# Patient Record
Sex: FEMALE | Race: WHITE | NOT HISPANIC OR LATINO | Employment: UNEMPLOYED | URBAN - METROPOLITAN AREA
[De-identification: names, ages, dates, MRNs, and addresses within clinical notes are randomized per-mention and may not be internally consistent; named-entity substitution may affect disease eponyms.]

---

## 2018-01-26 ENCOUNTER — OFFICE VISIT (OUTPATIENT)
Dept: URGENT CARE | Facility: CLINIC | Age: 33
End: 2018-01-26
Payer: COMMERCIAL

## 2018-01-26 VITALS
TEMPERATURE: 97 F | HEART RATE: 99 BPM | BODY MASS INDEX: 18.95 KG/M2 | DIASTOLIC BLOOD PRESSURE: 78 MMHG | WEIGHT: 103 LBS | OXYGEN SATURATION: 100 % | SYSTOLIC BLOOD PRESSURE: 106 MMHG | RESPIRATION RATE: 18 BRPM | HEIGHT: 62 IN

## 2018-01-26 DIAGNOSIS — J06.9 UPPER RESPIRATORY TRACT INFECTION, UNSPECIFIED TYPE: Primary | ICD-10-CM

## 2018-01-26 DIAGNOSIS — J02.9 SORE THROAT: ICD-10-CM

## 2018-01-26 DIAGNOSIS — B34.9 VIRAL SYNDROME: ICD-10-CM

## 2018-01-26 LAB
CTP QC/QA: YES
FLUAV AG NPH QL: NEGATIVE
FLUBV AG NPH QL: NEGATIVE

## 2018-01-26 PROCEDURE — 99214 OFFICE O/P EST MOD 30 MIN: CPT | Mod: S$GLB,,, | Performed by: PHYSICIAN ASSISTANT

## 2018-01-26 PROCEDURE — 87804 INFLUENZA ASSAY W/OPTIC: CPT | Mod: QW,S$GLB,, | Performed by: PHYSICIAN ASSISTANT

## 2018-01-27 NOTE — PATIENT INSTRUCTIONS
- Rest.    - Drink plenty of fluids.    - Tylenol or Ibuprofen as directed as needed for fever/pain.    - Take over-the-counter claritin, zyrtec, allegra, or xyzal as directed.  - Use over the counter Flonase as directed for sinus congestion and postnasal drip.  - use nasal saline prior to Flonase.  - Antibiotics are not needed at this time.  - Usual course of cold symptom is 10-14 days, but longer if patient is a smoker.   - Use salt water gargle for sore throat.   - Follow up with your PCP or specialty clinic as directed in the next 1-2 weeks if not improved or as needed.  You can call [phone] to schedule an appointment with the appropriate provider.    - Go to the ED if your symptoms worsen.    Viral Upper Respiratory Illness (Adult)  You have a viral upper respiratory illness (URI), which is another term for the common cold. This illness is contagious during the first few days. It is spread through the air by coughing and sneezing. It may also be spread by direct contact (touching the sick person and then touching your own eyes, nose, or mouth). Frequent handwashing will decrease risk of spread. Most viral illnesses go away within 7 to 10 days with rest and simple home remedies. Sometimes the illness may last for several weeks. Antibiotics will not kill a virus, and they are generally not prescribed for this condition.    Home care  · If symptoms are severe, rest at home for the first 2 to 3 days. When you resume activity, don't let yourself get too tired.  · Avoid being exposed to cigarette smoke (yours or others).  · You may use acetaminophen or ibuprofen to control pain and fever, unless another medicine was prescribed. (Note: If you have chronic liver or kidney disease, have ever had a stomach ulcer or gastrointestinal bleeding, or are taking blood-thinning medicines, talk with your healthcare provider before using these medicines.) Aspirin should never be given to anyone under 18 years of age who is  "ill with a viral infection or fever. It may cause severe liver or brain damage.  · Your appetite may be poor, so a light diet is fine. Avoid dehydration by drinking 6 to 8 glasses of fluids per day (water, soft drinks, juices, tea, or soup). Extra fluids will help loosen secretions in the nose and lungs.  · Over-the-counter cold medicines will not shorten the length of time youre sick, but they may be helpful for the following symptoms: cough, sore throat, and nasal and sinus congestion. (Note: Do not use decongestants if you have high blood pressure.)  Follow-up care  Follow up with your healthcare provider, or as advised.  When to seek medical advice  Call your healthcare provider right away if any of these occur:  · Cough with lots of colored sputum (mucus)  · Severe headache; face, neck, or ear pain  · Difficulty swallowing due to throat pain  · Fever of 100.4°F (38°C)  Call 911, or get immediate medical care  Call emergency services right away if any of these occur:  · Chest pain, shortness of breath, wheezing, or difficulty breathing  · Coughing up blood  · Inability to swallow due to throat pain  Date Last Reviewed: 9/13/2015  © 3074-4454 [employer]. [address] [place] [address], [place] [address]. All rights reserved. This information is not intended as a substitute for professional medical care. Always follow your healthcare professional's instructions.        Viral Syndrome (Adult)  A viral illness may cause a number of symptoms. The symptoms depend on the part of the body that the virus affects. If it settles in your nose, throat, and lungs, it may cause cough, sore throat, congestion, and sometimes headache. If it settles in your stomach and intestinal tract, it may cause vomiting and diarrhea. Sometimes it causes vague symptoms like "aching all over," feeling tired, loss of appetite, or fever.  A viral illness usually lasts 1 to 2 weeks, but sometimes it lasts longer. In some cases, a more " serious infection can look like a viral syndrome in the first few days of the illness. You may need another exam and additional tests to know the difference. Watch for the warning signs listed below.  Home care  Follow these guidelines for taking care of yourself at home:  · If symptoms are severe, rest at home for the first 2 to 3 days.  · Stay away from cigarette smoke - both your smoke and the smoke from others.  · You may use over-the-counter acetaminophen or ibuprofen for fever, muscle aching, and headache, unless another medicine was prescribed for this. If you have chronic liver or kidney disease or ever had a stomach ulcer or GI bleeding, talk with your doctor before using these medicines. No one who is younger than 18 and ill with a fever should take aspirin. It may cause severe disease or death.  · Your appetite may be poor, so a light diet is fine. Avoid dehydration by drinking 8 to 12 8-ounce glasses of fluids each day. This may include water; orange juice; lemonade; apple, grape, and cranberry juice; clear fruit drinks; electrolyte replacement and sports drinks; and decaffeinated teas and coffee. If you have been diagnosed with a kidney disease, ask your doctor how much and what types of fluids you should drink to prevent dehydration. If you have kidney disease, drinking too much fluid can cause it build up in the your body and be dangerous to your health.  · Over-the-counter remedies won't shorten the length of the illness but may be helpful for cough, sore throat; and nasal and sinus congestion. Don't use decongestants if you have high blood pressure.  Follow-up care  Follow up with your healthcare provider if you do not improve over the next week.  Call 911  Get emergency medical care if any of the following occur:  · Convulsion  · Feeling weak, dizzy, or like you are going to faint  · Chest pain, shortness of breath, wheezing, or difficulty breathing  When to seek medical advice  Call your  healthcare provider right away if any of these occur:  · Cough with lots of colored sputum (mucus) or blood in your sputum  · Chest pain, shortness of breath, wheezing, or difficulty breathing  · Severe headache; face, neck, or ear pain  · Severe, constant pain in the lower right side of your belly (abdominal)  · Continued vomiting (cant keep liquids down)  · Frequent diarrhea (more than 5 times a day); blood (red or black color) or mucus in diarrhea  · Feeling weak, dizzy, or like you are going to faint  · Extreme thirst  · Fever of 100.4°F (38°C) or higher, or as directed by your healthcare provider  Date Last Reviewed: 9/25/2015  © 2377-0059 [employer]. [address] [place] [address], [place] [address]. All rights reserved. This information is not intended as a substitute for professional medical care. Always follow your healthcare professional's instructions.

## 2018-01-27 NOTE — PROGRESS NOTES
"Subjective:       Patient ID: Aleena Elizalde is a 32 y.o. female.    Vitals:  height is 5' 2" (1.575 m) and weight is 46.7 kg (103 lb). Her oral temperature is 97.4 °F (36.3 °C). Her blood pressure is 106/78 and her pulse is 99. Her respiration is 18 and oxygen saturation is 100%.     Chief Complaint: Sore Throat (3 days )    This is a 32 y.o. female with Past Medical History:  No date: Anemia  No date: Hypertension  No date: Stroke   who presents today with a chief complaint of sore throat and cough.       Sore Throat    This is a new problem. The current episode started in the past 7 days (3 days ). The problem has been gradually worsening. Neither side of throat is experiencing more pain than the other. There has been no fever. The pain is at a severity of 3/10. The pain is mild. Associated symptoms include congestion, coughing, headaches and a hoarse voice. Pertinent negatives include no abdominal pain, ear pain or shortness of breath. She has tried NSAIDs (singular ) for the symptoms. The treatment provided moderate relief.     Review of Systems   Constitution: Positive for chills. Negative for fever and malaise/fatigue.   HENT: Positive for congestion, hoarse voice and sore throat. Negative for ear pain.    Eyes: Positive for discharge. Negative for redness.   Cardiovascular: Negative for chest pain, dyspnea on exertion and leg swelling.   Respiratory: Positive for cough. Negative for shortness of breath, sputum production and wheezing.    Musculoskeletal: Positive for myalgias.   Gastrointestinal: Negative for abdominal pain and nausea.   Neurological: Positive for headaches.       Objective:      Physical Exam   Constitutional: She is oriented to person, place, and time. She appears well-developed and well-nourished.   HENT:   Head: Normocephalic and atraumatic.   Right Ear: Hearing, tympanic membrane, external ear and ear canal normal.   Left Ear: Hearing, tympanic membrane, external ear and ear canal " normal.   Nose: No mucosal edema or rhinorrhea. Right sinus exhibits frontal sinus tenderness. Right sinus exhibits no maxillary sinus tenderness. Left sinus exhibits frontal sinus tenderness. Left sinus exhibits no maxillary sinus tenderness.   Mouth/Throat: Uvula is midline and oropharynx is clear and moist.   Eyes: Conjunctivae are normal.   Neck: Normal range of motion. Neck supple. No thyromegaly present.   Cardiovascular: Normal rate and regular rhythm.  Exam reveals no gallop and no friction rub.    No murmur heard.  Pulmonary/Chest: Effort normal and breath sounds normal. She has no wheezes. She has no rales.   Musculoskeletal: Normal range of motion.   Lymphadenopathy:     She has no cervical adenopathy.   Neurological: She is alert and oriented to person, place, and time.   Skin: Skin is warm and dry. No rash noted. No erythema.   Psychiatric: She has a normal mood and affect. Her behavior is normal. Judgment and thought content normal.   Nursing note and vitals reviewed.      Assessment:       1. Upper respiratory tract infection, unspecified type    2. Sore throat    3. Viral syndrome        Plan:         Upper respiratory tract infection, unspecified type    Sore throat  -     POCT Influenza A/B    Viral syndrome      Aleena was seen today for sore throat.    Diagnoses and all orders for this visit:    Upper respiratory tract infection, unspecified type    Sore throat  -     POCT Influenza A/B    Viral syndrome      Patient Instructions   - Rest.    - Drink plenty of fluids.    - Tylenol or Ibuprofen as directed as needed for fever/pain.    - Take over-the-counter claritin, zyrtec, allegra, or xyzal as directed.  - Use over the counter Flonase as directed for sinus congestion and postnasal drip.  - use nasal saline prior to Flonase.  - Antibiotics are not needed at this time.  - Usual course of cold symptom is 10-14 days, but longer if patient is a smoker.   - Use salt water gargle for sore throat.   -  Follow up with your PCP or specialty clinic as directed in the next 1-2 weeks if not improved or as needed.  You can call [phone] to schedule an appointment with the appropriate provider.    - Go to the ED if your symptoms worsen.    Viral Upper Respiratory Illness (Adult)  You have a viral upper respiratory illness (URI), which is another term for the common cold. This illness is contagious during the first few days. It is spread through the air by coughing and sneezing. It may also be spread by direct contact (touching the sick person and then touching your own eyes, nose, or mouth). Frequent handwashing will decrease risk of spread. Most viral illnesses go away within 7 to 10 days with rest and simple home remedies. Sometimes the illness may last for several weeks. Antibiotics will not kill a virus, and they are generally not prescribed for this condition.    Home care  · If symptoms are severe, rest at home for the first 2 to 3 days. When you resume activity, don't let yourself get too tired.  · Avoid being exposed to cigarette smoke (yours or others).  · You may use acetaminophen or ibuprofen to control pain and fever, unless another medicine was prescribed. (Note: If you have chronic liver or kidney disease, have ever had a stomach ulcer or gastrointestinal bleeding, or are taking blood-thinning medicines, talk with your healthcare provider before using these medicines.) Aspirin should never be given to anyone under 18 years of age who is ill with a viral infection or fever. It may cause severe liver or brain damage.  · Your appetite may be poor, so a light diet is fine. Avoid dehydration by drinking 6 to 8 glasses of fluids per day (water, soft drinks, juices, tea, or soup). Extra fluids will help loosen secretions in the nose and lungs.  · Over-the-counter cold medicines will not shorten the length of time youre sick, but they may be helpful for the following symptoms: cough, sore throat, and nasal  "and sinus congestion. (Note: Do not use decongestants if you have high blood pressure.)  Follow-up care  Follow up with your healthcare provider, or as advised.  When to seek medical advice  Call your healthcare provider right away if any of these occur:  · Cough with lots of colored sputum (mucus)  · Severe headache; face, neck, or ear pain  · Difficulty swallowing due to throat pain  · Fever of 100.4°F (38°C)  Call 911, or get immediate medical care  Call emergency services right away if any of these occur:  · Chest pain, shortness of breath, wheezing, or difficulty breathing  · Coughing up blood  · Inability to swallow due to throat pain  Date Last Reviewed: 9/13/2015  © 5257-5126 [employer]. [address] [place] [address], [place] [address]. All rights reserved. This information is not intended as a substitute for professional medical care. Always follow your healthcare professional's instructions.        Viral Syndrome (Adult)  A viral illness may cause a number of symptoms. The symptoms depend on the part of the body that the virus affects. If it settles in your nose, throat, and lungs, it may cause cough, sore throat, congestion, and sometimes headache. If it settles in your stomach and intestinal tract, it may cause vomiting and diarrhea. Sometimes it causes vague symptoms like "aching all over," feeling tired, loss of appetite, or fever.  A viral illness usually lasts 1 to 2 weeks, but sometimes it lasts longer. In some cases, a more serious infection can look like a viral syndrome in the first few days of the illness. You may need another exam and additional tests to know the difference. Watch for the warning signs listed below.  Home care  Follow these guidelines for taking care of yourself at home:  · If symptoms are severe, rest at home for the first 2 to 3 days.  · Stay away from cigarette smoke - both your smoke and the smoke from others.  · You may use over-the-counter acetaminophen or " ibuprofen for fever, muscle aching, and headache, unless another medicine was prescribed for this. If you have chronic liver or kidney disease or ever had a stomach ulcer or GI bleeding, talk with your doctor before using these medicines. No one who is younger than 18 and ill with a fever should take aspirin. It may cause severe disease or death.  · Your appetite may be poor, so a light diet is fine. Avoid dehydration by drinking 8 to 12 8-ounce glasses of fluids each day. This may include water; orange juice; lemonade; apple, grape, and cranberry juice; clear fruit drinks; electrolyte replacement and sports drinks; and decaffeinated teas and coffee. If you have been diagnosed with a kidney disease, ask your doctor how much and what types of fluids you should drink to prevent dehydration. If you have kidney disease, drinking too much fluid can cause it build up in the your body and be dangerous to your health.  · Over-the-counter remedies won't shorten the length of the illness but may be helpful for cough, sore throat; and nasal and sinus congestion. Don't use decongestants if you have high blood pressure.  Follow-up care  Follow up with your healthcare provider if you do not improve over the next week.  Call 911  Get emergency medical care if any of the following occur:  · Convulsion  · Feeling weak, dizzy, or like you are going to faint  · Chest pain, shortness of breath, wheezing, or difficulty breathing  When to seek medical advice  Call your healthcare provider right away if any of these occur:  · Cough with lots of colored sputum (mucus) or blood in your sputum  · Chest pain, shortness of breath, wheezing, or difficulty breathing  · Severe headache; face, neck, or ear pain  · Severe, constant pain in the lower right side of your belly (abdominal)  · Continued vomiting (cant keep liquids down)  · Frequent diarrhea (more than 5 times a day); blood (red or black color) or mucus in diarrhea  · Feeling weak,  dizzy, or like you are going to faint  · Extreme thirst  · Fever of 100.4°F (38°C) or higher, or as directed by your healthcare provider  Date Last Reviewed: 9/25/2015  © 7112-8997 [employer]. [address] [place] [address], [place] [address]. All rights reserved. This information is not intended as a substitute for professional medical care. Always follow your healthcare professional's instructions.

## 2019-06-08 ENCOUNTER — HOSPITAL ENCOUNTER (EMERGENCY)
Facility: HOSPITAL | Age: 34
Discharge: HOME | End: 2019-06-09
Attending: EMERGENCY MEDICINE
Payer: COMMERCIAL

## 2019-06-08 DIAGNOSIS — R20.2 PARESTHESIAS: Primary | ICD-10-CM

## 2019-06-08 DIAGNOSIS — R06.02 SOB (SHORTNESS OF BREATH): ICD-10-CM

## 2019-06-08 PROCEDURE — 80053 COMPREHEN METABOLIC PANEL: CPT

## 2019-06-08 PROCEDURE — 83880 ASSAY OF NATRIURETIC PEPTIDE: CPT

## 2019-06-08 PROCEDURE — 84484 ASSAY OF TROPONIN QUANT: CPT

## 2019-06-08 PROCEDURE — 85025 COMPLETE CBC W/AUTO DIFF WBC: CPT

## 2019-06-08 PROCEDURE — 93010 ELECTROCARDIOGRAM REPORT: CPT | Mod: ,,, | Performed by: INTERNAL MEDICINE

## 2019-06-08 PROCEDURE — 99285 EMERGENCY DEPT VISIT HI MDM: CPT | Mod: 25

## 2019-06-08 PROCEDURE — 25000003 PHARM REV CODE 250: Performed by: PHYSICIAN ASSISTANT

## 2019-06-08 PROCEDURE — 93010 EKG 12-LEAD: ICD-10-PCS | Mod: ,,, | Performed by: INTERNAL MEDICINE

## 2019-06-08 PROCEDURE — 93005 ELECTROCARDIOGRAM TRACING: CPT

## 2019-06-08 PROCEDURE — 81001 URINALYSIS AUTO W/SCOPE: CPT

## 2019-06-08 PROCEDURE — 99285 EMERGENCY DEPT VISIT HI MDM: CPT | Mod: ,,, | Performed by: PHYSICIAN ASSISTANT

## 2019-06-08 PROCEDURE — 99285 PR EMERGENCY DEPT VISIT,LEVEL V: ICD-10-PCS | Mod: ,,, | Performed by: PHYSICIAN ASSISTANT

## 2019-06-08 RX ORDER — LISDEXAMFETAMINE DIMESYLATE CAPSULES 20 MG/1
20 CAPSULE ORAL EVERY MORNING
COMMUNITY
End: 2019-07-04 | Stop reason: ALTCHOICE

## 2019-06-08 RX ORDER — ASPIRIN 325 MG
325 TABLET ORAL
Status: COMPLETED | OUTPATIENT
Start: 2019-06-08 | End: 2019-06-08

## 2019-06-08 RX ADMIN — ASPIRIN 325 MG ORAL TABLET 325 MG: 325 PILL ORAL at 11:06

## 2019-06-09 VITALS
HEART RATE: 92 BPM | OXYGEN SATURATION: 97 % | SYSTOLIC BLOOD PRESSURE: 115 MMHG | TEMPERATURE: 98 F | DIASTOLIC BLOOD PRESSURE: 66 MMHG | RESPIRATION RATE: 16 BRPM

## 2019-06-09 LAB
ALBUMIN SERPL BCP-MCNC: 4.5 G/DL (ref 3.5–5.2)
ALP SERPL-CCNC: 61 U/L (ref 55–135)
ALT SERPL W/O P-5'-P-CCNC: 27 U/L (ref 10–44)
ANION GAP SERPL CALC-SCNC: 10 MMOL/L (ref 8–16)
AST SERPL-CCNC: 28 U/L (ref 10–40)
B-HCG UR QL: NEGATIVE
BACTERIA #/AREA URNS AUTO: NORMAL /HPF
BASOPHILS # BLD AUTO: 0.02 K/UL (ref 0–0.2)
BASOPHILS NFR BLD: 0.3 % (ref 0–1.9)
BILIRUB SERPL-MCNC: 1.4 MG/DL (ref 0.1–1)
BILIRUB UR QL STRIP: NEGATIVE
BNP SERPL-MCNC: <10 PG/ML (ref 0–99)
BUN SERPL-MCNC: 29 MG/DL (ref 6–20)
CALCIUM SERPL-MCNC: 10.3 MG/DL (ref 8.7–10.5)
CHLORIDE SERPL-SCNC: 104 MMOL/L (ref 95–110)
CLARITY UR REFRACT.AUTO: CLEAR
CO2 SERPL-SCNC: 22 MMOL/L (ref 23–29)
COLOR UR AUTO: YELLOW
CREAT SERPL-MCNC: 1.3 MG/DL (ref 0.5–1.4)
CTP QC/QA: YES
D DIMER PPP IA.FEU-MCNC: 0.29 MG/L FEU
DIFFERENTIAL METHOD: ABNORMAL
EOSINOPHIL # BLD AUTO: 0.1 K/UL (ref 0–0.5)
EOSINOPHIL NFR BLD: 1.5 % (ref 0–8)
ERYTHROCYTE [DISTWIDTH] IN BLOOD BY AUTOMATED COUNT: 12.6 % (ref 11.5–14.5)
EST. GFR  (AFRICAN AMERICAN): >60 ML/MIN/1.73 M^2
EST. GFR  (NON AFRICAN AMERICAN): 53.3 ML/MIN/1.73 M^2
GLUCOSE SERPL-MCNC: 84 MG/DL (ref 70–110)
GLUCOSE UR QL STRIP: NEGATIVE
HCT VFR BLD AUTO: 41.5 % (ref 37–48.5)
HGB BLD-MCNC: 13.7 G/DL (ref 12–16)
HGB UR QL STRIP: NEGATIVE
IMM GRANULOCYTES # BLD AUTO: 0.02 K/UL (ref 0–0.04)
IMM GRANULOCYTES NFR BLD AUTO: 0.3 % (ref 0–0.5)
KETONES UR QL STRIP: ABNORMAL
LEUKOCYTE ESTERASE UR QL STRIP: NEGATIVE
LYMPHOCYTES # BLD AUTO: 1.5 K/UL (ref 1–4.8)
LYMPHOCYTES NFR BLD: 24.9 % (ref 18–48)
MCH RBC QN AUTO: 31.2 PG (ref 27–31)
MCHC RBC AUTO-ENTMCNC: 33 G/DL (ref 32–36)
MCV RBC AUTO: 95 FL (ref 82–98)
MICROSCOPIC COMMENT: NORMAL
MONOCYTES # BLD AUTO: 0.7 K/UL (ref 0.3–1)
MONOCYTES NFR BLD: 11.6 % (ref 4–15)
NEUTROPHILS # BLD AUTO: 3.7 K/UL (ref 1.8–7.7)
NEUTROPHILS NFR BLD: 61.4 % (ref 38–73)
NITRITE UR QL STRIP: NEGATIVE
NRBC BLD-RTO: 0 /100 WBC
PH UR STRIP: 5 [PH] (ref 5–8)
PLATELET # BLD AUTO: 167 K/UL (ref 150–350)
PMV BLD AUTO: 10 FL (ref 9.2–12.9)
POTASSIUM SERPL-SCNC: 4.4 MMOL/L (ref 3.5–5.1)
PROT SERPL-MCNC: 7.5 G/DL (ref 6–8.4)
PROT UR QL STRIP: NEGATIVE
RBC # BLD AUTO: 4.39 M/UL (ref 4–5.4)
RBC #/AREA URNS AUTO: 1 /HPF (ref 0–4)
SODIUM SERPL-SCNC: 136 MMOL/L (ref 136–145)
SP GR UR STRIP: 1.03 (ref 1–1.03)
SQUAMOUS #/AREA URNS AUTO: 1 /HPF
TROPONIN I SERPL DL<=0.01 NG/ML-MCNC: <0.006 NG/ML (ref 0–0.03)
URN SPEC COLLECT METH UR: ABNORMAL
WBC # BLD AUTO: 6.1 K/UL (ref 3.9–12.7)
WBC #/AREA URNS AUTO: 0 /HPF (ref 0–5)

## 2019-06-09 PROCEDURE — 85379 FIBRIN DEGRADATION QUANT: CPT

## 2019-06-09 PROCEDURE — 81025 URINE PREGNANCY TEST: CPT | Performed by: EMERGENCY MEDICINE

## 2019-06-09 NOTE — DISCHARGE INSTRUCTIONS
Your cardiac workup today was negative for evidence of blood clots, heart attack, and heart failure. Your electrolytes today were normal. However it is important for you to stay well hydrated especially while working long shift.  Please take over-the-counter ibuprofen and Tylenol for pain relief.  Return to the ER if he develops severe shortness of breath, chest pain, numbness, weakness, lightheadedness, or any other concerning symptoms. Please follow up with her primary care physician within a week to discuss her ER visit today

## 2019-06-09 NOTE — ED PROVIDER NOTES
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  Patient reports developing paresthesias throughout her bilateral lower extremities over the past week, which has been gradually worsening over time.  She describes her discomfort as a constant tingling throughout, worse in her feet.  She has also noticed that her, "veins are more constricted," in her feet.  Denies any known alleviating or exacerbating features.  She also reports feeling short of breath while climbing stairs yesterday, which resolved with rest.  However, patient this morning with SOB that has been constant throughout the day.  She reports associated mild chest heaviness, which she describes as her lungs feeling constricted.  Patient started smoking cigarettes daily 2 months ago after previously quitting.  Her father has a significant history of CAD in his 40s.  Patient reports being treated for a UTI last week with PO antibiotics.  Denies leg swelling, cough, fevers, chills, fatigue, weakness, numbness, urinary symptoms, abdominal pain, N/V/D, diaphoresis, or any other medical complaints.    The history is provided by the patient.     Review of patient's allergies indicates:  No Known Allergies  Past Medical History:   Diagnosis Date    Anemia     Hypertension     pre eclampsia and eclampsia    Stroke     baby     Past Surgical History:   Procedure Laterality Date    BREAST SURGERY      TUBAL LIGATION       Family History   Problem Relation Age of Onset    Hypertension Mother     Heart disease Father     Diabetes Maternal Grandmother     Cancer Maternal Grandmother      Social History     Tobacco Use    Smoking status: Current Some " Day Smoker    Smokeless tobacco: Never Used   Substance Use Topics    Alcohol use: Not Currently    Drug use: Not on file     Review of Systems   Constitutional: Negative for chills and fever.   HENT: Negative for congestion.    Eyes: Negative for visual disturbance.   Respiratory: Positive for shortness of breath. Negative for cough.    Cardiovascular: Negative for chest pain, palpitations and leg swelling.        +chest heaviness   Gastrointestinal: Negative for abdominal pain, constipation, diarrhea, nausea and vomiting.   Genitourinary: Negative for dysuria, frequency and hematuria.   Musculoskeletal: Negative for back pain and neck pain.        +bilateral leg paresthesias/pain   Skin: Negative for rash.   Neurological: Positive for tremors. Negative for dizziness, weakness, numbness and headaches.   Hematological: Does not bruise/bleed easily.   Psychiatric/Behavioral: Negative for confusion.       Physical Exam     Initial Vitals [06/08/19 2245]   BP Pulse Resp Temp SpO2   127/73 108 18 98.1 °F (36.7 °C) 98 %      MAP       --         Physical Exam    Nursing note and vitals reviewed.  Constitutional: She appears well-developed and well-nourished.   HENT:   Head: Normocephalic and atraumatic.   Eyes: Conjunctivae and EOM are normal.   Neck: Normal range of motion.   Cardiovascular: Regular rhythm and normal heart sounds. Tachycardia present.    Pulses:       Dorsalis pedis pulses are 2+ on the right side, and 2+ on the left side.   Pulmonary/Chest: Breath sounds normal. She has no wheezes. She has no rhonchi. She has no rales. She exhibits no tenderness.   Abdominal: Soft. There is no tenderness. There is no rebound and no guarding.   Musculoskeletal: Normal range of motion. She exhibits no edema or tenderness.   Neurological: She is alert and oriented to person, place, and time.   Decreased sensation in right lower extremity compared to left, which the patient states is her baseline.  Strength equal and  symmetric in bilateral lower extremities.   Skin: Skin is warm.   Psychiatric: She has a normal mood and affect.         ED Course   Procedures  Labs Reviewed   CBC W/ AUTO DIFFERENTIAL - Abnormal; Notable for the following components:       Result Value    Mean Corpuscular Hemoglobin 31.2 (*)     All other components within normal limits    Narrative:     add on d dimer, quantitative per HARVEY CREED, PA-C   COMPREHENSIVE METABOLIC PANEL - Abnormal; Notable for the following components:    CO2 22 (*)     BUN, Bld 29 (*)     Total Bilirubin 1.4 (*)     eGFR if non  53.3 (*)     All other components within normal limits    Narrative:     add on d dimer, quantitative per HARVEY CREED, PA-C   URINALYSIS, REFLEX TO URINE CULTURE - Abnormal; Notable for the following components:    Ketones, UA Trace (*)     All other components within normal limits    Narrative:     Preferred Collection Type->Urine, Clean Catch   TROPONIN I    Narrative:     add on d dimer, quantitative per HARVEY CREED, PA-C   B-TYPE NATRIURETIC PEPTIDE    Narrative:     add on d dimer, quantitative per HARVEY CREED, PA-C   D DIMER, QUANTITATIVE   D DIMER, QUANTITATIVE    Narrative:     add on d dimer, quantitative per HARVEY CREED, PA-C   URINALYSIS MICROSCOPIC    Narrative:     Preferred Collection Type->Urine, Clean Catch   POCT URINE PREGNANCY          Imaging Results          X-Ray Chest PA And Lateral (Final result)  Result time 06/09/19 00:36:55    Final result by Oleg Daly MD (06/09/19 00:36:55)                 Impression:      No acute cardiopulmonary process.      Electronically signed by: Oleg Daly MD  Date:    06/09/2019  Time:    00:36             Narrative:    EXAMINATION:  XR CHEST PA AND LATERAL    CLINICAL HISTORY:  Chest Pain;    TECHNIQUE:  PA and lateral views of the chest were performed.    COMPARISON:  None.    FINDINGS:  There is no consolidation, effusion, or pneumothorax.    Cardiomediastinal silhouette  is unremarkable.    Regional osseous structures are unremarkable.                                 Medical Decision Making:   History:   Old Medical Records: I decided to obtain old medical records.  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  Patient reports paresthesias to bilateral LEs x 1 week.  SOB x 2 days.  She has associated mild chest heaviness.  Significant family history of CAD.  Current everyday smoker.  Patient is tachycardic with HR of 108.  Afebrile.  Lungs CTA bilaterally. No chest wall tenderness. Abdomen soft and nontender. Decreased sensation in RLE, which patient states is her baseline after stroke.  DP 2+ bilaterally.  Differential Diagnosis:   DDX includes but is not limited to PE, ACS, CHF exacerbation, UTI, electrolyte abnormality, anemia.   Independently Interpreted Test(s):   I have ordered and independently interpreted X-rays - see summary below.  I have ordered and independently interpreted EKG Reading(s) - see prior notes  Clinical Tests:   Lab Tests: Reviewed and Ordered  Radiological Study: Ordered  Medical Tests: Ordered and Reviewed  ED Management:  Patient with significant cardiac risk factors. Will obtain cardiac workup, including troponin, BNP, d-dimer, EKG, and CXR.  Patient's have been persistent for > 6 hr, not necessary to trend troponin if negative.    D-dimer negative. No leukocytosis or anemia on CBC.  BUN 29, Cr 1.3.  Potassium 4.4, sodium 136. Troponin negative.  BNP negative. CXR without acute cardiopulmonary process.  UA with negative nitrites and leukocytes.    Patient is nontoxic appearing and is stable for discharge with instructions of follow up with PCP within a week.  Recommended patient stay well hydrated while working 13 hr shifts.  Strict ER return precautions given.  All questions answered.  Patient expressed understanding and is  agreeable with plan.    I have discussed the treatment and management of this patient with my supervising physician, and we agree on the plan of care.      Margarita Mccarthy PA-C      Additional MDM:   PERC Rule:   Age is greater than or equal to 50 = 0.0  Heart Rate is greater than or equal to 100 = 1.0  SaO2 on room air < 95% = 0.0  Unilateral leg swelling = 0.0  Hemoptysis = 0.0  Recent surgery or trauma = 0.0  Prior PE or DVT =  0.0  Hormone use = 0.00  PERC Score = 1                   Clinical Impression:       ICD-10-CM ICD-9-CM   1. Paresthesias R20.2 782.0   2. SOB (shortness of breath) R06.02 786.05         Disposition:   Disposition: Discharged  Condition: Stable                        Margarita Mccarthy PA-C  06/09/19 0108

## 2019-06-09 NOTE — PROVIDER PROGRESS NOTES - EMERGENCY DEPT.
Encounter Date: 6/8/2019    ED Physician Progress Notes       SCRIBE NOTE: I, Vianey Edwards, am scribing for, and in the presence of,  Dr. Bautista.  Physician Statement: I, Dr. Bautista, personally performed the services described in this documentation as scribed by Vianey Edwards in my presence, and it is both accurate and complete.      EKG - STEMI Decision  Initial Reading: No STEMI present.

## 2019-07-04 ENCOUNTER — OFFICE VISIT (OUTPATIENT)
Dept: URGENT CARE | Facility: CLINIC | Age: 34
End: 2019-07-04
Payer: COMMERCIAL

## 2019-07-04 VITALS
BODY MASS INDEX: 20.06 KG/M2 | RESPIRATION RATE: 18 BRPM | TEMPERATURE: 97 F | WEIGHT: 109 LBS | HEIGHT: 62 IN | SYSTOLIC BLOOD PRESSURE: 101 MMHG | OXYGEN SATURATION: 97 % | HEART RATE: 100 BPM | DIASTOLIC BLOOD PRESSURE: 71 MMHG

## 2019-07-04 DIAGNOSIS — J02.9 SORE THROAT: ICD-10-CM

## 2019-07-04 DIAGNOSIS — J02.9 PHARYNGITIS, UNSPECIFIED ETIOLOGY: Primary | ICD-10-CM

## 2019-07-04 LAB
CTP QC/QA: YES
S PYO RRNA THROAT QL PROBE: NEGATIVE

## 2019-07-04 PROCEDURE — 87880 POCT RAPID STREP A: ICD-10-PCS | Mod: QW,S$GLB,, | Performed by: FAMILY MEDICINE

## 2019-07-04 PROCEDURE — 87880 STREP A ASSAY W/OPTIC: CPT | Mod: QW,S$GLB,, | Performed by: FAMILY MEDICINE

## 2019-07-04 PROCEDURE — 87081 CULTURE SCREEN ONLY: CPT

## 2019-07-04 PROCEDURE — 99214 OFFICE O/P EST MOD 30 MIN: CPT | Mod: S$GLB,,, | Performed by: FAMILY MEDICINE

## 2019-07-04 PROCEDURE — 99000 PR SPECIMEN HANDLING,DR OFF->LAB: ICD-10-PCS | Mod: S$GLB,,, | Performed by: FAMILY MEDICINE

## 2019-07-04 PROCEDURE — 99214 PR OFFICE/OUTPT VISIT, EST, LEVL IV, 30-39 MIN: ICD-10-PCS | Mod: S$GLB,,, | Performed by: FAMILY MEDICINE

## 2019-07-04 PROCEDURE — 3008F BODY MASS INDEX DOCD: CPT | Mod: CPTII,S$GLB,, | Performed by: FAMILY MEDICINE

## 2019-07-04 PROCEDURE — 99000 SPECIMEN HANDLING OFFICE-LAB: CPT | Mod: S$GLB,,, | Performed by: FAMILY MEDICINE

## 2019-07-04 PROCEDURE — 3008F PR BODY MASS INDEX (BMI) DOCUMENTED: ICD-10-PCS | Mod: CPTII,S$GLB,, | Performed by: FAMILY MEDICINE

## 2019-07-04 RX ORDER — CEFDINIR 300 MG/1
300 CAPSULE ORAL 2 TIMES DAILY
Qty: 10 CAPSULE | Refills: 0 | Status: SHIPPED | OUTPATIENT
Start: 2019-07-04 | End: 2019-07-09

## 2019-07-04 NOTE — PROGRESS NOTES
"Subjective:       Patient ID: Aleena Thurman is a 33 y.o. female.    Vitals:  height is 5' 2" (1.575 m) and weight is 49.4 kg (109 lb). Her oral temperature is 97.1 °F (36.2 °C). Her blood pressure is 101/71 and her pulse is 100. Her respiration is 18 and oxygen saturation is 97%.     Chief Complaint: Sinus Problem    Patient presents with c/o sore throat that presented this morning. Notes frontal sinus pressure. Patient with sinus sxs in the last few days. Patient was dx with a uti on her last visit; she would like her urine retested to make sure the infection is completely gone. 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       Sinus Problem   This is a new problem. Episode onset: few days. The problem is unchanged. There has been no fever. Associated symptoms include congestion, coughing, sinus pressure and a sore throat. Pertinent negatives include no chills, diaphoresis, ear pain or shortness of breath. Treatments tried: zyrtec d. The treatment provided mild relief.       Constitution: Negative for chills, sweating, fatigue and fever.   HENT: Positive for congestion, sinus pressure and sore throat. Negative for ear pain and voice change.    Neck: Negative for painful lymph nodes.   Eyes: Negative for eye redness.   Respiratory: Positive for cough. Negative for chest tightness, sputum production, bloody sputum, COPD, shortness of breath, stridor, wheezing and asthma.    Gastrointestinal: Negative for nausea and vomiting.   Musculoskeletal: Negative for muscle ache.   Skin: Negative for rash.   Allergic/Immunologic: Negative for seasonal allergies and asthma.   Hematologic/Lymphatic: Negative for swollen lymph nodes.       Objective:      Physical Exam   Constitutional: She appears well-developed and well-nourished.   HENT:   Head: Normocephalic and atraumatic.   Right Ear: External ear normal.   Left Ear: External ear normal.   Mild erythema of pharynx, no "  Turbinate edema and congestion   Eyes: Pupils are equal, round, and reactive to light. Conjunctivae and EOM are normal.   Neck: Normal range of motion. Neck supple. No thyromegaly present.   Cardiovascular: Normal rate, regular rhythm, normal heart sounds and intact distal pulses.   Pulmonary/Chest: Effort normal and breath sounds normal.   Lymphadenopathy:     She has no cervical adenopathy.   Psychiatric: She has a normal mood and affect. Her behavior is normal. Judgment and thought content normal.   Vitals reviewed.      Assessment:       1. Pharyngitis, unspecified etiology    2. Sore throat        Plan:         Pharyngitis, unspecified etiology  -     cefdinir (OMNICEF) 300 MG capsule; Take 1 capsule (300 mg total) by mouth 2 (two) times daily. for 5 days  Dispense: 10 capsule; Refill: 0    Sore throat  -     POCT rapid strep A      Patient Instructions       Pharyngitis (Sore Throat), Report Pending    Pharyngitis (sore throat) is often due to a virus. It can also be caused by the streptococcus, or strep, bacterium, often called strep throat. Both viral and strep infections can cause throat pain that is worse when swallowing, aching all over with headache, and fever. Both types of infections are contagious. They may be spread by coughing, kissing, or touching others after touching your mouth or nose.  A test has been done to find out whether you (or your child, if your child is the patient) have strep throat. Call this facility or your healthcare provider if you were not given your test results. If the test is positive for strep infection, you will need to take antibiotic medicines. A prescription can be called into your pharmacy at that time. If the test is negative, you probably have a viral pharyngitis. This does not need to be treated with antibiotics. Until you receive the results of the strep test, you should stay home from work. If your child is being tested, he or she should stay home from  school.  Home care  · Rest at home. Drink plenty of fluids so you won't get dehydrated.  · If the test is positive for strep, don't go to work or school for the first 2 days of taking the antibiotics. After this time, you will not be contagious. You can then return to work or school if you are feeling better.   · Take the antibiotic medicine for the full 10 days, even if you feel better. This is very important to make sure the infection is treated. It is also important to prevent drug-resistant germs from developing. If you were given an antibiotic shot, you won't need more antibiotics.  · For children: Use acetaminophen for fever, fussiness, or discomfort. In infants older than 6 months of age, you may use ibuprofen instead of acetaminophen. Talk with your child's healthcare provider before giving these medicines if your child has chronic liver or kidney disease or ever had a stomach ulcer or GI bleeding. Never give aspirin to a child under 18 years of age who is ill with a fever. It may cause severe liver damage.  · For adults: Use acetaminophen or ibuprofen to control pain or fever, unless another medicine was prescribed for this. Talk with your healthcare provider before taking these medicines if you have chronic liver or kidney disease or ever had a stomach ulcer or GI bleeding.  · Use throat lozenges or numbing throat sprays to help reduce pain. Gargling with warm salt water will also help reduce throat pain. For this, dissolve 1/2 teaspoon of salt in 1 glass of warm water. To help soothe a sore throat, children can sip on juice or a popsicle. Children 5 years and older can also suck on a lollipop or hard candy.  · Don't eat salty or spicy foods. These can irritate the throat.  Follow-up care  Follow up with your healthcare provider or our staff if you don't get better over the next week.  When to seek medical advice  Call your healthcare provider right away if any of these occur:  · Fever as directed by your  healthcare provider. For children, seek care if:  ¨ Your child is of any age and has repeated fevers above 104°F (40°C).  ¨ Your child is younger than 2 years of age and has a fever of 100.4°F (38°C) that continues for more than 1 day.  ¨ Your child is 2 years old or older and has a fever of 100.4°F (38°C) that continues for more than 3 days.  · New or worsening ear pain, sinus pain, or headache  · Painful lumps in the back of neck  · Stiff neck  · Lymph nodes are getting larger  · Inability to swallow liquids, excessive drooling, or inability to open mouth wide due to throat pain  · Signs of dehydration (very dark urine or no urine, sunken eyes, dizziness)  · Trouble breathing or noisy breathing  · Muffled voice  · New rash  · Child appears to be getting sicker  Date Last Reviewed: 4/13/2015  © 0980-0524 [employer]. [address] [place] [address]. All rights reserved. This information is not intended as a substitute for professional medical care. Always follow your healthcare professional's instructions.        Self-Care for Sore Throats    Sore throats happen for many reasons, such as colds, allergies, and infections caused by viruses or bacteria. In any case, your throat becomes red and sore. Your goal for self-care is to reduce your discomfort while giving your throat a chance to heal.  Moisten and soothe your throat  Tips include the following:  · Try a sip of water first thing after waking up.  · Keep your throat moist by drinking 6 or more glasses of clear liquids every day.  · Run a cool-air humidifier in your room overnight.  · Avoid cigarette smoke.   · Suck on throat lozenges, cough drops, hard candy, ice chips, or frozen fruit-juice bars. Use the sugar-free versions if your diet or medical condition requires them.  Gargle to ease irritation  Gargling every hour or 2 can ease irritation. Try gargling with 1 of these solutions:  · 1/4 teaspoon of salt in 1/2 cup of warm  water  · An over-the-counter anesthetic gargle  Use medicine for more relief  Over-the-counter medicine can reduce sore throat symptoms. Ask your pharmacist if you have questions about which medicine to use:  · Ease pain with anesthetic sprays. Aspirin or an aspirin substitute also helps. Remember, never give aspirin to anyone 18 or younger, or if you are already taking blood thinners.   · For sore throats caused by allergies, try antihistamines to block the allergic reaction.  · Remember: unless a sore throat is caused by a bacterial infection, antibiotics wont help you.  Prevent future sore throats  Prevention tips include the following:  · Stop smoking or reduce contact with secondhand smoke. Smoke irritates the tender throat lining.  · Limit contact with pets and with allergy-causing substances, such as pollen and mold.  · When youre around someone with a sore throat or cold, wash your hands often to keep viruses or bacteria from spreading.  · Dont strain your vocal cords.  Call your healthcare provider  Contact your healthcare provider if you have:  · A temperature over 101°F (38.3°C)  · White spots on the throat  · Great difficulty swallowing  · Trouble breathing  · A skin rash  · Recent exposure to someone else with strep bacteria  · Severe hoarseness and swollen glands in the neck or jaw   Date Last Reviewed: 8/1/2016  © 6813-7322 [employer]. [address] [place] [address], [place] [address]. All rights reserved. This information is not intended as a substitute for professional medical care. Always follow your healthcare professional's instructions.

## 2019-07-04 NOTE — PATIENT INSTRUCTIONS
Pharyngitis (Sore Throat), Report Pending    Pharyngitis (sore throat) is often due to a virus. It can also be caused by the streptococcus, or strep, bacterium, often called strep throat. Both viral and strep infections can cause throat pain that is worse when swallowing, aching all over with headache, and fever. Both types of infections are contagious. They may be spread by coughing, kissing, or touching others after touching your mouth or nose.  A test has been done to find out whether you (or your child, if your child is the patient) have strep throat. Call this facility or your healthcare provider if you were not given your test results. If the test is positive for strep infection, you will need to take antibiotic medicines. A prescription can be called into your pharmacy at that time. If the test is negative, you probably have a viral pharyngitis. This does not need to be treated with antibiotics. Until you receive the results of the strep test, you should stay home from work. If your child is being tested, he or she should stay home from school.  Home care  · Rest at home. Drink plenty of fluids so you won't get dehydrated.  · If the test is positive for strep, don't go to work or school for the first 2 days of taking the antibiotics. After this time, you will not be contagious. You can then return to work or school if you are feeling better.   · Take the antibiotic medicine for the full 10 days, even if you feel better. This is very important to make sure the infection is treated. It is also important to prevent drug-resistant germs from developing. If you were given an antibiotic shot, you won't need more antibiotics.  · For children: Use acetaminophen for fever, fussiness, or discomfort. In infants older than 6 months of age, you may use ibuprofen instead of acetaminophen. Talk with your child's healthcare provider before giving these medicines if your child has chronic liver or kidney disease or ever had  a stomach ulcer or GI bleeding. Never give aspirin to a child under 18 years of age who is ill with a fever. It may cause severe liver damage.  · For adults: Use acetaminophen or ibuprofen to control pain or fever, unless another medicine was prescribed for this. Talk with your healthcare provider before taking these medicines if you have chronic liver or kidney disease or ever had a stomach ulcer or GI bleeding.  · Use throat lozenges or numbing throat sprays to help reduce pain. Gargling with warm salt water will also help reduce throat pain. For this, dissolve 1/2 teaspoon of salt in 1 glass of warm water. To help soothe a sore throat, children can sip on juice or a popsicle. Children 5 years and older can also suck on a lollipop or hard candy.  · Don't eat salty or spicy foods. These can irritate the throat.  Follow-up care  Follow up with your healthcare provider or our staff if you don't get better over the next week.  When to seek medical advice  Call your healthcare provider right away if any of these occur:  · Fever as directed by your healthcare provider. For children, seek care if:  ¨ Your child is of any age and has repeated fevers above 104°F (40°C).  ¨ Your child is younger than 2 years of age and has a fever of 100.4°F (38°C) that continues for more than 1 day.  ¨ Your child is 2 years old or older and has a fever of 100.4°F (38°C) that continues for more than 3 days.  · New or worsening ear pain, sinus pain, or headache  · Painful lumps in the back of neck  · Stiff neck  · Lymph nodes are getting larger  · Inability to swallow liquids, excessive drooling, or inability to open mouth wide due to throat pain  · Signs of dehydration (very dark urine or no urine, sunken eyes, dizziness)  · Trouble breathing or noisy breathing  · Muffled voice  · New rash  · Child appears to be getting sicker  Date Last Reviewed: 4/13/2015  © 0712-2314 [employer]. [address] [place] [address], [place] [address].  All rights reserved. This information is not intended as a substitute for professional medical care. Always follow your healthcare professional's instructions.        Self-Care for Sore Throats    Sore throats happen for many reasons, such as colds, allergies, and infections caused by viruses or bacteria. In any case, your throat becomes red and sore. Your goal for self-care is to reduce your discomfort while giving your throat a chance to heal.  Moisten and soothe your throat  Tips include the following:  · Try a sip of water first thing after waking up.  · Keep your throat moist by drinking 6 or more glasses of clear liquids every day.  · Run a cool-air humidifier in your room overnight.  · Avoid cigarette smoke.   · Suck on throat lozenges, cough drops, hard candy, ice chips, or frozen fruit-juice bars. Use the sugar-free versions if your diet or medical condition requires them.  Gargle to ease irritation  Gargling every hour or 2 can ease irritation. Try gargling with 1 of these solutions:  · 1/4 teaspoon of salt in 1/2 cup of warm water  · An over-the-counter anesthetic gargle  Use medicine for more relief  Over-the-counter medicine can reduce sore throat symptoms. Ask your pharmacist if you have questions about which medicine to use:  · Ease pain with anesthetic sprays. Aspirin or an aspirin substitute also helps. Remember, never give aspirin to anyone 18 or younger, or if you are already taking blood thinners.   · For sore throats caused by allergies, try antihistamines to block the allergic reaction.  · Remember: unless a sore throat is caused by a bacterial infection, antibiotics wont help you.  Prevent future sore throats  Prevention tips include the following:  · Stop smoking or reduce contact with secondhand smoke. Smoke irritates the tender throat lining.  · Limit contact with pets and with allergy-causing substances, such as pollen and mold.  · When youre around someone with a sore throat or cold,  wash your hands often to keep viruses or bacteria from spreading.  · Dont strain your vocal cords.  Call your healthcare provider  Contact your healthcare provider if you have:  · A temperature over 101°F (38.3°C)  · White spots on the throat  · Great difficulty swallowing  · Trouble breathing  · A skin rash  · Recent exposure to someone else with strep bacteria  · Severe hoarseness and swollen glands in the neck or jaw   Date Last Reviewed: 8/1/2016  © 5464-7513 [employer]. [address] [place] [address], [place] [address]. All rights reserved. This information is not intended as a substitute for professional medical care. Always follow your healthcare professional's instructions.

## 2019-07-06 ENCOUNTER — TELEPHONE (OUTPATIENT)
Dept: URGENT CARE | Facility: CLINIC | Age: 34
End: 2019-07-06

## 2019-07-06 LAB — BACTERIA THROAT CULT: NORMAL

## 2021-04-22 ENCOUNTER — LAB VISIT (OUTPATIENT)
Dept: LAB | Facility: OTHER | Age: 36
End: 2021-04-22
Attending: OBSTETRICS & GYNECOLOGY
Payer: COMMERCIAL

## 2021-04-22 ENCOUNTER — OFFICE VISIT (OUTPATIENT)
Dept: OBSTETRICS AND GYNECOLOGY | Facility: CLINIC | Age: 36
End: 2021-04-22
Payer: COMMERCIAL

## 2021-04-22 VITALS
WEIGHT: 110.25 LBS | HEIGHT: 62 IN | BODY MASS INDEX: 20.29 KG/M2 | DIASTOLIC BLOOD PRESSURE: 67 MMHG | SYSTOLIC BLOOD PRESSURE: 109 MMHG

## 2021-04-22 DIAGNOSIS — Z12.4 CERVICAL CANCER SCREENING: Primary | ICD-10-CM

## 2021-04-22 DIAGNOSIS — N92.0 MENORRHAGIA WITH REGULAR CYCLE: ICD-10-CM

## 2021-04-22 DIAGNOSIS — Z11.3 ROUTINE SCREENING FOR STI (SEXUALLY TRANSMITTED INFECTION): ICD-10-CM

## 2021-04-22 LAB — TSH SERPL DL<=0.005 MIU/L-ACNC: 0.91 UIU/ML (ref 0.4–4)

## 2021-04-22 PROCEDURE — 1126F AMNT PAIN NOTED NONE PRSNT: CPT | Mod: S$GLB,,, | Performed by: OBSTETRICS & GYNECOLOGY

## 2021-04-22 PROCEDURE — 3008F BODY MASS INDEX DOCD: CPT | Mod: CPTII,S$GLB,, | Performed by: OBSTETRICS & GYNECOLOGY

## 2021-04-22 PROCEDURE — 87340 HEPATITIS B SURFACE AG IA: CPT | Performed by: OBSTETRICS & GYNECOLOGY

## 2021-04-22 PROCEDURE — 84443 ASSAY THYROID STIM HORMONE: CPT | Performed by: OBSTETRICS & GYNECOLOGY

## 2021-04-22 PROCEDURE — 88175 CYTOPATH C/V AUTO FLUID REDO: CPT | Performed by: OBSTETRICS & GYNECOLOGY

## 2021-04-22 PROCEDURE — 1126F PR PAIN SEVERITY QUANTIFIED, NO PAIN PRESENT: ICD-10-PCS | Mod: S$GLB,,, | Performed by: OBSTETRICS & GYNECOLOGY

## 2021-04-22 PROCEDURE — 86803 HEPATITIS C AB TEST: CPT | Performed by: OBSTETRICS & GYNECOLOGY

## 2021-04-22 PROCEDURE — 87481 CANDIDA DNA AMP PROBE: CPT | Mod: 59 | Performed by: OBSTETRICS & GYNECOLOGY

## 2021-04-22 PROCEDURE — 36415 COLL VENOUS BLD VENIPUNCTURE: CPT | Performed by: OBSTETRICS & GYNECOLOGY

## 2021-04-22 PROCEDURE — 87624 HPV HI-RISK TYP POOLED RSLT: CPT | Performed by: OBSTETRICS & GYNECOLOGY

## 2021-04-22 PROCEDURE — 99385 PREV VISIT NEW AGE 18-39: CPT | Mod: 25,S$GLB,, | Performed by: OBSTETRICS & GYNECOLOGY

## 2021-04-22 PROCEDURE — 99999 PR PBB SHADOW E&M-EST. PATIENT-LVL III: ICD-10-PCS | Mod: PBBFAC,,, | Performed by: OBSTETRICS & GYNECOLOGY

## 2021-04-22 PROCEDURE — 99385 PR PREVENTIVE VISIT,NEW,18-39: ICD-10-PCS | Mod: 25,S$GLB,, | Performed by: OBSTETRICS & GYNECOLOGY

## 2021-04-22 PROCEDURE — 3008F PR BODY MASS INDEX (BMI) DOCUMENTED: ICD-10-PCS | Mod: CPTII,S$GLB,, | Performed by: OBSTETRICS & GYNECOLOGY

## 2021-04-22 PROCEDURE — 86703 HIV-1/HIV-2 1 RESULT ANTBDY: CPT | Performed by: OBSTETRICS & GYNECOLOGY

## 2021-04-22 PROCEDURE — 99999 PR PBB SHADOW E&M-EST. PATIENT-LVL III: CPT | Mod: PBBFAC,,, | Performed by: OBSTETRICS & GYNECOLOGY

## 2021-04-22 PROCEDURE — 86592 SYPHILIS TEST NON-TREP QUAL: CPT | Performed by: OBSTETRICS & GYNECOLOGY

## 2021-04-22 RX ORDER — DEXTROAMPHETAMINE SACCHARATE, AMPHETAMINE ASPARTATE, DEXTROAMPHETAMINE SULFATE AND AMPHETAMINE SULFATE 3.125; 3.125; 3.125; 3.125 MG/1; MG/1; MG/1; MG/1
12.5 TABLET ORAL
COMMUNITY

## 2021-04-22 RX ORDER — AZELASTINE HYDROCHLORIDE 0.5 MG/ML
SOLUTION/ DROPS OPHTHALMIC
COMMUNITY
Start: 2020-12-14 | End: 2023-04-21 | Stop reason: ALTCHOICE

## 2021-04-22 RX ORDER — VALACYCLOVIR HYDROCHLORIDE 500 MG/1
500 TABLET, FILM COATED ORAL
COMMUNITY
Start: 2018-04-22 | End: 2023-04-21 | Stop reason: ALTCHOICE

## 2021-04-22 RX ORDER — CETIRIZINE HYDROCHLORIDE 10 MG/1
10 TABLET, CHEWABLE ORAL
COMMUNITY

## 2021-04-22 RX ORDER — IBUPROFEN 600 MG/1
TABLET ORAL
COMMUNITY
Start: 2020-12-14

## 2021-04-22 RX ORDER — ACETAMINOPHEN AND CODEINE PHOSPHATE 120; 12 MG/5ML; MG/5ML
1 SOLUTION ORAL DAILY
Qty: 90 TABLET | Refills: 3 | Status: SHIPPED | OUTPATIENT
Start: 2021-04-22 | End: 2023-04-21 | Stop reason: ALTCHOICE

## 2021-04-22 RX ORDER — FLUCONAZOLE 150 MG/1
TABLET ORAL
COMMUNITY
Start: 2021-03-08 | End: 2023-04-21

## 2021-04-22 RX ORDER — CARBOXYMETHYLCELLULOSE SODIUM 10 MG/ML
GEL OPHTHALMIC
COMMUNITY
Start: 2020-12-14 | End: 2023-04-21 | Stop reason: ALTCHOICE

## 2021-04-23 LAB
BACTERIAL VAGINOSIS DNA: NEGATIVE
CANDIDA GLABRATA DNA: NEGATIVE
CANDIDA KRUSEI DNA: NEGATIVE
CANDIDA RRNA VAG QL PROBE: NEGATIVE
HIV 1+2 AB+HIV1 P24 AG SERPL QL IA: NEGATIVE
RPR SER QL: NORMAL
T VAGINALIS RRNA GENITAL QL PROBE: NEGATIVE

## 2021-04-26 LAB
C TRACH RRNA SPEC QL NAA+PROBE: NEGATIVE
HBV SURFACE AG SERPL QL IA: NEGATIVE
HCV AB SERPL QL IA: NEGATIVE
N GONORRHOEA RRNA SPEC QL NAA+PROBE: NEGATIVE

## 2021-04-28 ENCOUNTER — PATIENT MESSAGE (OUTPATIENT)
Dept: RESEARCH | Facility: HOSPITAL | Age: 36
End: 2021-04-28

## 2021-04-28 LAB
FINAL PATHOLOGIC DIAGNOSIS: NORMAL
Lab: NORMAL

## 2021-04-30 ENCOUNTER — TELEPHONE (OUTPATIENT)
Dept: OBSTETRICS AND GYNECOLOGY | Facility: CLINIC | Age: 36
End: 2021-04-30

## 2021-04-30 LAB
HPV HR 12 DNA SPEC QL NAA+PROBE: POSITIVE
HPV16 AG SPEC QL: NEGATIVE
HPV18 DNA SPEC QL NAA+PROBE: NEGATIVE

## 2021-05-07 RX ORDER — FLUCONAZOLE 100 MG/1
150 TABLET ORAL
Qty: 3 TABLET | Refills: 0 | Status: SHIPPED | OUTPATIENT
Start: 2021-05-07 | End: 2021-05-09

## 2021-07-29 NOTE — ED TRIAGE NOTES
Patient reports to the ED with complaints of leg cramping and shortness of breath that has been ongoing, she denies over exerting herself with exercise but endorses spending time in the sun and drinking coconut water and pedialyte.  She also endorses muscle weakness, she states tires easily.  Denies CP or dizziness.   AAO x4. AMbulates independently.        Patient Identifiers for Aleena Thurman checked and correct  · LOC: The patient is awake, alert and aware of environment with an appropriate affect.  Alert to person, place, time and situation.    · APPEARANCE: Patient resting comfortably with no acute distress noted, patient is clean and well groomed, patient's clothing is properly fastened.  · SKIN: The skin is warm and dry, patient has normal skin turgor and moist mucus membranes,no rashes or lesions.  Skin is Intact , No Breakdown Noted  · Musculoskeletal:  Normal range of motion noted. Moves all extremeties well, complaints of tingling, pain, and fatigue of BLE.  · RESPIRATORY: Airway is open and patent, respirations are spontaneous, patient has a normal effort, rate. Patient reports SOB, O2 sats 99% on RA.  · CARDIAC: Patient tachycardic, no periphreal edema noted, capillary refill < 3 seconds.   · ABDOMEN: Soft and non tender.  · PULSES: 2+  And symmetrical in all extremeties  · NEUROLOGIC: Pupils PERRL & Reactive to light.         
no

## 2022-06-17 ENCOUNTER — TELEPHONE (OUTPATIENT)
Dept: OBSTETRICS AND GYNECOLOGY | Facility: CLINIC | Age: 37
End: 2022-06-17
Payer: COMMERCIAL

## 2022-06-17 NOTE — TELEPHONE ENCOUNTER
----- Message from Yady Davies sent at 6/17/2022  4:45 PM CDT -----  Pt is calling to schedule an appt  Pt can be contacted at [phone]

## 2023-04-10 ENCOUNTER — TELEPHONE (OUTPATIENT)
Dept: HEMATOLOGY/ONCOLOGY | Facility: CLINIC | Age: 38
End: 2023-04-10
Payer: COMMERCIAL

## 2023-04-10 NOTE — NURSING
Received call from pt.  She is a newly dx TNBC pt who had a partial mastectomy at [facility] by Dr. Douglas Last on 2/28/23.  She lives in [place] and would like a second opinion with probable transfer of care here.   She is requesting Dr. Ayala.  Scheduled her to see Dr. Ayala on 4/17/23.  Requested records (fax [phone], tel [phone]). Location and contact information reviewed.  Asked her to call with any questions or concerns.  She thanked me and verbalized understanding.

## 2023-04-10 NOTE — TELEPHONE ENCOUNTER
Called pt back and she stated she has breast cancer, had sx with Dr Last at [facility] in [place] and requesting to have chemo here on the [facility].  She was recommended to Dr Ayala.  Gave and transferred call to Breast navigator, Angelica LYNN

## 2023-04-10 NOTE — TELEPHONE ENCOUNTER
----- Message from Heaven Mcdonald sent at 4/10/2023  9:24 AM CDT -----  Type: Needs Medical Advice  Who Called:  Patient   Symptoms (please be specific):    How long has patient had these symptoms:    Pharmacy name and phone #:    Best Call Back Number: [phone]  Additional Information: Patient is requesting a call back to schedule a NP appt. with Dr. Ayala.

## 2023-04-11 DIAGNOSIS — C50.919 BREAST CANCER IN FEMALE: Primary | ICD-10-CM

## 2023-04-13 ENCOUNTER — TELEPHONE (OUTPATIENT)
Dept: HEMATOLOGY/ONCOLOGY | Facility: CLINIC | Age: 38
End: 2023-04-13
Payer: COMMERCIAL

## 2023-04-13 NOTE — TELEPHONE ENCOUNTER
----- Message from Syed Howard sent at 4/13/2023  9:25 AM CDT -----  Type:  Patient Returning Call    Who Called:Pt  Who Left Message for Patient:unk  Does the patient know what this is regarding?:unk  Would the patient rather a call back or a response via [facility]? Call back  Best Call Back Number:[phone]     Additional Information: Sts she had a missed call but no vm.  Please advise -- Thank you

## 2023-04-17 ENCOUNTER — LAB VISIT (OUTPATIENT)
Dept: LAB | Facility: HOSPITAL | Age: 38
End: 2023-04-17
Attending: INTERNAL MEDICINE
Payer: COMMERCIAL

## 2023-04-17 ENCOUNTER — OFFICE VISIT (OUTPATIENT)
Dept: HEMATOLOGY/ONCOLOGY | Facility: CLINIC | Age: 38
End: 2023-04-17
Payer: COMMERCIAL

## 2023-04-17 ENCOUNTER — TELEPHONE (OUTPATIENT)
Dept: HEMATOLOGY/ONCOLOGY | Facility: CLINIC | Age: 38
End: 2023-04-17

## 2023-04-17 VITALS
SYSTOLIC BLOOD PRESSURE: 100 MMHG | OXYGEN SATURATION: 99 % | BODY MASS INDEX: 19.76 KG/M2 | DIASTOLIC BLOOD PRESSURE: 75 MMHG | RESPIRATION RATE: 16 BRPM | HEIGHT: 62 IN | WEIGHT: 107.38 LBS | TEMPERATURE: 98 F | HEART RATE: 90 BPM

## 2023-04-17 DIAGNOSIS — C50.919 TRIPLE NEGATIVE BREAST CANCER: ICD-10-CM

## 2023-04-17 DIAGNOSIS — C50.919 TRIPLE NEGATIVE BREAST CANCER: Primary | ICD-10-CM

## 2023-04-17 DIAGNOSIS — D50.0 IRON DEFICIENCY ANEMIA DUE TO CHRONIC BLOOD LOSS: ICD-10-CM

## 2023-04-17 DIAGNOSIS — E80.6 HYPERBILIRUBINEMIA: ICD-10-CM

## 2023-04-17 DIAGNOSIS — C50.919 BREAST CANCER IN FEMALE: ICD-10-CM

## 2023-04-17 DIAGNOSIS — D69.6 THROMBOCYTOPENIA: ICD-10-CM

## 2023-04-17 PROBLEM — Z17.421 TRIPLE NEGATIVE BREAST CANCER: Status: ACTIVE | Noted: 2023-04-17

## 2023-04-17 LAB
ALBUMIN SERPL BCP-MCNC: 4.7 G/DL (ref 3.5–5.2)
ALP SERPL-CCNC: 48 U/L (ref 55–135)
ALT SERPL W/O P-5'-P-CCNC: 30 U/L (ref 10–44)
ANION GAP SERPL CALC-SCNC: 9 MMOL/L (ref 8–16)
AST SERPL-CCNC: 23 U/L (ref 10–40)
BASOPHILS # BLD AUTO: 0.01 K/UL (ref 0–0.2)
BASOPHILS NFR BLD: 0.2 % (ref 0–1.9)
BILIRUB SERPL-MCNC: 1.9 MG/DL (ref 0.1–1)
BUN SERPL-MCNC: 18 MG/DL (ref 6–20)
CALCIUM SERPL-MCNC: 9.4 MG/DL (ref 8.7–10.5)
CHLORIDE SERPL-SCNC: 108 MMOL/L (ref 95–110)
CO2 SERPL-SCNC: 22 MMOL/L (ref 23–29)
CREAT SERPL-MCNC: 0.8 MG/DL (ref 0.5–1.4)
DIFFERENTIAL METHOD: ABNORMAL
EOSINOPHIL # BLD AUTO: 0.1 K/UL (ref 0–0.5)
EOSINOPHIL NFR BLD: 1.9 % (ref 0–8)
ERYTHROCYTE [DISTWIDTH] IN BLOOD BY AUTOMATED COUNT: 12.7 % (ref 11.5–14.5)
EST. GFR  (NO RACE VARIABLE): >60 ML/MIN/1.73 M^2
FERRITIN SERPL-MCNC: 94 NG/ML (ref 20–300)
GLUCOSE SERPL-MCNC: 91 MG/DL (ref 70–110)
HCT VFR BLD AUTO: 36.1 % (ref 37–48.5)
HGB BLD-MCNC: 12.7 G/DL (ref 12–16)
IMM GRANULOCYTES # BLD AUTO: 0.01 K/UL (ref 0–0.04)
IMM GRANULOCYTES NFR BLD AUTO: 0.2 % (ref 0–0.5)
IRON SERPL-MCNC: 137 UG/DL (ref 30–160)
LYMPHOCYTES # BLD AUTO: 1.5 K/UL (ref 1–4.8)
LYMPHOCYTES NFR BLD: 34.7 % (ref 18–48)
MCH RBC QN AUTO: 30.4 PG (ref 27–31)
MCHC RBC AUTO-ENTMCNC: 35.2 G/DL (ref 32–36)
MCV RBC AUTO: 86 FL (ref 82–98)
MONOCYTES # BLD AUTO: 0.5 K/UL (ref 0.3–1)
MONOCYTES NFR BLD: 11.4 % (ref 4–15)
NEUTROPHILS # BLD AUTO: 2.2 K/UL (ref 1.8–7.7)
NEUTROPHILS NFR BLD: 51.6 % (ref 38–73)
NRBC BLD-RTO: 0 /100 WBC
PLATELET # BLD AUTO: 138 K/UL (ref 150–450)
PMV BLD AUTO: 9.5 FL (ref 9.2–12.9)
POTASSIUM SERPL-SCNC: 4.2 MMOL/L (ref 3.5–5.1)
PROT SERPL-MCNC: 7.1 G/DL (ref 6–8.4)
RBC # BLD AUTO: 4.18 M/UL (ref 4–5.4)
SATURATED IRON: 38 % (ref 20–50)
SODIUM SERPL-SCNC: 139 MMOL/L (ref 136–145)
TOTAL IRON BINDING CAPACITY: 358 UG/DL (ref 250–450)
TRANSFERRIN SERPL-MCNC: 242 MG/DL (ref 200–375)
WBC # BLD AUTO: 4.21 K/UL (ref 3.9–12.7)

## 2023-04-17 PROCEDURE — 3008F BODY MASS INDEX DOCD: CPT | Mod: CPTII,S$GLB,, | Performed by: INTERNAL MEDICINE

## 2023-04-17 PROCEDURE — 99205 PR OFFICE/OUTPT VISIT, NEW, LEVL V, 60-74 MIN: ICD-10-PCS | Mod: S$GLB,,, | Performed by: INTERNAL MEDICINE

## 2023-04-17 PROCEDURE — 99999 PR PBB SHADOW E&M-EST. PATIENT-LVL V: CPT | Mod: PBBFAC,,, | Performed by: INTERNAL MEDICINE

## 2023-04-17 PROCEDURE — 3074F SYST BP LT 130 MM HG: CPT | Mod: CPTII,S$GLB,, | Performed by: INTERNAL MEDICINE

## 2023-04-17 PROCEDURE — 82728 ASSAY OF FERRITIN: CPT | Performed by: INTERNAL MEDICINE

## 2023-04-17 PROCEDURE — 3078F PR MOST RECENT DIASTOLIC BLOOD PRESSURE < 80 MM HG: ICD-10-PCS | Mod: CPTII,S$GLB,, | Performed by: INTERNAL MEDICINE

## 2023-04-17 PROCEDURE — 84466 ASSAY OF TRANSFERRIN: CPT | Performed by: INTERNAL MEDICINE

## 2023-04-17 PROCEDURE — 1159F MED LIST DOCD IN RCRD: CPT | Mod: CPTII,S$GLB,, | Performed by: INTERNAL MEDICINE

## 2023-04-17 PROCEDURE — 3074F PR MOST RECENT SYSTOLIC BLOOD PRESSURE < 130 MM HG: ICD-10-PCS | Mod: CPTII,S$GLB,, | Performed by: INTERNAL MEDICINE

## 2023-04-17 PROCEDURE — 36415 COLL VENOUS BLD VENIPUNCTURE: CPT | Mod: PN | Performed by: INTERNAL MEDICINE

## 2023-04-17 PROCEDURE — 80053 COMPREHEN METABOLIC PANEL: CPT | Mod: PN | Performed by: INTERNAL MEDICINE

## 2023-04-17 PROCEDURE — 3078F DIAST BP <80 MM HG: CPT | Mod: CPTII,S$GLB,, | Performed by: INTERNAL MEDICINE

## 2023-04-17 PROCEDURE — 1160F PR REVIEW ALL MEDS BY PRESCRIBER/CLIN PHARMACIST DOCUMENTED: ICD-10-PCS | Mod: CPTII,S$GLB,, | Performed by: INTERNAL MEDICINE

## 2023-04-17 PROCEDURE — 85025 COMPLETE CBC W/AUTO DIFF WBC: CPT | Mod: PN | Performed by: INTERNAL MEDICINE

## 2023-04-17 PROCEDURE — 99999 PR PBB SHADOW E&M-EST. PATIENT-LVL V: ICD-10-PCS | Mod: PBBFAC,,, | Performed by: INTERNAL MEDICINE

## 2023-04-17 PROCEDURE — 3008F PR BODY MASS INDEX (BMI) DOCUMENTED: ICD-10-PCS | Mod: CPTII,S$GLB,, | Performed by: INTERNAL MEDICINE

## 2023-04-17 PROCEDURE — 99205 OFFICE O/P NEW HI 60 MIN: CPT | Mod: S$GLB,,, | Performed by: INTERNAL MEDICINE

## 2023-04-17 PROCEDURE — 1160F RVW MEDS BY RX/DR IN RCRD: CPT | Mod: CPTII,S$GLB,, | Performed by: INTERNAL MEDICINE

## 2023-04-17 PROCEDURE — 1159F PR MEDICATION LIST DOCUMENTED IN MEDICAL RECORD: ICD-10-PCS | Mod: CPTII,S$GLB,, | Performed by: INTERNAL MEDICINE

## 2023-04-17 RX ORDER — CHLOPHEDIANOL HCL AND PYRILAMINE MALEATE 12.5; 12.5 MG/5ML; MG/5ML
10 SOLUTION ORAL
COMMUNITY
Start: 2023-03-23

## 2023-04-17 RX ORDER — ZOLPIDEM TARTRATE 10 MG/1
10 TABLET ORAL NIGHTLY PRN
COMMUNITY
Start: 2023-04-04

## 2023-04-17 RX ORDER — BALOXAVIR MARBOXIL 40 MG/1
40 TABLET, FILM COATED ORAL ONCE
COMMUNITY
Start: 2023-03-23 | End: 2023-04-21 | Stop reason: ALTCHOICE

## 2023-04-17 RX ORDER — DEXTROAMPHETAMINE SACCHARATE, AMPHETAMINE ASPARTATE, DEXTROAMPHETAMINE SULFATE AND AMPHETAMINE SULFATE 5; 5; 5; 5 MG/1; MG/1; MG/1; MG/1
1 TABLET ORAL
COMMUNITY
Start: 2023-02-27

## 2023-04-17 RX ORDER — OXYCODONE AND ACETAMINOPHEN 5; 325 MG/1; MG/1
1 TABLET ORAL EVERY 6 HOURS PRN
COMMUNITY
Start: 2023-03-03 | End: 2023-04-21 | Stop reason: DRUGHIGH

## 2023-04-17 RX ORDER — CEFDINIR 300 MG/1
300 CAPSULE ORAL EVERY 12 HOURS
COMMUNITY
Start: 2023-02-22 | End: 2023-04-21 | Stop reason: ALTCHOICE

## 2023-04-17 RX ORDER — DEXAMETHASONE 4 MG/1
8 TABLET ORAL EVERY 12 HOURS
Qty: 60 TABLET | Refills: 0 | Status: SHIPPED | OUTPATIENT
Start: 2023-04-17 | End: 2023-04-21

## 2023-04-17 RX ORDER — NORETHINDRONE ACETATE AND ETHINYL ESTRADIOL AND FERROUS FUMARATE 1MG-20(24)
1 KIT ORAL
COMMUNITY
Start: 2023-02-13 | End: 2023-07-10 | Stop reason: ALTCHOICE

## 2023-04-17 RX ORDER — OXYCODONE AND ACETAMINOPHEN 10; 325 MG/1; MG/1
1 TABLET ORAL EVERY 6 HOURS
COMMUNITY
Start: 2023-04-04

## 2023-04-17 NOTE — PLAN OF CARE
START ON PATHWAY REGIMEN - Breast    [id number]        Docetaxel (Taxotere)       Cyclophosphamide           Additional Orders: Premedicate with dexamethasone 8 mg PO bid for three   days beginning 1 day prior to therapy    **Always confirm dose/schedule in your pharmacy ordering system**    Patient Characteristics:  Postoperative without Neoadjuvant Therapy (Pathologic Staging), Invasive   Disease, Adjuvant Therapy, HER2 Negative/Unknown/Equivocal, ER Negative/Unknown,   Node Negative, pT1a-b, N0, Chemotherapy Indicated  Therapeutic Status: Postoperative without Neoadjuvant Therapy (Pathologic   Staging)  AJCC Grade: G3  AJCC N Category: pN0  AJCC M Category: cM0  ER Status: Negative (-)  AJCC 8 Stage Grouping: IB  HER2 Status: Negative (-)  Oncotype Dx Recurrence Score: Not Appropriate  AJCC T Category: pT1b  FL Status: Negative (-)  Adjuvant Therapy Status: No Adjuvant Therapy Received Yet or Changing Initial   Adjuvant Regimen due to Tolerance  Intervention Indicated: Chemotherapy  Intent of Therapy:  Curative Intent, Discussed with Patient

## 2023-04-17 NOTE — NURSING
Spoke with pt.  Scheduled chemo class for Friday.  Tentative date for infusion is Monday 4/24.  She understands if her treatment is not authorized by Monday, she will be rescheduled.   Still waiting on records from Dr. Nicholson's office.  Will reach out to them again tomorrow.  Pt will try to call them as well.

## 2023-04-17 NOTE — PROGRESS NOTES
CONSULT NOTE    Subjective:       Patient ID: Aleena Duckworth is a 37 y.o. female.  MRN: [id number]  : 1985    Chief Complaint: Breast Cancer      History of Present Illness:   Aleena Duckworth is a 37 y.o. female who is referred for newly diagnosed TNBC.  So far, she has been treated at [facility] and is establishing care with me due to proximity.     She does not report any prior breast issues, has had breast implants for several years.   She self palpated a left-sided breast mass.  She felt it was growing fast and reported to her primary care physician who referred her for breast imaging.  On 2023, she underwent a bilateral breast mammogram that showed a lobular hypoechoic solid mass within the left breast at 2 o'clock position 7 cm was recommended.  On 2023, she underwent an ultrasound guided biopsy that showed invasive ductal carcinoma, grade 3, ER/NE/HER2 Randall negative, Ki-67 80%.     On 23, she underwent left partial mastectomy that revealed invasive ductal carcinoma 9 x 8 x 6 mm, single focus, approximately 1 mm focus of high-grade DCIS, closest margin 3 mm, anterior.  Two sentinel lymph nodes were negative.  pT1b pN0(sn).     Has ADHD, has been on Adderall for several years.  Was born prematurely, was diagnosed with a stroke and heart murmur at birth, was treated at [facility].     Heavy menorrhagia for the past few years, worsening recently and has developed CAROLIN.  Has been on iron supplementation.  Will see her gynecologist, Dr. Almita Clark with [facility].      She did have 1 visit with the medical oncology team at [facility].  Adjuvant chemotherapy was recommended.  Workup was ongoing for mild hyperbilirubinemia.  An abdominal ultrasound was done, negative for any abnormalities.     Gyn:  Menarche 13  . Age at first pregnancy 19  OCP use a year or so   Premenopausal, regular, menorrhagia.   LMP 21       Oncology  History:  Oncology History   Triple negative breast cancer   4/17/2023 Initial Diagnosis    Triple negative breast cancer       4/24/2023 -  Chemotherapy    Treatment Summary   Plan Name: OP BREAST TC - DOCETAXEL CYCLOPHOSPHAMIDE Q3W  Treatment Goal: Curative  Status: Active  Start Date: 4/24/2023 (Planned)  End Date: 6/27/2023 (Planned)  Provider: Ambar Ayala MD  Chemotherapy: cycloPHOSphamide 600 mg/m2 = 880 mg in sodium chloride 0.9% 254.4 mL chemo infusion, 600 mg/m2, Intravenous, Clinic/HOD 1 time, 0 of 4 cycles  DOCEtaxel (TAXOTERE) 75 mg/m2 = 110 mg in sodium chloride 0.9% 255.5 mL chemo infusion, 75 mg/m2, Intravenous, Clinic/HOD 1 time, 0 of 4 cycles           History:  Past Medical History:   Diagnosis Date    ADHD (attention deficit hyperactivity disorder)     Anemia     Hypertension     pre eclampsia and eclampsia    Stroke     baby      Past Surgical History:   Procedure Laterality Date    BREAST SURGERY      TUBAL LIGATION      VAGINAL DELIVERY       Family History   Problem Relation Age of Onset    Hypertension Mother     Breast cancer Mother 51        negative screening    Heart disease Father     Diabetes Maternal Grandmother     Breast cancer Maternal Grandmother       Social History     Tobacco Use    Smoking status: Some Days    Smokeless tobacco: Never   Substance and Sexual Activity    Alcohol use: Yes    Drug use: Never    Sexual activity: Yes     Partners: Male     Birth control/protection: See Surgical Hx     Comment: tubal        ROS:   Review of Systems   Constitutional:  Negative for fever, malaise/fatigue and weight loss.   HENT:  Negative for congestion, hearing loss, nosebleeds and sore throat.    Eyes:  Negative for double vision and photophobia.   Respiratory:  Negative for cough, hemoptysis, sputum production, shortness of breath and wheezing.    Cardiovascular:  Negative for chest pain, palpitations, orthopnea and leg swelling.   Gastrointestinal:  Negative for abdominal pain,  "blood in stool, constipation, diarrhea, heartburn, nausea and vomiting.   Genitourinary:  Negative for dysuria, hematuria and urgency.   Musculoskeletal:  Negative for back pain, joint pain and myalgias.   Skin:  Negative for itching and rash.   Neurological:  Negative for dizziness, tingling, seizures, weakness and headaches.   Endo/Heme/Allergies:  Negative for polydipsia. Does not bruise/bleed easily.   Psychiatric/Behavioral:  Negative for depression and memory loss. The patient has insomnia. The patient is not nervous/anxious.       Objective:     Vitals:    04/17/23 1017   BP: 100/75   Pulse: 90   Resp: 16   Temp: 97.5 °F (36.4 °C)   TempSrc: Temporal   SpO2: 99%   Weight: 48.7 kg (107 lb 5.8 oz)   Height: 5' 2" (1.575 m)   PainSc:   1   PainLoc: Rib Cage       Physical Examination:   Physical Exam  Vitals and nursing note reviewed.   Constitutional:       General: She is not in acute distress.     Appearance: She is not diaphoretic.   HENT:      Head: Normocephalic.      Mouth/Throat:      Pharynx: No oropharyngeal exudate.   Eyes:      General: No scleral icterus.     Conjunctiva/sclera: Conjunctivae normal.   Neck:      Thyroid: No thyromegaly.   Cardiovascular:      Rate and Rhythm: Normal rate and regular rhythm.      Heart sounds: Normal heart sounds. No murmur heard.  Pulmonary:      Effort: Pulmonary effort is normal. No respiratory distress.      Breath sounds: No stridor. No wheezing or rales.   Chest:      Chest wall: No tenderness.      Comments: Bilateral breast implants.  Status post left lumpectomy, healed surgical incisions  Abdominal:      General: Bowel sounds are normal. There is no distension.      Palpations: Abdomen is soft. There is no mass.      Tenderness: There is no abdominal tenderness. There is no rebound.   Musculoskeletal:         General: No tenderness or deformity. Normal range of motion.      Cervical back: Neck supple.   Lymphadenopathy:      Cervical: No cervical adenopathy. "   Skin:     General: Skin is warm and dry.      Findings: No erythema or rash.   Neurological:      Mental Status: She is alert and oriented to person, place, and time.      Cranial Nerves: No cranial nerve deficit.      Coordination: Coordination normal.      Gait: Gait is intact.   Psychiatric:         Mood and Affect: Affect normal.         Cognition and Memory: Memory normal.         Judgment: Judgment normal.        Diagnostic Tests:  Significant Imaging: I have reviewed and interpreted all pertinent imaging results/findings.  CT Chest Without Contrast No results found for this or any previous visit.    CT Chest With ContrastNo results found for this or any previous visit.    CT Abdomen/Pelvis Without Contrast No results found for this or any previous visit.     CT Abdomen/Pelvis With Contrast No results found for this or any previous visit.    CT Abdomen/Pelvis With Without Contrast No results found for this or any previous visit.     PET Scan No results found for this or any previous visit.      Laboratory Data:  All pertinent labs have been reviewed.    Labs:   Lab Results   Component Value Date    WBC 5.01 04/18/2023    HGB 12.1 04/18/2023    HCT 33.5 (L) 04/18/2023    MCV 86 04/18/2023     (L) 04/18/2023       Assessment/Plan:   Triple negative breast cancer  pT1b pN0 (sn) ER/PA/HER2 Randall negative, Ki-67 80%, grade 3    37-year-old premenopausal woman, presenting with early stage triple negative breast cancer.  She is a candidate for adjuvant chemotherapy.  NCCN guidelines in different chemotherapy regimens were reviewed with the patient.  She is appropriate for and would prefer docetaxel and Cytoxan rather than Adriamycin, Cytoxan and Taxol to avoid cardiotoxicity and the rare risk of potential secondary cancers.  Her history of a childhood murmur in stroke was also discussed.    We did discuss that liver dysfunction is a contraindication to using docetaxel.  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  I will check hemolysis panel as well as indirect bilirubin.  Liver ultrasound results are reviewed, normal.  For cycle 1, will use a dose reduced docetaxel and if tolerated, will give full dose for the subsequent cycles.    We discussed thickening cap and she has declined it.  I will refer her to chemotherapy school.  Chemotherapy can be given via peripheral line.    -     CBC Auto Differential; Standing  -     CMP; Future; Expected date: 04/17/2023  -     Ambulatory referral/consult to Chemo School; Future; Expected date: 04/24/2023  -     dexAMETHasone (DECADRON) 4 MG Tab; Take 2 tablets (8 mg total) by mouth every 12 (twelve) hours. Take 2 tab bid the day before chemo, 2 tab the night of chemo and 2 tab bid the day after chemo  Dispense: 60 tablet; Refill: 0    Breast cancer in female  -     Ambulatory referral/consult to Hematology / Oncology    Hyperbilirubinemia  As above.  Continue to monitor bilirubin and liver function closely.  -     Pathologist Interpretation Differential; Future; Expected date: 04/17/2023  -     BILIRUBIN, DIRECT; Future; Expected date: 04/17/2023  -     Lactate Dehydrogenase; Standing  -     HAPTOGLOBIN; Future; Expected date: 04/17/2023    Thrombocytopenia  Mild and isolated thrombocytopenia noted.  Check peripheral smear.  Check LDH, haptoglobin and monitor closely.    Iron deficiency anemia due to chronic blood loss  History of menorrhagia noted.  Obtain baseline iron panel and offer IV iron as clinically indicated.  -     IRON AND TIBC; Future; Expected date: 04/17/2023  -     FERRITIN; Future; Expected date: 04/17/2023       ECOG SCORE    0 - Fully active-able to carry on all pre-disease performance without restriction           Discussion:   No follow-ups on file.    Plan was discussed with the patient at length, and she verbalized understanding. Aleena was given an opportunity to ask questions that were answered to her  satisfaction, and she was advised to call in the interval if any problems or questions arise.    Electronically signed by Ambar Ayala MD    Route Chart for Scheduling    Med Onc Chart Routing      Follow up with physician . Chemo school this week, 4/24   Follow up with NUNO    Infusion scheduling note 4/24 new chemo start.   Injection scheduling note    Labs CBC, CMP and iron and TIBC   Scheduling:  Preferred lab:  Lab interval:  labs today   Imaging    Pharmacy appointment    Other referrals           Treatment Plan Information   OP BREAST TC - DOCETAXEL CYCLOPHOSPHAMIDE Q3W   Ambar Ayala MD   Upcoming Treatment Dates - OP BREAST TC - DOCETAXEL CYCLOPHOSPHAMIDE Q3W    4/24/2023       Pre-Medications       palonosetron 0.25mg/dexAMETHasone 10mg in NS IVPB 0.25 mg 50 mL       aprepitant (CINVANTI) injection 130 mg       Chemotherapy       DOCEtaxel (TAXOTERE) 75 mg/m2 = 110 mg in sodium chloride 0.9% 255.5 mL chemo infusion       cycloPHOSphamide 600 mg/m2 = 880 mg in sodium chloride 0.9% 254.4 mL chemo infusion  4/25/2023       Growth Factor       pegfilgrastim-cbqv (UDENYCA) injection 6 mg  5/15/2023       Pre-Medications       palonosetron 0.25mg/dexAMETHasone 10mg in NS IVPB 0.25 mg 50 mL       aprepitant (CINVANTI) injection 130 mg       Chemotherapy       DOCEtaxel (TAXOTERE) 75 mg/m2 = 110 mg in sodium chloride 0.9% 255.5 mL chemo infusion       cycloPHOSphamide 600 mg/m2 = 880 mg in sodium chloride 0.9% 254.4 mL chemo infusion  5/16/2023       Growth Factor       pegfilgrastim-cbqv (UDENYCA) injection 6 mg

## 2023-04-17 NOTE — NURSING
Dr. Ayala ordered additional lab work.  Spoke with pt.  Scheduled for tomorrow.  Pt spoke with Dr. Ayala and understands her treatment may change depending on those results.  Reviewed contact information.  Asked her to call me with any questions or concerns.  Pt verbalized understanding.

## 2023-04-18 ENCOUNTER — LAB VISIT (OUTPATIENT)
Dept: LAB | Facility: HOSPITAL | Age: 38
End: 2023-04-18
Attending: NURSE PRACTITIONER
Payer: COMMERCIAL

## 2023-04-18 ENCOUNTER — PATIENT MESSAGE (OUTPATIENT)
Dept: HEMATOLOGY/ONCOLOGY | Facility: CLINIC | Age: 38
End: 2023-04-18
Payer: COMMERCIAL

## 2023-04-18 DIAGNOSIS — C50.919 TRIPLE NEGATIVE BREAST CANCER: ICD-10-CM

## 2023-04-18 DIAGNOSIS — E80.6 HYPERBILIRUBINEMIA: ICD-10-CM

## 2023-04-18 LAB
ALBUMIN SERPL BCP-MCNC: 4.7 G/DL (ref 3.5–5.2)
ALP SERPL-CCNC: 46 U/L (ref 55–135)
ALT SERPL W/O P-5'-P-CCNC: 28 U/L (ref 10–44)
ANION GAP SERPL CALC-SCNC: 9 MMOL/L (ref 8–16)
AST SERPL-CCNC: 21 U/L (ref 10–40)
B-HCG UR QL: NEGATIVE
BASOPHILS # BLD AUTO: 0.01 K/UL (ref 0–0.2)
BASOPHILS NFR BLD: 0.2 % (ref 0–1.9)
BILIRUB DIRECT SERPL-MCNC: 0.5 MG/DL (ref 0.1–0.3)
BILIRUB SERPL-MCNC: 1.6 MG/DL (ref 0.1–1)
BUN SERPL-MCNC: 19 MG/DL (ref 6–20)
CALCIUM SERPL-MCNC: 9.6 MG/DL (ref 8.7–10.5)
CHLORIDE SERPL-SCNC: 108 MMOL/L (ref 95–110)
CO2 SERPL-SCNC: 21 MMOL/L (ref 23–29)
CREAT SERPL-MCNC: 0.8 MG/DL (ref 0.5–1.4)
DIFFERENTIAL METHOD: ABNORMAL
EOSINOPHIL # BLD AUTO: 0.1 K/UL (ref 0–0.5)
EOSINOPHIL NFR BLD: 1.6 % (ref 0–8)
ERYTHROCYTE [DISTWIDTH] IN BLOOD BY AUTOMATED COUNT: 12.7 % (ref 11.5–14.5)
EST. GFR  (NO RACE VARIABLE): >60 ML/MIN/1.73 M^2
GLUCOSE SERPL-MCNC: 91 MG/DL (ref 70–110)
HAPTOGLOB SERPL-MCNC: 38 MG/DL (ref 30–250)
HCT VFR BLD AUTO: 33.5 % (ref 37–48.5)
HGB BLD-MCNC: 12.1 G/DL (ref 12–16)
IMM GRANULOCYTES # BLD AUTO: 0.01 K/UL (ref 0–0.04)
IMM GRANULOCYTES NFR BLD AUTO: 0.2 % (ref 0–0.5)
LDH SERPL L TO P-CCNC: 136 U/L (ref 110–260)
LYMPHOCYTES # BLD AUTO: 2 K/UL (ref 1–4.8)
LYMPHOCYTES NFR BLD: 38.9 % (ref 18–48)
MCH RBC QN AUTO: 31.2 PG (ref 27–31)
MCHC RBC AUTO-ENTMCNC: 36.1 G/DL (ref 32–36)
MCV RBC AUTO: 86 FL (ref 82–98)
MONOCYTES # BLD AUTO: 0.5 K/UL (ref 0.3–1)
MONOCYTES NFR BLD: 9 % (ref 4–15)
NEUTROPHILS # BLD AUTO: 2.5 K/UL (ref 1.8–7.7)
NEUTROPHILS NFR BLD: 50.1 % (ref 38–73)
NRBC BLD-RTO: 0 /100 WBC
PLATELET # BLD AUTO: 140 K/UL (ref 150–450)
PMV BLD AUTO: 9.2 FL (ref 9.2–12.9)
POTASSIUM SERPL-SCNC: 4.5 MMOL/L (ref 3.5–5.1)
PROT SERPL-MCNC: 6.9 G/DL (ref 6–8.4)
RBC # BLD AUTO: 3.88 M/UL (ref 4–5.4)
SODIUM SERPL-SCNC: 138 MMOL/L (ref 136–145)
WBC # BLD AUTO: 5.01 K/UL (ref 3.9–12.7)

## 2023-04-18 PROCEDURE — 82248 BILIRUBIN DIRECT: CPT | Mod: PN | Performed by: INTERNAL MEDICINE

## 2023-04-18 PROCEDURE — 80053 COMPREHEN METABOLIC PANEL: CPT | Mod: PN | Performed by: INTERNAL MEDICINE

## 2023-04-18 PROCEDURE — 85025 COMPLETE CBC W/AUTO DIFF WBC: CPT | Mod: PN | Performed by: INTERNAL MEDICINE

## 2023-04-18 PROCEDURE — 36415 COLL VENOUS BLD VENIPUNCTURE: CPT | Mod: PN | Performed by: INTERNAL MEDICINE

## 2023-04-18 PROCEDURE — 83010 ASSAY OF HAPTOGLOBIN QUANT: CPT | Performed by: INTERNAL MEDICINE

## 2023-04-18 PROCEDURE — 81025 URINE PREGNANCY TEST: CPT | Mod: PN | Performed by: INTERNAL MEDICINE

## 2023-04-18 PROCEDURE — 83615 LACTATE (LD) (LDH) ENZYME: CPT | Mod: PN | Performed by: INTERNAL MEDICINE

## 2023-04-20 ENCOUNTER — TELEPHONE (OUTPATIENT)
Dept: HEMATOLOGY/ONCOLOGY | Facility: CLINIC | Age: 38
End: 2023-04-20
Payer: COMMERCIAL

## 2023-04-20 NOTE — NURSING
Spoke with pt.  Confirmed all upcoming appts.   Chemo class and IO appt tomorrow, Dr. Ayala next Thursday and if authorized treatment on Monday 5/1/23. Pt is aware once auth for treatment is obtained she will need lab work and a pregnancy test.  She has seen the hepatologist and is having a liver scan today.  She has asked the office to send those records to me when complete.  Encouraged her to call with any questions or concerns.  Pt thanked me and verbalized understanding.

## 2023-04-21 ENCOUNTER — DOCUMENTATION ONLY (OUTPATIENT)
Dept: INFUSION THERAPY | Facility: HOSPITAL | Age: 38
End: 2023-04-21
Payer: COMMERCIAL

## 2023-04-21 ENCOUNTER — CLINICAL SUPPORT (OUTPATIENT)
Dept: HEMATOLOGY/ONCOLOGY | Facility: CLINIC | Age: 38
End: 2023-04-21
Payer: COMMERCIAL

## 2023-04-21 ENCOUNTER — OFFICE VISIT (OUTPATIENT)
Dept: HEMATOLOGY/ONCOLOGY | Facility: CLINIC | Age: 38
End: 2023-04-21
Payer: COMMERCIAL

## 2023-04-21 VITALS
OXYGEN SATURATION: 99 % | TEMPERATURE: 98 F | HEART RATE: 84 BPM | SYSTOLIC BLOOD PRESSURE: 91 MMHG | RESPIRATION RATE: 16 BRPM | WEIGHT: 108.13 LBS | BODY MASS INDEX: 19.9 KG/M2 | HEIGHT: 62 IN | DIASTOLIC BLOOD PRESSURE: 69 MMHG

## 2023-04-21 DIAGNOSIS — R45.89 ANXIETY ABOUT HEALTH: Primary | ICD-10-CM

## 2023-04-21 DIAGNOSIS — C50.919 TRIPLE NEGATIVE BREAST CANCER: Primary | ICD-10-CM

## 2023-04-21 DIAGNOSIS — G47.00 INSOMNIA, UNSPECIFIED TYPE: ICD-10-CM

## 2023-04-21 DIAGNOSIS — C50.919 TRIPLE NEGATIVE BREAST CANCER: ICD-10-CM

## 2023-04-21 PROCEDURE — 99215 OFFICE O/P EST HI 40 MIN: CPT | Mod: S$GLB,,, | Performed by: NURSE PRACTITIONER

## 2023-04-21 PROCEDURE — 99215 PR OFFICE/OUTPT VISIT, EST, LEVL V, 40-54 MIN: ICD-10-PCS | Mod: S$GLB,,, | Performed by: NURSE PRACTITIONER

## 2023-04-21 PROCEDURE — 3008F PR BODY MASS INDEX (BMI) DOCUMENTED: ICD-10-PCS | Mod: CPTII,S$GLB,, | Performed by: NURSE PRACTITIONER

## 2023-04-21 PROCEDURE — 1159F MED LIST DOCD IN RCRD: CPT | Mod: CPTII,S$GLB,, | Performed by: NURSE PRACTITIONER

## 2023-04-21 PROCEDURE — 1159F PR MEDICATION LIST DOCUMENTED IN MEDICAL RECORD: ICD-10-PCS | Mod: CPTII,S$GLB,, | Performed by: NURSE PRACTITIONER

## 2023-04-21 PROCEDURE — 3078F DIAST BP <80 MM HG: CPT | Mod: CPTII,S$GLB,, | Performed by: NURSE PRACTITIONER

## 2023-04-21 PROCEDURE — 3074F SYST BP LT 130 MM HG: CPT | Mod: CPTII,S$GLB,, | Performed by: NURSE PRACTITIONER

## 2023-04-21 PROCEDURE — 1160F PR REVIEW ALL MEDS BY PRESCRIBER/CLIN PHARMACIST DOCUMENTED: ICD-10-PCS | Mod: CPTII,S$GLB,, | Performed by: NURSE PRACTITIONER

## 2023-04-21 PROCEDURE — 1160F RVW MEDS BY RX/DR IN RCRD: CPT | Mod: CPTII,S$GLB,, | Performed by: NURSE PRACTITIONER

## 2023-04-21 PROCEDURE — 3008F BODY MASS INDEX DOCD: CPT | Mod: CPTII,S$GLB,, | Performed by: NURSE PRACTITIONER

## 2023-04-21 PROCEDURE — 99999 PR PBB SHADOW E&M-EST. PATIENT-LVL V: ICD-10-PCS | Mod: PBBFAC,,, | Performed by: NURSE PRACTITIONER

## 2023-04-21 PROCEDURE — 3078F PR MOST RECENT DIASTOLIC BLOOD PRESSURE < 80 MM HG: ICD-10-PCS | Mod: CPTII,S$GLB,, | Performed by: NURSE PRACTITIONER

## 2023-04-21 PROCEDURE — 99999 PR PBB SHADOW E&M-EST. PATIENT-LVL V: CPT | Mod: PBBFAC,,, | Performed by: NURSE PRACTITIONER

## 2023-04-21 PROCEDURE — 99999 PR PBB SHADOW E&M-EST. PATIENT-LVL IV: CPT | Mod: PBBFAC,,, | Performed by: NURSE PRACTITIONER

## 2023-04-21 PROCEDURE — 99999 PR PBB SHADOW E&M-EST. PATIENT-LVL IV: ICD-10-PCS | Mod: PBBFAC,,, | Performed by: NURSE PRACTITIONER

## 2023-04-21 PROCEDURE — 3074F PR MOST RECENT SYSTOLIC BLOOD PRESSURE < 130 MM HG: ICD-10-PCS | Mod: CPTII,S$GLB,, | Performed by: NURSE PRACTITIONER

## 2023-04-21 RX ORDER — DEXAMETHASONE 4 MG/1
8 TABLET ORAL EVERY 12 HOURS
Qty: 60 TABLET | Refills: 0 | Status: SHIPPED | OUTPATIENT
Start: 2023-04-21 | End: 2024-04-20

## 2023-04-21 RX ORDER — ONDANSETRON 8 MG/1
8 TABLET, ORALLY DISINTEGRATING ORAL EVERY 8 HOURS PRN
Qty: 40 TABLET | Refills: 3 | Status: SHIPPED | OUTPATIENT
Start: 2023-04-21 | End: 2023-06-26 | Stop reason: SDUPTHER

## 2023-04-21 RX ORDER — AZITHROMYCIN 250 MG/1
250 TABLET, FILM COATED ORAL
COMMUNITY
Start: 2023-02-15 | End: 2023-04-21 | Stop reason: ALTCHOICE

## 2023-04-21 RX ORDER — PROMETHAZINE HYDROCHLORIDE 12.5 MG/1
TABLET ORAL
Qty: 40 TABLET | Refills: 3 | Status: SHIPPED | OUTPATIENT
Start: 2023-04-21 | End: 2023-06-26 | Stop reason: SDUPTHER

## 2023-04-21 NOTE — PROGRESS NOTES
Subjective:      Name: Aleena Duckworht  : 1985  MRN: [id number]    CC:  Education    HPI:   Aleena Duckworth is a 37 y.o. female who presents to the clinic today with her  for education on treatment plan for a diagnosis of Breast cancer, left, Triple negative.  PMHx:  ADHD, Anemia, HTN, Stroke    History:  Implants for several years.  She self palpated a left-sided breast mass.  She felt it was growing fast and reported to her primary care physician who referred her for breast imaging.  On 2023, she underwent a bilateral breast mammogram that showed a lobular hypoechoic solid mass within the left breast at 2 o'clock position 7 cm was recommended.  On 2023, she underwent an ultrasound guided biopsy that showed invasive ductal carcinoma, grade 3, ER/NE/HER2 Randall negative, Ki-67 80%.     On 23, she underwent left partial mastectomy that revealed invasive ductal carcinoma 9 x 8 x 6 mm, single focus, approximately 1 mm focus of high-grade DCIS, closest margin 3 mm, anterior.  Two sentinel lymph nodes were negative.  pT1b pN0(sn).     Has ADHD, has been on Adderall for several years.  Was born prematurely, was diagnosed with a stroke and heart murmur at birth, was treated at [facility].     Heavy menorrhagia for the past few years, worsening recently and has developed CAROLIN.  Has been on iron supplementation.  Will see her gynecologist, Dr. Almita Clark with [facility].      She did have 1 visit with the medical oncology team at [facility].  Adjuvant chemotherapy was recommended.  Workup was ongoing for mild hyperbilirubinemia.  An abdominal ultrasound was done, negative for any abnormalities.    Gyn:  Menarche 13  . Age at first pregnancy 19  OCP use a year or so   Premenopausal, regular, menorrhagia.   LMP 21    Oncology History   Triple negative breast cancer   2023 Initial Diagnosis    Triple negative breast cancer       2023 Cancer Staged    Staging form: Breast, AJCC 8th  "Edition  - Pathologic stage from 4/18/2023: Stage IB (pT1b, pN0, cM0, G3, ER-, CA-, HER2-)       4/24/2023 -  Chemotherapy    Treatment Summary   Plan Name: OP BREAST TC - DOCETAXEL CYCLOPHOSPHAMIDE Q3W  Treatment Goal: Curative  Status: Active  Start Date: 4/24/2023 (Planned)  End Date: 6/27/2023 (Planned)  Provider: Ambar Ayala MD  Chemotherapy: cycloPHOSphamide 600 mg/m2 = 880 mg in sodium chloride 0.9% 254.4 mL chemo infusion, 600 mg/m2, Intravenous, Clinic/HOD 1 time, 0 of 4 cycles  DOCEtaxel (TAXOTERE) 75 mg/m2 = 110 mg in sodium chloride 0.9% 255.5 mL chemo infusion, 75 mg/m2, Intravenous, Clinic/HOD 1 time, 0 of 4 cycles              Past Medical History:   Diagnosis Date    ADHD (attention deficit hyperactivity disorder)     Anemia     Hypertension     pre eclampsia and eclampsia    Stroke     baby       Past Surgical History:   Procedure Laterality Date    BREAST SURGERY      TUBAL LIGATION      VAGINAL DELIVERY         Family History   Problem Relation Age of Onset    Hypertension Mother     Breast cancer Mother 51        negative screening    Heart disease Father     Diabetes Maternal Grandmother     Breast cancer Maternal Grandmother        Social History     Socioeconomic History    Marital status:    Tobacco Use    Smoking status: Some Days    Smokeless tobacco: Never   Substance and Sexual Activity    Alcohol use: Yes    Drug use: Never    Sexual activity: Yes     Partners: Male     Birth control/protection: See Surgical Hx     Comment: tubal       Review of patient's allergies indicates:  No Known Allergies    Review of Systems   All other systems reviewed and are negative.         Objective:     Vitals:    04/21/23 1309   BP: 91/69   Pulse: 84   Resp: 16   Temp: 97.7 °F (36.5 °C)   TempSrc: Temporal   SpO2: 99%   Weight: 49 kg (108 lb 2.2 oz)   Height: 5' 2" (1.575 m)        Physical Exam  Vitals reviewed.   Constitutional:       General: She is not in acute distress.  HENT:      Head: " Normocephalic.   Pulmonary:      Effort: Pulmonary effort is normal.   Neurological:      Mental Status: She is alert.   Psychiatric:         Behavior: Behavior normal.         Thought Content: Thought content normal.           Current Outpatient Medications on File Prior to Visit   Medication Sig    azelastine (OPTIVAR) 0.05 % ophthalmic solution azelastine 1 drop ophthalmic (eye)   2 times a day Until directed to stop    carboxymethylcellulose (REFRESH LIQUIGEL) 1 % ophthalmic solution Refresh Liquigel    cefdinir (OMNICEF) 300 MG capsule Take 300 mg by mouth every 12 (twelve) hours.    cetirizine 10 mg chewable tablet Take 10 mg by mouth.    cetirizine HCl/pseudoephedrine (ZYRTEC-D ORAL) Take by mouth.    dexAMETHasone (DECADRON) 4 MG Tab Take 2 tablets (8 mg total) by mouth every 12 (twelve) hours. Take 2 tab bid the day before chemo, 2 tab the night of chemo and 2 tab bid the day after chemo    dextroamphetamine-amphetamine (ADDERALL XR) 20 MG 24 hr capsule Take 1 capsule (20 mg total) by mouth every morning.    dextroamphetamine-amphetamine (ADDERALL) 12.5 MG tablet Take 12.5 mg by mouth.    dextroamphetamine-amphetamine (ADDERALL) 20 mg tablet Take 1 tablet by mouth.    dextroamphetamine/amphetamine (ADDERALL ORAL) Adderall    fluconazole (DIFLUCAN) 150 MG Tab Take 1 now and 1 in 1 week    fluticasone (FLONASE) 50 mcg/actuation nasal spray 1 spray by Each Nare route once daily.    ibuprofen (ADVIL,MOTRIN) 600 MG tablet ibuprofen    JUNEL FE 24 1 mg-20 mcg (24)/75 mg (4) per tablet Take 1 tablet by mouth.    montelukast (SINGULAIR) 10 mg tablet Take 1 tablet (10 mg total) by mouth once daily.    NINJACOF 12.5-12.5 mg/5 mL Liqd Take 10 mLs by mouth.    norethindrone (ORTHO MICRONOR) 0.35 mg tablet Take 1 tablet (0.35 mg total) by mouth once daily.    oxyCODONE-acetaminophen (PERCOCET)  mg per tablet Take 1 tablet by mouth every 6 (six) hours.    oxyCODONE-acetaminophen (PERCOCET) 5-325 mg per tablet Take  1 tablet by mouth every 6 (six) hours as needed.    valACYclovir (VALTREX) 500 MG tablet Take 500 mg by mouth.    XOFLUZA 40 mg tablet Take 40 mg by mouth once.    zolpidem (AMBIEN) 10 mg Tab Take 10 mg by mouth nightly as needed.     No current facility-administered medications on file prior to visit.       CBC:  Lab Results   Component Value Date    WBC 5.01 04/18/2023    HGB 12.1 04/18/2023    HCT 33.5 (L) 04/18/2023    MCV 86 04/18/2023     (L) 04/18/2023         CMP:  Sodium   Date Value Ref Range Status   04/18/2023 138 136 - 145 mmol/L Final     Potassium   Date Value Ref Range Status   04/18/2023 4.5 3.5 - 5.1 mmol/L Final     Chloride   Date Value Ref Range Status   04/18/2023 108 95 - 110 mmol/L Final     CO2   Date Value Ref Range Status   04/18/2023 21 (L) 23 - 29 mmol/L Final     Glucose   Date Value Ref Range Status   04/18/2023 91 70 - 110 mg/dL Final     BUN   Date Value Ref Range Status   04/18/2023 19 6 - 20 mg/dL Final     Creatinine   Date Value Ref Range Status   04/18/2023 0.8 0.5 - 1.4 mg/dL Final     Calcium   Date Value Ref Range Status   04/18/2023 9.6 8.7 - 10.5 mg/dL Final     Total Protein   Date Value Ref Range Status   04/18/2023 6.9 6.0 - 8.4 g/dL Final     Albumin   Date Value Ref Range Status   04/18/2023 4.7 3.5 - 5.2 g/dL Final     Total Bilirubin   Date Value Ref Range Status   04/18/2023 1.6 (H) 0.1 - 1.0 mg/dL Final     Comment:     For infants and newborns, interpretation of results should be based  on gestational age, weight and in agreement with clinical  observations.    Premature Infant recommended reference ranges:  Up to 24 hours.............<8.0 mg/dL  Up to 48 hours............<12.0 mg/dL  3-5 days..................<15.0 mg/dL  6-29 days.................<15.0 mg/dL       Alkaline Phosphatase   Date Value Ref Range Status   04/18/2023 46 (L) 55 - 135 U/L Final     AST   Date Value Ref Range Status   04/18/2023 21 10 - 40 U/L Final     ALT   Date Value Ref Range  Status   04/18/2023 28 10 - 44 U/L Final     Anion Gap   Date Value Ref Range Status   04/18/2023 9 8 - 16 mmol/L Final     eGFR if    Date Value Ref Range Status   06/08/2019 >60.0 >60 mL/min/1.73 m^2 Final     eGFR if non    Date Value Ref Range Status   06/08/2019 53.3 (A) >60 mL/min/1.73 m^2 Final     Comment:     Calculation used to obtain the estimated glomerular filtration  rate (eGFR) is the CKD-EPI equation.          X-Ray Chest PA And Lateral  Narrative: EXAMINATION:  XR CHEST PA AND LATERAL    CLINICAL HISTORY:  Chest Pain;    TECHNIQUE:  PA and lateral views of the chest were performed.    COMPARISON:  None.    FINDINGS:  There is no consolidation, effusion, or pneumothorax.    Cardiomediastinal silhouette is unremarkable.    Regional osseous structures are unremarkable.  Impression: No acute cardiopulmonary process.    Electronically signed by: Oleg Daly MD  Date:    06/09/2019  Time:    00:36       All pertinent labs and imaging reviewed.    Assessment:       1. Triple negative breast cancer         Plan:     Triple negative breast cancer         1.  Treatment plan of TC every 3 weeks x 4 cycles:  Udenyca on Day 2 discussed with patient.  2.  Handouts provided on chemotherapy agents & Dexamethasone administration.  Dexamethasone reviewed of 8 mg bid the day prior to chemo; 8 mg night of chemo & 8 mg bid the day after chemo.  3.  Discussed the mechanism of action, potential side effects of this treatment as well as ways to manage them at home.   4.  Dietary modifications discussed.  Detail instructions to be provided by dietician.   5.  Chemotherapy Portfolio supplied with contact information.   6.  Discussed follow-up with the physician for toxicity monitoring throughout treatment.    7.  Scripts were escribed (Zofran, ODT, Phenergan, Duke's, Dexamethasone) and explained for home use.    8.  Consented the patient to the treatment plan and the patient was educated  on the planned duration of the treatment and schedule of the treatment administration.     40 minutes were spent in coordination of patient's care, record review and counseling.

## 2023-04-21 NOTE — PROGRESS NOTES
Oncology Nutrition   New Patient Education  Aleena Duckworth   1985    Nutrition Education   This is a 37 y.o.female with a medical diagnosis of Triple negative breast cancer. She will begin IV Docetaxel Cyclophosphamide.     Met w/ pt & spouse Gen to discuss current nutritional status and nutrition as it relates to cancer and cancer treatment. Pt currently at low nutritional risk.     Wt Readings from Last 10 Encounters:   04/21/23 49 kg (108 lb 2.2 oz)   04/17/23 48.7 kg (107 lb 5.8 oz)   04/22/21 50 kg (110 lb 3.7 oz)   07/04/19 49.4 kg (109 lb)   01/26/18 46.7 kg (103 lb)   10/03/17 46.9 kg (103 lb 6.4 oz)   06/26/17 49 kg (108 lb)   03/20/17 48.5 kg (107 lb)   11/16/16 48.6 kg (107 lb 3.2 oz)   08/08/16 50.4 kg (111 lb 3.2 oz)      [x] PMHx reviewed  [x] Labs reviewed    Educated on food safety and common nutrition impact symptoms associated with chemotherapy treatment. Reinforced the importance of good hydration. Handouts provided.    Pt had no nutrition related questions.     Patient provided with dietitian contact number and advised to call with questions or make future appointment if further intervention is needed. RD to follow throughout tx prn.    Ronit Sim, JADON, LDN, [facility]  04/21/2023  2:29 PM

## 2023-04-21 NOTE — PROGRESS NOTES
Aleena Duckworth  37 y.o. is here to seek an integrative approach to discuss side effects related to breast cancer treatment. Aleena Duckworth  was referred by Dr. Ayala     HPI  Mrs. Duckworth is here today with her . She reports she noticed a lump in February and then was diagnosed with triple negative breast cancer. She had a partial mastectomy and will have a bilateral mastectomy with reconstruction at the end of the hear. She reports she sleeps well with Ambien, but has difficulty sleeping without it. She reports her stress level varies from moderate to high with daily life in addition to the diagnosis.   She has a good appetite and eats a healthy diet.     They recently  and Aleena has 2 children, a 7 year old and an 18 year old. 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       7 pillars Assessment    Sleep  How many hours of sleep per night? 8 hours  Do you have trouble falling asleep, staying asleep or waking up earlier than you need to? yes  Do you have daytime fatigue? yes  Do you need medication for sleep? yes Ambien as needed  Do you use any supplements or other interventions for sleep? no    Resilience  Rate your current level of stress- moderate to high  How do you manage stress?  Watches TV and reads    Purpose  Do you feel you have a vision or a life purpose? Yes    Environment  Any exposures:no known exposures    Spirituality-  Religion    Nutrition   Food allergies or sensitivities: yes rice and lactose  Do you adhere to a particular type of diet? no  Do you have any concerns with your eating habits? no    Exercise  How would you describe your physical activity level? low    Past Medical History  Past Medical History:   Diagnosis Date    ADHD (attention deficit hyperactivity disorder)     Anemia     Hypertension     pre eclampsia and eclampsia    Stroke     baby        Past Surgical History   Past Surgical History:   Procedure Laterality Date    BREAST SURGERY       TUBAL LIGATION      VAGINAL DELIVERY        Family History   Family History   Problem Relation Age of Onset    Hypertension Mother     Breast cancer Mother 51        negative screening    Heart disease Father     Diabetes Maternal Grandmother     Breast cancer Maternal Grandmother       Social History  Social History     Socioeconomic History    Marital status:    Tobacco Use    Smoking status: Some Days    Smokeless tobacco: Never    Tobacco comments:     vape   Substance and Sexual Activity    Alcohol use: Not Currently    Drug use: Never    Sexual activity: Yes     Partners: Male     Birth control/protection: See Surgical Hx     Comment: tubal      Allergies  Review of patient's allergies indicates:  No Known Allergies   Current Medications:    Current Outpatient Medications:     azelastine (OPTIVAR) 0.05 % ophthalmic solution, azelastine 1 drop ophthalmic (eye)   2 times a day Until directed to stop, Disp: , Rfl:     azithromycin (Z-SOBIA) 250 MG tablet, Take 250 mg by mouth., Disp: , Rfl:     carboxymethylcellulose (REFRESH LIQUIGEL) 1 % ophthalmic solution, Refresh Liquigel, Disp: , Rfl:     cefdinir (OMNICEF) 300 MG capsule, Take 300 mg by mouth every 12 (twelve) hours., Disp: , Rfl:     cetirizine 10 mg chewable tablet, Take 10 mg by mouth., Disp: , Rfl:     cetirizine HCl/pseudoephedrine (ZYRTEC-D ORAL), Take by mouth., Disp: , Rfl:     dexAMETHasone (DECADRON) 4 MG Tab, Take 2 tablets (8 mg total) by mouth every 12 (twelve) hours. Take 2 tab bid the day before chemo, 2 tab the night of chemo and 2 tab bid the day after chemo, Disp: 60 tablet, Rfl: 0    dextroamphetamine-amphetamine (ADDERALL XR) 20 MG 24 hr capsule, Take 1 capsule (20 mg total) by mouth every morning., Disp: 30 capsule, Rfl: 0    dextroamphetamine-amphetamine (ADDERALL) 12.5 MG tablet, Take 12.5 mg by mouth., Disp: , Rfl:     dextroamphetamine-amphetamine (ADDERALL) 20 mg tablet, Take 1 tablet by mouth., Disp: , Rfl:      dextroamphetamine/amphetamine (ADDERALL ORAL), Adderall, Disp: , Rfl:     duke's soln (benadryl 30 mL, mylanta 30 mL, LIDOcaine 30 mL, nystatin 30 mL) 120mL, Take 10 mLs by mouth 4 (four) times daily., Disp: 120 mL, Rfl: 0    fluconazole (DIFLUCAN) 150 MG Tab, Take 1 now and 1 in 1 week, Disp: , Rfl:     fluticasone (FLONASE) 50 mcg/actuation nasal spray, 1 spray by Each Nare route once daily., Disp: 16 g, Rfl: 1    ibuprofen (ADVIL,MOTRIN) 600 MG tablet, ibuprofen, Disp: , Rfl:     JUNEL FE 24 1 mg-20 mcg (24)/75 mg (4) per tablet, Take 1 tablet by mouth., Disp: , Rfl:     montelukast (SINGULAIR) 10 mg tablet, Take 1 tablet (10 mg total) by mouth once daily., Disp: 30 tablet, Rfl: 2    NINJACOF 12.5-12.5 mg/5 mL Liqd, Take 10 mLs by mouth., Disp: , Rfl:     norethindrone (ORTHO MICRONOR) 0.35 mg tablet, Take 1 tablet (0.35 mg total) by mouth once daily., Disp: 90 tablet, Rfl: 3    ondansetron (ZOFRAN-ODT) 8 MG TbDL, Take 1 tablet (8 mg total) by mouth every 8 (eight) hours as needed (nausea)., Disp: 40 tablet, Rfl: 3    oxyCODONE-acetaminophen (PERCOCET)  mg per tablet, Take 1 tablet by mouth every 6 (six) hours., Disp: , Rfl:     oxyCODONE-acetaminophen (PERCOCET) 5-325 mg per tablet, Take 1 tablet by mouth every 6 (six) hours as needed., Disp: , Rfl:     promethazine (PHENERGAN) 12.5 MG Tab, (Take 1-2 tabs every 6 hours as needed for nausea persistent despite zofran), Disp: 40 tablet, Rfl: 3    valACYclovir (VALTREX) 500 MG tablet, Take 500 mg by mouth., Disp: , Rfl:     XOFLUZA 40 mg tablet, Take 40 mg by mouth once., Disp: , Rfl:     zolpidem (AMBIEN) 10 mg Tab, Take 10 mg by mouth nightly as needed., Disp: , Rfl:      Review of Systems  Review of Systems   Constitutional: Negative.    HENT: Negative.     Eyes: Negative.    Respiratory: Negative.     Cardiovascular: Negative.    Gastrointestinal: Negative.    Genitourinary: Negative.    Musculoskeletal: Negative.    Skin: Negative.    Neurological:  "Negative.    Endo/Heme/Allergies: Negative.    Psychiatric/Behavioral:  The patient is nervous/anxious and has insomnia.       Physical Exam      Vitals:    04/21/23 1426   BP: 91/69   Pulse: 84   Resp: 16   Temp: 97.7 °F (36.5 °C)   SpO2: 99%   Weight: 49.4 kg (109 lb)   Height: 5' 2" (1.575 m)    Body mass index is 19.94 kg/m².  Physical Exam  Vitals reviewed.   Constitutional:       Appearance: Normal appearance.   Neurological:      Mental Status: She is alert.   Psychiatric:         Mood and Affect: Mood normal.         Behavior: Behavior normal.        ASSESSMENT :  1. Anxiety about health    2. Triple negative breast cancer    3. Insomnia, unspecified type        PLAN:  Reviewed all information discussed at today's visit and all questions were answered.    Counseled on healthy lifestyle and behavior modifications: Encouraged an increase in physical activity walking 10-15 minutes 3-5 days a week with the goals of increasing.   Referral to Acupuncture I discussed Acupuncture with Aleena Duckworth .    See Nutrition as needed during treatment  I discussed and recommended the following support services:  Tim Chi and Yoga   Music and Relaxation therapy   Meditation  [facility] appt scheduled    Follow up with Integrative Services       I spent a total of 43 minutes on the day of the visit.This includes face to face time and non-face to face time preparing to see the patient (eg, review of tests), obtaining and/or reviewing separately obtained history, documenting clinical information in the electronic or other health record, independently interpreting results and communicating results to the patient/family/caregiver, or care coordinator.    "

## 2023-04-21 NOTE — PROGRESS NOTES
Oncology   Chemotherapy School Education  Aleena Duckworth   1985    Social Service Education   This is a 37 y.o.female with a medical diagnosis of triple negative breast cancer. Pt said she is feeling nervous and overwhelmed. She is anxious to get started with treatment. SW provided emotional support and also educated pt and  on the availability of psychology here if needed.     Current Support System: Pt is here with her  Gen. Pt did not report any concerns with getting to and from her treatments.     Met with pt for new pt education. Reviewed role of  during cancer treatment. Educated on role of SW on care team and resources available at the cancer center.     Answered all psychosocial/socioeconomic related questions. 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 She said they have not work since the end of 2022. She is currently receiving unemployment. She does have insurance thorough her employer and expects it to last though the end of the year. Her husbands insurance is her secondary coverage. She is concerned the secondary coverage may end before her  treatments are finished. AYSE provided them information about financial counselor here and encouraged them to meet with Rachaeltt to address concerns and gather answers to questions they have. SW provided them with a card her her and showed pt the location of her office.     Patient provided with social contact number and advised to call with questions or make future appointment if further intervention is needed. AYSE to follow throughout tx.    Rylee Wright, LATOYA  04/21/2023  3:06 PM

## 2023-04-24 VITALS
RESPIRATION RATE: 16 BRPM | BODY MASS INDEX: 20.06 KG/M2 | HEART RATE: 84 BPM | OXYGEN SATURATION: 99 % | SYSTOLIC BLOOD PRESSURE: 91 MMHG | HEIGHT: 62 IN | TEMPERATURE: 98 F | WEIGHT: 109 LBS | DIASTOLIC BLOOD PRESSURE: 69 MMHG

## 2023-04-24 PROBLEM — G47.00 INSOMNIA: Status: ACTIVE | Noted: 2023-04-24

## 2023-04-24 PROBLEM — R45.89 ANXIETY ABOUT HEALTH: Status: ACTIVE | Noted: 2023-04-24

## 2023-04-27 ENCOUNTER — LAB VISIT (OUTPATIENT)
Dept: LAB | Facility: HOSPITAL | Age: 38
End: 2023-04-27
Attending: INTERNAL MEDICINE
Payer: COMMERCIAL

## 2023-04-27 ENCOUNTER — OFFICE VISIT (OUTPATIENT)
Dept: HEMATOLOGY/ONCOLOGY | Facility: CLINIC | Age: 38
End: 2023-04-27
Payer: COMMERCIAL

## 2023-04-27 ENCOUNTER — CLINICAL SUPPORT (OUTPATIENT)
Dept: HEMATOLOGY/ONCOLOGY | Facility: CLINIC | Age: 38
End: 2023-04-27
Payer: COMMERCIAL

## 2023-04-27 VITALS
OXYGEN SATURATION: 100 % | DIASTOLIC BLOOD PRESSURE: 60 MMHG | HEART RATE: 84 BPM | RESPIRATION RATE: 16 BRPM | SYSTOLIC BLOOD PRESSURE: 104 MMHG | WEIGHT: 108 LBS | TEMPERATURE: 98 F | HEIGHT: 62 IN | BODY MASS INDEX: 19.88 KG/M2

## 2023-04-27 DIAGNOSIS — C50.919 TRIPLE NEGATIVE BREAST CANCER: ICD-10-CM

## 2023-04-27 DIAGNOSIS — C50.919 TRIPLE NEGATIVE BREAST CANCER: Primary | ICD-10-CM

## 2023-04-27 DIAGNOSIS — E80.6 HYPERBILIRUBINEMIA: ICD-10-CM

## 2023-04-27 DIAGNOSIS — R45.89 ANXIETY ABOUT HEALTH: ICD-10-CM

## 2023-04-27 LAB
ALBUMIN SERPL BCP-MCNC: 4.5 G/DL (ref 3.5–5.2)
ALP SERPL-CCNC: 41 U/L (ref 55–135)
ALT SERPL W/O P-5'-P-CCNC: 25 U/L (ref 10–44)
ANION GAP SERPL CALC-SCNC: 9 MMOL/L (ref 8–16)
AST SERPL-CCNC: 20 U/L (ref 10–40)
B-HCG UR QL: NEGATIVE
BASOPHILS # BLD AUTO: 0.01 K/UL (ref 0–0.2)
BASOPHILS NFR BLD: 0.2 % (ref 0–1.9)
BILIRUB SERPL-MCNC: 1.4 MG/DL (ref 0.1–1)
BUN SERPL-MCNC: 15 MG/DL (ref 6–20)
CALCIUM SERPL-MCNC: 9.3 MG/DL (ref 8.7–10.5)
CHLORIDE SERPL-SCNC: 109 MMOL/L (ref 95–110)
CO2 SERPL-SCNC: 21 MMOL/L (ref 23–29)
CREAT SERPL-MCNC: 0.8 MG/DL (ref 0.5–1.4)
DIFFERENTIAL METHOD: ABNORMAL
EOSINOPHIL # BLD AUTO: 0.1 K/UL (ref 0–0.5)
EOSINOPHIL NFR BLD: 1.5 % (ref 0–8)
ERYTHROCYTE [DISTWIDTH] IN BLOOD BY AUTOMATED COUNT: 12.6 % (ref 11.5–14.5)
EST. GFR  (NO RACE VARIABLE): >60 ML/MIN/1.73 M^2
GLUCOSE SERPL-MCNC: 81 MG/DL (ref 70–110)
HCT VFR BLD AUTO: 32.4 % (ref 37–48.5)
HGB BLD-MCNC: 11.3 G/DL (ref 12–16)
IMM GRANULOCYTES # BLD AUTO: 0.01 K/UL (ref 0–0.04)
IMM GRANULOCYTES NFR BLD AUTO: 0.2 % (ref 0–0.5)
LDH SERPL L TO P-CCNC: 134 U/L (ref 110–260)
LYMPHOCYTES # BLD AUTO: 1.7 K/UL (ref 1–4.8)
LYMPHOCYTES NFR BLD: 36.5 % (ref 18–48)
MCH RBC QN AUTO: 30.3 PG (ref 27–31)
MCHC RBC AUTO-ENTMCNC: 34.9 G/DL (ref 32–36)
MCV RBC AUTO: 87 FL (ref 82–98)
MONOCYTES # BLD AUTO: 0.4 K/UL (ref 0.3–1)
MONOCYTES NFR BLD: 9.3 % (ref 4–15)
NEUTROPHILS # BLD AUTO: 2.4 K/UL (ref 1.8–7.7)
NEUTROPHILS NFR BLD: 52.3 % (ref 38–73)
NRBC BLD-RTO: 0 /100 WBC
PLATELET # BLD AUTO: 130 K/UL (ref 150–450)
PMV BLD AUTO: 9.6 FL (ref 9.2–12.9)
POTASSIUM SERPL-SCNC: 4.2 MMOL/L (ref 3.5–5.1)
PROT SERPL-MCNC: 6.6 G/DL (ref 6–8.4)
RBC # BLD AUTO: 3.73 M/UL (ref 4–5.4)
SODIUM SERPL-SCNC: 139 MMOL/L (ref 136–145)
WBC # BLD AUTO: 4.6 K/UL (ref 3.9–12.7)

## 2023-04-27 PROCEDURE — 3008F PR BODY MASS INDEX (BMI) DOCUMENTED: ICD-10-PCS | Mod: CPTII,S$GLB,, | Performed by: INTERNAL MEDICINE

## 2023-04-27 PROCEDURE — 1159F PR MEDICATION LIST DOCUMENTED IN MEDICAL RECORD: ICD-10-PCS | Mod: CPTII,S$GLB,, | Performed by: INTERNAL MEDICINE

## 2023-04-27 PROCEDURE — 3008F BODY MASS INDEX DOCD: CPT | Mod: CPTII,S$GLB,, | Performed by: INTERNAL MEDICINE

## 2023-04-27 PROCEDURE — 99999 PR PBB SHADOW E&M-EST. PATIENT-LVL IV: CPT | Mod: PBBFAC,,, | Performed by: INTERNAL MEDICINE

## 2023-04-27 PROCEDURE — 3078F DIAST BP <80 MM HG: CPT | Mod: CPTII,S$GLB,, | Performed by: INTERNAL MEDICINE

## 2023-04-27 PROCEDURE — 83615 LACTATE (LD) (LDH) ENZYME: CPT | Mod: PN | Performed by: INTERNAL MEDICINE

## 2023-04-27 PROCEDURE — 99999 PR PBB SHADOW E&M-EST. PATIENT-LVL IV: ICD-10-PCS | Mod: PBBFAC,,, | Performed by: INTERNAL MEDICINE

## 2023-04-27 PROCEDURE — 3078F PR MOST RECENT DIASTOLIC BLOOD PRESSURE < 80 MM HG: ICD-10-PCS | Mod: CPTII,S$GLB,, | Performed by: INTERNAL MEDICINE

## 2023-04-27 PROCEDURE — 3074F PR MOST RECENT SYSTOLIC BLOOD PRESSURE < 130 MM HG: ICD-10-PCS | Mod: CPTII,S$GLB,, | Performed by: INTERNAL MEDICINE

## 2023-04-27 PROCEDURE — 36415 COLL VENOUS BLD VENIPUNCTURE: CPT | Mod: PN | Performed by: INTERNAL MEDICINE

## 2023-04-27 PROCEDURE — 80053 COMPREHEN METABOLIC PANEL: CPT | Mod: PN | Performed by: INTERNAL MEDICINE

## 2023-04-27 PROCEDURE — 3074F SYST BP LT 130 MM HG: CPT | Mod: CPTII,S$GLB,, | Performed by: INTERNAL MEDICINE

## 2023-04-27 PROCEDURE — 1159F MED LIST DOCD IN RCRD: CPT | Mod: CPTII,S$GLB,, | Performed by: INTERNAL MEDICINE

## 2023-04-27 PROCEDURE — 99215 OFFICE O/P EST HI 40 MIN: CPT | Mod: S$GLB,,, | Performed by: INTERNAL MEDICINE

## 2023-04-27 PROCEDURE — 85025 COMPLETE CBC W/AUTO DIFF WBC: CPT | Mod: PN | Performed by: INTERNAL MEDICINE

## 2023-04-27 PROCEDURE — 81025 URINE PREGNANCY TEST: CPT | Mod: PN | Performed by: INTERNAL MEDICINE

## 2023-04-27 PROCEDURE — 99215 PR OFFICE/OUTPT VISIT, EST, LEVL V, 40-54 MIN: ICD-10-PCS | Mod: S$GLB,,, | Performed by: INTERNAL MEDICINE

## 2023-04-27 NOTE — PROGRESS NOTES
2:38 PM    [facility] and her , Gen for a wig appointment.     The pt tried on several wigs and chose one she and her  like on her. She also was provided with a chemo beanie and a donated beanie.    The pt was in good spirits and reported no other needs at this time.

## 2023-04-27 NOTE — PROGRESS NOTES
PROGRESS NOTE    Subjective:       Patient ID: Aleena Duckworth is a 37 y.o. female.  MRN: [id number]  : 1985    Chief Complaint: No chief complaint on file.      History of Present Illness:   Aleena Duckworth is a 37 y.o. female who is referred for newly diagnosed TNBC.  So far, she has been treated at [facility] and is establishing care with me due to proximity.     As previously documented, she self palpated a left-sided breast mass.  She felt it was growing fast and reported to her primary care physician who referred her for breast imaging.  On 2023, she underwent a bilateral breast mammogram that showed a lobular hypoechoic solid mass within the left breast at 2 o'clock position 7 cm was recommended.  On 2023, she underwent an ultrasound guided biopsy that showed invasive ductal carcinoma, grade 3, ER/FL/HER2 Randall negative, Ki-67 80%.     On 23, she underwent left partial mastectomy that revealed invasive ductal carcinoma 9 x 8 x 6 mm, single focus, approximately 1 mm focus of high-grade DCIS, closest margin 3 mm, anterior.  Two sentinel lymph nodes were negative.  pT1b pN0(sn).     Has ADHD, has been on Adderall for several years.  Was born prematurely, was diagnosed with a stroke and heart murmur at birth, was treated at [facility].     Heavy menorrhagia for the past few years, worsening recently and has developed CAROLIN.  Has been on iron supplementation.  Will see her gynecologist, Dr. Almita Clark with [facility].      She did have 1 visit with the medical oncology team at [facility].  Adjuvant chemotherapy was recommended.  Workup was ongoing for mild hyperbilirubinemia.  An abdominal ultrasound was done, negative for any abnormalities.     Gyn:  Menarche 13  . Age at first pregnancy 19  OCP use a year or so   Premenopausal, regular, menorrhagia.   LMP 21    Interim history:  Feels like she is about to start her period. Is concerned  about chemo side effects as she generally feels unwell during her period but is willing to start on Monday.   Saw Hepatology last week who suspected she has Gilbert's disease and found no contraindication to proceeding with TC. Awaiting records.        Oncology History:  Oncology History   Triple negative breast cancer   4/17/2023 Initial Diagnosis    Triple negative breast cancer       4/18/2023 Cancer Staged    Staging form: Breast, AJCC 8th Edition  - Pathologic stage from 4/18/2023: Stage IB (pT1b, pN0, cM0, G3, ER-, VA-, HER2-)       4/24/2023 -  Chemotherapy    Treatment Summary   Plan Name: OP BREAST TC - DOCETAXEL CYCLOPHOSPHAMIDE Q3W  Treatment Goal: Curative  Status: Active  Start Date: 4/24/2023 (Planned)  End Date: 6/27/2023 (Planned)  Provider: Ambar Ayala MD  Chemotherapy: cycloPHOSphamide 600 mg/m2 = 880 mg in sodium chloride 0.9% 254.4 mL chemo infusion, 600 mg/m2, Intravenous, Clinic/HOD 1 time, 0 of 4 cycles  DOCEtaxel (TAXOTERE) 75 mg/m2 = 110 mg in sodium chloride 0.9% 255.5 mL chemo infusion, 75 mg/m2, Intravenous, Clinic/HOD 1 time, 0 of 4 cycles           History:  Past Medical History:   Diagnosis Date    ADHD (attention deficit hyperactivity disorder)     Anemia     Hypertension     pre eclampsia and eclampsia    Stroke     baby      Past Surgical History:   Procedure Laterality Date    BREAST SURGERY      TUBAL LIGATION      VAGINAL DELIVERY       Family History   Problem Relation Age of Onset    Hypertension Mother     Breast cancer Mother 51        negative screening    Heart disease Father     Diabetes Maternal Grandmother     Breast cancer Maternal Grandmother       Social History     Tobacco Use    Smoking status: Some Days    Smokeless tobacco: Never    Tobacco comments:     vape   Substance and Sexual Activity    Alcohol use: Not Currently    Drug use: Never    Sexual activity: Yes     Partners: Male     Birth control/protection: See Surgical Hx     Comment: tubal        ROS:  "  Review of Systems   Constitutional:  Negative for fever, malaise/fatigue and weight loss.   HENT:  Negative for congestion, hearing loss, nosebleeds and sore throat.    Eyes:  Negative for double vision and photophobia.   Respiratory:  Negative for cough, hemoptysis, sputum production, shortness of breath and wheezing.    Cardiovascular:  Negative for chest pain, palpitations, orthopnea and leg swelling.   Gastrointestinal:  Negative for abdominal pain, blood in stool, constipation, diarrhea, heartburn, nausea and vomiting.   Genitourinary:  Negative for dysuria, hematuria and urgency.   Musculoskeletal:  Negative for back pain, joint pain and myalgias.   Skin:  Negative for itching and rash.   Neurological:  Negative for dizziness, tingling, seizures, weakness and headaches.   Endo/Heme/Allergies:  Negative for polydipsia. Does not bruise/bleed easily.   Psychiatric/Behavioral:  Negative for depression and memory loss. The patient has insomnia. The patient is not nervous/anxious.       Objective:     Vitals:    04/27/23 1306   BP: 104/60   Pulse: 84   Resp: 16   Temp: 97.7 °F (36.5 °C)   TempSrc: Temporal   SpO2: 100%   Weight: 49 kg (108 lb 0.4 oz)   Height: 5' 2" (1.575 m)   PainSc: 0-No pain         Physical Examination:   Physical Exam  Vitals and nursing note reviewed.   Constitutional:       General: She is not in acute distress.     Appearance: She is not diaphoretic.   HENT:      Head: Normocephalic.      Mouth/Throat:      Pharynx: No oropharyngeal exudate.   Eyes:      General: No scleral icterus.     Conjunctiva/sclera: Conjunctivae normal.   Neck:      Thyroid: No thyromegaly.   Cardiovascular:      Rate and Rhythm: Normal rate and regular rhythm.      Heart sounds: Normal heart sounds. No murmur heard.  Pulmonary:      Effort: Pulmonary effort is normal. No respiratory distress.      Breath sounds: No stridor. No wheezing or rales.   Chest:      Chest wall: No tenderness.      Comments: Bilateral " breast implants.  Status post left lumpectomy, healed surgical incisions  Abdominal:      General: Bowel sounds are normal. There is no distension.      Palpations: Abdomen is soft. There is no mass.      Tenderness: There is no abdominal tenderness. There is no rebound.   Musculoskeletal:         General: No tenderness or deformity. Normal range of motion.      Cervical back: Neck supple.   Lymphadenopathy:      Cervical: No cervical adenopathy.   Skin:     General: Skin is warm and dry.      Findings: No erythema or rash.   Neurological:      Mental Status: She is alert and oriented to person, place, and time.      Cranial Nerves: No cranial nerve deficit.      Coordination: Coordination normal.      Gait: Gait is intact.   Psychiatric:         Mood and Affect: Affect normal.         Cognition and Memory: Memory normal.         Judgment: Judgment normal.        Diagnostic Tests:  Significant Imaging: I have reviewed and interpreted all pertinent imaging results/findings.  CT Chest Without Contrast No results found for this or any previous visit.    CT Chest With ContrastNo results found for this or any previous visit.    CT Abdomen/Pelvis Without Contrast No results found for this or any previous visit.     CT Abdomen/Pelvis With Contrast No results found for this or any previous visit.    CT Abdomen/Pelvis With Without Contrast No results found for this or any previous visit.     PET Scan No results found for this or any previous visit.      Laboratory Data:  All pertinent labs have been reviewed.    Labs:   Lab Results   Component Value Date    WBC 4.60 04/27/2023    HGB 11.3 (L) 04/27/2023    HCT 32.4 (L) 04/27/2023    MCV 87 04/27/2023     (L) 04/27/2023       Assessment/Plan:   Triple negative breast cancer  pT1b pN0 (sn) ER/IN/HER2 Randall negative, Ki-67 80%, grade 3    37-year-old premenopausal woman, presenting with early stage triple negative breast cancer.  She is a candidate for adjuvant  chemotherapy.  NCCN guidelines in different chemotherapy regimens were reviewed with the patient.  She is appropriate for and would prefer docetaxel and Cytoxan rather than Adriamycin, Cytoxan and Taxol to avoid cardiotoxicity and the rare risk of potential secondary cancers.  Her history of a childhood murmur in stroke was also discussed.    We did discuss that liver dysfunction is a contraindication to using docetaxel.  She has chronic mild hyperbilirubinemia, most likely clinically consistent with Gilbert syndrome.   Liver ultrasound results are reviewed, normal.    Will obtain Hepatology records. Bilirubin is 1.4 today.  Will proceed and monitor closely.   She has completed chemo school. Went over supportive care again.    Chemotherapy can be given via peripheral line.      Hyperbilirubinemia  As above.  Continue to monitor bilirubin and liver function closely.    Thrombocytopenia  Mild and isolated thrombocytopenia noted.  Check peripheral smear. No hemolysis noted on work up. Monitor while on chemotherapy.     Iron deficiency anemia due to chronic blood loss  History of menorrhagia noted.  Obtain baseline iron panel and offer IV iron as clinically indicated.  Iron panel is normal at this time.      ECOG SCORE    0 - Fully active-able to carry on all pre-disease performance without restriction           Discussion:   No follow-ups on file.    Plan was discussed with the patient at length, and she verbalized understanding. Aleena was given an opportunity to ask questions that were answered to her satisfaction, and she was advised to call in the interval if any problems or questions arise.    Electronically signed by Ambar Ayala MD    Route Chart for Scheduling    Med Onc Chart Routing      Follow up with physician . 5/11,5/18   Follow up with NUNO    Infusion scheduling note 5/22   Injection scheduling note 5/23   Labs CBC and CMP   Scheduling:  Preferred lab:  Lab interval:  b hcg, labs today, 5/11 and 5/18    Imaging    Pharmacy appointment    Other referrals           Treatment Plan Information   OP BREAST TC - DOCETAXEL CYCLOPHOSPHAMIDE Q3W   Ambar Ayala MD   Upcoming Treatment Dates - OP BREAST TC - DOCETAXEL CYCLOPHOSPHAMIDE Q3W    4/24/2023       Pre-Medications       palonosetron 0.25mg/dexAMETHasone 10mg in NS IVPB 0.25 mg 50 mL       aprepitant (CINVANTI) injection 130 mg       Chemotherapy       DOCEtaxel (TAXOTERE) 75 mg/m2 = 110 mg in sodium chloride 0.9% 255.5 mL chemo infusion       cycloPHOSphamide 600 mg/m2 = 880 mg in sodium chloride 0.9% 254.4 mL chemo infusion  4/25/2023       Growth Factor       pegfilgrastim-cbqv (UDENYCA) injection 6 mg  5/15/2023       Pre-Medications       palonosetron 0.25mg/dexAMETHasone 10mg in NS IVPB 0.25 mg 50 mL       aprepitant (CINVANTI) injection 130 mg       Chemotherapy       DOCEtaxel (TAXOTERE) 75 mg/m2 = 110 mg in sodium chloride 0.9% 255.5 mL chemo infusion       cycloPHOSphamide 600 mg/m2 = 880 mg in sodium chloride 0.9% 254.4 mL chemo infusion  5/16/2023       Growth Factor       pegfilgrastim-cbqv (UDENYCA) injection 6 mg

## 2023-04-28 ENCOUNTER — PATIENT MESSAGE (OUTPATIENT)
Dept: ADMINISTRATIVE | Facility: OTHER | Age: 38
End: 2023-04-28
Payer: COMMERCIAL

## 2023-05-01 ENCOUNTER — INFUSION (OUTPATIENT)
Dept: INFUSION THERAPY | Facility: HOSPITAL | Age: 38
End: 2023-05-01
Attending: INTERNAL MEDICINE
Payer: COMMERCIAL

## 2023-05-01 ENCOUNTER — PATIENT MESSAGE (OUTPATIENT)
Dept: ADMINISTRATIVE | Facility: OTHER | Age: 38
End: 2023-05-01
Payer: COMMERCIAL

## 2023-05-01 ENCOUNTER — DOCUMENTATION ONLY (OUTPATIENT)
Dept: INFUSION THERAPY | Facility: HOSPITAL | Age: 38
End: 2023-05-01
Payer: COMMERCIAL

## 2023-05-01 VITALS
DIASTOLIC BLOOD PRESSURE: 63 MMHG | HEART RATE: 86 BPM | BODY MASS INDEX: 20.08 KG/M2 | SYSTOLIC BLOOD PRESSURE: 93 MMHG | HEIGHT: 62 IN | WEIGHT: 109.13 LBS

## 2023-05-01 DIAGNOSIS — C50.919 TRIPLE NEGATIVE BREAST CANCER: Primary | ICD-10-CM

## 2023-05-01 PROCEDURE — 96367 TX/PROPH/DG ADDL SEQ IV INF: CPT | Mod: PN

## 2023-05-01 PROCEDURE — 25000003 PHARM REV CODE 250: Mod: PN | Performed by: INTERNAL MEDICINE

## 2023-05-01 PROCEDURE — 63600175 PHARM REV CODE 636 W HCPCS: Mod: PN | Performed by: INTERNAL MEDICINE

## 2023-05-01 PROCEDURE — 96413 CHEMO IV INFUSION 1 HR: CPT | Mod: PN

## 2023-05-01 PROCEDURE — 96415 CHEMO IV INFUSION ADDL HR: CPT | Mod: PN

## 2023-05-01 RX ORDER — HEPARIN 100 UNIT/ML
500 SYRINGE INTRAVENOUS
Status: CANCELLED | OUTPATIENT
Start: 2023-05-01

## 2023-05-01 RX ORDER — SODIUM CHLORIDE 0.9 % (FLUSH) 0.9 %
10 SYRINGE (ML) INJECTION
Status: CANCELLED | OUTPATIENT
Start: 2023-05-01

## 2023-05-01 RX ORDER — HEPARIN 100 UNIT/ML
500 SYRINGE INTRAVENOUS
Status: DISCONTINUED | OUTPATIENT
Start: 2023-05-01 | End: 2023-05-01 | Stop reason: HOSPADM

## 2023-05-01 RX ORDER — SODIUM CHLORIDE 0.9 % (FLUSH) 0.9 %
10 SYRINGE (ML) INJECTION
Status: DISCONTINUED | OUTPATIENT
Start: 2023-05-01 | End: 2023-05-01 | Stop reason: HOSPADM

## 2023-05-01 RX ADMIN — DOCETAXEL ANHYDROUS 110 MG: 20 INJECTION, SOLUTION INTRAVENOUS at 02:05

## 2023-05-01 RX ADMIN — PALONOSETRON 0.25 MG: 0.05 INJECTION, SOLUTION INTRAVENOUS at 01:05

## 2023-05-01 RX ADMIN — SODIUM CHLORIDE: 9 INJECTION, SOLUTION INTRAVENOUS at 01:05

## 2023-05-01 RX ADMIN — CYCLOPHOSPHAMIDE 880 MG: 200 INJECTION, SOLUTION INTRAVENOUS at 03:05

## 2023-05-01 RX ADMIN — APREPITANT 130 MG: 130 INJECTION, EMULSION INTRAVENOUS at 01:05

## 2023-05-01 NOTE — PROGRESS NOTES
Oncology Nutrition   Chemotherapy Infusion Visit    Nutrition Follow Up   RD spoke with pt at chairside for f/u after chemo-school. Pt has read through all handouts and is aware of food safety pre-cautions. She asked if she has to avoid prepackaged deli meats. RD explained the newer guidelines by [employer] are to not consume even prepackaged meats as a safety precaution. Pt can eat at own risk and should really avoid if WBC is low. She understands importance of hydration and is consuming fluids during visit. She states her mother in law has lots of recipes and offering to help with meal planning. No other questions.     Wt Readings from Last 10 Encounters:   05/01/23 49.5 kg (109 lb 2 oz)   04/27/23 49 kg (108 lb 0.4 oz)   04/21/23 49.4 kg (109 lb)   04/21/23 49 kg (108 lb 2.2 oz)   04/17/23 48.7 kg (107 lb 5.8 oz)   04/22/21 50 kg (110 lb 3.7 oz)   07/04/19 49.4 kg (109 lb)   01/26/18 46.7 kg (103 lb)   10/03/17 46.9 kg (103 lb 6.4 oz)   06/26/17 49 kg (108 lb)     Will continue to monitor prn throughout treatment.     Ronit Sim RDN, LDN, [facility]  05/01/2023  3:35 PM

## 2023-05-01 NOTE — PROGRESS NOTES
4:16 PM    This LCSW met with this pt and her  at chairside to check in after the initiation of treatment. The pt reported being sleepy due to the anit-nausea medication, however was still interested in looking at some donated scarves and beanies.  The pt and her  were in good spirits and this  left the beanies with them to look through and choose some while she is receiving her treatment.  The pt reported no needs at this time.

## 2023-05-01 NOTE — PLAN OF CARE
Problem: Adult Inpatient Plan of Care  Goal: Plan of Care Review  Outcome: Ongoing, Progressing  Flowsheets (Taken 5/1/2023 1400)  Plan of Care Reviewed With:   patient   spouse  Goal: Patient-Specific Goal (Individualized)  Outcome: Ongoing, Progressing  Flowsheets (Taken 5/1/2023 1400)  Anxieties, Fears or Concerns: None  Individualized Care Needs: Recliner, warm blanket,  at chairside, education     Problem: Fatigue  Goal: Improved Activity Tolerance  5/1/2023 1510 by Tati Pa RN  Outcome: Ongoing, Progressing  5/1/2023 1507 by Tati Pa RN  Outcome: Ongoing, Progressing  Intervention: Promote Improved Energy  Flowsheets (Taken 5/1/2023 1400)  Fatigue Management:   frequent rest breaks encouraged   paced activity encouraged  Sleep/Rest Enhancement: regular sleep/rest pattern promoted  Activity Management: Ambulated -L4    Patient to Infusion for Taxotere and Cytoxan, accompanied by her . Treatment plan reviewed with patient. VSS. Tolerated infusion. Provided with copy of upcoming appointment schedule. Escorted to the front lobby for discharge to home in no distress.

## 2023-05-02 ENCOUNTER — NURSE TRIAGE (OUTPATIENT)
Dept: ADMINISTRATIVE | Facility: CLINIC | Age: 38
End: 2023-05-02
Payer: COMMERCIAL

## 2023-05-02 ENCOUNTER — INFUSION (OUTPATIENT)
Dept: INFUSION THERAPY | Facility: HOSPITAL | Age: 38
End: 2023-05-02
Attending: INTERNAL MEDICINE
Payer: COMMERCIAL

## 2023-05-02 ENCOUNTER — PATIENT MESSAGE (OUTPATIENT)
Dept: ADMINISTRATIVE | Facility: OTHER | Age: 38
End: 2023-05-02
Payer: COMMERCIAL

## 2023-05-02 ENCOUNTER — TELEPHONE (OUTPATIENT)
Dept: HEMATOLOGY/ONCOLOGY | Facility: CLINIC | Age: 38
End: 2023-05-02
Payer: COMMERCIAL

## 2023-05-02 ENCOUNTER — PATIENT MESSAGE (OUTPATIENT)
Dept: HEMATOLOGY/ONCOLOGY | Facility: CLINIC | Age: 38
End: 2023-05-02
Payer: COMMERCIAL

## 2023-05-02 VITALS
DIASTOLIC BLOOD PRESSURE: 69 MMHG | WEIGHT: 108 LBS | HEART RATE: 72 BPM | RESPIRATION RATE: 16 BRPM | BODY MASS INDEX: 19.76 KG/M2 | SYSTOLIC BLOOD PRESSURE: 102 MMHG | TEMPERATURE: 98 F

## 2023-05-02 DIAGNOSIS — C50.919 TRIPLE NEGATIVE BREAST CANCER: Primary | ICD-10-CM

## 2023-05-02 PROCEDURE — 96372 THER/PROPH/DIAG INJ SC/IM: CPT | Mod: PN

## 2023-05-02 PROCEDURE — 63600175 PHARM REV CODE 636 W HCPCS: Mod: JZ,JG,PN | Performed by: INTERNAL MEDICINE

## 2023-05-02 RX ORDER — LORAZEPAM 1 MG/1
1 TABLET ORAL 2 TIMES DAILY PRN
Qty: 30 TABLET | Refills: 0 | Status: SHIPPED | OUTPATIENT
Start: 2023-05-02 | End: 2023-06-06 | Stop reason: SDUPTHER

## 2023-05-02 RX ADMIN — PEGFILGRASTIM-CBQV 6 MG: 6 INJECTION, SOLUTION SUBCUTANEOUS at 03:05

## 2023-05-02 NOTE — TELEPHONE ENCOUNTER
Ok with cutting steroids. Can take zofran/promethazine together if needed. I can add ativan for nausea and anxiety if she wants to try it. Keep monitoring the heart rate and keep hydrating. Can come in for a visit if symptoms do not improve, I can see her Thursday. Thank you

## 2023-05-02 NOTE — TELEPHONE ENCOUNTER
----- Message from Syed Howard sent at 5/2/2023 10:08 AM CDT -----  Regarding: change of RX / reactions  Contact: spouse  Type:  Needs Medical Advice    Who Called: Pt's Manuela Blevins   Symptoms (please be specific): Rx change   How long has patient had these symptoms:       Pharmacy name and phone #:    [id number] [place] [address] & [address] [address]  Phone: [phone] Fax: [phone]      Would the patient rather a call back or a response via [facility]? Call back  Best Call Back Number: [phone]    Additional Information: Sts that she is having an adverse reaction to some of her Rx and wants to see about either cutting back or changing and would like to talk to someone about them.  Please advise =-- Thank you      Spoke with Pt, Steroids x1 Q12hr, ativan to Ro on [facility]. In Ward, Watch HR, rotate zofran and promethazine. Increase fluids.

## 2023-05-03 ENCOUNTER — PATIENT MESSAGE (OUTPATIENT)
Dept: ADMINISTRATIVE | Facility: OTHER | Age: 38
End: 2023-05-03
Payer: COMMERCIAL

## 2023-05-03 NOTE — TELEPHONE ENCOUNTER
Spoke to patient this morning. Flushing is better this morning. Will take the decadron today at half dose as advised. Will call if anything changes.

## 2023-05-03 NOTE — TELEPHONE ENCOUNTER
Patient is calling with concerns of her face being red on this evening.She received neulasta today post chemo on yesterday and she is very concerned as to wether or not she is experiencing an allergic reaction. I called to Dr. Ayala who advised patient to watch farther and if any sob,chest tightness to call back and also that the facial redness could also be from the chemo received on yesterday. Patient has voiced understanding and has no farther questions.      Reason for Disposition   [1] Caller has URGENT question AND [2] triager unable to answer question    Additional Information   Negative: Life-threatening allergic reaction (anaphylaxis) suspected   Negative: Tongue swelling is main symptom   Negative: Lip swelling is main symptom   Negative: Face swelling   Negative: Asthma attack triggered by pollen or other allergen   Negative: [1] Bee sting AND [2] widespread hives or swelling   Negative: [1] Hives AND [2] not from bee sting or prescription drug   Negative: [1] Drug allergy suspected AND [2] taking prescription medicine AND [3] widespread rash   Negative: Coughing from pollen or other allergen (allergic cough suspected)   Negative: Eyelid swelling   Negative: Allergic rhinitis suspected (itchy nose with clear discharge)   Negative: [1] Allergic conjunctivitis suspected (itchy red eyes) AND [2] no other symptoms   Negative: COVID-19 vaccine reaction suspected   Negative: Immunization (vaccine) reaction suspected   Negative: [1] Widespread itching BUT [2] no rash    Protocols used: Allergic Reactions - Guideline [id number], Cancer - Skin Symptoms and [id number]

## 2023-05-04 ENCOUNTER — TELEPHONE (OUTPATIENT)
Dept: HEMATOLOGY/ONCOLOGY | Facility: CLINIC | Age: 38
End: 2023-05-04
Payer: COMMERCIAL

## 2023-05-04 ENCOUNTER — PATIENT MESSAGE (OUTPATIENT)
Dept: ADMINISTRATIVE | Facility: OTHER | Age: 38
End: 2023-05-04
Payer: COMMERCIAL

## 2023-05-04 ENCOUNTER — PATIENT MESSAGE (OUTPATIENT)
Dept: HEMATOLOGY/ONCOLOGY | Facility: CLINIC | Age: 38
End: 2023-05-04
Payer: COMMERCIAL

## 2023-05-04 NOTE — TELEPHONE ENCOUNTER
Spoke to pt regarding temperature overnight, pt states injection yesterday was followed by bone pain and neck pain, mild sore throat.  noticed temp around midnight, care companion recorded 100.4, pt took tylenol, temperature returned to normal. Present temp is 98.1 @ 8:20 am. Pt also states she has constipation for which she took Miralax 1x a few days ago, suggested taking Miralax daily and senna. Pt states no other symptoms.

## 2023-05-04 NOTE — NURSING
Spoke with pt.  She is doing well.  She had some questions about nausea and constipation.  Reviewed anti nausea and constipation regimens.  Asked her to let us know if she did not have a BM by tomorrow morning.   Pt thanked me and verbalized understanding.

## 2023-05-05 ENCOUNTER — PATIENT MESSAGE (OUTPATIENT)
Dept: ADMINISTRATIVE | Facility: OTHER | Age: 38
End: 2023-05-05
Payer: COMMERCIAL

## 2023-05-05 ENCOUNTER — PATIENT MESSAGE (OUTPATIENT)
Dept: HEMATOLOGY/ONCOLOGY | Facility: CLINIC | Age: 38
End: 2023-05-05
Payer: COMMERCIAL

## 2023-05-05 DIAGNOSIS — K59.03 DRUG-INDUCED CONSTIPATION: Primary | ICD-10-CM

## 2023-05-05 RX ORDER — LACTULOSE 10 G/15ML
SOLUTION ORAL
Qty: 210 ML | Refills: 1 | Status: SHIPPED | OUTPATIENT
Start: 2023-05-05 | End: 2023-05-18 | Stop reason: SDUPTHER

## 2023-05-05 NOTE — PROGRESS NOTES
Juan Manuel Yoder reaching out to ask if its possible to take ibuprofen with my pain medication? It seems as though i have inflammation in my stomach, ribs, back, and legs. Hopping ibuprofen might help things settle. Marcella had on going constipation for 8 days now. Marcella taking all recommended laxatives (Myrilax, senna, stool softeners, etc) with little relief. Its as though my body isnt really moving things like it should. Ill continue to take everything, but I become very weak an exhausted when tying to go to the bathroom.    Call to patient:  Taking Miralax daily, hydrating with water along with senna.  Bowel movements are small & hard.  Using narcotic pain medicine every 5 to 6 hours.    Rx Lactulose with directions given.

## 2023-05-05 NOTE — TELEPHONE ENCOUNTER
Spoke with pt regarding care companion bp result. Pt states she is not having any symptoms and that it is a normal reading for her, which review of the chart shows. Pt states that she is still constipated, taking miralax and senna, she did however have a small bm today and feels that it's starting to work, Also stated she felt like she had a lot of bone pain and inflammation that she was going take ibuprofen which she has in her medications list.

## 2023-05-06 ENCOUNTER — PATIENT MESSAGE (OUTPATIENT)
Dept: HEMATOLOGY/ONCOLOGY | Facility: CLINIC | Age: 38
End: 2023-05-06
Payer: COMMERCIAL

## 2023-05-06 ENCOUNTER — PATIENT MESSAGE (OUTPATIENT)
Dept: ADMINISTRATIVE | Facility: OTHER | Age: 38
End: 2023-05-06
Payer: COMMERCIAL

## 2023-05-07 ENCOUNTER — NURSE TRIAGE (OUTPATIENT)
Dept: ADMINISTRATIVE | Facility: CLINIC | Age: 38
End: 2023-05-07
Payer: COMMERCIAL

## 2023-05-07 ENCOUNTER — PATIENT MESSAGE (OUTPATIENT)
Dept: ADMINISTRATIVE | Facility: OTHER | Age: 38
End: 2023-05-07
Payer: COMMERCIAL

## 2023-05-07 NOTE — TELEPHONE ENCOUNTER
"Pt calling states that she had chemo on Monday and today began with fever this AM but states it went away quickly and hasnt come back but got an "inflammation" feeling to her ribs to her pelvis and lower back. Rates the pain at "5" out of 10 after her prescribed pain medication.  States that it has been constant all day and has gotten worse. Per protocol advised to be seen within 4 hours. verbalized understanding. Denies any further questions or concerns at this time, advised to call back if they have any that come up. Advised pt to call back with any other concerns or worsening symptoms. Verbalized understanding and will route message to provider.     Reason for Disposition   [1] MILD-MODERATE pain AND [2] constant AND [3] present > 2 hours    Additional Information   Negative: Shock suspected (e.g., cold/pale/clammy skin, too weak to stand, low BP, rapid pulse)   Negative: Difficult to awaken or acting confused (e.g., disoriented, slurred speech)   Negative: Passed out (i.e., lost consciousness, collapsed and was not responding)   Negative: Sounds like a life-threatening emergency to the triager   Negative: [1] SEVERE pain (e.g., excruciating) AND [2] present > 1 hour   Negative: [1] SEVERE pain AND [2] age > 60 years   Negative: [1] Vomiting AND [2] contains red blood or black ("coffee ground") material  (Exception: Few red streaks in vomit that only happened once.)   Negative: Blood in bowel movements  (Exception: Blood on surface of BM with constipation.)   Negative: Black or tarry bowel movements  (Exception: Chronic-unchanged black-grey BMs AND is taking iron pills or Pepto-Bismol.)   Negative: [1] Vomiting AND [2] contains bile (green color)   Negative: Patient sounds very sick or weak to the triager    Protocols used: Abdominal Pain - Female-A-AH    "

## 2023-05-08 ENCOUNTER — PATIENT MESSAGE (OUTPATIENT)
Dept: HEMATOLOGY/ONCOLOGY | Facility: CLINIC | Age: 38
End: 2023-05-08
Payer: COMMERCIAL

## 2023-05-08 ENCOUNTER — TELEPHONE (OUTPATIENT)
Dept: HEMATOLOGY/ONCOLOGY | Facility: CLINIC | Age: 38
End: 2023-05-08
Payer: COMMERCIAL

## 2023-05-08 NOTE — TELEPHONE ENCOUNTER
Called patient to get her in to be seen today. She advised she was in the ER now being evaluated. Advised to keep us updated with what happens.

## 2023-05-08 NOTE — TELEPHONE ENCOUNTER
Called patient to see how she was feeling and to get her in to be seen today. She advised she is in ER now. Will keep us updated.

## 2023-05-08 NOTE — TELEPHONE ENCOUNTER
Patient wanted us to be advised that she in [facility] and they will be admitting her for further testing. Possible infection but unsure where. Will keep us updated.  ----- Message from Heaven Mcdonald sent at 5/8/2023 10:28 AM CDT -----  Type: Needs Medical Advice  Who Called:  Patient   Symptoms (please be specific):    How long has patient had these symptoms:    Pharmacy name and phone #:    Best Call Back Number: [phone]  Additional Information: Patient is requesting a call back from the nurse.

## 2023-05-09 ENCOUNTER — TELEPHONE (OUTPATIENT)
Dept: SURGERY | Facility: CLINIC | Age: 38
End: 2023-05-09
Payer: COMMERCIAL

## 2023-05-09 NOTE — TELEPHONE ENCOUNTER
Returned pt mother-in-law call and informed her Dr Caruso does not do breast  reconstruction and mastectomy.

## 2023-05-10 ENCOUNTER — TELEPHONE (OUTPATIENT)
Dept: HEMATOLOGY/ONCOLOGY | Facility: CLINIC | Age: 38
End: 2023-05-10
Payer: COMMERCIAL

## 2023-05-10 NOTE — TELEPHONE ENCOUNTER
----- Message from Pete Gonzalez sent at 5/10/2023  2:41 PM CDT -----  Type: Need Medical Advice   Who Called: Patient   Best callback number: [phone]  Additional Information: Patient is in  hospital and need to reschedule tomorrow's appointments. She will call back to reschedule once she is discharged   Thanks

## 2023-05-10 NOTE — TELEPHONE ENCOUNTER
Spoke to patient, in the hospital since Monday and will be in till at least Friday. Will call back when discharged to make appts.

## 2023-05-11 ENCOUNTER — PATIENT MESSAGE (OUTPATIENT)
Dept: ADMINISTRATIVE | Facility: OTHER | Age: 38
End: 2023-05-11
Payer: COMMERCIAL

## 2023-05-12 DIAGNOSIS — C50.919 TRIPLE NEGATIVE BREAST CANCER: Primary | ICD-10-CM

## 2023-05-15 ENCOUNTER — OFFICE VISIT (OUTPATIENT)
Dept: HEMATOLOGY/ONCOLOGY | Facility: CLINIC | Age: 38
End: 2023-05-15
Payer: COMMERCIAL

## 2023-05-15 ENCOUNTER — LAB VISIT (OUTPATIENT)
Dept: LAB | Facility: HOSPITAL | Age: 38
End: 2023-05-15
Attending: INTERNAL MEDICINE
Payer: COMMERCIAL

## 2023-05-15 VITALS
HEIGHT: 62 IN | WEIGHT: 112.44 LBS | OXYGEN SATURATION: 99 % | BODY MASS INDEX: 20.69 KG/M2 | TEMPERATURE: 98 F | DIASTOLIC BLOOD PRESSURE: 53 MMHG | SYSTOLIC BLOOD PRESSURE: 98 MMHG | HEART RATE: 98 BPM | RESPIRATION RATE: 16 BRPM

## 2023-05-15 DIAGNOSIS — C50.919 TRIPLE NEGATIVE BREAST CANCER: Primary | ICD-10-CM

## 2023-05-15 DIAGNOSIS — R50.81 NEUTROPENIC FEVER: ICD-10-CM

## 2023-05-15 DIAGNOSIS — R74.01 TRANSAMINITIS: ICD-10-CM

## 2023-05-15 DIAGNOSIS — C50.919 TRIPLE NEGATIVE BREAST CANCER: ICD-10-CM

## 2023-05-15 DIAGNOSIS — R45.89 ANXIETY ABOUT HEALTH: ICD-10-CM

## 2023-05-15 DIAGNOSIS — D70.9 NEUTROPENIC FEVER: ICD-10-CM

## 2023-05-15 DIAGNOSIS — G89.18 POST-OP PAIN: ICD-10-CM

## 2023-05-15 DIAGNOSIS — D69.6 THROMBOCYTOPENIA: ICD-10-CM

## 2023-05-15 LAB
ALBUMIN SERPL BCP-MCNC: 3.9 G/DL (ref 3.5–5.2)
ALP SERPL-CCNC: 132 U/L (ref 55–135)
ALT SERPL W/O P-5'-P-CCNC: 269 U/L (ref 10–44)
ANION GAP SERPL CALC-SCNC: 9 MMOL/L (ref 8–16)
AST SERPL-CCNC: 113 U/L (ref 10–40)
BASOPHILS # BLD AUTO: 0.05 K/UL (ref 0–0.2)
BASOPHILS NFR BLD: 1 % (ref 0–1.9)
BILIRUB SERPL-MCNC: 0.8 MG/DL (ref 0.1–1)
BUN SERPL-MCNC: 15 MG/DL (ref 6–20)
CALCIUM SERPL-MCNC: 9.3 MG/DL (ref 8.7–10.5)
CHLORIDE SERPL-SCNC: 103 MMOL/L (ref 95–110)
CO2 SERPL-SCNC: 24 MMOL/L (ref 23–29)
CREAT SERPL-MCNC: 0.8 MG/DL (ref 0.5–1.4)
DIFFERENTIAL METHOD: ABNORMAL
EOSINOPHIL # BLD AUTO: 0 K/UL (ref 0–0.5)
EOSINOPHIL NFR BLD: 0.2 % (ref 0–8)
ERYTHROCYTE [DISTWIDTH] IN BLOOD BY AUTOMATED COUNT: 13.7 % (ref 11.5–14.5)
EST. GFR  (NO RACE VARIABLE): >60 ML/MIN/1.73 M^2
GLUCOSE SERPL-MCNC: 92 MG/DL (ref 70–110)
HCT VFR BLD AUTO: 31.3 % (ref 37–48.5)
HGB BLD-MCNC: 10.8 G/DL (ref 12–16)
IMM GRANULOCYTES # BLD AUTO: 0.13 K/UL (ref 0–0.04)
IMM GRANULOCYTES NFR BLD AUTO: 2.5 % (ref 0–0.5)
LYMPHOCYTES # BLD AUTO: 1.1 K/UL (ref 1–4.8)
LYMPHOCYTES NFR BLD: 20.6 % (ref 18–48)
MCH RBC QN AUTO: 30.6 PG (ref 27–31)
MCHC RBC AUTO-ENTMCNC: 34.5 G/DL (ref 32–36)
MCV RBC AUTO: 89 FL (ref 82–98)
MONOCYTES # BLD AUTO: 0.7 K/UL (ref 0.3–1)
MONOCYTES NFR BLD: 12.5 % (ref 4–15)
NEUTROPHILS # BLD AUTO: 3.3 K/UL (ref 1.8–7.7)
NEUTROPHILS NFR BLD: 63.2 % (ref 38–73)
NRBC BLD-RTO: 0 /100 WBC
PLATELET # BLD AUTO: 103 K/UL (ref 150–450)
PMV BLD AUTO: 10 FL (ref 9.2–12.9)
POTASSIUM SERPL-SCNC: 4.2 MMOL/L (ref 3.5–5.1)
PROT SERPL-MCNC: 6.6 G/DL (ref 6–8.4)
RBC # BLD AUTO: 3.53 M/UL (ref 4–5.4)
SODIUM SERPL-SCNC: 136 MMOL/L (ref 136–145)
WBC # BLD AUTO: 5.2 K/UL (ref 3.9–12.7)

## 2023-05-15 PROCEDURE — 80053 COMPREHEN METABOLIC PANEL: CPT | Mod: PN | Performed by: INTERNAL MEDICINE

## 2023-05-15 PROCEDURE — 36415 COLL VENOUS BLD VENIPUNCTURE: CPT | Mod: PN | Performed by: INTERNAL MEDICINE

## 2023-05-15 PROCEDURE — 99215 PR OFFICE/OUTPT VISIT, EST, LEVL V, 40-54 MIN: ICD-10-PCS | Mod: S$GLB,,, | Performed by: INTERNAL MEDICINE

## 2023-05-15 PROCEDURE — 3074F PR MOST RECENT SYSTOLIC BLOOD PRESSURE < 130 MM HG: ICD-10-PCS | Mod: CPTII,S$GLB,, | Performed by: INTERNAL MEDICINE

## 2023-05-15 PROCEDURE — 3078F DIAST BP <80 MM HG: CPT | Mod: CPTII,S$GLB,, | Performed by: INTERNAL MEDICINE

## 2023-05-15 PROCEDURE — 3008F BODY MASS INDEX DOCD: CPT | Mod: CPTII,S$GLB,, | Performed by: INTERNAL MEDICINE

## 2023-05-15 PROCEDURE — 85025 COMPLETE CBC W/AUTO DIFF WBC: CPT | Mod: PN | Performed by: INTERNAL MEDICINE

## 2023-05-15 PROCEDURE — 3008F PR BODY MASS INDEX (BMI) DOCUMENTED: ICD-10-PCS | Mod: CPTII,S$GLB,, | Performed by: INTERNAL MEDICINE

## 2023-05-15 PROCEDURE — 99999 PR PBB SHADOW E&M-EST. PATIENT-LVL III: ICD-10-PCS | Mod: PBBFAC,,, | Performed by: INTERNAL MEDICINE

## 2023-05-15 PROCEDURE — 3074F SYST BP LT 130 MM HG: CPT | Mod: CPTII,S$GLB,, | Performed by: INTERNAL MEDICINE

## 2023-05-15 PROCEDURE — 1160F PR REVIEW ALL MEDS BY PRESCRIBER/CLIN PHARMACIST DOCUMENTED: ICD-10-PCS | Mod: CPTII,S$GLB,, | Performed by: INTERNAL MEDICINE

## 2023-05-15 PROCEDURE — 3078F PR MOST RECENT DIASTOLIC BLOOD PRESSURE < 80 MM HG: ICD-10-PCS | Mod: CPTII,S$GLB,, | Performed by: INTERNAL MEDICINE

## 2023-05-15 PROCEDURE — 1159F PR MEDICATION LIST DOCUMENTED IN MEDICAL RECORD: ICD-10-PCS | Mod: CPTII,S$GLB,, | Performed by: INTERNAL MEDICINE

## 2023-05-15 PROCEDURE — 99215 OFFICE O/P EST HI 40 MIN: CPT | Mod: S$GLB,,, | Performed by: INTERNAL MEDICINE

## 2023-05-15 PROCEDURE — 99999 PR PBB SHADOW E&M-EST. PATIENT-LVL III: CPT | Mod: PBBFAC,,, | Performed by: INTERNAL MEDICINE

## 2023-05-15 PROCEDURE — 1159F MED LIST DOCD IN RCRD: CPT | Mod: CPTII,S$GLB,, | Performed by: INTERNAL MEDICINE

## 2023-05-15 PROCEDURE — 1160F RVW MEDS BY RX/DR IN RCRD: CPT | Mod: CPTII,S$GLB,, | Performed by: INTERNAL MEDICINE

## 2023-05-15 RX ORDER — OXYCODONE HYDROCHLORIDE 10 MG/1
10 TABLET ORAL EVERY 12 HOURS PRN
Qty: 30 TABLET | Refills: 0 | Status: SHIPPED | OUTPATIENT
Start: 2023-05-15 | End: 2023-05-29 | Stop reason: SDUPTHER

## 2023-05-15 NOTE — PROGRESS NOTES
PROGRESS NOTE    Subjective:       Patient ID: Aleena Duckworth is a 37 y.o. female.  MRN: [id number]  : 1985    Chief Complaint: Breast Cancer      History of Present Illness:   Aleena Duckworth is a 37 y.o. female who is referred for newly diagnosed TNBC.  So far, she has been treated at [facility] and is establishing care with me due to proximity.     As previously documented, she self palpated a left-sided breast mass.  She felt it was growing fast and reported to her primary care physician who referred her for breast imaging.  On 2023, she underwent a bilateral breast mammogram that showed a lobular hypoechoic solid mass within the left breast at 2 o'clock position 7 cm was recommended.  On 2023, she underwent an ultrasound guided biopsy that showed invasive ductal carcinoma, grade 3, ER/WA/HER2 Randall negative, Ki-67 80%.     On 23, she underwent left partial mastectomy that revealed invasive ductal carcinoma 9 x 8 x 6 mm, single focus, approximately 1 mm focus of high-grade DCIS, closest margin 3 mm, anterior.  Two sentinel lymph nodes were negative.  pT1b pN0(sn).     Has ADHD, has been on Adderall for several years.  Was born prematurely, was diagnosed with a stroke and heart murmur at birth, was treated at [facility].     Heavy menorrhagia for the past few years, worsening recently and has developed CAROLIN.  Has been on iron supplementation.  Will see her gynecologist, Dr. Almita Clark with [facility].      She did have 1 visit with the medical oncology team at [facility].  Adjuvant chemotherapy was recommended.  Workup was ongoing for mild hyperbilirubinemia.  An abdominal ultrasound was done, negative for any abnormalities.Saw Hepatology prior to chemo who suspected she has Gilbert's disease and found no contraindication to proceeding with TC. Records not available.        Gyn:  Menarche 13  . Age at first pregnancy 19  OCP use a year or  so   Premenopausal, regular, menorrhagia.   LMP 4/1/23    Interim history:    Underwent cycle 1 of Docetaxel and Cytoxan on 5/1/23.   The steroids made her irritable and she cut down dose to 1 tab for day 3.   Developed nausea and generalized pain soon after receiving chemo.   Took promethazine, zofran and ativan as needed.   Had constipation.  Is using Percocet 1-2 times a day for post op pain and now for generalized body pain.      Developed a low grade fever at home, 100.4 one episode with quick resolution. At the same time, she had hypotension and tachycardia and was referred to the ER. She was admitted for sepsis, was started on antibiotics. Has no other episodes of fever, no vasopressor needed, no ICU visit.  Cultures reportedly negative, no clear source,  was discharged on Bactrim.        Oncology History:  Oncology History   Triple negative breast cancer   4/17/2023 Initial Diagnosis    Triple negative breast cancer       4/18/2023 Cancer Staged    Staging form: Breast, AJCC 8th Edition  - Pathologic stage from 4/18/2023: Stage IB (pT1b, pN0, cM0, G3, ER-, KS-, HER2-)       5/1/2023 -  Chemotherapy    Treatment Summary   Plan Name: OP BREAST TC - DOCETAXEL CYCLOPHOSPHAMIDE Q3W  Treatment Goal: Curative  Status: Active  Start Date: 5/1/2023  End Date: 7/4/2023 (Planned)  Provider: Ambar Ayala MD  Chemotherapy: cycloPHOSphamide 600 mg/m2 = 880 mg in sodium chloride 0.9% 289.4 mL chemo infusion, 600 mg/m2 = 880 mg, Intravenous, Clinic/HOD 1 time, 1 of 4 cycles  Administration: 880 mg (5/1/2023)  DOCEtaxel 75 mg/m2 = 110 mg in sodium chloride 0.9% 290.5 mL chemo infusion, 75 mg/m2 = 110 mg, Intravenous, Clinic/HOD 1 time, 1 of 4 cycles  Administration: 110 mg (5/1/2023)           History:  Past Medical History:   Diagnosis Date    ADHD (attention deficit hyperactivity disorder)     Anemia     Hypertension     pre eclampsia and eclampsia    Stroke     baby      Past Surgical History:   Procedure Laterality  "Date    BREAST SURGERY      TUBAL LIGATION      VAGINAL DELIVERY       Family History   Problem Relation Age of Onset    Hypertension Mother     Breast cancer Mother 51        negative screening    Heart disease Father     Diabetes Maternal Grandmother     Breast cancer Maternal Grandmother       Social History     Tobacco Use    Smoking status: Some Days    Smokeless tobacco: Never    Tobacco comments:     vape   Substance and Sexual Activity    Alcohol use: Not Currently    Drug use: Never    Sexual activity: Yes     Partners: Male     Birth control/protection: See Surgical Hx     Comment: tubal        ROS:   Review of Systems   Constitutional:  Positive for malaise/fatigue. Negative for fever and weight loss.   HENT:  Negative for congestion, hearing loss, nosebleeds and sore throat.    Eyes:  Negative for double vision and photophobia.   Respiratory:  Negative for cough, hemoptysis, sputum production, shortness of breath and wheezing.    Cardiovascular:  Positive for leg swelling. Negative for chest pain, palpitations and orthopnea.   Gastrointestinal:  Positive for nausea. Negative for abdominal pain, blood in stool, constipation, diarrhea, heartburn and vomiting.   Genitourinary:  Negative for dysuria, hematuria and urgency.   Musculoskeletal:  Negative for back pain, joint pain and myalgias.   Skin:  Negative for itching and rash.   Neurological:  Negative for dizziness, tingling, seizures, weakness and headaches.   Endo/Heme/Allergies:  Negative for polydipsia. Does not bruise/bleed easily.   Psychiatric/Behavioral:  Negative for depression and memory loss. The patient has insomnia. The patient is not nervous/anxious.       Objective:     Vitals:    05/15/23 1531   BP: (!) 98/53   Pulse: 98   Resp: 16   Temp: 98 °F (36.7 °C)   SpO2: 99%   Weight: 51 kg (112 lb 7 oz)   Height: 5' 2" (1.575 m)   PainSc:   2           Physical Examination:   Physical Exam  Vitals and nursing note reviewed.   Constitutional:     "   General: She is not in acute distress.     Appearance: She is not diaphoretic.   HENT:      Head: Normocephalic.      Mouth/Throat:      Pharynx: No oropharyngeal exudate.   Eyes:      General: No scleral icterus.     Conjunctiva/sclera: Conjunctivae normal.   Neck:      Thyroid: No thyromegaly.   Cardiovascular:      Rate and Rhythm: Normal rate and regular rhythm.      Heart sounds: Normal heart sounds. No murmur heard.  Pulmonary:      Effort: Pulmonary effort is normal. No respiratory distress.      Breath sounds: No stridor. No wheezing or rales.   Chest:      Chest wall: No tenderness.   Abdominal:      General: Bowel sounds are normal. There is no distension.      Palpations: Abdomen is soft. There is no mass.      Tenderness: There is no abdominal tenderness. There is no rebound.   Musculoskeletal:         General: No tenderness or deformity. Normal range of motion.      Cervical back: Neck supple.   Lymphadenopathy:      Cervical: No cervical adenopathy.   Skin:     General: Skin is warm and dry.      Findings: No erythema or rash.   Neurological:      Mental Status: She is alert and oriented to person, place, and time.      Cranial Nerves: No cranial nerve deficit.      Coordination: Coordination normal.      Gait: Gait is intact.   Psychiatric:         Mood and Affect: Affect normal.         Cognition and Memory: Memory normal.         Judgment: Judgment normal.        Diagnostic Tests:  Significant Imaging: I have reviewed and interpreted all pertinent imaging results/findings.      Laboratory Data:  All pertinent labs have been reviewed.    Labs:   Lab Results   Component Value Date    WBC 5.20 05/15/2023    HGB 10.8 (L) 05/15/2023    HCT 31.3 (L) 05/15/2023    MCV 89 05/15/2023     (L) 05/15/2023       Assessment/Plan:   Triple negative breast cancer  pT1b pN0 (sn) ER/SC/HER2 Randall negative, Ki-67 80%, grade 3    37-year-old premenopausal woman, presenting with early stage triple negative breast  cancer.  She is a candidate for adjuvant chemotherapy.  See previous discussions.   S/p cycle 1 of  docetaxel and Cytoxan  with poor tolerability and hospitalization.   Chemo is due next week but will obtain outside records, monitor recovery and may delay by another week. Will also reduce the dose for cycle 2. Continue to optimize supportive care while on chemotherapy.     Transaminitis   AST/ALT , new onset elevation. Likely multifactorial post chemo and antibiotics. Stop bactrim at this time. Repeat CMP in one week. As above.       Hyperbilirubinemia  Possible Gilbert's syndrome  Bilirubin is now normal   As above.  Continue to monitor bilirubin and liver function closely.    Thrombocytopenia  Mild and isolated thrombocytopenia noted.    No hemolysis noted on work up. Monitor while on chemotherapy. 103 k today, no intervention needed.     Iron deficiency anemia due to chronic blood loss  History of menorrhagia noted.  Obtain baseline iron panel and offer IV iron as clinically indicated.  Iron panel is normal at this time.      ECOG SCORE    1 - Restricted in strenuous activity-ambulatory and able to carry out work of a light nature           Discussion:   No follow-ups on file.    Plan was discussed with the patient at length, and she verbalized understanding. Aleena was given an opportunity to ask questions that were answered to her satisfaction, and she was advised to call in the interval if any problems or questions arise.    Electronically signed by Ambar Ayala MD    Route Chart for Scheduling    Med Onc Chart Routing      Follow up with physician . 5/22 with labs, cancel 5/18   Follow up with NUNO    Infusion scheduling note    Injection scheduling note    Labs CBC and CMP   Scheduling:  Preferred lab:  Lab interval:     Imaging    Pharmacy appointment    Other referrals           Treatment Plan Information   OP BREAST TC - DOCETAXEL CYCLOPHOSPHAMIDE Q3W   Ambar Ayala MD   Upcoming Treatment Dates -  OP BREAST TC - DOCETAXEL CYCLOPHOSPHAMIDE Q3W    5/22/2023       Pre-Medications       palonosetron 0.25mg/dexAMETHasone 10mg in NS IVPB 0.25 mg 50 mL       aprepitant (CINVANTI) injection 130 mg       Chemotherapy       DOCEtaxel (TAXOTERE) 75 mg/m2 = 110 mg in sodium chloride 0.9% 255.5 mL chemo infusion       cycloPHOSphamide 600 mg/m2 = 880 mg in sodium chloride 0.9% 254.4 mL chemo infusion  5/23/2023       Growth Factor       pegfilgrastim-cbqv (UDENYCA) injection 6 mg  6/12/2023       Pre-Medications       palonosetron 0.25mg/dexAMETHasone 10mg in NS IVPB 0.25 mg 50 mL       aprepitant (CINVANTI) injection 130 mg       Chemotherapy       DOCEtaxel (TAXOTERE) 75 mg/m2 = 110 mg in sodium chloride 0.9% 255.5 mL chemo infusion       cycloPHOSphamide 600 mg/m2 = 880 mg in sodium chloride 0.9% 254.4 mL chemo infusion  6/13/2023       Growth Factor       pegfilgrastim-cbqv (UDENYCA) injection 6 mg

## 2023-05-16 DIAGNOSIS — G89.18 POST-OP PAIN: ICD-10-CM

## 2023-05-16 DIAGNOSIS — C50.919 TRIPLE NEGATIVE BREAST CANCER: Primary | ICD-10-CM

## 2023-05-16 RX ORDER — OXYCODONE HYDROCHLORIDE 10 MG/1
10 TABLET ORAL EVERY 12 HOURS PRN
Qty: 30 TABLET | Refills: 0 | Status: CANCELLED | OUTPATIENT
Start: 2023-05-16

## 2023-05-17 ENCOUNTER — TELEPHONE (OUTPATIENT)
Dept: HEMATOLOGY/ONCOLOGY | Facility: CLINIC | Age: 38
End: 2023-05-17
Payer: COMMERCIAL

## 2023-05-17 NOTE — TELEPHONE ENCOUNTER
----- Message from Jessica Fernandez RN sent at 5/16/2023  3:22 PM CDT -----  Regarding: FW: advice  Contact: patient    ----- Message -----  From: Ruth Lopez  Sent: 5/16/2023   3:01 PM CDT  To: Jamie Villalta ([facility]) Staff  Subject: advice                                           Type: Needs Medical Advice  Who Called:  patient  Symptoms (please be specific):    How long has patient had these symptoms:    Pharmacy name and phone #:    Best Call Back Number: [phone] (home)   Additional Information: Patient states the pharmacy told her they can only fill her oxyCODONE (ROXICODONE) 10 mg Tab immediate release tablet for 2 weeks. Patient would like to speak to Angelica. Please call patient to advise.Thanks!

## 2023-05-18 DIAGNOSIS — K59.03 DRUG-INDUCED CONSTIPATION: ICD-10-CM

## 2023-05-18 RX ORDER — LACTULOSE 10 G/15ML
SOLUTION ORAL
Qty: 210 ML | Refills: 1 | Status: SHIPPED | OUTPATIENT
Start: 2023-05-18

## 2023-05-19 DIAGNOSIS — C50.919 TRIPLE NEGATIVE BREAST CANCER: Primary | ICD-10-CM

## 2023-05-22 ENCOUNTER — OFFICE VISIT (OUTPATIENT)
Dept: HEMATOLOGY/ONCOLOGY | Facility: CLINIC | Age: 38
End: 2023-05-22
Payer: COMMERCIAL

## 2023-05-22 ENCOUNTER — LAB VISIT (OUTPATIENT)
Dept: LAB | Facility: HOSPITAL | Age: 38
End: 2023-05-22
Attending: INTERNAL MEDICINE
Payer: COMMERCIAL

## 2023-05-22 VITALS
DIASTOLIC BLOOD PRESSURE: 58 MMHG | RESPIRATION RATE: 16 BRPM | HEIGHT: 62 IN | HEART RATE: 69 BPM | TEMPERATURE: 98 F | SYSTOLIC BLOOD PRESSURE: 112 MMHG | OXYGEN SATURATION: 99 % | WEIGHT: 113.56 LBS | BODY MASS INDEX: 20.9 KG/M2

## 2023-05-22 DIAGNOSIS — R11.0 NAUSEA: ICD-10-CM

## 2023-05-22 DIAGNOSIS — R74.01 TRANSAMINITIS: ICD-10-CM

## 2023-05-22 DIAGNOSIS — C50.919 TRIPLE NEGATIVE BREAST CANCER: Primary | ICD-10-CM

## 2023-05-22 DIAGNOSIS — G47.00 INSOMNIA, UNSPECIFIED TYPE: ICD-10-CM

## 2023-05-22 DIAGNOSIS — C50.919 TRIPLE NEGATIVE BREAST CANCER: ICD-10-CM

## 2023-05-22 DIAGNOSIS — R45.89 ANXIETY ABOUT HEALTH: ICD-10-CM

## 2023-05-22 LAB
ALBUMIN SERPL BCP-MCNC: 3.8 G/DL (ref 3.5–5.2)
ALP SERPL-CCNC: 78 U/L (ref 55–135)
ALT SERPL W/O P-5'-P-CCNC: 79 U/L (ref 10–44)
ANION GAP SERPL CALC-SCNC: 8 MMOL/L (ref 8–16)
AST SERPL-CCNC: 30 U/L (ref 10–40)
BASOPHILS # BLD AUTO: 0.03 K/UL (ref 0–0.2)
BASOPHILS NFR BLD: 0.7 % (ref 0–1.9)
BILIRUB SERPL-MCNC: 1.2 MG/DL (ref 0.1–1)
BUN SERPL-MCNC: 9 MG/DL (ref 6–20)
CALCIUM SERPL-MCNC: 8.8 MG/DL (ref 8.7–10.5)
CHLORIDE SERPL-SCNC: 107 MMOL/L (ref 95–110)
CO2 SERPL-SCNC: 24 MMOL/L (ref 23–29)
CREAT SERPL-MCNC: 0.7 MG/DL (ref 0.5–1.4)
DIFFERENTIAL METHOD: ABNORMAL
EOSINOPHIL # BLD AUTO: 0 K/UL (ref 0–0.5)
EOSINOPHIL NFR BLD: 0.5 % (ref 0–8)
ERYTHROCYTE [DISTWIDTH] IN BLOOD BY AUTOMATED COUNT: 14.3 % (ref 11.5–14.5)
EST. GFR  (NO RACE VARIABLE): >60 ML/MIN/1.73 M^2
GLUCOSE SERPL-MCNC: 112 MG/DL (ref 70–110)
HCG INTACT+B SERPL-ACNC: <2.4 MIU/ML
HCT VFR BLD AUTO: 27.5 % (ref 37–48.5)
HGB BLD-MCNC: 9.3 G/DL (ref 12–16)
IMM GRANULOCYTES # BLD AUTO: 0.02 K/UL (ref 0–0.04)
IMM GRANULOCYTES NFR BLD AUTO: 0.5 % (ref 0–0.5)
LYMPHOCYTES # BLD AUTO: 0.6 K/UL (ref 1–4.8)
LYMPHOCYTES NFR BLD: 14 % (ref 18–48)
MCH RBC QN AUTO: 30.5 PG (ref 27–31)
MCHC RBC AUTO-ENTMCNC: 33.8 G/DL (ref 32–36)
MCV RBC AUTO: 90 FL (ref 82–98)
MONOCYTES # BLD AUTO: 0.7 K/UL (ref 0.3–1)
MONOCYTES NFR BLD: 15.4 % (ref 4–15)
NEUTROPHILS # BLD AUTO: 2.9 K/UL (ref 1.8–7.7)
NEUTROPHILS NFR BLD: 68.9 % (ref 38–73)
NRBC BLD-RTO: 0 /100 WBC
PLATELET # BLD AUTO: 205 K/UL (ref 150–450)
PLATELET BLD QL SMEAR: ABNORMAL
PMV BLD AUTO: 8.6 FL (ref 9.2–12.9)
POTASSIUM SERPL-SCNC: 3.6 MMOL/L (ref 3.5–5.1)
PROT SERPL-MCNC: 5.7 G/DL (ref 6–8.4)
RBC # BLD AUTO: 3.05 M/UL (ref 4–5.4)
SODIUM SERPL-SCNC: 139 MMOL/L (ref 136–145)
WBC # BLD AUTO: 4.22 K/UL (ref 3.9–12.7)

## 2023-05-22 PROCEDURE — 99215 OFFICE O/P EST HI 40 MIN: CPT | Mod: S$GLB,,, | Performed by: INTERNAL MEDICINE

## 2023-05-22 PROCEDURE — 99999 PR PBB SHADOW E&M-EST. PATIENT-LVL V: CPT | Mod: PBBFAC,,, | Performed by: INTERNAL MEDICINE

## 2023-05-22 PROCEDURE — 99215 PR OFFICE/OUTPT VISIT, EST, LEVL V, 40-54 MIN: ICD-10-PCS | Mod: S$GLB,,, | Performed by: INTERNAL MEDICINE

## 2023-05-22 PROCEDURE — 3008F PR BODY MASS INDEX (BMI) DOCUMENTED: ICD-10-PCS | Mod: CPTII,S$GLB,, | Performed by: INTERNAL MEDICINE

## 2023-05-22 PROCEDURE — 3008F BODY MASS INDEX DOCD: CPT | Mod: CPTII,S$GLB,, | Performed by: INTERNAL MEDICINE

## 2023-05-22 PROCEDURE — 3078F DIAST BP <80 MM HG: CPT | Mod: CPTII,S$GLB,, | Performed by: INTERNAL MEDICINE

## 2023-05-22 PROCEDURE — 36415 COLL VENOUS BLD VENIPUNCTURE: CPT | Mod: PN | Performed by: INTERNAL MEDICINE

## 2023-05-22 PROCEDURE — 3074F SYST BP LT 130 MM HG: CPT | Mod: CPTII,S$GLB,, | Performed by: INTERNAL MEDICINE

## 2023-05-22 PROCEDURE — 99999 PR PBB SHADOW E&M-EST. PATIENT-LVL V: ICD-10-PCS | Mod: PBBFAC,,, | Performed by: INTERNAL MEDICINE

## 2023-05-22 PROCEDURE — 80053 COMPREHEN METABOLIC PANEL: CPT | Mod: PN | Performed by: INTERNAL MEDICINE

## 2023-05-22 PROCEDURE — 85025 COMPLETE CBC W/AUTO DIFF WBC: CPT | Mod: PN | Performed by: INTERNAL MEDICINE

## 2023-05-22 PROCEDURE — 84702 CHORIONIC GONADOTROPIN TEST: CPT | Performed by: INTERNAL MEDICINE

## 2023-05-22 PROCEDURE — 3078F PR MOST RECENT DIASTOLIC BLOOD PRESSURE < 80 MM HG: ICD-10-PCS | Mod: CPTII,S$GLB,, | Performed by: INTERNAL MEDICINE

## 2023-05-22 PROCEDURE — 3074F PR MOST RECENT SYSTOLIC BLOOD PRESSURE < 130 MM HG: ICD-10-PCS | Mod: CPTII,S$GLB,, | Performed by: INTERNAL MEDICINE

## 2023-05-22 RX ORDER — SODIUM CHLORIDE 0.9 % (FLUSH) 0.9 %
10 SYRINGE (ML) INJECTION
Status: CANCELLED | OUTPATIENT
Start: 2023-05-24

## 2023-05-22 RX ORDER — HEPARIN 100 UNIT/ML
500 SYRINGE INTRAVENOUS
Status: CANCELLED | OUTPATIENT
Start: 2023-05-24

## 2023-05-22 RX ORDER — POLYETHYLENE GLYCOL 3350 17 G/17G
17 POWDER, FOR SOLUTION ORAL
COMMUNITY
Start: 2023-05-15

## 2023-05-22 RX ORDER — AMOXICILLIN 875 MG/1
TABLET, FILM COATED ORAL
COMMUNITY
End: 2023-05-29 | Stop reason: ALTCHOICE

## 2023-05-22 NOTE — PROGRESS NOTES
PROGRESS NOTE    Subjective:       Patient ID: Aleena Duckworth is a 37 y.o. female.  MRN: [id number]  : 1985    Chief Complaint: Triple negative breast cancer      History of Present Illness:   Aleena Duckworth is a 37 y.o. female who is referred for newly diagnosed TNBC. Had her initial surgery at [facility].      As previously documented, she self palpated a left-sided breast mass.  She felt it was growing fast and reported to her primary care physician who referred her for breast imaging.  On 2023, she underwent a bilateral breast mammogram that showed a lobular hypoechoic solid mass within the left breast at 2 o'clock position 7 cm was recommended.  On 2023, she underwent an ultrasound guided biopsy that showed invasive ductal carcinoma, grade 3, ER/TN/HER2 Randall negative, Ki-67 80%.     On 23, she underwent left partial mastectomy that revealed invasive ductal carcinoma 9 x 8 x 6 mm, single focus, approximately 1 mm focus of high-grade DCIS, closest margin 3 mm, anterior.  Two sentinel lymph nodes were negative.  pT1b pN0(sn).     Has ADHD, has been on Adderall for several years.  Was born prematurely, was diagnosed with a stroke and heart murmur at birth, was treated at [facility].     Heavy menorrhagia for the past few years, worsening recently and has developed CAROLIN.  Has been on iron supplementation.  Sees gynecologist, Dr. Almita Clark with [facility].      She did have 1 visit with the medical oncology team at [facility].  Adjuvant chemotherapy was recommended.  Workup was ongoing for mild hyperbilirubinemia.  An abdominal ultrasound was done, negative for any abnormalities.Saw Hepatology prior to chemo who suspected she has Gilbert's disease and found no contraindication to proceeding with TC.       Gyn:  Menarche 13  . Age at first pregnancy 19  OCP use a year or so   Premenopausal, regular, menorrhagia.   LMP 23    Underwent cycle 1 of  Docetaxel and Cytoxan on 5/1/23.   The steroids made her irritable and she cut down dose to 1 tab for day 3.   Developed nausea and generalized pain soon after receiving chemo.   Took promethazine, zofran and ativan as needed.   Had constipation.  Is using Percocet 1-2 times a day for post op pain and now for generalized body pain.      Developed a low grade fever at home, 100.4 one episode with quick resolution. At the same time, she had hypotension and tachycardia and was referred to the ER. She was admitted for sepsis, was started on antibiotics. Has no other episodes of fever, no vasopressor needed, no ICU visit.  Cultures negative, no clear source,  was discharged on Bactrim.     Interim history:  Feels better over time. No fever. Has some fatigue and intermittent nausea.   I have reviewed outside hospitalization records.   C2 will be given with 20% dose reduction.        Oncology History:  Oncology History   Triple negative breast cancer   4/17/2023 Initial Diagnosis    Triple negative breast cancer       4/18/2023 Cancer Staged    Staging form: Breast, AJCC 8th Edition  - Pathologic stage from 4/18/2023: Stage IB (pT1b, pN0, cM0, G3, ER-, [place]-, HER2-)       5/1/2023 -  Chemotherapy    Treatment Summary   Plan Name: OP BREAST TC - DOCETAXEL CYCLOPHOSPHAMIDE Q3W  Treatment Goal: Curative  Status: Active  Start Date: 5/1/2023  End Date: 7/4/2023 (Planned)  Provider: Ambar Ayala MD  Chemotherapy: cycloPHOSphamide 600 mg/m2 = 880 mg in sodium chloride 0.9% 289.4 mL chemo infusion, 600 mg/m2 = 880 mg, Intravenous, Clinic/HOD 1 time, 1 of 4 cycles  Administration: 880 mg (5/1/2023)  DOCEtaxel 75 mg/m2 = 110 mg in sodium chloride 0.9% 290.5 mL chemo infusion, 75 mg/m2 = 110 mg, Intravenous, Clinic/HOD 1 time, 1 of 4 cycles  Dose modification: 60 mg/m2 (original dose 75 mg/m2, Cycle 2, Reason: Dose not tolerated)  Administration: 110 mg (5/1/2023)           History:  Past Medical History:   Diagnosis Date    ADHD  (attention deficit hyperactivity disorder)     Anemia     Hypertension     pre eclampsia and eclampsia    Stroke     baby      Past Surgical History:   Procedure Laterality Date    BREAST SURGERY      TUBAL LIGATION      VAGINAL DELIVERY       Family History   Problem Relation Age of Onset    Hypertension Mother     Breast cancer Mother 51        negative screening    Heart disease Father     Diabetes Maternal Grandmother     Breast cancer Maternal Grandmother       Social History     Tobacco Use    Smoking status: Some Days    Smokeless tobacco: Never    Tobacco comments:     vape   Substance and Sexual Activity    Alcohol use: Not Currently    Drug use: Never    Sexual activity: Yes     Partners: Male     Birth control/protection: See Surgical Hx     Comment: tubal        ROS:   Review of Systems   Constitutional:  Positive for malaise/fatigue. Negative for fever and weight loss.   HENT:  Negative for congestion, hearing loss, nosebleeds and sore throat.    Eyes:  Negative for double vision and photophobia.   Respiratory:  Negative for cough, hemoptysis, sputum production, shortness of breath and wheezing.    Cardiovascular:  Positive for leg swelling. Negative for chest pain, palpitations and orthopnea.   Gastrointestinal:  Positive for nausea. Negative for abdominal pain, blood in stool, constipation, diarrhea, heartburn and vomiting.   Genitourinary:  Negative for dysuria, hematuria and urgency.   Musculoskeletal:  Negative for back pain, joint pain and myalgias.   Skin:  Negative for itching and rash.   Neurological:  Negative for dizziness, tingling, seizures, weakness and headaches.   Endo/Heme/Allergies:  Negative for polydipsia. Does not bruise/bleed easily.   Psychiatric/Behavioral:  Negative for depression and memory loss. The patient has insomnia. The patient is not nervous/anxious.       Objective:     Vitals:    05/22/23 1134   BP: (!) 112/58   Pulse: 69   Resp: 16   Temp: 98 °F (36.7 °C)   TempSrc:  "Temporal   SpO2: 99%   Weight: 51.5 kg (113 lb 8.6 oz)   Height: 5' 2" (1.575 m)   PainSc:   4   PainLoc: Back       Physical Examination:   Physical Exam  Vitals and nursing note reviewed.   Constitutional:       General: She is not in acute distress.     Appearance: She is not diaphoretic.   HENT:      Head: Normocephalic.      Mouth/Throat:      Pharynx: No oropharyngeal exudate.   Eyes:      General: No scleral icterus.     Conjunctiva/sclera: Conjunctivae normal.   Neck:      Thyroid: No thyromegaly.   Cardiovascular:      Rate and Rhythm: Normal rate and regular rhythm.      Heart sounds: Normal heart sounds. No murmur heard.  Pulmonary:      Effort: Pulmonary effort is normal. No respiratory distress.      Breath sounds: No stridor. No wheezing or rales.   Chest:      Chest wall: No tenderness.   Abdominal:      General: Bowel sounds are normal. There is no distension.      Palpations: Abdomen is soft. There is no mass.      Tenderness: There is no abdominal tenderness. There is no rebound.   Musculoskeletal:         General: No tenderness or deformity. Normal range of motion.      Cervical back: Neck supple.   Lymphadenopathy:      Cervical: No cervical adenopathy.   Skin:     General: Skin is warm and dry.      Findings: No erythema or rash.   Neurological:      Mental Status: She is alert and oriented to person, place, and time.      Cranial Nerves: No cranial nerve deficit.      Coordination: Coordination normal.      Gait: Gait is intact.   Psychiatric:         Mood and Affect: Affect normal.         Cognition and Memory: Memory normal.         Judgment: Judgment normal.        Diagnostic Tests:  Significant Imaging: I have reviewed and interpreted all pertinent imaging results/findings.      Laboratory Data:  All pertinent labs have been reviewed.    Labs:   Lab Results   Component Value Date    WBC 4.22 05/22/2023    HGB 9.3 (L) 05/22/2023    HCT 27.5 (L) 05/22/2023    MCV 90 05/22/2023     " 05/22/2023       Assessment/Plan:   Triple negative breast cancer  pT1b pN0 (sn) ER/WA/HER2 Randall negative, Ki-67 80%, grade 3    37-year-old premenopausal woman, presenting with early stage triple negative breast cancer.  She is a candidate for adjuvant chemotherapy.  See previous discussions.   S/p cycle 1 of  docetaxel and Cytoxan  with poor tolerability and hospitalization.   Clinically back to baseline. Will  reduce the dose for cycle 2, docetaxel to 60 mg/m2. Continue to optimize supportive care while on chemotherapy.     Transaminitis   AST/ALT , new onset elevation. Likely multifactorial post chemo and antibiotics. LFTs have improved significantly, ALT is slightly elevated, AP is normal.   Decetaxel is being dose reduced.     Hyperbilirubinemia  Possible Gilbert's syndrome  Bilirubin is 1.2.  As above.  Continue to monitor bilirubin and liver function closely.    Thrombocytopenia  Mild and isolated thrombocytopenia noted.    No hemolysis noted on work up. Monitor while on chemotherapy.  Normal today, no intervention needed.     Iron deficiency anemia due to chronic blood loss  History of menorrhagia noted.  Obtain baseline iron panel and offer IV iron as clinically indicated.  Iron panel is normal at this time.   Hb is noted to be in the 9 g/dl range, continue to monitor.      ECOG SCORE    1 - Restricted in strenuous activity-ambulatory and able to carry out work of a light nature           Discussion:   No follow-ups on file.    Plan was discussed with the patient at length, and she verbalized understanding. Aleena was given an opportunity to ask questions that were answered to her satisfaction, and she was advised to call in the interval if any problems or questions arise.    Electronically signed by Ambar Ayala MD    Route Chart for Scheduling    Med Onc Chart Routing      Follow up with physician . 6/12   Follow up with NUNO . 6/5 tox check   Infusion scheduling note    Injection scheduling note     Labs CBC and CMP   Scheduling:  Preferred lab:  Lab interval:  b hcg 6/12. only, cbc cmp on 6/5 and 6/12   Imaging    Pharmacy appointment    Other referrals           Treatment Plan Information   OP BREAST TC - DOCETAXEL CYCLOPHOSPHAMIDE Q3W   Ambar Ayala MD   Upcoming Treatment Dates - OP BREAST TC - DOCETAXEL CYCLOPHOSPHAMIDE Q3W    5/22/2023       Pre-Medications       palonosetron 0.25mg/dexAMETHasone 10mg in NS IVPB 0.25 mg 50 mL       aprepitant (CINVANTI) injection 130 mg       Chemotherapy       DOCEtaxel (TAXOTERE) 60 mg/m2 = 88 mg in sodium chloride 0.9% 254.4 mL chemo infusion       cycloPHOSphamide 600 mg/m2 = 880 mg in sodium chloride 0.9% 254.4 mL chemo infusion  5/23/2023       Growth Factor       pegfilgrastim-cbqv (UDENYCA) injection 6 mg  6/12/2023       Pre-Medications       palonosetron 0.25mg/dexAMETHasone 10mg in NS IVPB 0.25 mg 50 mL       aprepitant (CINVANTI) injection 130 mg       Chemotherapy       DOCEtaxel (TAXOTERE) 60 mg/m2 = 88 mg in sodium chloride 0.9% 254.4 mL chemo infusion       cycloPHOSphamide 600 mg/m2 = 880 mg in sodium chloride 0.9% 254.4 mL chemo infusion  6/13/2023       Growth Factor       pegfilgrastim-cbqv (UDENYCA) injection 6 mg

## 2023-05-23 ENCOUNTER — PATIENT MESSAGE (OUTPATIENT)
Dept: HEMATOLOGY/ONCOLOGY | Facility: CLINIC | Age: 38
End: 2023-05-23
Payer: COMMERCIAL

## 2023-05-23 RX ORDER — LORAZEPAM 2 MG/ML
1 INJECTION INTRAMUSCULAR ONCE
Status: CANCELLED
Start: 2023-05-24

## 2023-05-24 ENCOUNTER — INFUSION (OUTPATIENT)
Dept: INFUSION THERAPY | Facility: HOSPITAL | Age: 38
End: 2023-05-24
Attending: INTERNAL MEDICINE
Payer: COMMERCIAL

## 2023-05-24 VITALS
RESPIRATION RATE: 16 BRPM | WEIGHT: 113.56 LBS | HEART RATE: 84 BPM | OXYGEN SATURATION: 100 % | DIASTOLIC BLOOD PRESSURE: 72 MMHG | HEIGHT: 62 IN | BODY MASS INDEX: 20.9 KG/M2 | TEMPERATURE: 98 F | SYSTOLIC BLOOD PRESSURE: 97 MMHG

## 2023-05-24 DIAGNOSIS — C50.919 TRIPLE NEGATIVE BREAST CANCER: Primary | ICD-10-CM

## 2023-05-24 PROCEDURE — 25000003 PHARM REV CODE 250: Mod: PN | Performed by: INTERNAL MEDICINE

## 2023-05-24 PROCEDURE — A4216 STERILE WATER/SALINE, 10 ML: HCPCS | Mod: PN | Performed by: INTERNAL MEDICINE

## 2023-05-24 PROCEDURE — 63600175 PHARM REV CODE 636 W HCPCS: Mod: PN | Performed by: INTERNAL MEDICINE

## 2023-05-24 PROCEDURE — 96367 TX/PROPH/DG ADDL SEQ IV INF: CPT | Mod: PN

## 2023-05-24 PROCEDURE — 96375 TX/PRO/DX INJ NEW DRUG ADDON: CPT | Mod: PN

## 2023-05-24 PROCEDURE — 96413 CHEMO IV INFUSION 1 HR: CPT | Mod: PN

## 2023-05-24 PROCEDURE — 96417 CHEMO IV INFUS EACH ADDL SEQ: CPT | Mod: PN

## 2023-05-24 RX ORDER — PANTOPRAZOLE SODIUM 40 MG/1
40 TABLET, DELAYED RELEASE ORAL DAILY
Qty: 30 TABLET | Refills: 1 | Status: SHIPPED | OUTPATIENT
Start: 2023-05-24 | End: 2023-07-25

## 2023-05-24 RX ORDER — SODIUM CHLORIDE 0.9 % (FLUSH) 0.9 %
10 SYRINGE (ML) INJECTION
Status: DISCONTINUED | OUTPATIENT
Start: 2023-05-24 | End: 2023-05-24 | Stop reason: HOSPADM

## 2023-05-24 RX ORDER — LORAZEPAM 2 MG/ML
1 INJECTION INTRAMUSCULAR ONCE
Status: COMPLETED | OUTPATIENT
Start: 2023-05-24 | End: 2023-05-24

## 2023-05-24 RX ADMIN — APREPITANT 130 MG: 130 INJECTION, EMULSION INTRAVENOUS at 02:05

## 2023-05-24 RX ADMIN — CYCLOPHOSPHAMIDE 880 MG: 200 INJECTION, SOLUTION INTRAVENOUS at 04:05

## 2023-05-24 RX ADMIN — DOCETAXEL 90 MG: 20 INJECTION, SOLUTION, CONCENTRATE INTRAVENOUS at 02:05

## 2023-05-24 RX ADMIN — SODIUM CHLORIDE: 9 INJECTION, SOLUTION INTRAVENOUS at 02:05

## 2023-05-24 RX ADMIN — LORAZEPAM 1 MG: 2 INJECTION INTRAMUSCULAR; INTRAVENOUS at 02:05

## 2023-05-24 RX ADMIN — PALONOSETRON 0.25 MG: 0.05 INJECTION, SOLUTION INTRAVENOUS at 02:05

## 2023-05-24 RX ADMIN — Medication 10 ML: at 05:05

## 2023-05-24 NOTE — PLAN OF CARE
Problem: Adult Inpatient Plan of Care  Goal: Plan of Care Review  Outcome: Ongoing, Progressing  Flowsheets (Taken 5/24/2023 1715)  Plan of Care Reviewed With:   spouse   patient  Goal: Patient-Specific Goal (Individualized)  Outcome: Ongoing, Progressing  Flowsheets (Taken 5/24/2023 1715)  Anxieties, Fears or Concerns: generalized anxiety  Individualized Care Needs: recliner, blanket, pillow, dim lights,  at chairside, education, conversation, tv, Ativan for anxiety, ice to chew/homemade popsicles     Problem: Fatigue  Goal: Improved Activity Tolerance  Outcome: Ongoing, Progressing  Intervention: Promote Improved Energy  Flowsheets (Taken 5/24/2023 1715)  Fatigue Management:   frequent rest breaks encouraged   paced activity encouraged   fatigue-related activity identified  Sleep/Rest Enhancement:   therapeutic touch utilized   room darkened   relaxation techniques promoted   noise level reduced   family presence promoted   consistent schedule promoted   natural light exposure provided  Activity Management:   Ambulated -L4   Up in chair - L3     Problem: Fall Injury Risk  Goal: Absence of Fall and Fall-Related Injury  Outcome: Ongoing, Progressing  Intervention: Promote Injury-Free Environment  Flowsheets (Taken 5/24/2023 1715)  Safety Promotion/Fall Prevention:   instructed to call staff for mobility   lighting adjusted   in recliner, wheels locked   family to remain at bedside   high risk medications identified   medications reviewed   Fall Risk reviewed with patient/family   room near unit station   supervised activity  Pt arrived to clinic today for C2D1 of Taxotere and Cyclophosphamide infusions and tolerated well with no changes throughout therapy. Pt aware of home meds to take with treatment plan and prn. Pt aware of side effects and number to call for any needs and discharged to home in NAD with .

## 2023-05-25 ENCOUNTER — PATIENT MESSAGE (OUTPATIENT)
Dept: ADMINISTRATIVE | Facility: OTHER | Age: 38
End: 2023-05-25
Payer: COMMERCIAL

## 2023-05-25 ENCOUNTER — INFUSION (OUTPATIENT)
Dept: INFUSION THERAPY | Facility: HOSPITAL | Age: 38
End: 2023-05-25
Attending: INTERNAL MEDICINE
Payer: COMMERCIAL

## 2023-05-25 VITALS — DIASTOLIC BLOOD PRESSURE: 61 MMHG | RESPIRATION RATE: 16 BRPM | SYSTOLIC BLOOD PRESSURE: 94 MMHG | HEART RATE: 72 BPM

## 2023-05-25 DIAGNOSIS — C50.919 TRIPLE NEGATIVE BREAST CANCER: Primary | ICD-10-CM

## 2023-05-25 PROCEDURE — 63600175 PHARM REV CODE 636 W HCPCS: Mod: JZ,JG,PN | Performed by: INTERNAL MEDICINE

## 2023-05-25 PROCEDURE — 96372 THER/PROPH/DIAG INJ SC/IM: CPT | Mod: PN

## 2023-05-25 RX ADMIN — PEGFILGRASTIM-CBQV 6 MG: 6 INJECTION, SOLUTION SUBCUTANEOUS at 04:05

## 2023-05-25 NOTE — PLAN OF CARE
Problem: Adult Inpatient Plan of Care  Goal: Plan of Care Review  Outcome: Ongoing, Progressing  Goal: Patient-Specific Goal (Individualized)  Outcome: Ongoing, Progressing     Problem: Fatigue  Goal: Improved Activity Tolerance  Outcome: Ongoing, Progressing   Pt tolerated udenyca injection well.   No adverse reaction noted.  Pt very nauseated but not throwing up, staying hydrated.  All questions answered.  Pt left clinic in no acute distress.

## 2023-05-26 ENCOUNTER — PATIENT MESSAGE (OUTPATIENT)
Dept: HEMATOLOGY/ONCOLOGY | Facility: CLINIC | Age: 38
End: 2023-05-26
Payer: COMMERCIAL

## 2023-05-29 ENCOUNTER — INFUSION (OUTPATIENT)
Dept: INFUSION THERAPY | Facility: HOSPITAL | Age: 38
End: 2023-05-29
Attending: INTERNAL MEDICINE
Payer: COMMERCIAL

## 2023-05-29 ENCOUNTER — LAB VISIT (OUTPATIENT)
Dept: LAB | Facility: HOSPITAL | Age: 38
End: 2023-05-29
Attending: INTERNAL MEDICINE
Payer: COMMERCIAL

## 2023-05-29 ENCOUNTER — TELEPHONE (OUTPATIENT)
Dept: HEMATOLOGY/ONCOLOGY | Facility: CLINIC | Age: 38
End: 2023-05-29
Payer: COMMERCIAL

## 2023-05-29 ENCOUNTER — OFFICE VISIT (OUTPATIENT)
Dept: HEMATOLOGY/ONCOLOGY | Facility: CLINIC | Age: 38
End: 2023-05-29
Payer: COMMERCIAL

## 2023-05-29 VITALS
WEIGHT: 112.88 LBS | OXYGEN SATURATION: 99 % | SYSTOLIC BLOOD PRESSURE: 90 MMHG | HEIGHT: 62 IN | BODY MASS INDEX: 20.77 KG/M2 | DIASTOLIC BLOOD PRESSURE: 50 MMHG | HEART RATE: 99 BPM | TEMPERATURE: 96 F

## 2023-05-29 VITALS
WEIGHT: 112.88 LBS | RESPIRATION RATE: 17 BRPM | SYSTOLIC BLOOD PRESSURE: 101 MMHG | TEMPERATURE: 98 F | DIASTOLIC BLOOD PRESSURE: 57 MMHG | HEART RATE: 99 BPM | BODY MASS INDEX: 20.77 KG/M2 | HEIGHT: 62 IN

## 2023-05-29 DIAGNOSIS — K59.1 FUNCTIONAL DIARRHEA: ICD-10-CM

## 2023-05-29 DIAGNOSIS — D72.819 LEUKOPENIA, UNSPECIFIED TYPE: ICD-10-CM

## 2023-05-29 DIAGNOSIS — D63.0 ANEMIA IN NEOPLASTIC DISEASE: ICD-10-CM

## 2023-05-29 DIAGNOSIS — R11.0 NAUSEA: ICD-10-CM

## 2023-05-29 DIAGNOSIS — D69.6 THROMBOCYTOPENIA: ICD-10-CM

## 2023-05-29 DIAGNOSIS — G89.18 POST-OP PAIN: ICD-10-CM

## 2023-05-29 DIAGNOSIS — C50.919 TRIPLE NEGATIVE BREAST CANCER: ICD-10-CM

## 2023-05-29 DIAGNOSIS — R74.01 TRANSAMINITIS: ICD-10-CM

## 2023-05-29 DIAGNOSIS — R45.89 ANXIETY ABOUT HEALTH: ICD-10-CM

## 2023-05-29 DIAGNOSIS — C50.919 TRIPLE NEGATIVE BREAST CANCER: Primary | ICD-10-CM

## 2023-05-29 LAB
ALBUMIN SERPL BCP-MCNC: 3.7 G/DL (ref 3.5–5.2)
ALP SERPL-CCNC: 65 U/L (ref 55–135)
ALT SERPL W/O P-5'-P-CCNC: 27 U/L (ref 10–44)
ANION GAP SERPL CALC-SCNC: 10 MMOL/L (ref 8–16)
AST SERPL-CCNC: 13 U/L (ref 10–40)
BASOPHILS # BLD AUTO: 0.04 K/UL (ref 0–0.2)
BASOPHILS NFR BLD: 1.6 % (ref 0–1.9)
BILIRUB SERPL-MCNC: 1.2 MG/DL (ref 0.1–1)
BUN SERPL-MCNC: 11 MG/DL (ref 6–20)
CALCIUM SERPL-MCNC: 9 MG/DL (ref 8.7–10.5)
CHLORIDE SERPL-SCNC: 101 MMOL/L (ref 95–110)
CO2 SERPL-SCNC: 25 MMOL/L (ref 23–29)
CREAT SERPL-MCNC: 0.6 MG/DL (ref 0.5–1.4)
DIFFERENTIAL METHOD: ABNORMAL
EOSINOPHIL # BLD AUTO: 0.1 K/UL (ref 0–0.5)
EOSINOPHIL NFR BLD: 4.8 % (ref 0–8)
ERYTHROCYTE [DISTWIDTH] IN BLOOD BY AUTOMATED COUNT: 13.9 % (ref 11.5–14.5)
EST. GFR  (NO RACE VARIABLE): >60 ML/MIN/1.73 M^2
GLUCOSE SERPL-MCNC: 87 MG/DL (ref 70–110)
HCT VFR BLD AUTO: 26.3 % (ref 37–48.5)
HGB BLD-MCNC: 8.8 G/DL (ref 12–16)
IMM GRANULOCYTES # BLD AUTO: 0 K/UL (ref 0–0.04)
IMM GRANULOCYTES NFR BLD AUTO: 0 % (ref 0–0.5)
LYMPHOCYTES # BLD AUTO: 0.6 K/UL (ref 1–4.8)
LYMPHOCYTES NFR BLD: 22.2 % (ref 18–48)
MAGNESIUM SERPL-MCNC: 1.9 MG/DL (ref 1.6–2.6)
MCH RBC QN AUTO: 30.3 PG (ref 27–31)
MCHC RBC AUTO-ENTMCNC: 33.5 G/DL (ref 32–36)
MCV RBC AUTO: 91 FL (ref 82–98)
MONOCYTES # BLD AUTO: 0.2 K/UL (ref 0.3–1)
MONOCYTES NFR BLD: 6.9 % (ref 4–15)
NEUTROPHILS # BLD AUTO: 1.6 K/UL (ref 1.8–7.7)
NEUTROPHILS NFR BLD: 64.5 % (ref 38–73)
NRBC BLD-RTO: 0 /100 WBC
PLATELET # BLD AUTO: 73 K/UL (ref 150–450)
PLATELET BLD QL SMEAR: ABNORMAL
PMV BLD AUTO: 9.5 FL (ref 9.2–12.9)
POTASSIUM SERPL-SCNC: 3.6 MMOL/L (ref 3.5–5.1)
PROT SERPL-MCNC: 5.6 G/DL (ref 6–8.4)
RBC # BLD AUTO: 2.9 M/UL (ref 4–5.4)
SODIUM SERPL-SCNC: 136 MMOL/L (ref 136–145)
WBC # BLD AUTO: 2.48 K/UL (ref 3.9–12.7)

## 2023-05-29 PROCEDURE — 3074F PR MOST RECENT SYSTOLIC BLOOD PRESSURE < 130 MM HG: ICD-10-PCS | Mod: CPTII,S$GLB,, | Performed by: INTERNAL MEDICINE

## 2023-05-29 PROCEDURE — 63600175 PHARM REV CODE 636 W HCPCS: Mod: PN | Performed by: INTERNAL MEDICINE

## 2023-05-29 PROCEDURE — 3078F DIAST BP <80 MM HG: CPT | Mod: CPTII,S$GLB,, | Performed by: INTERNAL MEDICINE

## 2023-05-29 PROCEDURE — 83735 ASSAY OF MAGNESIUM: CPT | Mod: PN | Performed by: INTERNAL MEDICINE

## 2023-05-29 PROCEDURE — 85025 COMPLETE CBC W/AUTO DIFF WBC: CPT | Mod: PN | Performed by: INTERNAL MEDICINE

## 2023-05-29 PROCEDURE — 96367 TX/PROPH/DG ADDL SEQ IV INF: CPT | Mod: PN

## 2023-05-29 PROCEDURE — 80053 COMPREHEN METABOLIC PANEL: CPT | Mod: PN | Performed by: INTERNAL MEDICINE

## 2023-05-29 PROCEDURE — 3008F BODY MASS INDEX DOCD: CPT | Mod: CPTII,S$GLB,, | Performed by: INTERNAL MEDICINE

## 2023-05-29 PROCEDURE — 96375 TX/PRO/DX INJ NEW DRUG ADDON: CPT | Mod: PN

## 2023-05-29 PROCEDURE — 25000003 PHARM REV CODE 250: Mod: PN | Performed by: INTERNAL MEDICINE

## 2023-05-29 PROCEDURE — 36415 COLL VENOUS BLD VENIPUNCTURE: CPT | Mod: PN | Performed by: INTERNAL MEDICINE

## 2023-05-29 PROCEDURE — 99215 OFFICE O/P EST HI 40 MIN: CPT | Mod: S$GLB,,, | Performed by: INTERNAL MEDICINE

## 2023-05-29 PROCEDURE — 99215 PR OFFICE/OUTPT VISIT, EST, LEVL V, 40-54 MIN: ICD-10-PCS | Mod: S$GLB,,, | Performed by: INTERNAL MEDICINE

## 2023-05-29 PROCEDURE — 3074F SYST BP LT 130 MM HG: CPT | Mod: CPTII,S$GLB,, | Performed by: INTERNAL MEDICINE

## 2023-05-29 PROCEDURE — 3008F PR BODY MASS INDEX (BMI) DOCUMENTED: ICD-10-PCS | Mod: CPTII,S$GLB,, | Performed by: INTERNAL MEDICINE

## 2023-05-29 PROCEDURE — 99999 PR PBB SHADOW E&M-EST. PATIENT-LVL IV: ICD-10-PCS | Mod: PBBFAC,,, | Performed by: INTERNAL MEDICINE

## 2023-05-29 PROCEDURE — 99999 PR PBB SHADOW E&M-EST. PATIENT-LVL IV: CPT | Mod: PBBFAC,,, | Performed by: INTERNAL MEDICINE

## 2023-05-29 PROCEDURE — 3078F PR MOST RECENT DIASTOLIC BLOOD PRESSURE < 80 MM HG: ICD-10-PCS | Mod: CPTII,S$GLB,, | Performed by: INTERNAL MEDICINE

## 2023-05-29 PROCEDURE — 96361 HYDRATE IV INFUSION ADD-ON: CPT | Mod: PN

## 2023-05-29 PROCEDURE — 96374 THER/PROPH/DIAG INJ IV PUSH: CPT | Mod: PN

## 2023-05-29 RX ORDER — DEXAMETHASONE SODIUM PHOSPHATE 4 MG/ML
8 INJECTION, SOLUTION INTRA-ARTICULAR; INTRALESIONAL; INTRAMUSCULAR; INTRAVENOUS; SOFT TISSUE ONCE
Status: COMPLETED | OUTPATIENT
Start: 2023-05-29 | End: 2023-05-29

## 2023-05-29 RX ORDER — SODIUM CHLORIDE 0.9 % (FLUSH) 0.9 %
10 SYRINGE (ML) INJECTION
OUTPATIENT
Start: 2023-05-29

## 2023-05-29 RX ORDER — ONDANSETRON 2 MG/ML
8 INJECTION INTRAMUSCULAR; INTRAVENOUS ONCE AS NEEDED
Status: COMPLETED | OUTPATIENT
Start: 2023-05-29 | End: 2023-05-29

## 2023-05-29 RX ORDER — OLANZAPINE 5 MG/1
5 TABLET ORAL NIGHTLY
Qty: 20 TABLET | Refills: 1 | Status: SHIPPED | OUTPATIENT
Start: 2023-05-29 | End: 2024-05-28

## 2023-05-29 RX ORDER — OXYCODONE HYDROCHLORIDE 10 MG/1
10 TABLET ORAL EVERY 8 HOURS PRN
Qty: 30 TABLET | Refills: 0 | Status: SHIPPED | OUTPATIENT
Start: 2023-05-29 | End: 2023-06-13 | Stop reason: SDUPTHER

## 2023-05-29 RX ORDER — GRANISETRON 3.1 MG/24H
1 PATCH TRANSDERMAL ONCE
Qty: 4 PATCH | Refills: 0 | Status: SHIPPED | OUTPATIENT
Start: 2023-05-29 | End: 2023-05-30

## 2023-05-29 RX ORDER — HEPARIN 100 UNIT/ML
500 SYRINGE INTRAVENOUS
Status: DISCONTINUED | OUTPATIENT
Start: 2023-05-29 | End: 2023-05-29 | Stop reason: HOSPADM

## 2023-05-29 RX ORDER — ONDANSETRON 2 MG/ML
8 INJECTION INTRAMUSCULAR; INTRAVENOUS ONCE AS NEEDED
Start: 2023-05-29

## 2023-05-29 RX ORDER — HEPARIN 100 UNIT/ML
500 SYRINGE INTRAVENOUS
OUTPATIENT
Start: 2023-05-29

## 2023-05-29 RX ORDER — DEXAMETHASONE SODIUM PHOSPHATE 4 MG/ML
8 INJECTION, SOLUTION INTRA-ARTICULAR; INTRALESIONAL; INTRAMUSCULAR; INTRAVENOUS; SOFT TISSUE ONCE
Start: 2023-05-29

## 2023-05-29 RX ORDER — SODIUM CHLORIDE 0.9 % (FLUSH) 0.9 %
10 SYRINGE (ML) INJECTION
Status: DISCONTINUED | OUTPATIENT
Start: 2023-05-29 | End: 2023-05-29 | Stop reason: HOSPADM

## 2023-05-29 RX ORDER — SODIUM CHLORIDE 9 MG/ML
1000 INJECTION, SOLUTION INTRAVENOUS
Status: COMPLETED | OUTPATIENT
Start: 2023-05-29 | End: 2023-05-29

## 2023-05-29 RX ADMIN — SODIUM CHLORIDE 1000 ML: 9 INJECTION, SOLUTION INTRAVENOUS at 01:05

## 2023-05-29 RX ADMIN — DEXAMETHASONE SODIUM PHOSPHATE 8 MG: 4 INJECTION INTRA-ARTICULAR; INTRALESIONAL; INTRAMUSCULAR; INTRAVENOUS; SOFT TISSUE at 01:05

## 2023-05-29 RX ADMIN — PROMETHAZINE HYDROCHLORIDE 12.5 MG: 25 INJECTION INTRAMUSCULAR; INTRAVENOUS at 02:05

## 2023-05-29 RX ADMIN — ONDANSETRON 8 MG: 2 INJECTION INTRAMUSCULAR; INTRAVENOUS at 01:05

## 2023-05-29 NOTE — TELEPHONE ENCOUNTER
Spoke with pt. BP 94/61. Pain, nausea, diarrhea, hot flashes x2 days. She would like to know if she could possible come in for fluids today and could pain meds be called in for her. Forwarded message to Dr. Ayala.

## 2023-05-29 NOTE — PROGRESS NOTES
PROGRESS NOTE    Subjective:       Patient ID: Aleena Duckworth is a 37 y.o. female.  MRN: [id number]  : 1985    Chief Complaint: Diarrhea (Sick visit; pt has had nausea, vomiting, and fatigue)      History of Present Illness:   Aleena Duckworth is a 37 y.o. female who is referred for newly diagnosed TNBC. Had her initial surgery at [facility].      As previously documented, she self palpated a left-sided breast mass.  She felt it was growing fast and reported to her primary care physician who referred her for breast imaging.  On 2023, she underwent a bilateral breast mammogram that showed a lobular hypoechoic solid mass within the left breast at 2 o'clock position 7 cm was recommended.  On 2023, she underwent an ultrasound guided biopsy that showed invasive ductal carcinoma, grade 3, ER/LA/HER2 Randall negative, Ki-67 80%.     On 23, she underwent left partial mastectomy that revealed invasive ductal carcinoma 9 x 8 x 6 mm, single focus, approximately 1 mm focus of high-grade DCIS, closest margin 3 mm, anterior.  Two sentinel lymph nodes were negative.  pT1b pN0(sn).     Has ADHD, has been on Adderall for several years.  Was born prematurely, was diagnosed with a stroke and heart murmur at birth, was treated at [facility].     Heavy menorrhagia for the past few years, worsening recently and has developed CAROLIN.  Has been on iron supplementation.  Sees gynecologist, Dr. Almita Clark with [facility].      She did have 1 visit with the medical oncology team at [facility].  Adjuvant chemotherapy was recommended.  Workup was ongoing for mild hyperbilirubinemia.  An abdominal ultrasound was done, negative for any abnormalities.Saw Hepatology prior to chemo who suspected she has Gilbert's disease and found no contraindication to proceeding with TC.       Gyn:  Menarche 13  . Age at first pregnancy 19  OCP use a year or so   Premenopausal, regular, menorrhagia.    LMP 4/1/23    Underwent cycle 1 of Docetaxel and Cytoxan on 5/1/23.   The steroids made her irritable and she cut down dose to 1 tab for day 3.   Developed nausea and generalized pain soon after receiving chemo.   Took promethazine, zofran and ativan as needed.   Had constipation.  Is using Percocet 1-2 times a day for post op pain and now for generalized body pain.      Developed a low grade fever at home, 100.4 one episode with quick resolution. At the same time, she had hypotension and tachycardia and was referred to the ER. She was admitted for sepsis, was started on antibiotics. Has no other episodes of fever, no vasopressor needed, no ICU visit.  Cultures negative, no clear source,  was discharged on Bactrim.     Interim history:  Received cycle 2 of TC with 20% dose reduction of docetaxel. Presents for an urgent visit today.   Generalized body pain and nausea since this chemo on 5/24/23. Is on oxycodone every 6 hours.  Is taking zofran and promethazine alternating every 4 hours.     Diarrhea after even minimal food intake. Did not want to take imodium and took activated charcoal yesterday with improvement.   Did have a mild productive cough. Has a sore throat. Improving.          Oncology History:  Oncology History   Triple negative breast cancer   4/17/2023 Initial Diagnosis    Triple negative breast cancer     4/18/2023 Cancer Staged    Staging form: Breast, AJCC 8th Edition  - Pathologic stage from 4/18/2023: Stage IB (pT1b, pN0, cM0, G3, ER-, RI-, HER2-)     5/1/2023 -  Chemotherapy    Treatment Summary   Plan Name: OP BREAST TC - DOCETAXEL CYCLOPHOSPHAMIDE Q3W  Treatment Goal: Curative  Status: Active  Start Date: 5/1/2023  End Date: 7/6/2023 (Planned)  Provider: Ambar Ayala MD  Chemotherapy: cycloPHOSphamide 600 mg/m2 = 880 mg in sodium chloride 0.9% 289.4 mL chemo infusion, 600 mg/m2 = 880 mg, Intravenous, Clinic/HOD 1 time, 2 of 4 cycles  Dose modification: 600 mg/m2 (Cycle 3)  Administration:  880 mg (5/1/2023)  DOCEtaxel 75 mg/m2 = 110 mg in sodium chloride 0.9% 290.5 mL chemo infusion, 75 mg/m2 = 110 mg, Intravenous, Clinic/HOD 1 time, 2 of 4 cycles  Dose modification: 60 mg/m2 (original dose 75 mg/m2, Cycle 2, Reason: Dose not tolerated)  Administration: 110 mg (5/1/2023), 90 mg (5/24/2023)         History:  Past Medical History:   Diagnosis Date    ADHD (attention deficit hyperactivity disorder)     Anemia     Hypertension     pre eclampsia and eclampsia    Stroke     baby      Past Surgical History:   Procedure Laterality Date    BREAST SURGERY      TUBAL LIGATION      VAGINAL DELIVERY       Family History   Problem Relation Age of Onset    Hypertension Mother     Breast cancer Mother 51        negative screening    Heart disease Father     Diabetes Maternal Grandmother     Breast cancer Maternal Grandmother       Social History     Tobacco Use    Smoking status: Some Days    Smokeless tobacco: Never    Tobacco comments:     vape   Substance and Sexual Activity    Alcohol use: Not Currently    Drug use: Never    Sexual activity: Yes     Partners: Male     Birth control/protection: See Surgical Hx     Comment: tubal        ROS:   Review of Systems   Constitutional:  Positive for malaise/fatigue. Negative for fever and weight loss.   HENT:  Positive for sore throat. Negative for congestion, hearing loss and nosebleeds.    Eyes:  Negative for double vision and photophobia.   Respiratory:  Negative for cough, hemoptysis, sputum production, shortness of breath and wheezing.    Cardiovascular:  Negative for chest pain, palpitations, orthopnea and leg swelling.   Gastrointestinal:  Positive for diarrhea and nausea. Negative for abdominal pain, blood in stool, constipation, heartburn and vomiting.   Genitourinary:  Negative for dysuria, hematuria and urgency.   Musculoskeletal:  Negative for back pain, joint pain and myalgias.   Skin:  Negative for itching and rash.   Neurological:  Negative for dizziness,  "tingling, seizures, weakness and headaches.   Endo/Heme/Allergies:  Negative for polydipsia. Does not bruise/bleed easily.   Psychiatric/Behavioral:  Negative for depression and memory loss. The patient is not nervous/anxious and does not have insomnia.       Objective:     Vitals:    05/29/23 1302   BP: (!) 90/50   Pulse: 99   Temp: 96.3 °F (35.7 °C)   TempSrc: Temporal   SpO2: 99%   Weight: 51.2 kg (112 lb 14 oz)   Height: 5' 2" (1.575 m)   PainSc:   3   PainLoc: Generalized       Physical Examination:   Physical Exam  Vitals and nursing note reviewed.   Constitutional:       General: She is not in acute distress.     Appearance: She is not diaphoretic.   HENT:      Head: Normocephalic.      Mouth/Throat:      Pharynx: No oropharyngeal exudate.   Eyes:      General: No scleral icterus.     Conjunctiva/sclera: Conjunctivae normal.   Neck:      Thyroid: No thyromegaly.   Cardiovascular:      Rate and Rhythm: Normal rate and regular rhythm.      Heart sounds: Normal heart sounds. No murmur heard.  Pulmonary:      Effort: Pulmonary effort is normal. No respiratory distress.      Breath sounds: No stridor. No wheezing or rales.   Chest:      Chest wall: No tenderness.   Abdominal:      General: Bowel sounds are normal. There is no distension.      Palpations: Abdomen is soft. There is no mass.      Tenderness: There is no abdominal tenderness. There is no rebound.   Musculoskeletal:         General: No tenderness or deformity. Normal range of motion.      Cervical back: Neck supple.   Lymphadenopathy:      Cervical: No cervical adenopathy.   Skin:     General: Skin is warm and dry.      Findings: No erythema or rash.   Neurological:      Mental Status: She is alert and oriented to person, place, and time.      Cranial Nerves: No cranial nerve deficit.      Coordination: Coordination normal.      Gait: Gait is intact.   Psychiatric:         Mood and Affect: Affect normal.         Cognition and Memory: Memory normal.    "      Judgment: Judgment normal.        Diagnostic Tests:  Significant Imaging: I have reviewed and interpreted all pertinent imaging results/findings.      Laboratory Data:  All pertinent labs have been reviewed.    Labs:   Lab Results   Component Value Date    WBC 2.48 (L) 05/29/2023    HGB 8.8 (L) 05/29/2023    HCT 26.3 (L) 05/29/2023    MCV 91 05/29/2023    PLT 73 (L) 05/29/2023       Assessment/Plan:   Triple negative breast cancer  pT1b pN0 (sn) ER/MS/HER2 Randall negative, Ki-67 80%, grade 3    37-year-old premenopausal woman, presenting with early stage triple negative breast cancer.  She is a candidate for adjuvant chemotherapy.  See previous discussions.   S/p cycle 1 of  docetaxel and Cytoxan  with poor tolerability and hospitalization.   Dose reduced for cycle 2, docetaxel to 60 mg/m2. Continue to optimize supportive care while on chemotherapy. See below.     Hypotension  Give 1 L NS today, monitor closely.     Nausea   IV anti emetics today.   Start Sancuso patch, Zyprexa and continue phenergan, dex and ativan. Continue to monitor closely.     Transaminitis   AST/ALT , new onset elevation. Likely multifactorial post chemo and antibiotics.  LFTS are now normal.     Hyperbilirubinemia  Possible Gilbert's syndrome  Bilirubin is 1.2.  As above.  Continue to monitor bilirubin and liver function closely.    Thrombocytopenia  Declining post chemo, 70 k today. Monitor closely, monitor for bleeding.     Neutropenia secondary to chemo   ANC 1.6, received Neulasta, continue to monitor.      Iron deficiency anemia due to chronic blood loss  History of menorrhagia noted.  Obtain baseline iron panel and offer IV iron as clinically indicated.  Iron panel is normal at this time.   Hb is noted to be in the 8 g/dl range, continue to monitor. Transfuse for Hb <7 g/dl or for symptomatic anemia.        MDM includes  :    - Acute or chronic illness or injury that poses a threat to life or bodily function  - Independent review and  explanation of 3+ results from unique tests  - Discussion of management and ordering 3+ unique tests  - Extensive discussion of treatment and management  - Prescription drug management  - Drug therapy requiring intensive monitoring for toxicity   ECOG SCORE               Discussion:   No follow-ups on file.    Plan was discussed with the patient at length, and she verbalized understanding. Aleena was given an opportunity to ask questions that were answered to her satisfaction, and she was advised to call in the interval if any problems or questions arise.    Electronically signed by Ambar Ayala MD    Route Chart for Scheduling    Med Onc Chart Routing      Follow up with physician . Scheduled   Follow up with NUNO    Infusion scheduling note    Injection scheduling note    Labs    Imaging    Pharmacy appointment    Other referrals           Treatment Plan Information   OP BREAST TC - DOCETAXEL CYCLOPHOSPHAMIDE Q3W   Ambar Ayala MD   Upcoming Treatment Dates - OP BREAST TC - DOCETAXEL CYCLOPHOSPHAMIDE Q3W    6/14/2023       Pre-Medications       palonosetron 0.25mg/dexAMETHasone 10mg in NS IVPB 0.25 mg 50 mL       aprepitant (CINVANTI) injection 130 mg       Chemotherapy       DOCEtaxel (TAXOTERE) 60 mg/m2 = 88 mg in sodium chloride 0.9% 254.4 mL chemo infusion       cycloPHOSphamide 600 mg/m2 = 880 mg in sodium chloride 0.9% 254.4 mL chemo infusion  6/15/2023       Growth Factor       pegfilgrastim-cbqv (UDENYCA) injection 6 mg  7/5/2023       Pre-Medications       palonosetron 0.25mg/dexAMETHasone 10mg in NS IVPB 0.25 mg 50 mL       aprepitant (CINVANTI) injection 130 mg       Chemotherapy       DOCEtaxel (TAXOTERE) 60 mg/m2 = 88 mg in sodium chloride 0.9% 254.4 mL chemo infusion       cycloPHOSphamide 600 mg/m2 = 880 mg in sodium chloride 0.9% 254.4 mL chemo infusion  7/6/2023       Growth Factor       pegfilgrastim-cbqv (UDENYCA) injection 6 mg    Therapy Plan Information  IV Fluids  sodium chloride 0.9%  bolus 1,000 mL 1,000 mL  1,000 mL, Intravenous, Every visit  Flushes  sodium chloride 0.9% flush 10 mL  10 mL, Intravenous, PRN  heparin, porcine (PF) 100 unit/mL injection flush 500 Units  500 Units, Intravenous, PRN  Antiemetics  ondansetron injection 8 mg  8 mg, Intravenous, PRN  promethazine (PHENERGAN) 12.5 mg in dextrose 5 % (D5W) 50 mL IVPB  12.5 mg, Intravenous, PRN  dexAMETHasone injection 8 mg  8 mg, Intravenous, PRN

## 2023-05-29 NOTE — PLAN OF CARE
Problem: Adult Inpatient Plan of Care  Goal: Plan of Care Review  Outcome: Ongoing, Progressing  Flowsheets (Taken 5/29/2023 7130)  Plan of Care Reviewed With:   patient   spouse   Pt tolerated hydration infusion, and nausea meds well.  No adverse reaction noted.   IV flushed with NS and D/C per protocol.  Patient left clinic in no acute distress.

## 2023-05-31 ENCOUNTER — PATIENT MESSAGE (OUTPATIENT)
Dept: HEMATOLOGY/ONCOLOGY | Facility: CLINIC | Age: 38
End: 2023-05-31
Payer: COMMERCIAL

## 2023-05-31 ENCOUNTER — TELEPHONE (OUTPATIENT)
Dept: PHARMACY | Facility: CLINIC | Age: 38
End: 2023-05-31
Payer: COMMERCIAL

## 2023-05-31 NOTE — TELEPHONE ENCOUNTER
Please let her know that we can monitor her cycle for now,and to continue following up with her gyn.     Try oral decongestants for the cough and congestion, take steroids only if that does not work. Thanks

## 2023-06-01 ENCOUNTER — NURSE TRIAGE (OUTPATIENT)
Dept: ADMINISTRATIVE | Facility: CLINIC | Age: 38
End: 2023-06-01
Payer: COMMERCIAL

## 2023-06-02 ENCOUNTER — PATIENT MESSAGE (OUTPATIENT)
Dept: ADMINISTRATIVE | Facility: OTHER | Age: 38
End: 2023-06-02
Payer: COMMERCIAL

## 2023-06-02 NOTE — TELEPHONE ENCOUNTER
Offered patient to come in to see Lula Stallings NP at 11am to evaluate. Patient declined and will see how it goes over the weekend. Still no fever but has intermittent cough with slight wheezing in the AM. Patient advised to report to ER or urgent care over the weekend if gets worse or has fever. Patient verbalized understanding of this.

## 2023-06-02 NOTE — TELEPHONE ENCOUNTER
"Pt reports 2nd chemo last Wednesday, developed a cough day after, then wheezing a few days after the cough, had never had any fever, but the wheezing has gotten slightly worse and now coughing up some mucous, Pt advised to be seen by a MD within 4 hours (or PCP triage) per protocol, Call placed to [facility] Hem/Onc on call MD, Dr. Khoobehi, who advised as long as pt is afebrile and not have respiratory distress she may be seen in office tomorrow. Pt informed and encouraged to call back with any worsening symptoms or questions. Pt verbalized understanding.    Reason for Disposition   [1] MILD difficulty breathing (e.g., minimal/no SOB at rest, SOB with walking, pulse <100) AND [2] NEW-onset or WORSE than normal    Additional Information   Negative: SEVERE difficulty breathing (e.g., struggling for each breath, speaks in single words)   Negative: [1] Breathing stopped AND [2] hasn't returned   Negative: Choking on something   Negative: Bluish (or gray) lips or face now   Negative: Difficult to awaken or acting confused (e.g., disoriented, slurred speech)   Negative: Passed out (i.e., lost consciousness, collapsed and was not responding)   Negative: Wheezing started suddenly after medicine, an allergic food or bee sting   Negative: Stridor (harsh sound while breathing in)   Negative: Slow, shallow and weak breathing   Negative: Sounds like a life-threatening emergency to the triager   Negative: [1] MODERATE difficulty breathing (e.g., speaks in phrases, SOB even at rest, pulse 100-120) AND [2] NEW-onset or WORSE than normal   Negative: [1] Neutropenia known or suspected (e.g., recent cancer chemotherapy) AND [2] fever > 100.4 F (38.0 C)   Negative: Oxygen level (e.g., pulse oximetry) 90 percent or lower   Negative: Wheezing can be heard across the room   Negative: Drooling or spitting out saliva (because can't swallow)   Negative: History of prior "blood clot" in leg or lungs (i.e., deep vein thrombosis, pulmonary " "embolism)   Negative: History of inherited increased risk of blood clots (e.g., Factor 5 Leiden, Anti-thrombin 3, Protein C or Protein S deficiency, Prothrombin mutation)   Negative: Major surgery in the past month   Negative: Hip or leg fracture (broken bone) in past month (or had cast on leg or ankle in past month)   Negative: Illness requiring prolonged bedrest in past month (e.g., immobilization, long hospital stay)   Negative: Long-distance travel in past month (e.g., car, bus, train, plane; with trip lasting 6 or more hours)   Negative: Extra heartbeats, irregular heart beating, or heart is beating very fast  (i.e., "palpitations")   Negative: Fever > 103 F (39.4 C)   Negative: [1] Fever > 101 F (38.3 C) AND [2] age > 60 years   Negative: [1] Fever > 100.0 F (37.8 C) AND [2] diabetes mellitus or weak immune system (e.g., HIV positive, cancer chemo, splenectomy, organ transplant, chronic steroids)   Negative: Patient sounds very sick or weak to the triager    Protocols used: Cancer - Breathing Difficulty-A-AH    "

## 2023-06-05 ENCOUNTER — OFFICE VISIT (OUTPATIENT)
Dept: HEMATOLOGY/ONCOLOGY | Facility: CLINIC | Age: 38
End: 2023-06-05
Payer: COMMERCIAL

## 2023-06-05 ENCOUNTER — LAB VISIT (OUTPATIENT)
Dept: LAB | Facility: HOSPITAL | Age: 38
End: 2023-06-05
Attending: INTERNAL MEDICINE
Payer: COMMERCIAL

## 2023-06-05 VITALS
WEIGHT: 113.13 LBS | HEIGHT: 62 IN | SYSTOLIC BLOOD PRESSURE: 102 MMHG | TEMPERATURE: 98 F | DIASTOLIC BLOOD PRESSURE: 64 MMHG | HEART RATE: 94 BPM | BODY MASS INDEX: 20.82 KG/M2 | RESPIRATION RATE: 16 BRPM | OXYGEN SATURATION: 96 %

## 2023-06-05 DIAGNOSIS — J40 BRONCHITIS: Primary | ICD-10-CM

## 2023-06-05 DIAGNOSIS — C50.919 TRIPLE NEGATIVE BREAST CANCER: ICD-10-CM

## 2023-06-05 DIAGNOSIS — D69.6 THROMBOCYTOPENIA: ICD-10-CM

## 2023-06-05 LAB
ALBUMIN SERPL BCP-MCNC: 4 G/DL (ref 3.5–5.2)
ALP SERPL-CCNC: 106 U/L (ref 55–135)
ALT SERPL W/O P-5'-P-CCNC: 54 U/L (ref 10–44)
ANION GAP SERPL CALC-SCNC: 13 MMOL/L (ref 8–16)
ANISOCYTOSIS BLD QL SMEAR: SLIGHT
AST SERPL-CCNC: 16 U/L (ref 10–40)
BASOPHILS # BLD AUTO: ABNORMAL K/UL (ref 0–0.2)
BASOPHILS NFR BLD: 0 % (ref 0–1.9)
BILIRUB SERPL-MCNC: 0.4 MG/DL (ref 0.1–1)
BUN SERPL-MCNC: 11 MG/DL (ref 6–20)
CALCIUM SERPL-MCNC: 9 MG/DL (ref 8.7–10.5)
CHLORIDE SERPL-SCNC: 105 MMOL/L (ref 95–110)
CO2 SERPL-SCNC: 22 MMOL/L (ref 23–29)
CREAT SERPL-MCNC: 0.7 MG/DL (ref 0.5–1.4)
DACRYOCYTES BLD QL SMEAR: ABNORMAL
DIFFERENTIAL METHOD: ABNORMAL
EOSINOPHIL # BLD AUTO: ABNORMAL K/UL (ref 0–0.5)
EOSINOPHIL NFR BLD: 0 % (ref 0–8)
ERYTHROCYTE [DISTWIDTH] IN BLOOD BY AUTOMATED COUNT: 14.7 % (ref 11.5–14.5)
EST. GFR  (NO RACE VARIABLE): >60 ML/MIN/1.73 M^2
GLUCOSE SERPL-MCNC: 96 MG/DL (ref 70–110)
HCT VFR BLD AUTO: 29.2 % (ref 37–48.5)
HGB BLD-MCNC: 9.7 G/DL (ref 12–16)
HYPOCHROMIA BLD QL SMEAR: ABNORMAL
IMM GRANULOCYTES # BLD AUTO: ABNORMAL K/UL (ref 0–0.04)
IMM GRANULOCYTES NFR BLD AUTO: ABNORMAL % (ref 0–0.5)
LYMPHOCYTES # BLD AUTO: ABNORMAL K/UL (ref 1–4.8)
LYMPHOCYTES NFR BLD: 11 % (ref 18–48)
MCH RBC QN AUTO: 30.7 PG (ref 27–31)
MCHC RBC AUTO-ENTMCNC: 33.2 G/DL (ref 32–36)
MCV RBC AUTO: 92 FL (ref 82–98)
METAMYELOCYTES NFR BLD MANUAL: 3 %
MONOCYTES # BLD AUTO: ABNORMAL K/UL (ref 0.3–1)
MONOCYTES NFR BLD: 3 % (ref 4–15)
MYELOCYTES NFR BLD MANUAL: 4 %
NEUTROPHILS NFR BLD: 69 % (ref 38–73)
NEUTS BAND NFR BLD MANUAL: 10 %
NRBC BLD-RTO: 0 /100 WBC
OVALOCYTES BLD QL SMEAR: ABNORMAL
PLATELET # BLD AUTO: 93 K/UL (ref 150–450)
PLATELET BLD QL SMEAR: ABNORMAL
PMV BLD AUTO: 8.9 FL (ref 9.2–12.9)
POIKILOCYTOSIS BLD QL SMEAR: SLIGHT
POTASSIUM SERPL-SCNC: 3.3 MMOL/L (ref 3.5–5.1)
PROT SERPL-MCNC: 6.2 G/DL (ref 6–8.4)
RBC # BLD AUTO: 3.16 M/UL (ref 4–5.4)
SODIUM SERPL-SCNC: 140 MMOL/L (ref 136–145)
TOXIC GRANULES BLD QL SMEAR: PRESENT
WBC # BLD AUTO: 18.51 K/UL (ref 3.9–12.7)

## 2023-06-05 PROCEDURE — 85027 COMPLETE CBC AUTOMATED: CPT | Mod: PN | Performed by: INTERNAL MEDICINE

## 2023-06-05 PROCEDURE — 80053 COMPREHEN METABOLIC PANEL: CPT | Mod: PN | Performed by: INTERNAL MEDICINE

## 2023-06-05 PROCEDURE — 99999 PR PBB SHADOW E&M-EST. PATIENT-LVL V: ICD-10-PCS | Mod: PBBFAC,,, | Performed by: NURSE PRACTITIONER

## 2023-06-05 PROCEDURE — 3078F PR MOST RECENT DIASTOLIC BLOOD PRESSURE < 80 MM HG: ICD-10-PCS | Mod: CPTII,S$GLB,, | Performed by: NURSE PRACTITIONER

## 2023-06-05 PROCEDURE — 99999 PR PBB SHADOW E&M-EST. PATIENT-LVL V: CPT | Mod: PBBFAC,,, | Performed by: NURSE PRACTITIONER

## 2023-06-05 PROCEDURE — 3074F PR MOST RECENT SYSTOLIC BLOOD PRESSURE < 130 MM HG: ICD-10-PCS | Mod: CPTII,S$GLB,, | Performed by: NURSE PRACTITIONER

## 2023-06-05 PROCEDURE — 36415 COLL VENOUS BLD VENIPUNCTURE: CPT | Mod: PN | Performed by: INTERNAL MEDICINE

## 2023-06-05 PROCEDURE — 99214 PR OFFICE/OUTPT VISIT, EST, LEVL IV, 30-39 MIN: ICD-10-PCS | Mod: S$GLB,,, | Performed by: NURSE PRACTITIONER

## 2023-06-05 PROCEDURE — 3078F DIAST BP <80 MM HG: CPT | Mod: CPTII,S$GLB,, | Performed by: NURSE PRACTITIONER

## 2023-06-05 PROCEDURE — 85007 BL SMEAR W/DIFF WBC COUNT: CPT | Mod: PN | Performed by: INTERNAL MEDICINE

## 2023-06-05 PROCEDURE — 1159F MED LIST DOCD IN RCRD: CPT | Mod: CPTII,S$GLB,, | Performed by: NURSE PRACTITIONER

## 2023-06-05 PROCEDURE — 3008F BODY MASS INDEX DOCD: CPT | Mod: CPTII,S$GLB,, | Performed by: NURSE PRACTITIONER

## 2023-06-05 PROCEDURE — 99214 OFFICE O/P EST MOD 30 MIN: CPT | Mod: S$GLB,,, | Performed by: NURSE PRACTITIONER

## 2023-06-05 PROCEDURE — 1160F PR REVIEW ALL MEDS BY PRESCRIBER/CLIN PHARMACIST DOCUMENTED: ICD-10-PCS | Mod: CPTII,S$GLB,, | Performed by: NURSE PRACTITIONER

## 2023-06-05 PROCEDURE — 3008F PR BODY MASS INDEX (BMI) DOCUMENTED: ICD-10-PCS | Mod: CPTII,S$GLB,, | Performed by: NURSE PRACTITIONER

## 2023-06-05 PROCEDURE — 3074F SYST BP LT 130 MM HG: CPT | Mod: CPTII,S$GLB,, | Performed by: NURSE PRACTITIONER

## 2023-06-05 PROCEDURE — 1159F PR MEDICATION LIST DOCUMENTED IN MEDICAL RECORD: ICD-10-PCS | Mod: CPTII,S$GLB,, | Performed by: NURSE PRACTITIONER

## 2023-06-05 PROCEDURE — 1160F RVW MEDS BY RX/DR IN RCRD: CPT | Mod: CPTII,S$GLB,, | Performed by: NURSE PRACTITIONER

## 2023-06-05 RX ORDER — HYDROCODONE BITARTRATE AND ACETAMINOPHEN 10; 325 MG/1; MG/1
1 TABLET ORAL EVERY 6 HOURS PRN
COMMUNITY
Start: 2023-05-23

## 2023-06-05 RX ORDER — CETIRIZINE HYDROCHLORIDE 10 MG/1
CAPSULE, LIQUID FILLED ORAL
COMMUNITY

## 2023-06-05 RX ORDER — DOXYCYCLINE 100 MG/1
100 CAPSULE ORAL 2 TIMES DAILY
Qty: 20 CAPSULE | Refills: 0 | Status: SHIPPED | OUTPATIENT
Start: 2023-06-05 | End: 2023-06-16 | Stop reason: ALTCHOICE

## 2023-06-05 NOTE — PROGRESS NOTES
PROGRESS NOTE    Subjective:       Patient ID: Aleena Duckworth is a 37 y.o. female.  MRN: [id number]  : 1985    Chief Complaint: Tox check      History of Present Illness:   Aleena Duckworth is a 37 y.o. female who is a patient of Dr. Ayala for a diagnosis TNBC.   Had her initial surgery at [facility].   She has received 2 cycles of Docetaxel/Cytoxan    History:  As previously documented, she self palpated a left-sided breast mass.  She felt it was growing fast and reported to her primary care physician who referred her for breast imaging.  On 2023, she underwent a bilateral breast mammogram that showed a lobular hypoechoic solid mass within the left breast at 2 o'clock position 7 cm was recommended.  On 2023, she underwent an ultrasound guided biopsy that showed invasive ductal carcinoma, grade 3, ER/DC/HER2 Randall negative, Ki-67 80%.     On 23, she underwent left partial mastectomy that revealed invasive ductal carcinoma 9 x 8 x 6 mm, single focus, approximately 1 mm focus of high-grade DCIS, closest margin 3 mm, anterior.  Two sentinel lymph nodes were negative.  pT1b pN0(sn).     Has ADHD, has been on Adderall for several years.  Was born prematurely, was diagnosed with a stroke and heart murmur at birth, was treated at [facility].     Heavy menorrhagia for the past few years, worsening recently and has developed CAROLIN.  Has been on iron supplementation.  Sees gynecologist, Dr. Almita Clark with [facility].      She did have 1 visit with the medical oncology team at [facility].  Adjuvant chemotherapy was recommended.  Workup was ongoing for mild hyperbilirubinemia.  An abdominal ultrasound was done, negative for any abnormalities.Saw Hepatology prior to chemo who suspected she has Gilbert's disease and found no contraindication to proceeding with TC.       Gyn:  Menarche 13  . Age at first pregnancy 19  OCP use a year or so   Premenopausal, regular,  menorrhagia.   LMP 4/1/23    Underwent cycle 1 of Docetaxel and Cytoxan on 5/1/23.   The steroids made her irritable and she cut down dose to 1 tab for day 3.   Developed nausea and generalized pain soon after receiving chemo.   Took promethazine, zofran and ativan as needed.   Had constipation.  Is using Percocet 1-2 times a day for post op pain and now for generalized body pain.      Developed a low grade fever at home, 100.4 one episode with quick resolution. At the same time, she had hypotension and tachycardia and was referred to the ER. She was admitted for sepsis, was started on antibiotics. Has no other episodes of fever, no vasopressor needed, no ICU visit.  Cultures negative, no clear source,  was discharged on Bactrim.     Interim history:  05/01/23:  Cycle 1; nausea/generalized pain; constipation; T100.4, hypotensive/tachycardic, referred to ED; admitted sepsis; started on antibiotics; cultures negative, d/c'd on Bactrim  05/24/23:  Cycle 2: 20% dose reduction of docetaxel.      C/o cough & wheezing. Remains fatigued.  Started on Dexamethasone, but only taking 2 mg bid.  No fevers, CP, palpitations, abdominal discomfort, N/V/D, bleeding, etc.       Oncology History:  Oncology History   Triple negative breast cancer   4/17/2023 Initial Diagnosis    Triple negative breast cancer     4/18/2023 Cancer Staged    Staging form: Breast, AJCC 8th Edition  - Pathologic stage from 4/18/2023: Stage IB (pT1b, pN0, cM0, G3, ER-, NC-, HER2-)     5/1/2023 -  Chemotherapy    Treatment Summary   Plan Name: OP BREAST TC - DOCETAXEL CYCLOPHOSPHAMIDE Q3W  Treatment Goal: Curative  Status: Active  Start Date: 5/1/2023  End Date: 7/6/2023 (Planned)  Provider: Ambar Ayala MD  Chemotherapy: cycloPHOSphamide 600 mg/m2 = 880 mg in sodium chloride 0.9% 289.4 mL chemo infusion, 600 mg/m2 = 880 mg, Intravenous, Clinic/HOD 1 time, 2 of 4 cycles  Dose modification: 600 mg/m2 (Cycle 3)  Administration: 880 mg (5/1/2023)  DOCEtaxel  75 mg/m2 = 110 mg in sodium chloride 0.9% 290.5 mL chemo infusion, 75 mg/m2 = 110 mg, Intravenous, Clinic/HOD 1 time, 2 of 4 cycles  Dose modification: 60 mg/m2 (original dose 75 mg/m2, Cycle 2, Reason: Dose not tolerated)  Administration: 110 mg (5/1/2023), 90 mg (5/24/2023)         History:  Past Medical History:   Diagnosis Date    ADHD (attention deficit hyperactivity disorder)     Anemia     Hypertension     pre eclampsia and eclampsia    Stroke     baby      Past Surgical History:   Procedure Laterality Date    BREAST SURGERY      TUBAL LIGATION      VAGINAL DELIVERY       Family History   Problem Relation Age of Onset    Hypertension Mother     Breast cancer Mother 51        negative screening    Heart disease Father     Diabetes Maternal Grandmother     Breast cancer Maternal Grandmother       Social History     Tobacco Use    Smoking status: Some Days     Types: Vaping with nicotine    Smokeless tobacco: Never    Tobacco comments:     vape   Substance and Sexual Activity    Alcohol use: Not Currently    Drug use: Never    Sexual activity: Yes     Partners: Male     Birth control/protection: See Surgical Hx     Comment: tubal        ROS:   Review of Systems   Constitutional:  Positive for malaise/fatigue. Negative for fever and weight loss.   HENT:  Negative for congestion, hearing loss and nosebleeds.    Eyes:  Negative for double vision and photophobia.   Respiratory:  Positive for cough and wheezing. Negative for hemoptysis, sputum production and shortness of breath.    Cardiovascular:  Negative for chest pain, palpitations, orthopnea and leg swelling.   Gastrointestinal:  Negative for abdominal pain, blood in stool, constipation, heartburn, nausea and vomiting.   Genitourinary:  Negative for dysuria, hematuria and urgency.   Musculoskeletal:  Negative for back pain, joint pain and myalgias.   Skin:  Negative for itching and rash.   Neurological:  Negative for dizziness, tingling,  "seizures, weakness and headaches.   Endo/Heme/Allergies:  Negative for polydipsia. Does not bruise/bleed easily.   Psychiatric/Behavioral:  Negative for depression and memory loss. The patient is not nervous/anxious and does not have insomnia.       Objective:     Vitals:    06/05/23 1138   BP: 102/64   Pulse: 94   Resp: 16   Temp: 98.1 °F (36.7 °C)   TempSrc: Temporal   SpO2: 96%   Weight: 51.3 kg (113 lb 1.5 oz)   Height: 5' 2" (1.575 m)   PainSc:   4   PainLoc: Back       Physical Examination:   Physical Exam  Vitals and nursing note reviewed.   Constitutional:       General: She is not in acute distress.     Appearance: She is not diaphoretic.   HENT:      Head: Normocephalic.      Mouth/Throat:      Pharynx: No oropharyngeal exudate.   Eyes:      General: No scleral icterus.     Conjunctiva/sclera: Conjunctivae normal.   Neck:      Thyroid: No thyromegaly.   Cardiovascular:      Rate and Rhythm: Normal rate and regular rhythm.      Heart sounds: Normal heart sounds. No murmur heard.  Pulmonary:      Effort: Pulmonary effort is normal. No respiratory distress.      Breath sounds: No stridor. Wheezing present. No rales.   Chest:      Chest wall: No tenderness.   Abdominal:      General: Bowel sounds are normal. There is no distension.      Palpations: Abdomen is soft. There is no mass.      Tenderness: There is no abdominal tenderness. There is no rebound.   Musculoskeletal:         General: No tenderness or deformity. Normal range of motion.      Cervical back: Neck supple.   Lymphadenopathy:      Cervical: No cervical adenopathy.   Skin:     General: Skin is warm and dry.      Coloration: Skin is pale.      Findings: No erythema or rash.   Neurological:      Mental Status: She is alert and oriented to person, place, and time.      Cranial Nerves: No cranial nerve deficit.      Coordination: Coordination normal.      Gait: Gait is intact.   Psychiatric:         Mood and Affect: Affect normal.         Cognition " and Memory: Memory normal.         Judgment: Judgment normal.      Laboratory Data:  All pertinent labs have been reviewed.    Labs:   Lab Results   Component Value Date    WBC 18.51 (H) 06/05/2023    HGB 9.7 (L) 06/05/2023    HCT 29.2 (L) 06/05/2023    MCV 92 06/05/2023    PLT 93 (L) 06/05/2023     CMP  Sodium   Date Value Ref Range Status   06/05/2023 140 136 - 145 mmol/L Final     Potassium   Date Value Ref Range Status   06/05/2023 3.3 (L) 3.5 - 5.1 mmol/L Final     Chloride   Date Value Ref Range Status   06/05/2023 105 95 - 110 mmol/L Final     CO2   Date Value Ref Range Status   06/05/2023 22 (L) 23 - 29 mmol/L Final     Glucose   Date Value Ref Range Status   06/05/2023 96 70 - 110 mg/dL Final     BUN   Date Value Ref Range Status   06/05/2023 11 6 - 20 mg/dL Final     Creatinine   Date Value Ref Range Status   06/05/2023 0.7 0.5 - 1.4 mg/dL Final     Calcium   Date Value Ref Range Status   06/05/2023 9.0 8.7 - 10.5 mg/dL Final     Total Protein   Date Value Ref Range Status   06/05/2023 6.2 6.0 - 8.4 g/dL Final     Albumin   Date Value Ref Range Status   06/05/2023 4.0 3.5 - 5.2 g/dL Final     Total Bilirubin   Date Value Ref Range Status   06/05/2023 0.4 0.1 - 1.0 mg/dL Final     Comment:     For infants and newborns, interpretation of results should be based  on gestational age, weight and in agreement with clinical  observations.    Premature Infant recommended reference ranges:  Up to 24 hours.............<8.0 mg/dL  Up to 48 hours............<12.0 mg/dL  3-5 days..................<15.0 mg/dL  6-29 days.................<15.0 mg/dL       Alkaline Phosphatase   Date Value Ref Range Status   06/05/2023 106 55 - 135 U/L Final     AST   Date Value Ref Range Status   06/05/2023 16 10 - 40 U/L Final     ALT   Date Value Ref Range Status   06/05/2023 54 (H) 10 - 44 U/L Final     Anion Gap   Date Value Ref Range Status   06/05/2023 13 8 - 16 mmol/L Final     eGFR   Date Value Ref Range Status   06/05/2023 >60.0  >60 mL/min/1.73 m^2 Final         Assessment/Plan:   Triple negative breast cancer  pT1b pN0 (sn) ER/AZ/HER2 Randall negative, Ki-67 80%, grade 3    37-year-old premenopausal woman, presenting with early stage triple negative breast cancer.  She is a candidate for adjuvant chemotherapy.  See previous discussions.   S/p cycle 1 of  docetaxel and Cytoxan  with poor tolerability and hospitalization.   Dose reduced for cycle 2, docetaxel to 60 mg/m2. Continue to optimize supportive care while on chemotherapy. See below.   Cycle 2 on 05/24/23    Bronchitis  -Increase Dexamethasone to 4 mg bid (patient unable to handle 8 mg bid); states Azithromycin does not work on her; Doxycycline 100 mg bid x 10 days.  -increase fluids; guaifenesin as directed; call for any worsening s/s.    Transaminitis   AST/ALT , new onset elevation. Likely multifactorial post chemo and antibiotics.  LFTS are now normal.     Hyperbilirubinemia  Possible Gilbert's syndrome  Bilirubin is 1.2.  As above.  Continue to monitor bilirubin and liver function closely.    Thrombocytopenia  Declining post chemo, 93 k today. Monitor closely, monitor for bleeding.     Iron deficiency anemia due to chronic blood loss  History of menorrhagia noted.  Obtain baseline iron panel and offer IV iron as clinically indicated.  Iron panel is normal at this time.   Hb is noted to be in the 8 g/dl range, continue to monitor. Transfuse for Hb <7 g/dl or for symptomatic anemia.        MDM includes  :    - Acute or chronic illness or injury that poses a threat to life or bodily function  - Independent review and explanation of 3+ results from unique tests  - Discussion of management and ordering 3+ unique tests  - Extensive discussion of treatment and management  - Prescription drug management  - Drug therapy requiring intensive monitoring for toxicity       Discussion:   No follow-ups on file.    Plan was discussed with the patient at length, and she verbalized understanding.  Aleena was given an opportunity to ask questions that were answered to her satisfaction, and she was advised to call in the interval if any problems or questions arise.    Electronically signed by Wyatt Torres NP    Route Chart for Scheduling    Med Onc Chart Routing      Follow up with physician . Keep appt with Dr. Ayala on 06/12   Follow up with NUNO    Infusion scheduling note    Injection scheduling note    Labs    Imaging    Pharmacy appointment    Other referrals          Treatment Plan Information   OP BREAST TC - DOCETAXEL CYCLOPHOSPHAMIDE Q3W   Ambar Ayala MD   Upcoming Treatment Dates - OP BREAST TC - DOCETAXEL CYCLOPHOSPHAMIDE Q3W    6/14/2023       Pre-Medications       palonosetron 0.25mg/dexAMETHasone 10mg in NS IVPB 0.25 mg 50 mL       aprepitant (CINVANTI) injection 130 mg       Chemotherapy       DOCEtaxel (TAXOTERE) 60 mg/m2 = 88 mg in sodium chloride 0.9% 254.4 mL chemo infusion       cycloPHOSphamide 600 mg/m2 = 880 mg in sodium chloride 0.9% 254.4 mL chemo infusion  6/15/2023       Growth Factor       pegfilgrastim-cbqv (UDENYCA) injection 6 mg  7/5/2023       Pre-Medications       palonosetron 0.25mg/dexAMETHasone 10mg in NS IVPB 0.25 mg 50 mL       aprepitant (CINVANTI) injection 130 mg       Chemotherapy       DOCEtaxel (TAXOTERE) 60 mg/m2 = 88 mg in sodium chloride 0.9% 254.4 mL chemo infusion       cycloPHOSphamide 600 mg/m2 = 880 mg in sodium chloride 0.9% 254.4 mL chemo infusion  7/6/2023       Growth Factor       pegfilgrastim-cbqv (UDENYCA) injection 6 mg    Therapy Plan Information  IV Fluids  sodium chloride 0.9% bolus 1,000 mL 1,000 mL  1,000 mL, Intravenous, Every visit  Flushes  sodium chloride 0.9% flush 10 mL  10 mL, Intravenous, PRN  heparin, porcine (PF) 100 unit/mL injection flush 500 Units  500 Units, Intravenous, PRN  Antiemetics  ondansetron injection 8 mg  8 mg, Intravenous, PRN  promethazine (PHENERGAN) 12.5 mg in dextrose 5 % (D5W) 50 mL IVPB  12.5 mg,  Intravenous, PRN  dexAMETHasone injection 8 mg  8 mg, Intravenous, PRN     30 minutes were spent in coordination of patient's care, record review and counseling.

## 2023-06-06 ENCOUNTER — PATIENT MESSAGE (OUTPATIENT)
Dept: HEMATOLOGY/ONCOLOGY | Facility: CLINIC | Age: 38
End: 2023-06-06
Payer: COMMERCIAL

## 2023-06-06 DIAGNOSIS — C50.919 TRIPLE NEGATIVE BREAST CANCER: ICD-10-CM

## 2023-06-06 RX ORDER — LORAZEPAM 1 MG/1
1 TABLET ORAL 2 TIMES DAILY PRN
Qty: 30 TABLET | Refills: 0 | Status: SHIPPED | OUTPATIENT
Start: 2023-06-06 | End: 2023-06-27 | Stop reason: SDUPTHER

## 2023-06-07 ENCOUNTER — PATIENT MESSAGE (OUTPATIENT)
Dept: HEMATOLOGY/ONCOLOGY | Facility: CLINIC | Age: 38
End: 2023-06-07
Payer: COMMERCIAL

## 2023-06-07 ENCOUNTER — PATIENT MESSAGE (OUTPATIENT)
Dept: INFUSION THERAPY | Facility: HOSPITAL | Age: 38
End: 2023-06-07
Payer: COMMERCIAL

## 2023-06-08 ENCOUNTER — PATIENT MESSAGE (OUTPATIENT)
Dept: HEMATOLOGY/ONCOLOGY | Facility: CLINIC | Age: 38
End: 2023-06-08
Payer: COMMERCIAL

## 2023-06-12 ENCOUNTER — OFFICE VISIT (OUTPATIENT)
Dept: HEMATOLOGY/ONCOLOGY | Facility: CLINIC | Age: 38
End: 2023-06-12
Payer: COMMERCIAL

## 2023-06-12 ENCOUNTER — LAB VISIT (OUTPATIENT)
Dept: LAB | Facility: HOSPITAL | Age: 38
End: 2023-06-12
Attending: INTERNAL MEDICINE
Payer: COMMERCIAL

## 2023-06-12 ENCOUNTER — PATIENT MESSAGE (OUTPATIENT)
Dept: HEMATOLOGY/ONCOLOGY | Facility: CLINIC | Age: 38
End: 2023-06-12

## 2023-06-12 VITALS
WEIGHT: 119.25 LBS | SYSTOLIC BLOOD PRESSURE: 114 MMHG | BODY MASS INDEX: 21.94 KG/M2 | HEART RATE: 102 BPM | HEIGHT: 62 IN | RESPIRATION RATE: 16 BRPM | OXYGEN SATURATION: 96 % | DIASTOLIC BLOOD PRESSURE: 73 MMHG | TEMPERATURE: 98 F

## 2023-06-12 DIAGNOSIS — D72.819 LEUKOPENIA, UNSPECIFIED TYPE: ICD-10-CM

## 2023-06-12 DIAGNOSIS — R11.0 NAUSEA: ICD-10-CM

## 2023-06-12 DIAGNOSIS — C50.919 TRIPLE NEGATIVE BREAST CANCER: Primary | ICD-10-CM

## 2023-06-12 DIAGNOSIS — D63.0 ANEMIA IN NEOPLASTIC DISEASE: ICD-10-CM

## 2023-06-12 DIAGNOSIS — R74.01 TRANSAMINITIS: ICD-10-CM

## 2023-06-12 DIAGNOSIS — C50.919 TRIPLE NEGATIVE BREAST CANCER: ICD-10-CM

## 2023-06-12 DIAGNOSIS — K59.1 FUNCTIONAL DIARRHEA: ICD-10-CM

## 2023-06-12 LAB
ALBUMIN SERPL BCP-MCNC: 3.9 G/DL (ref 3.5–5.2)
ALP SERPL-CCNC: 61 U/L (ref 55–135)
ALT SERPL W/O P-5'-P-CCNC: 29 U/L (ref 10–44)
ANION GAP SERPL CALC-SCNC: 12 MMOL/L (ref 8–16)
AST SERPL-CCNC: 14 U/L (ref 10–40)
BASOPHILS # BLD AUTO: 0 K/UL (ref 0–0.2)
BASOPHILS NFR BLD: 0 % (ref 0–1.9)
BILIRUB SERPL-MCNC: 0.7 MG/DL (ref 0.1–1)
BUN SERPL-MCNC: 14 MG/DL (ref 6–20)
CALCIUM SERPL-MCNC: 8.9 MG/DL (ref 8.7–10.5)
CHLORIDE SERPL-SCNC: 100 MMOL/L (ref 95–110)
CO2 SERPL-SCNC: 24 MMOL/L (ref 23–29)
CREAT SERPL-MCNC: 0.7 MG/DL (ref 0.5–1.4)
DIFFERENTIAL METHOD: ABNORMAL
EOSINOPHIL # BLD AUTO: 0 K/UL (ref 0–0.5)
EOSINOPHIL NFR BLD: 0.1 % (ref 0–8)
ERYTHROCYTE [DISTWIDTH] IN BLOOD BY AUTOMATED COUNT: 15.9 % (ref 11.5–14.5)
EST. GFR  (NO RACE VARIABLE): >60 ML/MIN/1.73 M^2
GLUCOSE SERPL-MCNC: 99 MG/DL (ref 70–110)
HCG INTACT+B SERPL-ACNC: <2.4 MIU/ML
HCT VFR BLD AUTO: 29.2 % (ref 37–48.5)
HGB BLD-MCNC: 9.9 G/DL (ref 12–16)
IMM GRANULOCYTES # BLD AUTO: 0.17 K/UL (ref 0–0.04)
IMM GRANULOCYTES NFR BLD AUTO: 1.5 % (ref 0–0.5)
LYMPHOCYTES # BLD AUTO: 0.6 K/UL (ref 1–4.8)
LYMPHOCYTES NFR BLD: 5.7 % (ref 18–48)
MCH RBC QN AUTO: 31.4 PG (ref 27–31)
MCHC RBC AUTO-ENTMCNC: 33.9 G/DL (ref 32–36)
MCV RBC AUTO: 93 FL (ref 82–98)
MONOCYTES # BLD AUTO: 0.8 K/UL (ref 0.3–1)
MONOCYTES NFR BLD: 6.9 % (ref 4–15)
NEUTROPHILS # BLD AUTO: 9.7 K/UL (ref 1.8–7.7)
NEUTROPHILS NFR BLD: 85.8 % (ref 38–73)
NRBC BLD-RTO: 0 /100 WBC
PLATELET # BLD AUTO: 178 K/UL (ref 150–450)
PMV BLD AUTO: 9.1 FL (ref 9.2–12.9)
POTASSIUM SERPL-SCNC: 3.8 MMOL/L (ref 3.5–5.1)
PROT SERPL-MCNC: 5.9 G/DL (ref 6–8.4)
RBC # BLD AUTO: 3.15 M/UL (ref 4–5.4)
SODIUM SERPL-SCNC: 136 MMOL/L (ref 136–145)
WBC # BLD AUTO: 11.28 K/UL (ref 3.9–12.7)

## 2023-06-12 PROCEDURE — 3074F PR MOST RECENT SYSTOLIC BLOOD PRESSURE < 130 MM HG: ICD-10-PCS | Mod: CPTII,S$GLB,, | Performed by: INTERNAL MEDICINE

## 2023-06-12 PROCEDURE — 3008F PR BODY MASS INDEX (BMI) DOCUMENTED: ICD-10-PCS | Mod: CPTII,S$GLB,, | Performed by: INTERNAL MEDICINE

## 2023-06-12 PROCEDURE — 99215 PR OFFICE/OUTPT VISIT, EST, LEVL V, 40-54 MIN: ICD-10-PCS | Mod: S$GLB,,, | Performed by: INTERNAL MEDICINE

## 2023-06-12 PROCEDURE — 3008F BODY MASS INDEX DOCD: CPT | Mod: CPTII,S$GLB,, | Performed by: INTERNAL MEDICINE

## 2023-06-12 PROCEDURE — 84702 CHORIONIC GONADOTROPIN TEST: CPT | Performed by: INTERNAL MEDICINE

## 2023-06-12 PROCEDURE — 36415 COLL VENOUS BLD VENIPUNCTURE: CPT | Mod: PN | Performed by: INTERNAL MEDICINE

## 2023-06-12 PROCEDURE — 3074F SYST BP LT 130 MM HG: CPT | Mod: CPTII,S$GLB,, | Performed by: INTERNAL MEDICINE

## 2023-06-12 PROCEDURE — 85025 COMPLETE CBC W/AUTO DIFF WBC: CPT | Mod: PN | Performed by: INTERNAL MEDICINE

## 2023-06-12 PROCEDURE — 99999 PR PBB SHADOW E&M-EST. PATIENT-LVL IV: ICD-10-PCS | Mod: PBBFAC,,, | Performed by: INTERNAL MEDICINE

## 2023-06-12 PROCEDURE — 3078F DIAST BP <80 MM HG: CPT | Mod: CPTII,S$GLB,, | Performed by: INTERNAL MEDICINE

## 2023-06-12 PROCEDURE — 3078F PR MOST RECENT DIASTOLIC BLOOD PRESSURE < 80 MM HG: ICD-10-PCS | Mod: CPTII,S$GLB,, | Performed by: INTERNAL MEDICINE

## 2023-06-12 PROCEDURE — 99215 OFFICE O/P EST HI 40 MIN: CPT | Mod: S$GLB,,, | Performed by: INTERNAL MEDICINE

## 2023-06-12 PROCEDURE — 99999 PR PBB SHADOW E&M-EST. PATIENT-LVL IV: CPT | Mod: PBBFAC,,, | Performed by: INTERNAL MEDICINE

## 2023-06-12 PROCEDURE — 80053 COMPREHEN METABOLIC PANEL: CPT | Mod: PN | Performed by: INTERNAL MEDICINE

## 2023-06-12 RX ORDER — DEXTROAMPHETAMINE SACCHARATE, AMPHETAMINE ASPARTATE, DEXTROAMPHETAMINE SULFATE AND AMPHETAMINE SULFATE 5; 5; 5; 5 MG/1; MG/1; MG/1; MG/1
TABLET ORAL
COMMUNITY

## 2023-06-12 NOTE — PROGRESS NOTES
PROGRESS NOTE    Subjective:       Patient ID: Aleena Duckworth is a 37 y.o. female.  MRN: [id number]  : 1985    Chief Complaint: TNBC    History of Present Illness:   Aleena Duckworth is a 37 y.o. female who is referred for newly diagnosed TNBC. Had her initial surgery at [facility].      As previously documented, she self palpated a left-sided breast mass.  She felt it was growing fast and reported to her primary care physician who referred her for breast imaging.  On 2023, she underwent a bilateral breast mammogram that showed a lobular hypoechoic solid mass within the left breast at 2 o'clock position 7 cm was recommended.  On 2023, she underwent an ultrasound guided biopsy that showed invasive ductal carcinoma, grade 3, ER/OR/HER2 Randall negative, Ki-67 80%.     On 23, she underwent left partial mastectomy that revealed invasive ductal carcinoma 9 x 8 x 6 mm, single focus, approximately 1 mm focus of high-grade DCIS, closest margin 3 mm, anterior.  Two sentinel lymph nodes were negative.  pT1b pN0(sn).     Has ADHD, has been on Adderall for several years.  Was born prematurely, was diagnosed with a stroke and heart murmur at birth, was treated at [facility].     Heavy menorrhagia for the past few years, worsening recently and has developed CAROLIN.  Has been on iron supplementation.  Sees gynecologist, Dr. Almita Clark with [facility].      She did have 1 visit with the medical oncology team at [facility].  Adjuvant chemotherapy was recommended.  Workup was ongoing for mild hyperbilirubinemia.  An abdominal ultrasound was done, negative for any abnormalities.Saw Hepatology prior to chemo who suspected she has Gilbert's disease and found no contraindication to proceeding with TC.       Gyn:  Menarche 13  . Age at first pregnancy 19  OCP use a year or so   Premenopausal, regular, menorrhagia.   LMP 21    Underwent cycle 1 of Docetaxel and Cytoxan on  5/1/23.   The steroids made her irritable and she cut down dose to 1 tab for day 3.   Developed nausea and generalized pain soon after receiving chemo.   Took promethazine, zofran and ativan as needed.   Had constipation.  Is using Percocet 1-2 times a day for post op pain and now for generalized body pain.      Developed a low grade fever at home, 100.4 one episode with quick resolution. At the same time, she had hypotension and tachycardia and was referred to the ER. She was admitted for sepsis, was started on antibiotics. Has no other episodes of fever, no vasopressor needed, no ICU visit.  Cultures negative, no clear source,  was discharged on Bactrim.     Interim history:  Received cycle 2 of TC with 20% dose reduction of docetaxel.   Generalized body pain and nausea and diarrhea for one week.     Saw Wyatt last week due to cough, wheezing and diagnosed with bronchitis. On doxycycline and decadron with improvement. Nausea has been improving on Sancuso patch.          Oncology History:  Oncology History   Triple negative breast cancer   4/17/2023 Initial Diagnosis    Triple negative breast cancer     4/18/2023 Cancer Staged    Staging form: Breast, AJCC 8th Edition  - Pathologic stage from 4/18/2023: Stage IB (pT1b, pN0, cM0, G3, ER-, ND-, HER2-)     5/1/2023 -  Chemotherapy    Treatment Summary   Plan Name: OP BREAST TC - DOCETAXEL CYCLOPHOSPHAMIDE Q3W  Treatment Goal: Curative  Status: Active  Start Date: 5/1/2023  End Date: 7/6/2023 (Planned)  Provider: Ambar Ayala MD  Chemotherapy: cycloPHOSphamide 600 mg/m2 = 880 mg in sodium chloride 0.9% 289.4 mL chemo infusion, 600 mg/m2 = 880 mg, Intravenous, Clinic/HOD 1 time, 2 of 4 cycles  Dose modification: 600 mg/m2 (Cycle 3)  Administration: 880 mg (5/1/2023)  DOCEtaxel 75 mg/m2 = 110 mg in sodium chloride 0.9% 290.5 mL chemo infusion, 75 mg/m2 = 110 mg, Intravenous, Clinic/HOD 1 time, 2 of 4 cycles  Dose modification: 60 mg/m2 (original dose 75 mg/m2, Cycle  2, Reason: Dose not tolerated)  Administration: 110 mg (5/1/2023), 90 mg (5/24/2023)         History:  Past Medical History:   Diagnosis Date    ADHD (attention deficit hyperactivity disorder)     Anemia     Hypertension     pre eclampsia and eclampsia    Stroke     baby      Past Surgical History:   Procedure Laterality Date    BREAST SURGERY      TUBAL LIGATION      VAGINAL DELIVERY       Family History   Problem Relation Age of Onset    Hypertension Mother     Breast cancer Mother 51        negative screening    Heart disease Father     Diabetes Maternal Grandmother     Breast cancer Maternal Grandmother       Social History     Tobacco Use    Smoking status: Some Days     Types: Vaping with nicotine    Smokeless tobacco: Never    Tobacco comments:     vape   Substance and Sexual Activity    Alcohol use: Not Currently    Drug use: Never    Sexual activity: Yes     Partners: Male     Birth control/protection: See Surgical Hx     Comment: tubal        ROS:   Review of Systems   Constitutional:  Positive for malaise/fatigue. Negative for fever and weight loss.   HENT:  Positive for sore throat. Negative for congestion, hearing loss and nosebleeds.    Eyes:  Negative for double vision and photophobia.   Respiratory:  Negative for cough, hemoptysis, sputum production, shortness of breath and wheezing.    Cardiovascular:  Negative for chest pain, palpitations, orthopnea and leg swelling.   Gastrointestinal:  Positive for diarrhea and nausea. Negative for abdominal pain, blood in stool, constipation, heartburn and vomiting.   Genitourinary:  Negative for dysuria, hematuria and urgency.   Musculoskeletal:  Negative for back pain, joint pain and myalgias.   Skin:  Negative for itching and rash.   Neurological:  Negative for dizziness, tingling, seizures, weakness and headaches.   Endo/Heme/Allergies:  Negative for polydipsia. Does not bruise/bleed easily.   Psychiatric/Behavioral:  Negative for depression and memory loss.  "The patient is not nervous/anxious and does not have insomnia.       Objective:     Vitals:    06/12/23 1144   BP: 114/73   Pulse: 102   Resp: 16   Temp: 97.6 °F (36.4 °C)   TempSrc: Temporal   SpO2: 96%   Weight: 54.1 kg (119 lb 4.3 oz)   Height: 5' 2" (1.575 m)   PainSc: 0-No pain       Physical Examination:   Physical Exam  Vitals and nursing note reviewed.   Constitutional:       General: She is not in acute distress.     Appearance: She is not diaphoretic.   HENT:      Head: Normocephalic.      Mouth/Throat:      Pharynx: No oropharyngeal exudate.   Eyes:      General: No scleral icterus.     Conjunctiva/sclera: Conjunctivae normal.   Neck:      Thyroid: No thyromegaly.   Cardiovascular:      Rate and Rhythm: Normal rate and regular rhythm.      Heart sounds: Normal heart sounds. No murmur heard.  Pulmonary:      Effort: Pulmonary effort is normal. No respiratory distress.      Breath sounds: No stridor. No wheezing or rales.   Chest:      Chest wall: No tenderness.   Abdominal:      General: Bowel sounds are normal. There is no distension.      Palpations: Abdomen is soft. There is no mass.      Tenderness: There is no abdominal tenderness. There is no rebound.   Musculoskeletal:         General: No tenderness or deformity. Normal range of motion.      Cervical back: Neck supple.   Lymphadenopathy:      Cervical: No cervical adenopathy.   Skin:     General: Skin is warm and dry.      Findings: No erythema or rash.   Neurological:      Mental Status: She is alert and oriented to person, place, and time.      Cranial Nerves: No cranial nerve deficit.      Coordination: Coordination normal.      Gait: Gait is intact.   Psychiatric:         Mood and Affect: Affect normal.         Cognition and Memory: Memory normal.         Judgment: Judgment normal.        Diagnostic Tests:  Significant Imaging: I have reviewed and interpreted all pertinent imaging results/findings.      Laboratory Data:  All pertinent labs have " been reviewed.    Labs:   Lab Results   Component Value Date    WBC 11.28 06/12/2023    HGB 9.9 (L) 06/12/2023    HCT 29.2 (L) 06/12/2023    MCV 93 06/12/2023     06/12/2023       Assessment/Plan:   Triple negative breast cancer  pT1b pN0 (sn) ER/RI/HER2 Randall negative, Ki-67 80%, grade 3    37-year-old premenopausal woman, presenting with early stage triple negative breast cancer.  She is a candidate for adjuvant chemotherapy.  See previous discussions.   S/p cycle 1 of  docetaxel and Cytoxan  with poor tolerability and hospitalization.   Dose reduced for cycle 2, docetaxel to 60 mg/m2. Continue to optimize supportive care while on chemotherapy.  Cut down dose of cytoxan by 20% for cycle 3. Alternatively, can stop chemo at this time due to very poor tolerability.     Bronchitis:  Improving on steroids and doxycycline. Cut down on decadron.      Nausea   Continue Sancuso patch, Zyprexa and continue phenergan, dex and ativan. Continue to monitor closely.     Transaminitis   LFTS are now normal.     Hyperbilirubinemia  Possible Gilbert's syndrome  Bilirubin is 1.2.  As above.  Continue to monitor bilirubin and liver function closely.    Thrombocytopenia  Declined post chemo,improving.     Neutropenia secondary to chemo    received Neulasta, continue to monitor.      Iron deficiency anemia due to chronic blood loss  History of menorrhagia noted.  Obtain baseline iron panel and offer IV iron as clinically indicated.  Iron panel is normal at this time.   Hb is noted to be in the 8 g/dl range, continue to monitor. Transfuse for Hb <7 g/dl or for symptomatic anemia.        MDM includes  :    - Acute or chronic illness or injury that poses a threat to life or bodily function  - Independent review and explanation of 3+ results from unique tests  - Discussion of management and ordering 3+ unique tests  - Extensive discussion of treatment and management  - Prescription drug management  - Drug therapy requiring intensive  monitoring for toxicity   ECOG SCORE               Discussion:   No follow-ups on file.    Plan was discussed with the patient at length, and she verbalized understanding. Aleena was given an opportunity to ask questions that were answered to her satisfaction, and she was advised to call in the interval if any problems or questions arise.    Electronically signed by Ambar Ayala MD    Route Chart for Scheduling    Med Onc Chart Routing      Follow up with physician . 7/10 with cbc, cmp   Follow up with NUNO . Cancel 7/5, do follow up on 6/28 instead for tox check   Infusion scheduling note   change chemo from 6/14 to 6/19, then 7/10   Injection scheduling note    Labs CMP and CBC   Scheduling:  Preferred lab:  Lab interval:  cmp today, cbc, cmp on 6/28 and 7/10   Imaging    Pharmacy appointment    Other referrals            Treatment Plan Information   OP BREAST TC - DOCETAXEL CYCLOPHOSPHAMIDE Q3W   Ambar Ayala MD   Upcoming Treatment Dates - OP BREAST TC - DOCETAXEL CYCLOPHOSPHAMIDE Q3W    6/14/2023       Pre-Medications       palonosetron 0.25mg/dexAMETHasone 10mg in NS IVPB 0.25 mg 50 mL       aprepitant (CINVANTI) injection 130 mg       Chemotherapy       DOCEtaxel (TAXOTERE) 60 mg/m2 = 88 mg in sodium chloride 0.9% 254.4 mL chemo infusion       cycloPHOSphamide 600 mg/m2 = 880 mg in sodium chloride 0.9% 254.4 mL chemo infusion  6/15/2023       Growth Factor       pegfilgrastim-cbqv (UDENYCA) injection 6 mg  7/5/2023       Pre-Medications       palonosetron 0.25mg/dexAMETHasone 10mg in NS IVPB 0.25 mg 50 mL       aprepitant (CINVANTI) injection 130 mg       Chemotherapy       DOCEtaxel (TAXOTERE) 60 mg/m2 = 88 mg in sodium chloride 0.9% 254.4 mL chemo infusion       cycloPHOSphamide 600 mg/m2 = 880 mg in sodium chloride 0.9% 254.4 mL chemo infusion  7/6/2023       Growth Factor       pegfilgrastim-cbqv (UDENYCA) injection 6 mg    Therapy Plan Information  IV Fluids  sodium chloride 0.9% bolus 1,000 mL  1,000 mL  1,000 mL, Intravenous, Every visit  Flushes  sodium chloride 0.9% flush 10 mL  10 mL, Intravenous, PRN  heparin, porcine (PF) 100 unit/mL injection flush 500 Units  500 Units, Intravenous, PRN  Antiemetics  ondansetron injection 8 mg  8 mg, Intravenous, PRN  promethazine (PHENERGAN) 12.5 mg in dextrose 5 % (D5W) 50 mL IVPB  12.5 mg, Intravenous, PRN  dexAMETHasone injection 8 mg  8 mg, Intravenous, PRN

## 2023-06-13 ENCOUNTER — PATIENT MESSAGE (OUTPATIENT)
Dept: HEMATOLOGY/ONCOLOGY | Facility: CLINIC | Age: 38
End: 2023-06-13
Payer: COMMERCIAL

## 2023-06-13 DIAGNOSIS — G89.18 POST-OP PAIN: ICD-10-CM

## 2023-06-13 RX ORDER — OXYCODONE HYDROCHLORIDE 10 MG/1
10 TABLET ORAL EVERY 8 HOURS PRN
Qty: 30 TABLET | Refills: 0 | Status: SHIPPED | OUTPATIENT
Start: 2023-06-13 | End: 2023-06-27 | Stop reason: SDUPTHER

## 2023-06-14 ENCOUNTER — PATIENT MESSAGE (OUTPATIENT)
Dept: HEMATOLOGY/ONCOLOGY | Facility: CLINIC | Age: 38
End: 2023-06-14
Payer: COMMERCIAL

## 2023-06-14 NOTE — TELEPHONE ENCOUNTER
Let her know that the steroids cause the swelling and it is likely showing up now. Let us know if it continues to worsen and we can have her come in. Thanks

## 2023-06-15 ENCOUNTER — PATIENT MESSAGE (OUTPATIENT)
Dept: HEMATOLOGY/ONCOLOGY | Facility: CLINIC | Age: 38
End: 2023-06-15
Payer: COMMERCIAL

## 2023-06-15 ENCOUNTER — TELEPHONE (OUTPATIENT)
Dept: HEMATOLOGY/ONCOLOGY | Facility: CLINIC | Age: 38
End: 2023-06-15
Payer: COMMERCIAL

## 2023-06-15 DIAGNOSIS — E87.70 HYPERVOLEMIA, UNSPECIFIED HYPERVOLEMIA TYPE: Primary | ICD-10-CM

## 2023-06-15 RX ORDER — FUROSEMIDE 20 MG/1
20 TABLET ORAL DAILY
Qty: 30 TABLET | Refills: 11 | Status: SHIPPED | OUTPATIENT
Start: 2023-06-15 | End: 2024-06-14

## 2023-06-15 RX ORDER — POTASSIUM CHLORIDE 750 MG/1
10 TABLET, EXTENDED RELEASE ORAL DAILY
Qty: 30 TABLET | Refills: 1 | Status: SHIPPED | OUTPATIENT
Start: 2023-06-15 | End: 2023-08-10

## 2023-06-15 NOTE — TELEPHONE ENCOUNTER
----- Message from Madeleine Washington sent at 6/15/2023 10:46 AM CDT -----  Contact: Pt @ [phone]  Type:  Needs Medical Advice    Who Called: Pt  Symptoms (please be specific): Swelling from steroids   How long has patient had these symptoms:  1 week    Would the patient rather a call back or a response via [facility] [employer] [facility]? call  Best Call Back Number:  [phone]  Additional Information: Pt is swelling from steroids, and would like a call back to advise.

## 2023-06-15 NOTE — TELEPHONE ENCOUNTER
Patient advised of Rx of lasix and KCL sent to [facility] for her and to call if not improved by tomorrow. If improving can just keep follow up on Monday as scheduled. Patient  verbalized understanding of this.

## 2023-06-15 NOTE — TELEPHONE ENCOUNTER
I sent a script for lasix, start once tab daily with KCL. If no improvement, can come in tomorrow to see NUNO. I see she is already scheduled to see Lula on Monday. Thanks

## 2023-06-16 ENCOUNTER — TELEPHONE (OUTPATIENT)
Dept: INFUSION THERAPY | Facility: HOSPITAL | Age: 38
End: 2023-06-16
Payer: COMMERCIAL

## 2023-06-16 ENCOUNTER — OFFICE VISIT (OUTPATIENT)
Dept: HEMATOLOGY/ONCOLOGY | Facility: CLINIC | Age: 38
End: 2023-06-16
Payer: COMMERCIAL

## 2023-06-16 VITALS
DIASTOLIC BLOOD PRESSURE: 60 MMHG | SYSTOLIC BLOOD PRESSURE: 108 MMHG | BODY MASS INDEX: 24.09 KG/M2 | HEIGHT: 62 IN | TEMPERATURE: 97 F | WEIGHT: 130.94 LBS | HEART RATE: 77 BPM

## 2023-06-16 DIAGNOSIS — C50.919 TRIPLE NEGATIVE BREAST CANCER: Primary | ICD-10-CM

## 2023-06-16 PROCEDURE — 99214 OFFICE O/P EST MOD 30 MIN: CPT | Mod: S$GLB,,,

## 2023-06-16 PROCEDURE — 99999 PR PBB SHADOW E&M-EST. PATIENT-LVL IV: ICD-10-PCS | Mod: PBBFAC,,,

## 2023-06-16 PROCEDURE — 3074F SYST BP LT 130 MM HG: CPT | Mod: CPTII,S$GLB,,

## 2023-06-16 PROCEDURE — 3008F BODY MASS INDEX DOCD: CPT | Mod: CPTII,S$GLB,,

## 2023-06-16 PROCEDURE — 3078F PR MOST RECENT DIASTOLIC BLOOD PRESSURE < 80 MM HG: ICD-10-PCS | Mod: CPTII,S$GLB,,

## 2023-06-16 PROCEDURE — 99999 PR PBB SHADOW E&M-EST. PATIENT-LVL IV: CPT | Mod: PBBFAC,,,

## 2023-06-16 PROCEDURE — 3078F DIAST BP <80 MM HG: CPT | Mod: CPTII,S$GLB,,

## 2023-06-16 PROCEDURE — 99214 PR OFFICE/OUTPT VISIT, EST, LEVL IV, 30-39 MIN: ICD-10-PCS | Mod: S$GLB,,,

## 2023-06-16 PROCEDURE — 1159F PR MEDICATION LIST DOCUMENTED IN MEDICAL RECORD: ICD-10-PCS | Mod: CPTII,S$GLB,,

## 2023-06-16 PROCEDURE — 1159F MED LIST DOCD IN RCRD: CPT | Mod: CPTII,S$GLB,,

## 2023-06-16 PROCEDURE — 3008F PR BODY MASS INDEX (BMI) DOCUMENTED: ICD-10-PCS | Mod: CPTII,S$GLB,,

## 2023-06-16 PROCEDURE — 3074F PR MOST RECENT SYSTOLIC BLOOD PRESSURE < 130 MM HG: ICD-10-PCS | Mod: CPTII,S$GLB,,

## 2023-06-16 NOTE — TELEPHONE ENCOUNTER
----- Message from Gisela Fuller Patient Care Assistant sent at 6/16/2023  8:00 AM CDT -----  Type: Needs Medical Advice  Who Called:  sulyjacob Granda Call Back Number: [phone]    Additional Information: patient would like to speak to someone about mondays appointment please call to further discuss thank you

## 2023-06-16 NOTE — PROGRESS NOTES
Urgent Care Oncology Visit    Date and Time: 06/16/2023 1:44 PM   Patient MRN: [id number]   Chief Complaint:   Chief Complaint   Patient presents with    Edema     Pt c/o edema and pain from pt's abdomen downward. Occurring x 5 days      Reason for Urgent Care Visit:  swelling/edema, weight gain   Intervention: medication change  Dispo: return to clinic as previous  UrgOnc appointment addressed via: [facility] message  This UrgOnc visit was in person  Time spent handling UC issue:  30    Was this virtual or in person UrgOnc visit appropriate? Yes       PATIENT: Aleena Duckworth  MRN: [id number]  DATE: 6/16/2023      Diagnosis:   1. Triple negative breast cancer        Chief Complaint: Edema (Pt c/o edema and pain from pt's abdomen downward. Occurring x 5 days )    Subjective:   HPI: Ms. Duckworth is a 37 y.o. female who returns for follow up newly diagnosed TNBC. Had her initial surgery at [facility].      As previously documented, she self palpated a left-sided breast mass.  She felt it was growing fast and reported to her primary care physician who referred her for breast imaging.  On 02/02/2023, she underwent a bilateral breast mammogram that showed a lobular hypoechoic solid mass within the left breast at 2 o'clock position 7 cm was recommended.  On 02/09/2023, she underwent an ultrasound guided biopsy that showed invasive ductal carcinoma, grade 3, ER/AR/HER2 Randall negative, Ki-67 80%.     On 2/27/23, she underwent left partial mastectomy that revealed invasive ductal carcinoma 9 x 8 x 6 mm, single focus, approximately 1 mm focus of high-grade DCIS, closest margin 3 mm, anterior.  Two sentinel lymph nodes were negative.  pT1b pN0(sn).     Has ADHD, has been on Adderall for several years.  Was born prematurely, was diagnosed with a stroke and heart murmur at birth, was treated at [facility].     Heavy menorrhagia for the past few years, worsening recently and has developed CAROLIN.  Has been on iron supplementation.  Sees  gynecologist, Dr. Almita Clark with [facility].      She did have 1 visit with the medical oncology team at [facility].  Adjuvant chemotherapy was recommended.  Workup was ongoing for mild hyperbilirubinemia.  An abdominal ultrasound was done, negative for any abnormalities.Saw Hepatology prior to chemo who suspected she has Gilbert's disease and found no contraindication to proceeding with TC.     Gyn:  Menarche 13  . Age at first pregnancy 19  OCP use a year or so   Premenopausal, regular, menorrhagia.   LMP 21     Underwent cycle 1 of Docetaxel and Cytoxan on 23.   The steroids made her irritable and she cut down dose to 1 tab for day 3.   Developed nausea and generalized pain soon after receiving chemo.   Took promethazine, zofran and ativan as needed.   Had constipation.  Is using Percocet 1-2 times a day for post op pain and now for generalized body pain.       Developed a low grade fever at home, 100.4 one episode with quick resolution. At the same time, she had hypotension and tachycardia and was referred to the ER. She was admitted for sepsis, was started on antibiotics. Has no other episodes of fever, no vasopressor needed, no ICU visit.  Cultures negative, no clear source,  was discharged on Bactrim.     Interim History:   Received cycle 2 of TC with 20% dose reduction of docetaxel 23, Udenyca . Was seen after for cough with wheezing, treated with 10 day course of doxycycline and advised to increase dexamethasone to 4 mg BID.     Patient seen in UrgentOnc visit on  and was given IV antiemetics with IVF, reached out on  with c/o swelling and weight gain. The swelling started approximately 4 days ago. Prescriptions for Lasix and Potassium sent to pharmacy on 6/15/23.    Presented to the ER last night for her edema and pain.   CTA was done without evidence of PE or acute findings in the chest. Labs are stable.     Today she endorses swelling in BLE, feeling of abd fullness. She has taken 2  doses of Lasix 20 mg since yesterday morning and does not notice much difference. Stopped taking Dexamethasone yesterday.   Weight on 6/12/23 was recorded at 119 lbs, today 130 lbs.   Is having bowel movements every day, nausea has improved with current PRN medications. Denies SOB, cough, diarrhea, rashes, dysuria, new lumps or bumps. Denies fevers, chills.     Past Medical History:   Past Medical History:   Diagnosis Date    ADHD (attention deficit hyperactivity disorder)     Anemia     Hypertension     pre eclampsia and eclampsia    Stroke     baby       Past Surgical HIstory:   Past Surgical History:   Procedure Laterality Date    BREAST SURGERY      TUBAL LIGATION      VAGINAL DELIVERY         Family History:   Family History   Problem Relation Age of Onset    Hypertension Mother     Breast cancer Mother 51        negative screening    Heart disease Father     Diabetes Maternal Grandmother     Breast cancer Maternal Grandmother        Social History:  reports that she has been smoking vaping with nicotine. She has never used smokeless tobacco. She reports that she does not currently use alcohol. She reports that she does not use drugs.    Allergies:  Review of patient's allergies indicates:  No Known Allergies    Medications:  Current Outpatient Medications   Medication Sig Dispense Refill    cetirizine (ZYRTEC) 10 mg Cap ZyrTEC      dexAMETHasone (DECADRON) 4 MG Tab Take 2 tablets (8 mg total) by mouth every 12 (twelve) hours. Take 2 tab bid the day before chemo, 2 tab the night of chemo and 2 tab bid the day after chemo 60 tablet 0    dextroamphetamine-amphetamine (ADDERALL XR) 20 MG 24 hr capsule Take 1 capsule (20 mg total) by mouth every morning. 30 capsule 0    furosemide (LASIX) 20 MG tablet Take 1 tablet (20 mg total) by mouth once daily. 30 tablet 11    ibuprofen (ADVIL,MOTRIN) 600 MG tablet ibuprofen      lactulose (CHRONULAC) 20 gram/30 mL Soln 30 ml (20 gm) daily as needed for constipation 210 mL 1     LORazepam (ATIVAN) 1 MG tablet Take 1 tablet (1 mg total) by mouth 2 (two) times daily as needed for Anxiety (nausea). 30 tablet 0    NINJACOF 12.5-12.5 mg/5 mL Liqd Take 10 mLs by mouth.      OLANZapine (ZYPREXA) 5 MG tablet Take 1 tablet (5 mg total) by mouth every evening. Take on Days 1 - Days 4 of chemotherapy. 20 tablet 1    ondansetron (ZOFRAN-ODT) 8 MG TbDL Take 1 tablet (8 mg total) by mouth every 8 (eight) hours as needed (nausea). 40 tablet 3    oxyCODONE (ROXICODONE) 10 mg Tab immediate release tablet Take 1 tablet (10 mg total) by mouth every 8 (eight) hours as needed for Pain. 30 tablet 0    pantoprazole (PROTONIX) 40 MG tablet Take 1 tablet (40 mg total) by mouth once daily. 30 tablet 1    polyethylene glycol (GLYCOLAX) 17 gram PwPk Take 17 g by mouth.      potassium chloride SA (K-DUR,KLOR-CON M) 10 MEQ tablet Take 1 tablet (10 mEq total) by mouth once daily. 30 tablet 1    promethazine (PHENERGAN) 12.5 MG Tab (Take 1-2 tabs every 6 hours as needed for nausea persistent despite zofran) 40 tablet 3    zolpidem (AMBIEN) 10 mg Tab Take 10 mg by mouth nightly as needed.      cetirizine 10 mg chewable tablet Take 10 mg by mouth.      dextroamphetamine-amphetamine (ADDERALL) 12.5 MG tablet Take 12.5 mg by mouth.      dextroamphetamine-amphetamine (ADDERALL) 20 mg tablet Take 1 tablet by mouth.      dextroamphetamine-amphetamine (ADDERALL) 20 mg tablet 1 tablet Orally Twice a day      dextroamphetamine/amphetamine (ADDERALL ORAL) Adderall      duke's soln (benadryl 30 mL, mylanta 30 mL, LIDOcaine 30 mL, nystatin 30 mL) 120mL Take 10 mLs by mouth 4 (four) times daily. (Patient not taking: Reported on 6/5/2023) 120 mL 0    HYDROcodone-acetaminophen (NORCO)  mg per tablet Take 1 tablet by mouth every 6 (six) hours as needed.      JUNEL FE 24 1 mg-20 mcg (24)/75 mg (4) per tablet Take 1 tablet by mouth.      oxyCODONE-acetaminophen (PERCOCET)  mg per tablet Take 1 tablet by mouth every 6 (six)  "hours.       No current facility-administered medications for this visit.       Review of Systems   Constitutional:  Negative for appetite change.   HENT:  Negative for trouble swallowing.    Respiratory:  Negative for cough, shortness of breath and wheezing.    Cardiovascular:  Positive for leg swelling. Negative for chest pain and palpitations.   Gastrointestinal:  Negative for abdominal pain, constipation, diarrhea and nausea.   Genitourinary:  Negative for dysuria.   Musculoskeletal:  Positive for myalgias.   Skin:  Negative for rash.   Neurological:  Negative for light-headedness, numbness and headaches.   Hematological:  Negative for adenopathy.     ECOG Performance Status:   ECOG SCORE    0 - Fully active-able to carry on all pre-disease performance without restriction         Objective:      Vitals:   Vitals:    06/16/23 1318   BP: 108/60   BP Location: Left arm   Patient Position: Sitting   BP Method: Medium (Manual)   Pulse: 77   Temp: 97.3 °F (36.3 °C)   TempSrc: Temporal   Weight: 59.4 kg (130 lb 15.3 oz)   Height: 5' 2" (1.575 m)     BMI: Body mass index is 23.95 kg/m².    Physical Exam  Vitals reviewed.   Constitutional:       General: She is not in acute distress.     Appearance: She is not diaphoretic.   Eyes:      General: No scleral icterus.  Cardiovascular:      Rate and Rhythm: Normal rate and regular rhythm.      Heart sounds: Normal heart sounds. No murmur heard.  Pulmonary:      Effort: Pulmonary effort is normal. No respiratory distress.      Breath sounds: No wheezing.   Abdominal:      General: Abdomen is flat. Bowel sounds are normal. There is no distension.      Palpations: Abdomen is soft.      Tenderness: There is no abdominal tenderness.   Musculoskeletal:      Right lower leg: Edema present.      Left lower leg: Edema present.   Lymphadenopathy:      Cervical: No cervical adenopathy.   Skin:     General: Skin is warm.      Coloration: Skin is not jaundiced.      Findings: No rash. "   Neurological:      Mental Status: She is alert and oriented to person, place, and time.       Laboratory Data:  Lab Results   Component Value Date    WBC 10.90 06/16/2023    HGB 9.2 (L) 06/16/2023    HCT 28.5 (L) 06/16/2023    MCV 93 06/16/2023     06/16/2023          Imaging: Narrative & Impression  EXAMINATION:  CTA CHEST NON CORONARY (PE STUDIES)     CLINICAL HISTORY:  Shortness of breath     TECHNIQUE:  Axial CT images were obtained through the chest after IV administration of 80 cc Omnipaque 350 contrast.  MIP, coronal and sagittal reconstructions submitted and interpreted.  Total DLP 98.  Automated exposure control utilized.     COMPARISON:  None     FINDINGS:  No filling defects are in the main pulmonary artery or segmental branches.     No focal consolidation, pneumothorax or pleural effusion.     Heart is normal size.  Thoracic aorta is normal in caliber.  Central airways are clear.     No enlarged lymph nodes.     No acute osseous findings.     Visualized portions of the upper abdomen show no acute findings.     Impression:     No CT evidence of pulmonary thromboembolism or acute findings in the chest.     Findings are compatible with the preliminary report.        Electronically signed by: Dayday Cantu MD  Date:                                            06/16/2023  Time:                                           06:54   Assessment:       1. Triple negative breast cancer           Plan:   Triple negative breast cancer  pT1b pN0 (sn) ER/OK/HER2 Randall negative, Ki-67 80%, grade 3  -37-year-old premenopausal woman, presenting with early stage triple negative breast cancer.  She is a candidate for adjuvant chemotherapy. See previous discussions.   -S/p cycle 1 of  docetaxel and Cytoxan with poor tolerability and hospitalization.   -Dose reduced for cycle 2, docetaxel to 60 mg/m2. Continue to optimize supportive care while on chemotherapy. See below.   -Cycle 2 on 05/24/23  -Cycle 3 delayed due to  nausea, swelling. Now Planned for Monday 6/19/23; will see her that morning with repeat labs prior     Swelling BLE  -Patient with +2 edema BLE  -Discussed with patient that the swelling could be related her the steroids used to treat her recent bronchitis, also discussed possible related to Docetaxel  -Weight has increased 12 lbs over 5 days  -Increase Lasix to 40 mg daily x 2 days, then back down to 20 mg daily, continue PO Potassium supplements  -Follow up with me on Monday with labs prior to visit, will reassess prior to treatment that day    Nausea   -controlled with Sancuso patch, Zyprexa  -Phenergan, Zofran, Ativan PRN  -Monitor     Transaminitis   -AST/ALT, new onset elevation.   -Likely multifactorial post chemo and antibiotics  -Continue to monitor        Hyperbilirubinemia  Possible Gilbert's syndrome  -Bilirubin is normal today  -As above; continue to monitor bilirubin and liver function closely.     Thrombocytopenia  -Due to chemotherapy  -Improved at 190k this morning   -Monitor     Neutropenia secondary to chemo   -ANC 7.9 this morning   -received Neulasta, continue to monitor    Iron deficiency anemia due to chronic blood loss  -History of menorrhagia noted  -Iron panel is normal at this time.   -Hgb is 9.2 g/dL this morning   -Monitor     Assessment/Plan reviewed and approved by Dr. Ayala  Patient queried and all questions were answered.   30 minutes were spent in coordination of patient's care, record review and counseling.  Route Chart for Scheduling    Med Onc Chart Routing      Follow up with physician    Follow up with NUNO Other. Monday 930, labs prior with CBC, CMP   Infusion scheduling note    Injection scheduling note    Labs    Imaging    Pharmacy appointment    Other referrals            Treatment Plan Information   OP BREAST TC - DOCETAXEL CYCLOPHOSPHAMIDE Q3W   Ambar Ayala MD   Upcoming Treatment Dates - OP BREAST TC - DOCETAXEL CYCLOPHOSPHAMIDE Q3W    6/19/2023        Pre-Medications       palonosetron 0.25mg/dexAMETHasone 10mg in NS IVPB 0.25 mg 50 mL       aprepitant (CINVANTI) injection 130 mg       Chemotherapy       DOCEtaxel (TAXOTERE) 60 mg/m2 = 88 mg in sodium chloride 0.9% 254.4 mL chemo infusion       cycloPHOSphamide 480 mg/m2 = 700 mg in sodium chloride 0.9% 253.5 mL chemo infusion  6/20/2023       Growth Factor       pegfilgrastim-cbqv (UDENYCA) injection 6 mg  7/10/2023       Pre-Medications       palonosetron 0.25mg/dexAMETHasone 10mg in NS IVPB 0.25 mg 50 mL       aprepitant (CINVANTI) injection 130 mg       Chemotherapy       DOCEtaxel (TAXOTERE) 60 mg/m2 = 88 mg in sodium chloride 0.9% 254.4 mL chemo infusion       cycloPHOSphamide 480 mg/m2 = 700 mg in sodium chloride 0.9% 253.5 mL chemo infusion  7/11/2023       Growth Factor       pegfilgrastim-cbqv (UDENYCA) injection 6 mg    Therapy Plan Information  IV Fluids  sodium chloride 0.9% bolus 1,000 mL 1,000 mL  1,000 mL, Intravenous, Every visit  Flushes  sodium chloride 0.9% flush 10 mL  10 mL, Intravenous, PRN  heparin, porcine (PF) 100 unit/mL injection flush 500 Units  500 Units, Intravenous, PRN  Antiemetics  ondansetron injection 8 mg  8 mg, Intravenous, PRN  promethazine (PHENERGAN) 12.5 mg in dextrose 5 % (D5W) 50 mL IVPB  12.5 mg, Intravenous, PRN  dexAMETHasone injection 8 mg  8 mg, Intravenous, PRN

## 2023-06-18 ENCOUNTER — PATIENT MESSAGE (OUTPATIENT)
Dept: HEMATOLOGY/ONCOLOGY | Facility: CLINIC | Age: 38
End: 2023-06-18
Payer: COMMERCIAL

## 2023-06-19 ENCOUNTER — PATIENT MESSAGE (OUTPATIENT)
Dept: HEMATOLOGY/ONCOLOGY | Facility: CLINIC | Age: 38
End: 2023-06-19

## 2023-06-19 ENCOUNTER — LAB VISIT (OUTPATIENT)
Dept: LAB | Facility: HOSPITAL | Age: 38
End: 2023-06-19
Payer: COMMERCIAL

## 2023-06-19 ENCOUNTER — OFFICE VISIT (OUTPATIENT)
Dept: HEMATOLOGY/ONCOLOGY | Facility: CLINIC | Age: 38
End: 2023-06-19
Payer: COMMERCIAL

## 2023-06-19 VITALS
HEART RATE: 105 BPM | TEMPERATURE: 98 F | BODY MASS INDEX: 23.85 KG/M2 | SYSTOLIC BLOOD PRESSURE: 110 MMHG | RESPIRATION RATE: 16 BRPM | HEIGHT: 62 IN | OXYGEN SATURATION: 98 % | WEIGHT: 129.63 LBS | DIASTOLIC BLOOD PRESSURE: 70 MMHG

## 2023-06-19 DIAGNOSIS — D69.6 THROMBOCYTOPENIA: ICD-10-CM

## 2023-06-19 DIAGNOSIS — R74.01 TRANSAMINITIS: ICD-10-CM

## 2023-06-19 DIAGNOSIS — E80.6 HYPERBILIRUBINEMIA: ICD-10-CM

## 2023-06-19 DIAGNOSIS — C50.919 TRIPLE NEGATIVE BREAST CANCER: Primary | ICD-10-CM

## 2023-06-19 DIAGNOSIS — D50.0 IRON DEFICIENCY ANEMIA DUE TO CHRONIC BLOOD LOSS: ICD-10-CM

## 2023-06-19 DIAGNOSIS — C50.919 TRIPLE NEGATIVE BREAST CANCER: ICD-10-CM

## 2023-06-19 DIAGNOSIS — E87.70 HYPERVOLEMIA, UNSPECIFIED HYPERVOLEMIA TYPE: ICD-10-CM

## 2023-06-19 DIAGNOSIS — R11.0 NAUSEA: ICD-10-CM

## 2023-06-19 DIAGNOSIS — D72.819 LEUKOPENIA, UNSPECIFIED TYPE: ICD-10-CM

## 2023-06-19 LAB
ALBUMIN SERPL BCP-MCNC: 3.8 G/DL (ref 3.5–5.2)
ALP SERPL-CCNC: 95 U/L (ref 55–135)
ALT SERPL W/O P-5'-P-CCNC: 108 U/L (ref 10–44)
ANION GAP SERPL CALC-SCNC: 12 MMOL/L (ref 8–16)
AST SERPL-CCNC: 34 U/L (ref 10–40)
BASOPHILS # BLD AUTO: 0.01 K/UL (ref 0–0.2)
BASOPHILS NFR BLD: 0.1 % (ref 0–1.9)
BILIRUB SERPL-MCNC: 0.8 MG/DL (ref 0.1–1)
BUN SERPL-MCNC: 15 MG/DL (ref 6–20)
CALCIUM SERPL-MCNC: 9.2 MG/DL (ref 8.7–10.5)
CHLORIDE SERPL-SCNC: 103 MMOL/L (ref 95–110)
CO2 SERPL-SCNC: 24 MMOL/L (ref 23–29)
CREAT SERPL-MCNC: 0.7 MG/DL (ref 0.5–1.4)
DIFFERENTIAL METHOD: ABNORMAL
EOSINOPHIL # BLD AUTO: 0 K/UL (ref 0–0.5)
EOSINOPHIL NFR BLD: 0.1 % (ref 0–8)
ERYTHROCYTE [DISTWIDTH] IN BLOOD BY AUTOMATED COUNT: 15.9 % (ref 11.5–14.5)
EST. GFR  (NO RACE VARIABLE): >60 ML/MIN/1.73 M^2
GLUCOSE SERPL-MCNC: 140 MG/DL (ref 70–110)
HCT VFR BLD AUTO: 32.1 % (ref 37–48.5)
HGB BLD-MCNC: 10.4 G/DL (ref 12–16)
IMM GRANULOCYTES # BLD AUTO: 0.1 K/UL (ref 0–0.04)
IMM GRANULOCYTES NFR BLD AUTO: 0.9 % (ref 0–0.5)
LYMPHOCYTES # BLD AUTO: 0.4 K/UL (ref 1–4.8)
LYMPHOCYTES NFR BLD: 3.8 % (ref 18–48)
MCH RBC QN AUTO: 30.1 PG (ref 27–31)
MCHC RBC AUTO-ENTMCNC: 32.4 G/DL (ref 32–36)
MCV RBC AUTO: 93 FL (ref 82–98)
MONOCYTES # BLD AUTO: 0.5 K/UL (ref 0.3–1)
MONOCYTES NFR BLD: 4.6 % (ref 4–15)
NEUTROPHILS # BLD AUTO: 9.9 K/UL (ref 1.8–7.7)
NEUTROPHILS NFR BLD: 90.5 % (ref 38–73)
NRBC BLD-RTO: 0 /100 WBC
PLATELET # BLD AUTO: 168 K/UL (ref 150–450)
PMV BLD AUTO: 8.5 FL (ref 9.2–12.9)
POTASSIUM SERPL-SCNC: 4.2 MMOL/L (ref 3.5–5.1)
PROT SERPL-MCNC: 6.6 G/DL (ref 6–8.4)
RBC # BLD AUTO: 3.45 M/UL (ref 4–5.4)
SODIUM SERPL-SCNC: 139 MMOL/L (ref 136–145)
WBC # BLD AUTO: 10.9 K/UL (ref 3.9–12.7)

## 2023-06-19 PROCEDURE — 99214 OFFICE O/P EST MOD 30 MIN: CPT | Mod: S$GLB,,,

## 2023-06-19 PROCEDURE — 1159F PR MEDICATION LIST DOCUMENTED IN MEDICAL RECORD: ICD-10-PCS | Mod: CPTII,S$GLB,,

## 2023-06-19 PROCEDURE — 80053 COMPREHEN METABOLIC PANEL: CPT | Mod: PN

## 2023-06-19 PROCEDURE — 3074F PR MOST RECENT SYSTOLIC BLOOD PRESSURE < 130 MM HG: ICD-10-PCS | Mod: CPTII,S$GLB,,

## 2023-06-19 PROCEDURE — 99214 PR OFFICE/OUTPT VISIT, EST, LEVL IV, 30-39 MIN: ICD-10-PCS | Mod: S$GLB,,,

## 2023-06-19 PROCEDURE — 36415 COLL VENOUS BLD VENIPUNCTURE: CPT | Mod: PN

## 2023-06-19 PROCEDURE — 3008F PR BODY MASS INDEX (BMI) DOCUMENTED: ICD-10-PCS | Mod: CPTII,S$GLB,,

## 2023-06-19 PROCEDURE — 3074F SYST BP LT 130 MM HG: CPT | Mod: CPTII,S$GLB,,

## 2023-06-19 PROCEDURE — 85025 COMPLETE CBC W/AUTO DIFF WBC: CPT | Mod: PN

## 2023-06-19 PROCEDURE — 99999 PR PBB SHADOW E&M-EST. PATIENT-LVL V: CPT | Mod: PBBFAC,,,

## 2023-06-19 PROCEDURE — 3008F BODY MASS INDEX DOCD: CPT | Mod: CPTII,S$GLB,,

## 2023-06-19 PROCEDURE — 1159F MED LIST DOCD IN RCRD: CPT | Mod: CPTII,S$GLB,,

## 2023-06-19 PROCEDURE — 3078F DIAST BP <80 MM HG: CPT | Mod: CPTII,S$GLB,,

## 2023-06-19 PROCEDURE — 99999 PR PBB SHADOW E&M-EST. PATIENT-LVL V: ICD-10-PCS | Mod: PBBFAC,,,

## 2023-06-19 PROCEDURE — 3078F PR MOST RECENT DIASTOLIC BLOOD PRESSURE < 80 MM HG: ICD-10-PCS | Mod: CPTII,S$GLB,,

## 2023-06-19 NOTE — PROGRESS NOTES
PATIENT: Aleena Duckworth  MRN: [id number]  DATE: 6/19/2023      Diagnosis:   1. Triple negative breast cancer    2. Hypervolemia, unspecified hypervolemia type    3. Nausea    4. Transaminitis    5. Hyperbilirubinemia    6. Thrombocytopenia    7. Leukopenia, unspecified type    8. Iron deficiency anemia due to chronic blood loss          Chief Complaint: Triple negative breast cancer ( Follow up with labs)    Subjective:   HPI: Ms. Duckworth is a 37 y.o. female who returns for follow up newly diagnosed TNBC. Had her initial surgery at [facility].      As previously documented, she self palpated a left-sided breast mass.  She felt it was growing fast and reported to her primary care physician who referred her for breast imaging.  On 02/02/2023, she underwent a bilateral breast mammogram that showed a lobular hypoechoic solid mass within the left breast at 2 o'clock position 7 cm was recommended.  On 02/09/2023, she underwent an ultrasound guided biopsy that showed invasive ductal carcinoma, grade 3, ER/ND/HER2 Randall negative, Ki-67 80%.     On 2/27/23, she underwent left partial mastectomy that revealed invasive ductal carcinoma 9 x 8 x 6 mm, single focus, approximately 1 mm focus of high-grade DCIS, closest margin 3 mm, anterior.  Two sentinel lymph nodes were negative.  pT1b pN0(sn).     Has ADHD, has been on Adderall for several years.  Was born prematurely, was diagnosed with a stroke and heart murmur at birth, was treated at [facility].     Heavy menorrhagia for the past few years, worsening recently and has developed CAROLIN.  Has been on iron supplementation.  Sees gynecologist, Dr. Almita Clark with [facility].      She did have 1 visit with the medical oncology team at [facility].  Adjuvant chemotherapy was recommended.  Workup was ongoing for mild hyperbilirubinemia.  An abdominal ultrasound was done, negative for any abnormalities.Saw Hepatology prior to chemo who suspected she has Gilbert's disease and found no  contraindication to proceeding with TC.     Gyn:  Menarche 13  . Age at first pregnancy 19  OCP use a year or so   Premenopausal, regular, menorrhagia.   LMP 21     Underwent cycle 1 of Docetaxel and Cytoxan on 23.   The steroids made her irritable and she cut down dose to 1 tab for day 3.   Developed nausea and generalized pain soon after receiving chemo.   Took promethazine, zofran and ativan as needed.   Had constipation.  Is using Percocet 1-2 times a day for post op pain and now for generalized body pain.       Developed a low grade fever at home, 100.4 one episode with quick resolution. At the same time, she had hypotension and tachycardia and was referred to the ER. She was admitted for sepsis, was started on antibiotics. Has no other episodes of fever, no vasopressor needed, no ICU visit.  Cultures negative, no clear source,  was discharged on Bactrim.     Interim History:   Received cycle 2 of TC with 20% dose reduction of docetaxel 23, Udenyca . Was seen after for cough with wheezing, treated with 10 day course of doxycycline and advised to increase dexamethasone to 4 mg BID.     Patient seen in UrgentOnc visit on  and was given IV antiemetics with IVF, reached out on  with c/o swelling and weight gain. The swelling started approximately 4 days ago. Prescriptions for Lasix and Potassium sent to pharmacy on 6/15/23.    6/15/23 stopped daily Dex for URI    On 23 Presented to the ER for edema and pain. CTA was done without evidence of PE or acute findings in the chest. Labs stable.   Seen in UrgentOnc visit on 23 for new onset swelling in her abd and BLE. Had taken 2 doses of Lasix 20 mg without improvement. Weight on 23 was recorded at 119 lbs, 30 lbs. Lasix was increased to 40 mg po x 2 days.     She is here today 23 for recheck of labs, symptoms and consideration of C3 TC. Accompanied by her .  Weight is 129 lbs, down 1 lb over the weekend.  Swelling in BLE is slightly worse, her abd continues to be firm, bloated feeling.   Is having bowel movements every day, nausea has improved with current PRN medications.   Denies SOB, cough, palpitations, diarrhea, rashes, dysuria, new lumps or bumps. Denies fevers, chills.     Past Medical History:   Past Medical History:   Diagnosis Date    ADHD (attention deficit hyperactivity disorder)     Anemia     Hypertension     pre eclampsia and eclampsia    Stroke     baby       Past Surgical HIstory:   Past Surgical History:   Procedure Laterality Date    BREAST SURGERY      TUBAL LIGATION      VAGINAL DELIVERY         Family History:   Family History   Problem Relation Age of Onset    Hypertension Mother     Breast cancer Mother 51        negative screening    Heart disease Father     Diabetes Maternal Grandmother     Breast cancer Maternal Grandmother        Social History:  reports that she has been smoking vaping with nicotine. She has never used smokeless tobacco. She reports that she does not currently use alcohol. She reports that she does not use drugs.    Allergies:  Review of patient's allergies indicates:  No Known Allergies    Medications:  Current Outpatient Medications   Medication Sig Dispense Refill    cetirizine (ZYRTEC) 10 mg Cap ZyrTEC      dexAMETHasone (DECADRON) 4 MG Tab Take 2 tablets (8 mg total) by mouth every 12 (twelve) hours. Take 2 tab bid the day before chemo, 2 tab the night of chemo and 2 tab bid the day after chemo 60 tablet 0    dextroamphetamine-amphetamine (ADDERALL XR) 20 MG 24 hr capsule Take 1 capsule (20 mg total) by mouth every morning. 30 capsule 0    dextroamphetamine-amphetamine (ADDERALL) 12.5 MG tablet Take 12.5 mg by mouth.      dextroamphetamine-amphetamine (ADDERALL) 20 mg tablet Take 1 tablet by mouth.      dextroamphetamine-amphetamine (ADDERALL) 20 mg tablet 1 tablet Orally Twice a day      dextroamphetamine/amphetamine (ADDERALL ORAL) Adderall      furosemide (LASIX)  20 MG tablet Take 1 tablet (20 mg total) by mouth once daily. 30 tablet 11    HYDROcodone-acetaminophen (NORCO)  mg per tablet Take 1 tablet by mouth every 6 (six) hours as needed.      ibuprofen (ADVIL,MOTRIN) 600 MG tablet ibuprofen      lactulose (CHRONULAC) 20 gram/30 mL Soln 30 ml (20 gm) daily as needed for constipation 210 mL 1    LORazepam (ATIVAN) 1 MG tablet Take 1 tablet (1 mg total) by mouth 2 (two) times daily as needed for Anxiety (nausea). 30 tablet 0    ondansetron (ZOFRAN-ODT) 8 MG TbDL Take 1 tablet (8 mg total) by mouth every 8 (eight) hours as needed (nausea). 40 tablet 3    oxyCODONE (ROXICODONE) 10 mg Tab immediate release tablet Take 1 tablet (10 mg total) by mouth every 8 (eight) hours as needed for Pain. 30 tablet 0    oxyCODONE-acetaminophen (PERCOCET)  mg per tablet Take 1 tablet by mouth every 6 (six) hours.      polyethylene glycol (GLYCOLAX) 17 gram PwPk Take 17 g by mouth.      potassium chloride SA (K-DUR,KLOR-CON M) 10 MEQ tablet Take 1 tablet (10 mEq total) by mouth once daily. 30 tablet 1    promethazine (PHENERGAN) 12.5 MG Tab (Take 1-2 tabs every 6 hours as needed for nausea persistent despite zofran) 40 tablet 3    zolpidem (AMBIEN) 10 mg Tab Take 10 mg by mouth nightly as needed.      cetirizine 10 mg chewable tablet Take 10 mg by mouth.      duke's soln (benadryl 30 mL, mylanta 30 mL, LIDOcaine 30 mL, nystatin 30 mL) 120mL Take 10 mLs by mouth 4 (four) times daily. (Patient not taking: Reported on 6/5/2023) 120 mL 0    JUNEL FE 24 1 mg-20 mcg (24)/75 mg (4) per tablet Take 1 tablet by mouth.      NINJACOF 12.5-12.5 mg/5 mL Liqd Take 10 mLs by mouth.      OLANZapine (ZYPREXA) 5 MG tablet Take 1 tablet (5 mg total) by mouth every evening. Take on Days 1 - Days 4 of chemotherapy. (Patient not taking: Reported on 6/19/2023) 20 tablet 1    pantoprazole (PROTONIX) 40 MG tablet Take 1 tablet (40 mg total) by mouth once daily. (Patient not taking: Reported on 6/19/2023) 30  "tablet 1     No current facility-administered medications for this visit.       Review of Systems   Constitutional:  Negative for appetite change.   HENT:  Negative for trouble swallowing.    Respiratory:  Negative for cough, shortness of breath and wheezing.    Cardiovascular:  Positive for leg swelling. Negative for chest pain and palpitations.   Gastrointestinal:  Negative for abdominal pain, constipation, diarrhea and nausea.   Genitourinary:  Negative for dysuria.   Musculoskeletal:  Positive for myalgias.   Skin:  Negative for rash.   Neurological:  Negative for light-headedness, numbness and headaches.   Hematological:  Negative for adenopathy.     ECOG Performance Status:   ECOG SCORE             Objective:      Vitals:   Vitals:    06/19/23 0915   BP: 110/70   BP Location: Left arm   Patient Position: Sitting   BP Method: Medium (Manual)   Pulse: 105   Resp: 16   Temp: 97.7 °F (36.5 °C)   TempSrc: Temporal   SpO2: 98%   Weight: 58.8 kg (129 lb 10.1 oz)   Height: 5' 2" (1.575 m)       BMI: Body mass index is 23.71 kg/m².    Physical Exam  Vitals reviewed.   Constitutional:       General: She is not in acute distress.     Appearance: She is not diaphoretic.   Eyes:      General: No scleral icterus.  Cardiovascular:      Rate and Rhythm: Normal rate and regular rhythm.      Heart sounds: Normal heart sounds. No murmur heard.  Pulmonary:      Effort: Pulmonary effort is normal. No respiratory distress.      Breath sounds: No wheezing.   Abdominal:      General: Bowel sounds are normal. There is no distension.      Palpations: There is no hepatomegaly.      Tenderness: There is no abdominal tenderness. There is no guarding.   Musculoskeletal:      Right lower leg: Edema present.      Left lower leg: Edema present.   Lymphadenopathy:      Cervical: No cervical adenopathy.   Skin:     General: Skin is warm.      Coloration: Skin is not jaundiced.      Findings: No rash.   Neurological:      Mental Status: She is " alert and oriented to person, place, and time.       Laboratory Data:  Lab Results   Component Value Date    WBC 10.90 06/19/2023    HGB 10.4 (L) 06/19/2023    HCT 32.1 (L) 06/19/2023    MCV 93 06/19/2023     06/19/2023          Imaging: Narrative & Impression  EXAMINATION:  CTA CHEST NON CORONARY (PE STUDIES)     CLINICAL HISTORY:  Shortness of breath     TECHNIQUE:  Axial CT images were obtained through the chest after IV administration of 80 cc Omnipaque 350 contrast.  MIP, coronal and sagittal reconstructions submitted and interpreted.  Total DLP 98.  Automated exposure control utilized.     COMPARISON:  None     FINDINGS:  No filling defects are in the main pulmonary artery or segmental branches.     No focal consolidation, pneumothorax or pleural effusion.     Heart is normal size.  Thoracic aorta is normal in caliber.  Central airways are clear.     No enlarged lymph nodes.     No acute osseous findings.     Visualized portions of the upper abdomen show no acute findings.     Impression:     No CT evidence of pulmonary thromboembolism or acute findings in the chest.     Findings are compatible with the preliminary report.        Electronically signed by: Dayday Cantu MD  Date:                                            06/16/2023  Time:                                           06:54   Assessment:       1. Triple negative breast cancer    2. Hypervolemia, unspecified hypervolemia type    3. Nausea    4. Transaminitis    5. Hyperbilirubinemia    6. Thrombocytopenia    7. Leukopenia, unspecified type    8. Iron deficiency anemia due to chronic blood loss         Plan:   Triple negative breast cancer  pT1b pN0 (sn) ER/TN/HER2 Randall negative, Ki-67 80%, grade 3  -37-year-old premenopausal woman, presenting with early stage triple negative breast cancer.  She is a candidate for adjuvant chemotherapy. See previous discussions.   -S/p cycle 1 of  docetaxel and Cytoxan with poor tolerability and  hospitalization.   -Dose reduced for cycle 2, docetaxel to 60 mg/m2. Continue to optimize supportive care while on chemotherapy. See below.   -Cycle 2 on 05/24/23  -Cycle 3 delayed due to nausea, swelling. Discussed with Dr. Ayala, will continue to hold chemotherapy at this time due to poor tolerability.   -Follow up with Dr. Ayala on Thursday, CBC and CMP prior to appointment. Encouraged to call with worsening symptoms.      Swelling BLE  -Patient with +2 edema BLE  -Discussed with patient that the swelling could be related the steroids used to treat her recent bronchitis, also discussed possibly related to Docetaxel  -Weight has increased 12 lbs over 5 days; 6/19/23 still up 11lbs overall   -Continue Lasix 20 mg po daily, continue PO Potassium supplements  -Will get echo prior to follow up appointment this week  -Follow up with Dr. Ayala on Thursday, labs prior to visit     Nausea   -controlled with Sancuso patch, Zyprexa  -Phenergan, Zofran, Ativan PRN  -Monitor     Transaminitis   -AST/ALT, new onset elevation.   -Likely multifactorial post chemo and antibiotics  -6/19/23 Improving, Continue to monitor     Hyperbilirubinemia  Possible Gilbert's syndrome  -Bilirubin is normal today  -As above; continue to monitor bilirubin and liver function closely.     Thrombocytopenia  -Due to chemotherapy  -Improved at 168k this morning   -Monitor     Neutropenia secondary to chemo   -received Neulasta, continue to monitor    Iron deficiency anemia due to chronic blood loss  -History of menorrhagia noted  -Iron panel is normal at this time.   -Hgb is 10.4 g/dL this morning   -Monitor     Assessment/Plan reviewed and approved by Dr. Ayala  Patient queried and all questions were answered.   30 minutes were spent in coordination of patient's care, record review and counseling.    Route Chart for Scheduling    Med Onc Chart Routing      Follow up with physician Other. 6/22/23 with Dr. Ayala (per Dr. Ayala) CBC, CMP prior to  appointment   Follow up with NUNO    Infusion scheduling note   Continue to hold chemo   Injection scheduling note    Labs    Imaging ECHO   prior to Thursday if possible   Pharmacy appointment    Other referrals            Treatment Plan Information   OP BREAST TC - DOCETAXEL CYCLOPHOSPHAMIDE Q3W   Ambar Ayala MD   Upcoming Treatment Dates - OP BREAST TC - DOCETAXEL CYCLOPHOSPHAMIDE Q3W    6/19/2023       Pre-Medications       palonosetron 0.25mg/dexAMETHasone 10mg in NS IVPB 0.25 mg 50 mL       aprepitant (CINVANTI) injection 130 mg       Chemotherapy       DOCEtaxel (TAXOTERE) 60 mg/m2 = 88 mg in sodium chloride 0.9% 254.4 mL chemo infusion       cycloPHOSphamide 480 mg/m2 = 700 mg in sodium chloride 0.9% 253.5 mL chemo infusion  6/20/2023       Growth Factor       pegfilgrastim-cbqv (UDENYCA) injection 6 mg  7/10/2023       Pre-Medications       palonosetron 0.25mg/dexAMETHasone 10mg in NS IVPB 0.25 mg 50 mL       aprepitant (CINVANTI) injection 130 mg       Chemotherapy       DOCEtaxel (TAXOTERE) 60 mg/m2 = 88 mg in sodium chloride 0.9% 254.4 mL chemo infusion       cycloPHOSphamide 480 mg/m2 = 700 mg in sodium chloride 0.9% 253.5 mL chemo infusion  7/11/2023       Growth Factor       pegfilgrastim-cbqv (UDENYCA) injection 6 mg    Therapy Plan Information  IV Fluids  sodium chloride 0.9% bolus 1,000 mL 1,000 mL  1,000 mL, Intravenous, Every visit  Flushes  sodium chloride 0.9% flush 10 mL  10 mL, Intravenous, PRN  heparin, porcine (PF) 100 unit/mL injection flush 500 Units  500 Units, Intravenous, PRN  Antiemetics  ondansetron injection 8 mg  8 mg, Intravenous, PRN  promethazine (PHENERGAN) 12.5 mg in dextrose 5 % (D5W) 50 mL IVPB  12.5 mg, Intravenous, PRN  dexAMETHasone injection 8 mg  8 mg, Intravenous, PRN

## 2023-06-20 ENCOUNTER — CLINICAL SUPPORT (OUTPATIENT)
Dept: CARDIOLOGY | Facility: HOSPITAL | Age: 38
End: 2023-06-20
Payer: COMMERCIAL

## 2023-06-20 VITALS — BODY MASS INDEX: 23.74 KG/M2 | WEIGHT: 129 LBS | HEIGHT: 62 IN

## 2023-06-20 DIAGNOSIS — C50.919 TRIPLE NEGATIVE BREAST CANCER: ICD-10-CM

## 2023-06-20 DIAGNOSIS — E87.70 HYPERVOLEMIA, UNSPECIFIED HYPERVOLEMIA TYPE: ICD-10-CM

## 2023-06-20 PROCEDURE — 93356 MYOCRD STRAIN IMG SPCKL TRCK: CPT | Mod: ,,, | Performed by: INTERNAL MEDICINE

## 2023-06-20 PROCEDURE — 93356 ECHO (CUPID ONLY): ICD-10-PCS | Mod: ,,, | Performed by: INTERNAL MEDICINE

## 2023-06-20 PROCEDURE — 93306 ECHO (CUPID ONLY): ICD-10-PCS | Mod: 26,,, | Performed by: INTERNAL MEDICINE

## 2023-06-20 PROCEDURE — 93306 TTE W/DOPPLER COMPLETE: CPT | Mod: 26,,, | Performed by: INTERNAL MEDICINE

## 2023-06-20 PROCEDURE — 93356 MYOCRD STRAIN IMG SPCKL TRCK: CPT | Mod: PO

## 2023-06-21 LAB
ASCENDING AORTA: 2.12 CM
AV INDEX (PROSTH): 1.01
AV MEAN GRADIENT: 5 MMHG
AV PEAK GRADIENT: 8 MMHG
AV VALVE AREA: 3.05 CM2
AV VELOCITY RATIO: 0.89
BSA FOR ECHO PROCEDURE: 1.6 M2
CV ECHO LV RWT: 0.41 CM
DOP CALC AO PEAK VEL: 1.37 M/S
DOP CALC AO VTI: 25.1 CM
DOP CALC LVOT AREA: 3 CM2
DOP CALC LVOT DIAMETER: 1.96 CM
DOP CALC LVOT PEAK VEL: 1.22 M/S
DOP CALC LVOT STROKE VOLUME: 76.6 CM3
DOP CALCLVOT PEAK VEL VTI: 25.4 CM
E WAVE DECELERATION TIME: 165.79 MSEC
E/A RATIO: 1.14
E/E' RATIO: 8.61 M/S
ECHO LV POSTERIOR WALL: 0.87 CM (ref 0.6–1.1)
EJECTION FRACTION: 65 %
FRACTIONAL SHORTENING: 33 % (ref 28–44)
INTERVENTRICULAR SEPTUM: 0.89 CM (ref 0.6–1.1)
LA MAJOR: 4.65 CM
LA MINOR: 3.94 CM
LA WIDTH: 4.5 CM
LEFT ATRIUM SIZE: 3.04 CM
LEFT ATRIUM VOLUME INDEX: 31.2 ML/M2
LEFT ATRIUM VOLUME: 49.6 CM3
LEFT INTERNAL DIMENSION IN SYSTOLE: 2.89 CM (ref 2.1–4)
LEFT VENTRICLE DIASTOLIC VOLUME INDEX: 52 ML/M2
LEFT VENTRICLE DIASTOLIC VOLUME: 82.68 ML
LEFT VENTRICLE MASS INDEX: 75 G/M2
LEFT VENTRICLE SYSTOLIC VOLUME INDEX: 20 ML/M2
LEFT VENTRICLE SYSTOLIC VOLUME: 31.85 ML
LEFT VENTRICULAR INTERNAL DIMENSION IN DIASTOLE: 4.29 CM (ref 3.5–6)
LEFT VENTRICULAR MASS: 119.15 G
LV LATERAL E/E' RATIO: 8.25 M/S
LV SEPTAL E/E' RATIO: 9 M/S
LVOT MG: 3.29 MMHG
LVOT MV: 0.84 CM/S
MV PEAK A VEL: 0.87 M/S
MV PEAK E VEL: 0.99 M/S
PISA MRMAX VEL: 4.77 M/S
PISA TR MAX VEL: 2.21 M/S
PULM VEIN S/D RATIO: 1.51
PV PEAK D VEL: 0.37 M/S
PV PEAK S VEL: 0.56 M/S
RA MAJOR: 3.93 CM
RA PRESSURE: 3 MMHG
RIGHT VENTRICULAR END-DIASTOLIC DIMENSION: 3.53 CM
RIGHT VENTRICULAR LENGTH IN DIASTOLE (APICAL 4-CHAMBER VIEW): 6.34 CM
RV MID DIAMA: 1.83 CM
RV TISSUE DOPPLER FREE WALL SYSTOLIC VELOCITY 1 (APICAL 4 CHAMBER VIEW): 14.27 CM/S
SINUS: 1.96 CM
STJ: 1.96 CM
TDI LATERAL: 0.12 M/S
TDI SEPTAL: 0.11 M/S
TDI: 0.12 M/S
TR MAX PG: 20 MMHG
TRICUSPID ANNULAR PLANE SYSTOLIC EXCURSION: 2.49 CM
TV REST PULMONARY ARTERY PRESSURE: 23 MMHG

## 2023-06-22 ENCOUNTER — LAB VISIT (OUTPATIENT)
Dept: LAB | Facility: HOSPITAL | Age: 38
End: 2023-06-22
Attending: INTERNAL MEDICINE
Payer: COMMERCIAL

## 2023-06-22 ENCOUNTER — OFFICE VISIT (OUTPATIENT)
Dept: HEMATOLOGY/ONCOLOGY | Facility: CLINIC | Age: 38
End: 2023-06-22
Payer: COMMERCIAL

## 2023-06-22 VITALS
DIASTOLIC BLOOD PRESSURE: 70 MMHG | RESPIRATION RATE: 16 BRPM | HEIGHT: 62 IN | BODY MASS INDEX: 24.63 KG/M2 | HEART RATE: 133 BPM | TEMPERATURE: 98 F | SYSTOLIC BLOOD PRESSURE: 104 MMHG | OXYGEN SATURATION: 99 % | WEIGHT: 133.81 LBS

## 2023-06-22 DIAGNOSIS — D63.0 ANEMIA IN NEOPLASTIC DISEASE: ICD-10-CM

## 2023-06-22 DIAGNOSIS — E87.70 HYPERVOLEMIA, UNSPECIFIED HYPERVOLEMIA TYPE: ICD-10-CM

## 2023-06-22 DIAGNOSIS — C50.919 TRIPLE NEGATIVE BREAST CANCER: Primary | ICD-10-CM

## 2023-06-22 DIAGNOSIS — R74.01 TRANSAMINITIS: ICD-10-CM

## 2023-06-22 DIAGNOSIS — D69.6 THROMBOCYTOPENIA: ICD-10-CM

## 2023-06-22 DIAGNOSIS — C50.919 TRIPLE NEGATIVE BREAST CANCER: ICD-10-CM

## 2023-06-22 DIAGNOSIS — R11.0 NAUSEA: ICD-10-CM

## 2023-06-22 DIAGNOSIS — D50.0 IRON DEFICIENCY ANEMIA DUE TO CHRONIC BLOOD LOSS: ICD-10-CM

## 2023-06-22 LAB
ALBUMIN SERPL BCP-MCNC: 3.3 G/DL (ref 3.5–5.2)
ALP SERPL-CCNC: 80 U/L (ref 55–135)
ALT SERPL W/O P-5'-P-CCNC: 69 U/L (ref 10–44)
ANION GAP SERPL CALC-SCNC: 9 MMOL/L (ref 8–16)
AST SERPL-CCNC: 25 U/L (ref 10–40)
BASOPHILS # BLD AUTO: 0.01 K/UL (ref 0–0.2)
BASOPHILS NFR BLD: 0.2 % (ref 0–1.9)
BILIRUB SERPL-MCNC: 0.4 MG/DL (ref 0.1–1)
BUN SERPL-MCNC: 12 MG/DL (ref 6–20)
CALCIUM SERPL-MCNC: 8.7 MG/DL (ref 8.7–10.5)
CHLORIDE SERPL-SCNC: 105 MMOL/L (ref 95–110)
CO2 SERPL-SCNC: 27 MMOL/L (ref 23–29)
CREAT SERPL-MCNC: 0.7 MG/DL (ref 0.5–1.4)
DIFFERENTIAL METHOD: ABNORMAL
EOSINOPHIL # BLD AUTO: 0.2 K/UL (ref 0–0.5)
EOSINOPHIL NFR BLD: 3.3 % (ref 0–8)
ERYTHROCYTE [DISTWIDTH] IN BLOOD BY AUTOMATED COUNT: 15.9 % (ref 11.5–14.5)
EST. GFR  (NO RACE VARIABLE): >60 ML/MIN/1.73 M^2
GLUCOSE SERPL-MCNC: 104 MG/DL (ref 70–110)
HCT VFR BLD AUTO: 29.5 % (ref 37–48.5)
HGB BLD-MCNC: 9.5 G/DL (ref 12–16)
IMM GRANULOCYTES # BLD AUTO: 0.1 K/UL (ref 0–0.04)
IMM GRANULOCYTES NFR BLD AUTO: 1.8 % (ref 0–0.5)
LYMPHOCYTES # BLD AUTO: 0.9 K/UL (ref 1–4.8)
LYMPHOCYTES NFR BLD: 16.5 % (ref 18–48)
MCH RBC QN AUTO: 30.1 PG (ref 27–31)
MCHC RBC AUTO-ENTMCNC: 32.2 G/DL (ref 32–36)
MCV RBC AUTO: 93 FL (ref 82–98)
MONOCYTES # BLD AUTO: 0.6 K/UL (ref 0.3–1)
MONOCYTES NFR BLD: 11.7 % (ref 4–15)
NEUTROPHILS # BLD AUTO: 3.6 K/UL (ref 1.8–7.7)
NEUTROPHILS NFR BLD: 66.5 % (ref 38–73)
NRBC BLD-RTO: 0 /100 WBC
PLATELET # BLD AUTO: 108 K/UL (ref 150–450)
PLATELET BLD QL SMEAR: ABNORMAL
PMV BLD AUTO: 8.2 FL (ref 9.2–12.9)
POTASSIUM SERPL-SCNC: 4 MMOL/L (ref 3.5–5.1)
PROT SERPL-MCNC: 5.7 G/DL (ref 6–8.4)
RBC # BLD AUTO: 3.16 M/UL (ref 4–5.4)
SODIUM SERPL-SCNC: 141 MMOL/L (ref 136–145)
WBC # BLD AUTO: 5.47 K/UL (ref 3.9–12.7)

## 2023-06-22 PROCEDURE — 3078F PR MOST RECENT DIASTOLIC BLOOD PRESSURE < 80 MM HG: ICD-10-PCS | Mod: CPTII,S$GLB,, | Performed by: INTERNAL MEDICINE

## 2023-06-22 PROCEDURE — 3074F SYST BP LT 130 MM HG: CPT | Mod: CPTII,S$GLB,, | Performed by: INTERNAL MEDICINE

## 2023-06-22 PROCEDURE — 85025 COMPLETE CBC W/AUTO DIFF WBC: CPT | Mod: PN

## 2023-06-22 PROCEDURE — 99215 OFFICE O/P EST HI 40 MIN: CPT | Mod: S$GLB,,, | Performed by: INTERNAL MEDICINE

## 2023-06-22 PROCEDURE — 3008F PR BODY MASS INDEX (BMI) DOCUMENTED: ICD-10-PCS | Mod: CPTII,S$GLB,, | Performed by: INTERNAL MEDICINE

## 2023-06-22 PROCEDURE — 99999 PR PBB SHADOW E&M-EST. PATIENT-LVL V: CPT | Mod: PBBFAC,,, | Performed by: INTERNAL MEDICINE

## 2023-06-22 PROCEDURE — 3078F DIAST BP <80 MM HG: CPT | Mod: CPTII,S$GLB,, | Performed by: INTERNAL MEDICINE

## 2023-06-22 PROCEDURE — 3008F BODY MASS INDEX DOCD: CPT | Mod: CPTII,S$GLB,, | Performed by: INTERNAL MEDICINE

## 2023-06-22 PROCEDURE — 3074F PR MOST RECENT SYSTOLIC BLOOD PRESSURE < 130 MM HG: ICD-10-PCS | Mod: CPTII,S$GLB,, | Performed by: INTERNAL MEDICINE

## 2023-06-22 PROCEDURE — 1159F MED LIST DOCD IN RCRD: CPT | Mod: CPTII,S$GLB,, | Performed by: INTERNAL MEDICINE

## 2023-06-22 PROCEDURE — 36415 COLL VENOUS BLD VENIPUNCTURE: CPT | Mod: PN

## 2023-06-22 PROCEDURE — 99999 PR PBB SHADOW E&M-EST. PATIENT-LVL V: ICD-10-PCS | Mod: PBBFAC,,, | Performed by: INTERNAL MEDICINE

## 2023-06-22 PROCEDURE — 1159F PR MEDICATION LIST DOCUMENTED IN MEDICAL RECORD: ICD-10-PCS | Mod: CPTII,S$GLB,, | Performed by: INTERNAL MEDICINE

## 2023-06-22 PROCEDURE — 80053 COMPREHEN METABOLIC PANEL: CPT | Mod: PN

## 2023-06-22 PROCEDURE — 99215 PR OFFICE/OUTPT VISIT, EST, LEVL V, 40-54 MIN: ICD-10-PCS | Mod: S$GLB,,, | Performed by: INTERNAL MEDICINE

## 2023-06-22 NOTE — PROGRESS NOTES
PROGRESS NOTE    Subjective:       Patient ID: Aleena Duckworth is a 37 y.o. female.  MRN: [id number]  : 1985    Chief Complaint: TNBC    History of Present Illness:   Aleena Duckworth is a 37 y.o. female who is referred for newly diagnosed TNBC. Had her initial surgery at [facility].      As previously documented, she self palpated a left-sided breast mass.  She felt it was growing fast and reported to her primary care physician who referred her for breast imaging.  On 2023, she underwent a bilateral breast mammogram that showed a lobular hypoechoic solid mass within the left breast at 2 o'clock position 7 cm was recommended.  On 2023, she underwent an ultrasound guided biopsy that showed invasive ductal carcinoma, grade 3, ER/WI/HER2 Randall negative, Ki-67 80%.     On 23, she underwent left partial mastectomy that revealed invasive ductal carcinoma 9 x 8 x 6 mm, single focus, approximately 1 mm focus of high-grade DCIS, closest margin 3 mm, anterior.  Two sentinel lymph nodes were negative.  pT1b pN0(sn).     Has ADHD, has been on Adderall for several years.  Was born prematurely, was diagnosed with a stroke and heart murmur at birth, was treated at [facility].     Heavy menorrhagia for the past few years, worsening recently and has developed CAROLIN.  Has been on iron supplementation.  Sees gynecologist, Dr. Almita Clark with [facility].      She did have 1 visit with the medical oncology team at [facility].  Adjuvant chemotherapy was recommended.  Workup was ongoing for mild hyperbilirubinemia.  An abdominal ultrasound was done, negative for any abnormalities.Saw Hepatology prior to chemo who suspected she has Gilbert's disease and found no contraindication to proceeding with TC.     Gyn:  Menarche 13  . Age at first pregnancy 19  OCP use a year or so   Premenopausal, regular, menorrhagia.   LMP 21    Underwent cycle 1 of Docetaxel and Cytoxan on  5/1/23.   The steroids made her irritable and she cut down dose to 1 tab for day 3.   Developed nausea and generalized pain soon after receiving chemo.   Took promethazine, zofran and ativan as needed.   Had constipation.  Is using Percocet 1-2 times a day for post op pain and now for generalized body pain.      Developed a low grade fever at home, 100.4 one episode with quick resolution. At the same time, she had hypotension and tachycardia and was referred to the ER. She was admitted for sepsis, was started on antibiotics. Has no other episodes of fever, no vasopressor needed, no ICU visit.  Cultures negative, no clear source,  was discharged on Bactrim.     Interim history:  Received cycle 2 of TC with 20% dose reduction of docetaxel on 5/24.   Generalized body pain and nausea and diarrhea for one week.     Then developed cough and wheezing and diagnosed with bronchitis. On doxycycline and decadron with improvement. Nausea has been improving on Sancuso patch.   Developed significant fluid overload while on decadron, LFTs went up again while on doxycycline.   Decadron has been stopped for 5 days, on lasix with no significant response yet. Went to the ER for fluid overload and saw Lula earlier this week. Echo was done and is andrés.          Oncology History:  Oncology History   Triple negative breast cancer   4/17/2023 Initial Diagnosis    Triple negative breast cancer     4/18/2023 Cancer Staged    Staging form: Breast, AJCC 8th Edition  - Pathologic stage from 4/18/2023: Stage IB (pT1b, pN0, cM0, G3, ER-, AR-, HER2-)     5/1/2023 -  Chemotherapy    Treatment Summary   Plan Name: OP BREAST TC - DOCETAXEL CYCLOPHOSPHAMIDE Q3W  Treatment Goal: Curative  Status: Active  Start Date: 5/1/2023  End Date: 7/11/2023 (Planned)  Provider: Ambar Ayala MD  Chemotherapy: cycloPHOSphamide 600 mg/m2 = 880 mg in sodium chloride 0.9% 289.4 mL chemo infusion, 600 mg/m2 = 880 mg, Intravenous, Clinic/HOD 1 time, 2 of 4  cycles  Dose modification: 600 mg/m2 (Cycle 3), 480 mg/m2 (Cycle 3, Reason: Dose not tolerated)  Administration: 880 mg (5/1/2023)  DOCEtaxel 75 mg/m2 = 110 mg in sodium chloride 0.9% 290.5 mL chemo infusion, 75 mg/m2 = 110 mg, Intravenous, Clinic/HOD 1 time, 2 of 4 cycles  Dose modification: 60 mg/m2 (original dose 75 mg/m2, Cycle 2, Reason: Dose not tolerated)  Administration: 110 mg (5/1/2023), 90 mg (5/24/2023)         History:  Past Medical History:   Diagnosis Date    ADHD (attention deficit hyperactivity disorder)     Anemia     Hypertension     pre eclampsia and eclampsia    Stroke     baby      Past Surgical History:   Procedure Laterality Date    BREAST SURGERY      TUBAL LIGATION      VAGINAL DELIVERY       Family History   Problem Relation Age of Onset    Hypertension Mother     Breast cancer Mother 51        negative screening    Heart disease Father     Diabetes Maternal Grandmother     Breast cancer Maternal Grandmother       Social History     Tobacco Use    Smoking status: Some Days     Types: Vaping with nicotine    Smokeless tobacco: Never    Tobacco comments:     vape   Substance and Sexual Activity    Alcohol use: Not Currently    Drug use: Never    Sexual activity: Yes     Partners: Male     Birth control/protection: See Surgical Hx     Comment: tubal        ROS:   Review of Systems   Constitutional:  Positive for malaise/fatigue. Negative for fever and weight loss.        Weight gain    HENT:  Positive for sore throat. Negative for congestion, hearing loss and nosebleeds.    Eyes:  Negative for double vision and photophobia.   Respiratory:  Negative for cough, hemoptysis, sputum production, shortness of breath and wheezing.    Cardiovascular:  Positive for leg swelling. Negative for chest pain, palpitations and orthopnea.   Gastrointestinal:  Positive for diarrhea and nausea. Negative for abdominal pain, blood in stool, constipation, heartburn and vomiting.   Genitourinary:  Negative for  "dysuria, hematuria and urgency.   Musculoskeletal:  Negative for back pain, joint pain and myalgias.   Skin:  Negative for itching and rash.   Neurological:  Negative for dizziness, tingling, seizures, weakness and headaches.   Endo/Heme/Allergies:  Negative for polydipsia. Does not bruise/bleed easily.   Psychiatric/Behavioral:  Negative for depression and memory loss. The patient is not nervous/anxious and does not have insomnia.       Objective:     Vitals:    06/22/23 1514   BP: 104/70   Pulse: (!) 133   Resp: 16   Temp: 97.8 °F (36.6 °C)   TempSrc: Temporal   SpO2: 99%   Weight: 60.7 kg (133 lb 13.1 oz)   Height: 5' 2" (1.575 m)   PainSc: 0-No pain         Physical Examination:   Physical Exam  Vitals and nursing note reviewed.   Constitutional:       General: She is not in acute distress.     Appearance: She is not diaphoretic.   HENT:      Head: Normocephalic.      Mouth/Throat:      Pharynx: No oropharyngeal exudate.   Eyes:      General: No scleral icterus.     Conjunctiva/sclera: Conjunctivae normal.   Neck:      Thyroid: No thyromegaly.   Cardiovascular:      Rate and Rhythm: Normal rate and regular rhythm.      Heart sounds: Normal heart sounds. No murmur heard.  Pulmonary:      Effort: Pulmonary effort is normal. No respiratory distress.      Breath sounds: No stridor. No wheezing or rales.   Chest:      Chest wall: No tenderness.   Abdominal:      General: Bowel sounds are normal. There is no distension.      Palpations: Abdomen is soft. There is no mass.      Tenderness: There is no abdominal tenderness. There is no rebound.   Musculoskeletal:         General: Swelling (facial) present. No tenderness or deformity. Normal range of motion.      Cervical back: Neck supple.      Right lower leg: Edema present.      Left lower leg: Edema present.   Lymphadenopathy:      Cervical: No cervical adenopathy.   Skin:     General: Skin is warm and dry.      Findings: No erythema or rash.   Neurological:      " Mental Status: She is alert and oriented to person, place, and time.      Cranial Nerves: No cranial nerve deficit.      Coordination: Coordination normal.      Gait: Gait is intact.   Psychiatric:         Mood and Affect: Affect normal.         Cognition and Memory: Memory normal.         Judgment: Judgment normal.        Diagnostic Tests:  Significant Imaging: I have reviewed and interpreted all pertinent imaging results/findings.      Laboratory Data:  All pertinent labs have been reviewed.    Labs:   Lab Results   Component Value Date    WBC 5.47 06/22/2023    HGB 9.5 (L) 06/22/2023    HCT 29.5 (L) 06/22/2023    MCV 93 06/22/2023     (L) 06/22/2023       Assessment/Plan:   Triple negative breast cancer  pT1b pN0 (sn) ER/DE/HER2 Randall negative, Ki-67 80%, grade 3    37-year-old premenopausal woman, presenting with early stage triple negative breast cancer.  She is a candidate for adjuvant chemotherapy.  See previous discussions.   S/p cycle 1 of  docetaxel and Cytoxan  with poor tolerability and hospitalization.   Dose reduced for cycle 2, docetaxel to 60 mg/m2. Has had multiple set backs including transaminitis, bronchitis, significant fluid overload.   We discussed the early stage of her tumor and that given that and the very poor tolerability, it is best to stop chemotherapy at this time.   Will see her in 4 weeks to ensure continued recovery.     Fluid overload   Multifactorial, with chemo and decadron.   Both stopped. Continue lasix with kcl bid, monitor closely.     Bronchitis:  Improving, continue to monitor.      Nausea   Continue Sancuso patch, Zyprexa and continue phenergan, ativan. Continue to monitor closely.     Transaminitis   LFTS are improving,  chemo has been stopped. Continue to monitor.     Hyperbilirubinemia  Possible Gilbert's syndrome  Bilirubin is andrés.  As above.  Continue to monitor bilirubin and liver function closely.    Thrombocytopenia  Mild at baseline, declined post chemo,  fluctuating, monitor.      Iron deficiency anemia due to chronic blood loss  History of menorrhagia noted.  Iron panel is normal at this time.   Hb is now in the 9-10 g/dl range, post chemo, continue to monitor.         MDM includes  :    - Acute or chronic illness or injury that poses a threat to life or bodily function  - Independent review and explanation of 3+ results from unique tests  - Discussion of management and ordering 3+ unique tests  - Extensive discussion of treatment and management  - Prescription drug management  - Drug therapy requiring intensive monitoring for toxicity   ECOG SCORE               Discussion:   No follow-ups on file.    Plan was discussed with the patient at length, and she verbalized understanding. Aleena was given an opportunity to ask questions that were answered to her satisfaction, and she was advised to call in the interval if any problems or questions arise.    Electronically signed by Ambar Ayala MD    Route Chart for Scheduling    Med Onc Chart Routing      Follow up with physician . Scheduled   Follow up with NUNO    Infusion scheduling note    Injection scheduling note    Labs CBC and CMP   Scheduling:  Preferred lab:  Lab interval:     Imaging    Pharmacy appointment    Other referrals            Treatment Plan Information   OP BREAST TC - DOCETAXEL CYCLOPHOSPHAMIDE Q3W   Ambar Ayala MD   Upcoming Treatment Dates - OP BREAST TC - DOCETAXEL CYCLOPHOSPHAMIDE Q3W    6/19/2023       Pre-Medications       palonosetron 0.25mg/dexAMETHasone 10mg in NS IVPB 0.25 mg 50 mL       aprepitant (CINVANTI) injection 130 mg       Chemotherapy       DOCEtaxel (TAXOTERE) 60 mg/m2 = 88 mg in sodium chloride 0.9% 254.4 mL chemo infusion       cycloPHOSphamide 480 mg/m2 = 700 mg in sodium chloride 0.9% 253.5 mL chemo infusion  6/20/2023       Growth Factor       pegfilgrastim-cbqv (UDENYCA) injection 6 mg  7/10/2023       Pre-Medications       palonosetron 0.25mg/dexAMETHasone 10mg in  NS IVPB 0.25 mg 50 mL       aprepitant (CINVANTI) injection 130 mg       Chemotherapy       DOCEtaxel (TAXOTERE) 60 mg/m2 = 88 mg in sodium chloride 0.9% 254.4 mL chemo infusion       cycloPHOSphamide 480 mg/m2 = 700 mg in sodium chloride 0.9% 253.5 mL chemo infusion  7/11/2023       Growth Factor       pegfilgrastim-cbqv (UDENYCA) injection 6 mg    Therapy Plan Information  IV Fluids  sodium chloride 0.9% bolus 1,000 mL 1,000 mL  1,000 mL, Intravenous, Every visit  Flushes  sodium chloride 0.9% flush 10 mL  10 mL, Intravenous, PRN  heparin, porcine (PF) 100 unit/mL injection flush 500 Units  500 Units, Intravenous, PRN  Antiemetics  ondansetron injection 8 mg  8 mg, Intravenous, PRN  promethazine (PHENERGAN) 12.5 mg in dextrose 5 % (D5W) 50 mL IVPB  12.5 mg, Intravenous, PRN  dexAMETHasone injection 8 mg  8 mg, Intravenous, PRN

## 2023-06-26 DIAGNOSIS — C50.919 TRIPLE NEGATIVE BREAST CANCER: ICD-10-CM

## 2023-06-26 RX ORDER — PROMETHAZINE HYDROCHLORIDE 12.5 MG/1
TABLET ORAL
Qty: 40 TABLET | Refills: 3 | Status: SHIPPED | OUTPATIENT
Start: 2023-06-26

## 2023-06-26 RX ORDER — ONDANSETRON HYDROCHLORIDE 8 MG/1
8 TABLET, FILM COATED ORAL EVERY 8 HOURS PRN
Qty: 30 TABLET | Refills: 2 | Status: SHIPPED | OUTPATIENT
Start: 2023-06-26 | End: 2024-06-25

## 2023-06-26 RX ORDER — ONDANSETRON 8 MG/1
8 TABLET, ORALLY DISINTEGRATING ORAL EVERY 8 HOURS PRN
Qty: 40 TABLET | Refills: 3 | Status: SHIPPED | OUTPATIENT
Start: 2023-06-26 | End: 2024-06-25

## 2023-06-27 ENCOUNTER — PATIENT MESSAGE (OUTPATIENT)
Dept: HEMATOLOGY/ONCOLOGY | Facility: CLINIC | Age: 38
End: 2023-06-27
Payer: COMMERCIAL

## 2023-06-27 DIAGNOSIS — G89.18 POST-OP PAIN: ICD-10-CM

## 2023-06-27 DIAGNOSIS — C50.919 TRIPLE NEGATIVE BREAST CANCER: ICD-10-CM

## 2023-06-27 RX ORDER — OXYCODONE HYDROCHLORIDE 10 MG/1
10 TABLET ORAL EVERY 8 HOURS PRN
Qty: 30 TABLET | Refills: 0 | Status: SHIPPED | OUTPATIENT
Start: 2023-06-27

## 2023-06-27 RX ORDER — LORAZEPAM 1 MG/1
1 TABLET ORAL 2 TIMES DAILY PRN
Qty: 30 TABLET | Refills: 0 | Status: SHIPPED | OUTPATIENT
Start: 2023-06-27 | End: 2023-08-04 | Stop reason: SDUPTHER

## 2023-07-07 ENCOUNTER — PATIENT MESSAGE (OUTPATIENT)
Dept: HEMATOLOGY/ONCOLOGY | Facility: CLINIC | Age: 38
End: 2023-07-07
Payer: COMMERCIAL

## 2023-07-10 ENCOUNTER — OFFICE VISIT (OUTPATIENT)
Dept: HEMATOLOGY/ONCOLOGY | Facility: CLINIC | Age: 38
End: 2023-07-10
Payer: COMMERCIAL

## 2023-07-10 ENCOUNTER — LAB VISIT (OUTPATIENT)
Dept: LAB | Facility: HOSPITAL | Age: 38
End: 2023-07-10
Attending: INTERNAL MEDICINE
Payer: COMMERCIAL

## 2023-07-10 ENCOUNTER — TELEPHONE (OUTPATIENT)
Dept: HEMATOLOGY/ONCOLOGY | Facility: CLINIC | Age: 38
End: 2023-07-10
Payer: COMMERCIAL

## 2023-07-10 VITALS
SYSTOLIC BLOOD PRESSURE: 118 MMHG | HEIGHT: 62 IN | WEIGHT: 125 LBS | BODY MASS INDEX: 23 KG/M2 | RESPIRATION RATE: 16 BRPM | HEART RATE: 83 BPM | DIASTOLIC BLOOD PRESSURE: 72 MMHG | OXYGEN SATURATION: 96 % | TEMPERATURE: 98 F

## 2023-07-10 DIAGNOSIS — N95.1 HOT FLUSHES, PERIMENOPAUSAL: ICD-10-CM

## 2023-07-10 DIAGNOSIS — C50.919 TRIPLE NEGATIVE BREAST CANCER: Primary | ICD-10-CM

## 2023-07-10 DIAGNOSIS — R74.01 TRANSAMINITIS: ICD-10-CM

## 2023-07-10 DIAGNOSIS — D63.0 ANEMIA IN NEOPLASTIC DISEASE: ICD-10-CM

## 2023-07-10 DIAGNOSIS — R11.0 NAUSEA: ICD-10-CM

## 2023-07-10 DIAGNOSIS — D69.6 THROMBOCYTOPENIA: ICD-10-CM

## 2023-07-10 DIAGNOSIS — C50.919 TRIPLE NEGATIVE BREAST CANCER: ICD-10-CM

## 2023-07-10 LAB
ALBUMIN SERPL BCP-MCNC: 4 G/DL (ref 3.5–5.2)
ALP SERPL-CCNC: 84 U/L (ref 55–135)
ALT SERPL W/O P-5'-P-CCNC: 34 U/L (ref 10–44)
ANION GAP SERPL CALC-SCNC: 8 MMOL/L (ref 8–16)
AST SERPL-CCNC: 27 U/L (ref 10–40)
BASOPHILS # BLD AUTO: 0.02 K/UL (ref 0–0.2)
BASOPHILS NFR BLD: 0.4 % (ref 0–1.9)
BILIRUB SERPL-MCNC: 1.3 MG/DL (ref 0.1–1)
BUN SERPL-MCNC: 15 MG/DL (ref 6–20)
CALCIUM SERPL-MCNC: 9.3 MG/DL (ref 8.7–10.5)
CHLORIDE SERPL-SCNC: 105 MMOL/L (ref 95–110)
CO2 SERPL-SCNC: 26 MMOL/L (ref 23–29)
CREAT SERPL-MCNC: 0.7 MG/DL (ref 0.5–1.4)
DIFFERENTIAL METHOD: ABNORMAL
EOSINOPHIL # BLD AUTO: 0.1 K/UL (ref 0–0.5)
EOSINOPHIL NFR BLD: 1.7 % (ref 0–8)
ERYTHROCYTE [DISTWIDTH] IN BLOOD BY AUTOMATED COUNT: 14.1 % (ref 11.5–14.5)
EST. GFR  (NO RACE VARIABLE): >60 ML/MIN/1.73 M^2
GLUCOSE SERPL-MCNC: 86 MG/DL (ref 70–110)
HCT VFR BLD AUTO: 31.6 % (ref 37–48.5)
HGB BLD-MCNC: 10.8 G/DL (ref 12–16)
IMM GRANULOCYTES # BLD AUTO: 0.01 K/UL (ref 0–0.04)
IMM GRANULOCYTES NFR BLD AUTO: 0.2 % (ref 0–0.5)
LYMPHOCYTES # BLD AUTO: 1.4 K/UL (ref 1–4.8)
LYMPHOCYTES NFR BLD: 31.2 % (ref 18–48)
MCH RBC QN AUTO: 29.3 PG (ref 27–31)
MCHC RBC AUTO-ENTMCNC: 34.2 G/DL (ref 32–36)
MCV RBC AUTO: 86 FL (ref 82–98)
MONOCYTES # BLD AUTO: 0.5 K/UL (ref 0.3–1)
MONOCYTES NFR BLD: 10 % (ref 4–15)
NEUTROPHILS # BLD AUTO: 2.6 K/UL (ref 1.8–7.7)
NEUTROPHILS NFR BLD: 56.5 % (ref 38–73)
NRBC BLD-RTO: 0 /100 WBC
PLATELET # BLD AUTO: 167 K/UL (ref 150–450)
PMV BLD AUTO: 8.1 FL (ref 9.2–12.9)
POTASSIUM SERPL-SCNC: 3.7 MMOL/L (ref 3.5–5.1)
PROT SERPL-MCNC: 6.5 G/DL (ref 6–8.4)
RBC # BLD AUTO: 3.69 M/UL (ref 4–5.4)
SODIUM SERPL-SCNC: 139 MMOL/L (ref 136–145)
WBC # BLD AUTO: 4.58 K/UL (ref 3.9–12.7)

## 2023-07-10 PROCEDURE — 85025 COMPLETE CBC W/AUTO DIFF WBC: CPT | Mod: PN | Performed by: INTERNAL MEDICINE

## 2023-07-10 PROCEDURE — 1160F PR REVIEW ALL MEDS BY PRESCRIBER/CLIN PHARMACIST DOCUMENTED: ICD-10-PCS | Mod: CPTII,S$GLB,, | Performed by: INTERNAL MEDICINE

## 2023-07-10 PROCEDURE — 99999 PR PBB SHADOW E&M-EST. PATIENT-LVL V: CPT | Mod: PBBFAC,,, | Performed by: INTERNAL MEDICINE

## 2023-07-10 PROCEDURE — 3074F PR MOST RECENT SYSTOLIC BLOOD PRESSURE < 130 MM HG: ICD-10-PCS | Mod: CPTII,S$GLB,, | Performed by: INTERNAL MEDICINE

## 2023-07-10 PROCEDURE — 1160F RVW MEDS BY RX/DR IN RCRD: CPT | Mod: CPTII,S$GLB,, | Performed by: INTERNAL MEDICINE

## 2023-07-10 PROCEDURE — 3074F SYST BP LT 130 MM HG: CPT | Mod: CPTII,S$GLB,, | Performed by: INTERNAL MEDICINE

## 2023-07-10 PROCEDURE — 3008F PR BODY MASS INDEX (BMI) DOCUMENTED: ICD-10-PCS | Mod: CPTII,S$GLB,, | Performed by: INTERNAL MEDICINE

## 2023-07-10 PROCEDURE — 1159F PR MEDICATION LIST DOCUMENTED IN MEDICAL RECORD: ICD-10-PCS | Mod: CPTII,S$GLB,, | Performed by: INTERNAL MEDICINE

## 2023-07-10 PROCEDURE — 1159F MED LIST DOCD IN RCRD: CPT | Mod: CPTII,S$GLB,, | Performed by: INTERNAL MEDICINE

## 2023-07-10 PROCEDURE — 36415 COLL VENOUS BLD VENIPUNCTURE: CPT | Mod: PN | Performed by: INTERNAL MEDICINE

## 2023-07-10 PROCEDURE — 99999 PR PBB SHADOW E&M-EST. PATIENT-LVL V: ICD-10-PCS | Mod: PBBFAC,,, | Performed by: INTERNAL MEDICINE

## 2023-07-10 PROCEDURE — 99214 PR OFFICE/OUTPT VISIT, EST, LEVL IV, 30-39 MIN: ICD-10-PCS | Mod: S$GLB,,, | Performed by: INTERNAL MEDICINE

## 2023-07-10 PROCEDURE — 3078F PR MOST RECENT DIASTOLIC BLOOD PRESSURE < 80 MM HG: ICD-10-PCS | Mod: CPTII,S$GLB,, | Performed by: INTERNAL MEDICINE

## 2023-07-10 PROCEDURE — 80053 COMPREHEN METABOLIC PANEL: CPT | Mod: PN | Performed by: INTERNAL MEDICINE

## 2023-07-10 PROCEDURE — 3008F BODY MASS INDEX DOCD: CPT | Mod: CPTII,S$GLB,, | Performed by: INTERNAL MEDICINE

## 2023-07-10 PROCEDURE — 99214 OFFICE O/P EST MOD 30 MIN: CPT | Mod: S$GLB,,, | Performed by: INTERNAL MEDICINE

## 2023-07-10 PROCEDURE — 3078F DIAST BP <80 MM HG: CPT | Mod: CPTII,S$GLB,, | Performed by: INTERNAL MEDICINE

## 2023-07-10 NOTE — PROGRESS NOTES
PROGRESS NOTE    Subjective:       Patient ID: Aleena Duckworth is a 37 y.o. female.  MRN: [id number]  : 1985    Chief Complaint: TNBC    History of Present Illness:   Aleena Duckworth is a 37 y.o. female who is referred for newly diagnosed TNBC. Had her initial surgery at [facility].      As previously documented, she self palpated a left-sided breast mass.  She felt it was growing fast and reported to her primary care physician who referred her for breast imaging.  On 2023, she underwent a bilateral breast mammogram that showed a lobular hypoechoic solid mass within the left breast at 2 o'clock position 7 cm was recommended.  On 2023, she underwent an ultrasound guided biopsy that showed invasive ductal carcinoma, grade 3, ER/DE/HER2 Randall negative, Ki-67 80%.     On 23, she underwent left partial mastectomy that revealed invasive ductal carcinoma 9 x 8 x 6 mm, single focus, approximately 1 mm focus of high-grade DCIS, closest margin 3 mm, anterior.  Two sentinel lymph nodes were negative.  pT1b pN0(sn).     Has ADHD, has been on Adderall for several years.  Was born prematurely, was diagnosed with a stroke and heart murmur at birth, was treated at [facility].     Heavy menorrhagia for the past few years, worsening recently and has developed CAROLIN.  Has been on iron supplementation.  Sees gynecologist, Dr. Almita Clark with [facility].      She did have 1 visit with the medical oncology team at [facility].  Adjuvant chemotherapy was recommended.  Workup was ongoing for mild hyperbilirubinemia.  An abdominal ultrasound was done, negative for any abnormalities.Saw Hepatology prior to chemo who suspected she has Gilbert's disease and found no contraindication to proceeding with TC.     Gyn:  Menarche 13  . Age at first pregnancy 19  OCP use a year or so   Premenopausal, regular, menorrhagia.   LMP 21    Underwent cycle 1 of Docetaxel and Cytoxan on  5/1/23.   The steroids made her irritable and she cut down dose to 1 tab for day 3.   Developed nausea and generalized pain soon after receiving chemo.   Took promethazine, zofran and ativan as needed.   Had constipation.  Is using Percocet 1-2 times a day for post op pain and now for generalized body pain.      Developed a low grade fever at home, 100.4 one episode with quick resolution. At the same time, she had hypotension and tachycardia and was referred to the ER. She was admitted for sepsis, was started on antibiotics. Has no other episodes of fever, no vasopressor needed, no ICU visit.  Cultures negative, no clear source,  was discharged on Bactrim.     Received cycle 2 of TC with 20% dose reduction of docetaxel on 5/24.   Generalized body pain and nausea and diarrhea for one week.     Then developed cough and wheezing and diagnosed with bronchitis. On doxycycline and decadron with improvement. Nausea has been improving on Sancuso patch.   Developed significant fluid overload while on decadron, LFTs went up again while on doxycycline.   Decadron has been stopped for 5 days, on lasix with no significant response yet. Went to the ER for fluid overload and saw Lula earlier this week. Echo was done and is andrés.     Interim history:  She is coming for a follow-up visit. She denies peripheral edema, intermittent nausea is present and better than before. The site of the lumpectomy is pulsating on and off. No more diarrhea or bronchitis. Her appetite has improved and she is biking.     She is due to have a bilateral mastectomy on July 26, 2023 with reconstruction.        Oncology History:  Oncology History   Triple negative breast cancer   4/17/2023 Initial Diagnosis    Triple negative breast cancer     4/18/2023 Cancer Staged    Staging form: Breast, AJCC 8th Edition  - Pathologic stage from 4/18/2023: Stage IB (pT1b, pN0, cM0, G3, ER-, WI-, HER2-)     5/1/2023 -  Chemotherapy    Treatment Summary   Plan Name: OP  BREAST TC - DOCETAXEL CYCLOPHOSPHAMIDE Q3W  Treatment Goal: Curative  Status: Active  Start Date: 5/1/2023  End Date: 7/11/2023 (Planned)  Provider: Ambar Ayala MD  Chemotherapy: cycloPHOSphamide 600 mg/m2 = 880 mg in sodium chloride 0.9% 289.4 mL chemo infusion, 600 mg/m2 = 880 mg, Intravenous, Clinic/HOD 1 time, 2 of 4 cycles  Dose modification: 600 mg/m2 (Cycle 3), 480 mg/m2 (Cycle 3, Reason: Dose not tolerated)  Administration: 880 mg (5/1/2023)  DOCEtaxel 75 mg/m2 = 110 mg in sodium chloride 0.9% 290.5 mL chemo infusion, 75 mg/m2 = 110 mg, Intravenous, Clinic/HOD 1 time, 2 of 4 cycles  Dose modification: 60 mg/m2 (original dose 75 mg/m2, Cycle 2, Reason: Dose not tolerated)  Administration: 110 mg (5/1/2023), 90 mg (5/24/2023)         History:  Past Medical History:   Diagnosis Date    ADHD (attention deficit hyperactivity disorder)     Anemia     Hypertension     pre eclampsia and eclampsia    Stroke     baby      Past Surgical History:   Procedure Laterality Date    BREAST SURGERY      TUBAL LIGATION      VAGINAL DELIVERY       Family History   Problem Relation Age of Onset    Hypertension Mother     Breast cancer Mother 51        negative screening    Heart disease Father     Diabetes Maternal Grandmother     Breast cancer Maternal Grandmother       Social History     Tobacco Use    Smoking status: Former     Types: Vaping with nicotine    Smokeless tobacco: Never    Tobacco comments:     vape   Substance and Sexual Activity    Alcohol use: Not Currently    Drug use: Never    Sexual activity: Yes     Partners: Male     Birth control/protection: See Surgical Hx     Comment: tubal        ROS:   Review of Systems   Constitutional:  Positive for malaise/fatigue. Negative for fever and weight loss.        Weight gain    HENT:  Positive for sore throat. Negative for congestion, hearing loss and nosebleeds.    Eyes:  Negative for double vision and photophobia.   Respiratory:  Negative for cough, hemoptysis,  "sputum production, shortness of breath and wheezing.    Cardiovascular:  Positive for leg swelling. Negative for chest pain, palpitations and orthopnea.   Gastrointestinal:  Positive for diarrhea and nausea. Negative for abdominal pain, blood in stool, constipation, heartburn and vomiting.   Genitourinary:  Negative for dysuria, hematuria and urgency.   Musculoskeletal:  Negative for back pain, joint pain and myalgias.   Skin:  Negative for itching and rash.   Neurological:  Negative for dizziness, tingling, seizures, weakness and headaches.   Endo/Heme/Allergies:  Negative for polydipsia. Does not bruise/bleed easily.   Psychiatric/Behavioral:  Negative for depression and memory loss. The patient is not nervous/anxious and does not have insomnia.       Objective:     Vitals:    07/10/23 1405   BP: 118/72   Pulse: 83   Resp: 16   Temp: 97.7 °F (36.5 °C)   TempSrc: Temporal   SpO2: 96%   Weight: 56.7 kg (125 lb)   Height: 5' 2" (1.575 m)   PainSc: 0-No pain         Physical Examination:   Physical Exam  Vitals and nursing note reviewed.   Constitutional:       General: She is not in acute distress.     Appearance: She is not diaphoretic.   HENT:      Head: Normocephalic.      Mouth/Throat:      Pharynx: No oropharyngeal exudate.   Eyes:      General: No scleral icterus.     Conjunctiva/sclera: Conjunctivae normal.   Neck:      Thyroid: No thyromegaly.   Cardiovascular:      Rate and Rhythm: Normal rate and regular rhythm.      Heart sounds: Normal heart sounds. No murmur heard.  Pulmonary:      Effort: Pulmonary effort is normal. No respiratory distress.      Breath sounds: No stridor. No wheezing or rales.   Chest:      Chest wall: No tenderness.   Abdominal:      General: Bowel sounds are normal. There is no distension.      Palpations: Abdomen is soft. There is no mass.      Tenderness: There is no abdominal tenderness. There is no rebound.   Musculoskeletal:         General: No swelling, tenderness or deformity. " Normal range of motion.      Cervical back: Neck supple.      Right lower leg: No edema.      Left lower leg: No edema.   Lymphadenopathy:      Cervical: No cervical adenopathy.   Skin:     General: Skin is warm and dry.      Findings: No erythema or rash.   Neurological:      Mental Status: She is alert and oriented to person, place, and time.      Cranial Nerves: No cranial nerve deficit.      Coordination: Coordination normal.      Gait: Gait is intact.   Psychiatric:         Mood and Affect: Affect normal.         Cognition and Memory: Memory normal.         Judgment: Judgment normal.        Diagnostic Tests:  Significant Imaging: I have reviewed and interpreted all pertinent imaging results/findings.      Laboratory Data:  All pertinent labs have been reviewed.    Labs:   Lab Results   Component Value Date    WBC 4.58 07/10/2023    HGB 10.8 (L) 07/10/2023    HCT 31.6 (L) 07/10/2023    MCV 86 07/10/2023     07/10/2023       Assessment/Plan:   Triple negative breast cancer  pT1b pN0 (sn) ER/ND/HER2 Randall negative, Ki-67 80%, grade 3    37-year-old premenopausal woman, presenting with early stage triple negative breast cancer.  She is a candidate for adjuvant chemotherapy.  See previous discussions.   S/p cycle 1 of  docetaxel and Cytoxan  with poor tolerability and hospitalization.   Dose reduced for cycle 2, docetaxel to 60 mg/m2. Has had multiple set backs including transaminitis, bronchitis, significant fluid overload.   We discussed the early stage of her tumor and that given that and the very poor tolerability, it is best to stop chemotherapy at this time.   Will see her in 4 weeks s/p mastectomy to discuss her final path and management.    Hot Flushes:  Will refer to integrative medicine.    Fluid overload   resolved  Multifactorial, with chemo and decadron.   Off  lasix     Bronchitis:  resolved      Nausea   Continue Sancuso patch, Zyprexa and continue phenergan, ativan. Continue to monitor closely.      Transaminitis   resolved  chemo has been stopped. Continue to monitor.     Hyperbilirubinemia  Possible Gilbert's syndrome  Bilirubin is 1.3  As above.  Continue to monitor bilirubin and liver function closely.    Thrombocytopenia  Resolved plat 167       Iron deficiency anemia due to chronic blood loss  History of menorrhagia noted.  Iron panel is normal at this time.   Hb is now in the 9-10 g/dl range, post chemo, continue to monitor.         MDM includes  :    - Acute or chronic illness or injury that poses a threat to life or bodily function  - Independent review and explanation of 3+ results from unique tests  - Discussion of management and ordering 3+ unique tests  - Extensive discussion of treatment and management  - Prescription drug management  - Drug therapy requiring intensive monitoring for toxicity   ECOG SCORE    1 - Restricted in strenuous activity-ambulatory and able to carry out work of a light nature           Discussion:   No follow-ups on file.    Plan was discussed with the patient at length, and she verbalized understanding. Aleena was given an opportunity to ask questions that were answered to her satisfaction, and she was advised to call in the interval if any problems or questions arise.    Electronically signed by Isha Sullivan MD    Route Chart for Scheduling    Med Onc Chart Routing      Follow up with physician 1 month. with  to discuss the final path. Referral to integrative medicine.   Follow up with NUNO    Infusion scheduling note    Injection scheduling note    Labs    Imaging    Pharmacy appointment    Other referrals            Treatment Plan Information   OP BREAST TC - DOCETAXEL CYCLOPHOSPHAMIDE Q3W   Ambar Ayala MD   Upcoming Treatment Dates - OP BREAST TC - DOCETAXEL CYCLOPHOSPHAMIDE Q3W    6/19/2023       Pre-Medications       palonosetron 0.25mg/dexAMETHasone 10mg in NS IVPB 0.25 mg 50 mL       aprepitant (CINVANTI) injection 130 mg       Chemotherapy        DOCEtaxel (TAXOTERE) 60 mg/m2 = 88 mg in sodium chloride 0.9% 254.4 mL chemo infusion       cycloPHOSphamide 480 mg/m2 = 700 mg in sodium chloride 0.9% 253.5 mL chemo infusion  6/20/2023       Growth Factor       pegfilgrastim-cbqv (UDENYCA) injection 6 mg  7/10/2023       Pre-Medications       palonosetron 0.25mg/dexAMETHasone 10mg in NS IVPB 0.25 mg 50 mL       aprepitant (CINVANTI) injection 130 mg       Chemotherapy       DOCEtaxel (TAXOTERE) 60 mg/m2 = 88 mg in sodium chloride 0.9% 254.4 mL chemo infusion       cycloPHOSphamide 480 mg/m2 = 700 mg in sodium chloride 0.9% 253.5 mL chemo infusion  7/11/2023       Growth Factor       pegfilgrastim-cbqv (UDENYCA) injection 6 mg    Therapy Plan Information  IV Fluids  sodium chloride 0.9% bolus 1,000 mL 1,000 mL  1,000 mL, Intravenous, Every visit  Flushes  sodium chloride 0.9% flush 10 mL  10 mL, Intravenous, PRN  heparin, porcine (PF) 100 unit/mL injection flush 500 Units  500 Units, Intravenous, PRN  Antiemetics  ondansetron injection 8 mg  8 mg, Intravenous, PRN  promethazine (PHENERGAN) 12.5 mg in dextrose 5 % (D5W) 50 mL IVPB  12.5 mg, Intravenous, PRN  dexAMETHasone injection 8 mg  8 mg, Intravenous, PRN

## 2023-07-10 NOTE — TELEPHONE ENCOUNTER
Pt's appt with Dr Ayala to be rescheduled, called pt to offer appt with Dr Sullivan, no ans.  Rescheduled pt with Dr Sullivan and LVM with appt information and contact information for pt to call if appt change is unacceptable and message we will reschedule for her.

## 2023-07-11 ENCOUNTER — PATIENT MESSAGE (OUTPATIENT)
Dept: FAMILY MEDICINE | Facility: CLINIC | Age: 38
End: 2023-07-11
Payer: COMMERCIAL

## 2023-07-11 ENCOUNTER — HOSPITAL ENCOUNTER (OUTPATIENT)
Dept: RADIOLOGY | Facility: OTHER | Age: 38
Discharge: HOME | End: 2023-07-11
Attending: FAMILY MEDICINE
Payer: COMMERCIAL

## 2023-07-11 DIAGNOSIS — Z01.818 OTHER SPECIFIED PRE-OPERATIVE EXAMINATION: ICD-10-CM

## 2023-07-11 PROCEDURE — 71046 XR CHEST PA AND LATERAL: ICD-10-PCS | Mod: 26,,, | Performed by: RADIOLOGY

## 2023-07-11 PROCEDURE — 71046 X-RAY EXAM CHEST 2 VIEWS: CPT | Mod: 26,,, | Performed by: RADIOLOGY

## 2023-07-11 PROCEDURE — 71046 X-RAY EXAM CHEST 2 VIEWS: CPT | Mod: TC,FY

## 2023-07-18 ENCOUNTER — TELEPHONE (OUTPATIENT)
Dept: HEMATOLOGY/ONCOLOGY | Facility: CLINIC | Age: 38
End: 2023-07-18
Payer: COMMERCIAL

## 2023-07-18 NOTE — TELEPHONE ENCOUNTER
Spoke with the patient and cancelled her acup for today. She stated she was not feeling well and will call to r/s.      ----- Message from Madeleine Singh sent at 7/18/2023 11:29 AM CDT -----  Contact: Pt  Type:  Needs Medical Advice    Who Called: Pt    Would the patient rather a call back or a response via [facility]? call  Best Call Back Number: [phone]    Additional Information: Pt would like to cancel her appt that is scheduled today at 1:00 p.m. for acupuncture.

## 2023-07-25 DIAGNOSIS — C50.919 TRIPLE NEGATIVE BREAST CANCER: ICD-10-CM

## 2023-07-25 RX ORDER — PANTOPRAZOLE SODIUM 40 MG/1
TABLET, DELAYED RELEASE ORAL
Qty: 90 TABLET | Refills: 1 | Status: SHIPPED | OUTPATIENT
Start: 2023-07-25

## 2023-07-25 RX ORDER — PANTOPRAZOLE SODIUM 40 MG/1
TABLET, DELAYED RELEASE ORAL
Qty: 30 TABLET | Refills: 1 | Status: SHIPPED | OUTPATIENT
Start: 2023-07-25 | End: 2023-07-25

## 2023-08-02 ENCOUNTER — TELEPHONE (OUTPATIENT)
Dept: HEMATOLOGY/ONCOLOGY | Facility: CLINIC | Age: 38
End: 2023-08-02
Payer: COMMERCIAL

## 2023-08-04 DIAGNOSIS — C50.919 TRIPLE NEGATIVE BREAST CANCER: ICD-10-CM

## 2023-08-07 RX ORDER — LORAZEPAM 1 MG/1
1 TABLET ORAL 2 TIMES DAILY PRN
Qty: 30 TABLET | Refills: 0 | Status: SHIPPED | OUTPATIENT
Start: 2023-08-07 | End: 2023-08-25 | Stop reason: SDUPTHER

## 2023-08-10 DIAGNOSIS — E87.70 HYPERVOLEMIA, UNSPECIFIED HYPERVOLEMIA TYPE: ICD-10-CM

## 2023-08-10 RX ORDER — POTASSIUM CHLORIDE 750 MG/1
10 TABLET, EXTENDED RELEASE ORAL
Qty: 30 TABLET | Refills: 1 | Status: SHIPPED | OUTPATIENT
Start: 2023-08-10 | End: 2023-08-14

## 2023-08-14 RX ORDER — POTASSIUM CHLORIDE 750 MG/1
10 TABLET, EXTENDED RELEASE ORAL
Qty: 90 TABLET | Refills: 0 | Status: SHIPPED | OUTPATIENT
Start: 2023-08-14

## 2023-08-17 ENCOUNTER — TELEPHONE (OUTPATIENT)
Dept: HEMATOLOGY/ONCOLOGY | Facility: CLINIC | Age: 38
End: 2023-08-17
Payer: COMMERCIAL

## 2023-08-17 ENCOUNTER — OFFICE VISIT (OUTPATIENT)
Dept: HEMATOLOGY/ONCOLOGY | Facility: CLINIC | Age: 38
End: 2023-08-17
Payer: COMMERCIAL

## 2023-08-17 VITALS
DIASTOLIC BLOOD PRESSURE: 60 MMHG | OXYGEN SATURATION: 97 % | SYSTOLIC BLOOD PRESSURE: 101 MMHG | TEMPERATURE: 98 F | HEART RATE: 102 BPM | BODY MASS INDEX: 22.03 KG/M2 | RESPIRATION RATE: 16 BRPM | HEIGHT: 62 IN | WEIGHT: 119.69 LBS

## 2023-08-17 DIAGNOSIS — N95.1 HOT FLUSHES, PERIMENOPAUSAL: ICD-10-CM

## 2023-08-17 DIAGNOSIS — C50.919 TRIPLE NEGATIVE BREAST CANCER: Primary | ICD-10-CM

## 2023-08-17 DIAGNOSIS — R45.89 ANXIETY ABOUT HEALTH: ICD-10-CM

## 2023-08-17 DIAGNOSIS — D69.6 THROMBOCYTOPENIA: ICD-10-CM

## 2023-08-17 PROCEDURE — 3008F BODY MASS INDEX DOCD: CPT | Mod: CPTII,S$GLB,, | Performed by: INTERNAL MEDICINE

## 2023-08-17 PROCEDURE — 3074F SYST BP LT 130 MM HG: CPT | Mod: CPTII,S$GLB,, | Performed by: INTERNAL MEDICINE

## 2023-08-17 PROCEDURE — 1159F PR MEDICATION LIST DOCUMENTED IN MEDICAL RECORD: ICD-10-PCS | Mod: CPTII,S$GLB,, | Performed by: INTERNAL MEDICINE

## 2023-08-17 PROCEDURE — 99999 PR PBB SHADOW E&M-EST. PATIENT-LVL IV: CPT | Mod: PBBFAC,,, | Performed by: INTERNAL MEDICINE

## 2023-08-17 PROCEDURE — 3074F PR MOST RECENT SYSTOLIC BLOOD PRESSURE < 130 MM HG: ICD-10-PCS | Mod: CPTII,S$GLB,, | Performed by: INTERNAL MEDICINE

## 2023-08-17 PROCEDURE — 3078F PR MOST RECENT DIASTOLIC BLOOD PRESSURE < 80 MM HG: ICD-10-PCS | Mod: CPTII,S$GLB,, | Performed by: INTERNAL MEDICINE

## 2023-08-17 PROCEDURE — 3078F DIAST BP <80 MM HG: CPT | Mod: CPTII,S$GLB,, | Performed by: INTERNAL MEDICINE

## 2023-08-17 PROCEDURE — 99214 OFFICE O/P EST MOD 30 MIN: CPT | Mod: S$GLB,,, | Performed by: INTERNAL MEDICINE

## 2023-08-17 PROCEDURE — 99214 PR OFFICE/OUTPT VISIT, EST, LEVL IV, 30-39 MIN: ICD-10-PCS | Mod: S$GLB,,, | Performed by: INTERNAL MEDICINE

## 2023-08-17 PROCEDURE — 99999 PR PBB SHADOW E&M-EST. PATIENT-LVL IV: ICD-10-PCS | Mod: PBBFAC,,, | Performed by: INTERNAL MEDICINE

## 2023-08-17 PROCEDURE — 3008F PR BODY MASS INDEX (BMI) DOCUMENTED: ICD-10-PCS | Mod: CPTII,S$GLB,, | Performed by: INTERNAL MEDICINE

## 2023-08-17 PROCEDURE — 1159F MED LIST DOCD IN RCRD: CPT | Mod: CPTII,S$GLB,, | Performed by: INTERNAL MEDICINE

## 2023-08-17 RX ORDER — OXYCODONE HYDROCHLORIDE 5 MG/1
5 TABLET ORAL EVERY 4 HOURS PRN
COMMUNITY
Start: 2023-08-03

## 2023-08-17 RX ORDER — CYCLOBENZAPRINE HCL 10 MG
10 TABLET ORAL 3 TIMES DAILY
COMMUNITY
Start: 2023-08-15

## 2023-08-17 RX ORDER — VALACYCLOVIR HYDROCHLORIDE 500 MG/1
500 TABLET, FILM COATED ORAL 2 TIMES DAILY
COMMUNITY
Start: 2023-08-04

## 2023-08-17 NOTE — TELEPHONE ENCOUNTER
Called pt and scheduled with Dr Mejia, Psych from referral.  Appt Sept 13, first avail psych appt.   Pt stated when the new Psych providers come on board and if there is a sooner appt she would like to rs for sooner appt.

## 2023-08-17 NOTE — PROGRESS NOTES
PROGRESS NOTE    Subjective:       Patient ID: Aleena Duckworth is a 37 y.o. female.  MRN: [id number]  : 1985    Chief Complaint: TNBC    History of Present Illness:   Aleena Duckworth is a 37 y.o. female who is referred for newly diagnosed TNBC. Had her initial surgery at [facility].      As previously documented, she self palpated a left-sided breast mass.  She felt it was growing fast and reported to her primary care physician who referred her for breast imaging.  On 2023, she underwent a bilateral breast mammogram that showed a lobular hypoechoic solid mass within the left breast at 2 o'clock position 7 cm was recommended.  On 2023, she underwent an ultrasound guided biopsy that showed invasive ductal carcinoma, grade 3, ER/OH/HER2 Randall negative, Ki-67 80%.     On 23, she underwent left partial mastectomy that revealed invasive ductal carcinoma 9 x 8 x 6 mm, single focus, approximately 1 mm focus of high-grade DCIS, closest margin 3 mm, anterior.  Two sentinel lymph nodes were negative.  pT1b pN0(sn).     Has ADHD, has been on Adderall for several years.  Was born prematurely, was diagnosed with a stroke and heart murmur at birth, was treated at [facility].     Heavy menorrhagia for the past few years, worsening recently and has developed CAROLIN.  Has been on iron supplementation.  Sees gynecologist, Dr. Almita Clark with [facility].      She did have 1 visit with the medical oncology team at [facility].  Adjuvant chemotherapy was recommended.  Workup was ongoing for mild hyperbilirubinemia.  An abdominal ultrasound was done, negative for any abnormalities.Saw Hepatology prior to chemo who suspected she has Gilbert's disease and found no contraindication to proceeding with TC.     Gyn:  Menarche 13  . Age at first pregnancy 19  OCP use a year or so   Premenopausal, regular, menorrhagia.   LMP 21    Underwent cycle 1 of Docetaxel and Cytoxan on  5/1/23.   The steroids made her irritable and she cut down dose to 1 tab for day 3.   Developed nausea and generalized pain soon after receiving chemo.   Took promethazine, zofran and ativan as needed.   Had constipation.  Is using Percocet 1-2 times a day for post op pain and now for generalized body pain.      Developed a low grade fever at home, 100.4 one episode with quick resolution. At the same time, she had hypotension and tachycardia and was referred to the ER. She was admitted for sepsis, was started on antibiotics. Has no other episodes of fever, no vasopressor needed, no ICU visit.  Cultures negative, no clear source,  was discharged on Bactrim.     Received cycle 2 of TC with 20% dose reduction of docetaxel on 5/24.   Generalized body pain and nausea and diarrhea for one week.     Then developed cough and wheezing and diagnosed with bronchitis. On doxycycline and decadron with improvement. Nausea has been improving on Sancuso patch.   Developed significant fluid overload while on decadron, LFTs went up again while on doxycycline.   Decadron has been stopped for 5 days, on lasix with no significant response yet. Went to the ER for fluid overload .     Extensive GOC discussion held with patient and family and decided to stop chemotherapy.     Interim history:  She presents for a follow up visit today.     She is s/p bilateral mastectomy, implant removal and hybrid flap and small implants on July 26, 2023 with reconstruction.     Has noticed worsening anxiety and depression since her diagnosis and treatment. Takes anxiolytics as needed as well as prescription THC gummies.     Other chemo related side effects have resolved.      Oncology History:  Oncology History   Triple negative breast cancer   4/17/2023 Initial Diagnosis    Triple negative breast cancer     4/18/2023 Cancer Staged    Staging form: Breast, AJCC 8th Edition  - Pathologic stage from 4/18/2023: Stage IB (pT1b, pN0, cM0, G3, ER-, OR-,  HER2-)     5/1/2023 -  Chemotherapy    Treatment Summary   Plan Name: OP BREAST TC - DOCETAXEL CYCLOPHOSPHAMIDE Q3W  Treatment Goal: Curative  Status: Active  Start Date: 5/1/2023  End Date: 7/11/2023 (Planned)  Provider: Ambar Ayala MD  Chemotherapy: cycloPHOSphamide 600 mg/m2 = 880 mg in sodium chloride 0.9% 289.4 mL chemo infusion, 600 mg/m2 = 880 mg, Intravenous, Clinic/HOD 1 time, 2 of 4 cycles  Dose modification: 600 mg/m2 (Cycle 3), 480 mg/m2 (Cycle 3, Reason: Dose not tolerated)  Administration: 880 mg (5/1/2023)  DOCEtaxel 75 mg/m2 = 110 mg in sodium chloride 0.9% 290.5 mL chemo infusion, 75 mg/m2 = 110 mg, Intravenous, Clinic/HOD 1 time, 2 of 4 cycles  Dose modification: 60 mg/m2 (original dose 75 mg/m2, Cycle 2, Reason: Dose not tolerated)  Administration: 110 mg (5/1/2023), 90 mg (5/24/2023)         History:  Past Medical History:   Diagnosis Date    ADHD (attention deficit hyperactivity disorder)     Anemia     Hypertension     pre eclampsia and eclampsia    Stroke     baby      Past Surgical History:   Procedure Laterality Date    BREAST SURGERY      TUBAL LIGATION      VAGINAL DELIVERY       Family History   Problem Relation Age of Onset    Hypertension Mother     Breast cancer Mother 51        negative screening    Heart disease Father     Diabetes Maternal Grandmother     Breast cancer Maternal Grandmother       Social History     Tobacco Use    Smoking status: Former     Current packs/day: 0.00     Types: Vaping with nicotine    Smokeless tobacco: Never    Tobacco comments:     vape   Substance and Sexual Activity    Alcohol use: Not Currently    Drug use: Never    Sexual activity: Yes     Partners: Male     Birth control/protection: See Surgical Hx     Comment: tubal        ROS:   Review of Systems   Constitutional:  Negative for fever, malaise/fatigue and weight loss.   HENT:  Negative for congestion, hearing loss, nosebleeds and sore throat.    Eyes:  Negative for double vision and  "photophobia.   Respiratory:  Negative for cough, hemoptysis, sputum production, shortness of breath and wheezing.    Cardiovascular:  Negative for chest pain, palpitations, orthopnea and leg swelling.   Gastrointestinal:  Negative for abdominal pain, blood in stool, constipation, diarrhea, heartburn, nausea and vomiting.   Genitourinary:  Negative for dysuria, hematuria and urgency.   Musculoskeletal:  Negative for back pain, joint pain and myalgias.   Skin:  Negative for itching and rash.   Neurological:  Negative for dizziness, tingling, seizures, weakness and headaches.   Endo/Heme/Allergies:  Negative for polydipsia. Does not bruise/bleed easily.   Psychiatric/Behavioral:  Negative for depression and memory loss. The patient is nervous/anxious. The patient does not have insomnia.         Objective:     Vitals:    08/17/23 0948   BP: 101/60   Pulse: 102   Resp: 16   Temp: 97.6 °F (36.4 °C)   TempSrc: Temporal   SpO2: 97%   Weight: 54.3 kg (119 lb 11.4 oz)   Height: 5' 2" (1.575 m)   PainSc:   3           Physical Examination:   Physical Exam  Vitals and nursing note reviewed.   Constitutional:       General: She is not in acute distress.     Appearance: She is not diaphoretic.   HENT:      Head: Normocephalic.      Mouth/Throat:      Pharynx: No oropharyngeal exudate.   Eyes:      General: No scleral icterus.     Conjunctiva/sclera: Conjunctivae normal.   Neck:      Thyroid: No thyromegaly.   Cardiovascular:      Rate and Rhythm: Normal rate and regular rhythm.      Heart sounds: Normal heart sounds. No murmur heard.  Pulmonary:      Effort: Pulmonary effort is normal. No respiratory distress.      Breath sounds: No stridor. No wheezing or rales.   Chest:      Chest wall: No tenderness.   Abdominal:      General: Bowel sounds are normal. There is no distension.      Palpations: Abdomen is soft. There is no mass.      Tenderness: There is no abdominal tenderness. There is no rebound.   Musculoskeletal:         " General: No swelling, tenderness or deformity. Normal range of motion.      Cervical back: Neck supple.      Right lower leg: No edema.      Left lower leg: No edema.   Lymphadenopathy:      Cervical: No cervical adenopathy.   Skin:     General: Skin is warm and dry.      Findings: No erythema or rash.   Neurological:      Mental Status: She is alert and oriented to person, place, and time.      Cranial Nerves: No cranial nerve deficit.      Coordination: Coordination normal.      Gait: Gait is intact.   Psychiatric:         Mood and Affect: Affect normal.         Cognition and Memory: Memory normal.         Judgment: Judgment normal.          Diagnostic Tests:  Significant Imaging: I have reviewed and interpreted all pertinent imaging results/findings.      Laboratory Data:  All pertinent labs have been reviewed.    Labs:   Lab Results   Component Value Date    WBC 4.58 07/10/2023    HGB 10.8 (L) 07/10/2023    HCT 31.6 (L) 07/10/2023    MCV 86 07/10/2023     07/10/2023       Assessment/Plan:   Triple negative breast cancer  pT1b pN0 (sn) ER/[place]/HER2 Randall negative, Ki-67 80%, grade 3    37-year-old premenopausal woman, presenting with early stage triple negative breast cancer.  Offered adjuvant chemotherapy with TC. S/p cycle 1 of  docetaxel and Cytoxan  with poor tolerability and hospitalization.   Dose reduced for cycle 2, docetaxel to 60 mg/m2. Has had multiple set backs including transaminitis, bronchitis, significant fluid overload.   We discussed the early stage of her tumor and that given that and the very poor tolerability, chemotherapy was stopped after 2 cycles.     She has had recent reconstruction, final pathology showed no abnormal findings. Continue active surveillance.     Hot Flushes:  Will refer to integrative medicine.     Anxiety   Referred to Onc psych and Integrative oncology. Consider antidepresses as needed. Has had AE to previous medications so will have to be cautious.     Transaminitis    resolved  chemo has been stopped. Continue to monitor.     Hyperbilirubinemia  Possible Gilbert's syndrome  Bilirubin is 1.4  As above.  Continue to monitor bilirubin and liver function closely.    Iron deficiency anemia due to chronic blood loss  History of menorrhagia noted.  Iron panel is normal at this time.   Hb is now in the 9-10 g/dl range, post chemo, continue to monitor.         ECOG SCORE               Discussion:   No follow-ups on file.    Plan was discussed with the patient at length, and she verbalized understanding. Aleena was given an opportunity to ask questions that were answered to her satisfaction, and she was advised to call in the interval if any problems or questions arise.    Electronically signed by Ambar Ayala MD    Route Chart for Scheduling    Med Onc Chart Routing      Follow up with physician 3 months. refer rto onc psych and integrative asap.   Follow up with NUNO    Infusion scheduling note    Injection scheduling note    Labs CBC and CMP   Scheduling:  Preferred lab:  Lab interval:     Imaging    Pharmacy appointment    Other referrals              Treatment Plan Information   OP BREAST TC - DOCETAXEL CYCLOPHOSPHAMIDE Q3W   Ambar Ayala MD   Upcoming Treatment Dates - OP BREAST TC - DOCETAXEL CYCLOPHOSPHAMIDE Q3W    6/19/2023       Pre-Medications       palonosetron 0.25mg/dexAMETHasone 10mg in NS IVPB 0.25 mg 50 mL       aprepitant (CINVANTI) injection 130 mg       Chemotherapy       DOCEtaxel (TAXOTERE) 60 mg/m2 = 88 mg in sodium chloride 0.9% 254.4 mL chemo infusion       cycloPHOSphamide 480 mg/m2 = 700 mg in sodium chloride 0.9% 253.5 mL chemo infusion  6/20/2023       Growth Factor       pegfilgrastim-cbqv (UDENYCA) injection 6 mg  7/10/2023       Pre-Medications       palonosetron 0.25mg/dexAMETHasone 10mg in NS IVPB 0.25 mg 50 mL       aprepitant (CINVANTI) injection 130 mg       Chemotherapy       DOCEtaxel (TAXOTERE) 60 mg/m2 = 88 mg in sodium chloride 0.9% 254.4  mL chemo infusion       cycloPHOSphamide 480 mg/m2 = 700 mg in sodium chloride 0.9% 253.5 mL chemo infusion  7/11/2023       Growth Factor       pegfilgrastim-cbqv (UDENYCA) injection 6 mg    Therapy Plan Information  IV Fluids  sodium chloride 0.9% bolus 1,000 mL 1,000 mL  1,000 mL, Intravenous, Every visit  Flushes  sodium chloride 0.9% flush 10 mL  10 mL, Intravenous, PRN  heparin, porcine (PF) 100 unit/mL injection flush 500 Units  500 Units, Intravenous, PRN  Antiemetics  ondansetron injection 8 mg  8 mg, Intravenous, PRN  promethazine (PHENERGAN) 12.5 mg in dextrose 5 % (D5W) 50 mL IVPB  12.5 mg, Intravenous, PRN  dexAMETHasone injection 8 mg  8 mg, Intravenous, PRN

## 2023-08-17 NOTE — TELEPHONE ENCOUNTER
----- Message from Alexus Dumont MA sent at 8/17/2023 11:22 AM CDT -----  Regarding: Refer psych  refe psych

## 2023-08-18 ENCOUNTER — TELEPHONE (OUTPATIENT)
Dept: HEMATOLOGY/ONCOLOGY | Facility: CLINIC | Age: 38
End: 2023-08-18
Payer: COMMERCIAL

## 2023-08-21 ENCOUNTER — TELEPHONE (OUTPATIENT)
Dept: HEMATOLOGY/ONCOLOGY | Facility: CLINIC | Age: 38
End: 2023-08-21
Payer: COMMERCIAL

## 2023-08-21 NOTE — TELEPHONE ENCOUNTER
Spoke with the patient and r/s her acup appt from referral to help with hot flashes. She voiced acceptance and scheduled appt on 9/6.

## 2023-09-06 ENCOUNTER — DOCUMENTATION ONLY (OUTPATIENT)
Dept: INFUSION THERAPY | Facility: HOSPITAL | Age: 38
End: 2023-09-06
Payer: COMMERCIAL

## 2023-09-06 ENCOUNTER — CLINICAL SUPPORT (OUTPATIENT)
Dept: REHABILITATION | Facility: HOSPITAL | Age: 38
End: 2023-09-06
Payer: COMMERCIAL

## 2023-09-06 DIAGNOSIS — M54.50 CHRONIC MIDLINE LOW BACK PAIN WITHOUT SCIATICA: Primary | ICD-10-CM

## 2023-09-06 DIAGNOSIS — G89.29 CHRONIC MIDLINE LOW BACK PAIN WITHOUT SCIATICA: Primary | ICD-10-CM

## 2023-09-06 PROCEDURE — 97813 ACUP 1/> W/ESTIM 1ST 15 MIN: CPT | Mod: PN | Performed by: ACUPUNCTURIST

## 2023-09-06 PROCEDURE — 97814 ACUP 1/> W/ESTIM EA ADDL 15: CPT | Mod: PN | Performed by: ACUPUNCTURIST

## 2023-09-06 NOTE — PROGRESS NOTES
Acupuncture Follow-Up Note     Name: Aleena Duckworth  Clinic Number: [id number]    Traditional Chinese Medicine (TCM) Diagnosis: Qi Stagnation, Blood Stasis, Qi Deficiency, Blood Deficiency, Wind , Damp, and Yin Deficiency  Medical Diagnosis: No diagnosis found.     Evaluation Date: 9/6/2023    Visit #/Visits authorized:     Precautions: Standard    Subjective     Chief Concern: No chief complaint on file.       Medical necessity is demonstrated by the following IMPAIRMENTS: Medical Necessity: Decreased mobility limits day to day activities, social, and emergent situations and Decreased quality of life              Aggravating Factors:  movement     Relieving Factors:  rest    Symptom Description:     Quality:  Aching, Dull, Tingling, and Variable  Severity:  4  Frequency:  every day      Objective     Observation: Patient reports back pain as 4/10 today, in addition she has a whole host of other issues and reports feeling out of sorts and balance.      Pulse:        slippery       New Findings:  na    Treatment     Treatment Principles:  move qi and blood, calm sabillon, harmonize middle naomy and relieve damp stagnation, nourish yin.    Acupuncture points used:  4 ALMENDAREZ, Du20, Gb34, Ki3, Ki6, Li11, Lu7, Pc6, REN12, REN6, Sp10, Sp6, Sp9, St25, St36, and YIN HUBER    Bilateral points:  Unilateral points:  Auricular Treatment:  sabillon men    Needles In: 33  Needles Out: 33  Needles W/ STIM placed: 135  Needles W/ STIM removed: 155      Other Traditional Chinese Medicine Modalities -  na    Assessment     After treatment, patient felt much better     Patient prognosis is Good.     Patient will continue to benefit from acupuncture treatment to address the deficits listed in the problem list box on initial evaluation, provide patient family education and to maximize pt's level of independence in the home and community environment.     Patient's spiritual, cultural and educational needs considered and pt agreeable to plan of care  and goals.     Anticipated barriers to treatment: none    Plan     Recommend 1 /week for 12 sessions then re-assess.      Education:  Patient is aware of cumulative benefit of acupuncture

## 2023-09-06 NOTE — PROGRESS NOTES
"3:09 PM    This LCSW met with this pt and her  after receiving a message regarding the pt wanting to speak to a .  The pt's , Dexter said they applied for disability and was denied, so they appealed. He asked if there is a certain "terminology to use or are they filling something out wrong.  This  told him that often people are denied and have to appeal at least one time before being approved, but would speak to the other , Rylee Wright, [facility] to see if she has any additional information that might be of assistance and call them to follow up.      "

## 2023-09-08 ENCOUNTER — TELEPHONE (OUTPATIENT)
Dept: HEMATOLOGY/ONCOLOGY | Facility: CLINIC | Age: 38
End: 2023-09-08
Payer: COMMERCIAL

## 2023-09-08 NOTE — TELEPHONE ENCOUNTER
----- Message from Gisela Fuller, Patient Care Assistant sent at 9/8/2023 10:02 AM CDT -----  Type: Needs Medical Advice  Who Called:  Aleena   Jesus Alberto Call Back Number: [phone]    Additional Information: Aleena needs to schedule a acupuncture appointment on the 13 th if possible after 1:00 , please call to further discuss, thank you

## 2023-09-13 ENCOUNTER — CLINICAL SUPPORT (OUTPATIENT)
Dept: REHABILITATION | Facility: HOSPITAL | Age: 38
End: 2023-09-13
Payer: COMMERCIAL

## 2023-09-13 ENCOUNTER — DOCUMENTATION ONLY (OUTPATIENT)
Dept: INFUSION THERAPY | Facility: HOSPITAL | Age: 38
End: 2023-09-13
Payer: COMMERCIAL

## 2023-09-13 ENCOUNTER — OFFICE VISIT (OUTPATIENT)
Dept: PSYCHIATRY | Facility: CLINIC | Age: 38
End: 2023-09-13
Payer: COMMERCIAL

## 2023-09-13 DIAGNOSIS — C50.919 TRIPLE NEGATIVE BREAST CANCER: ICD-10-CM

## 2023-09-13 DIAGNOSIS — R45.89 ANXIETY ABOUT HEALTH: ICD-10-CM

## 2023-09-13 DIAGNOSIS — G89.3 CHRONIC PAIN AFTER CANCER TREATMENT: Primary | ICD-10-CM

## 2023-09-13 PROCEDURE — 97811 ACUP 1/> W/O ESTIM EA ADD 15: CPT | Mod: PN | Performed by: ACUPUNCTURIST

## 2023-09-13 PROCEDURE — 90791 PR PSYCHIATRIC DIAGNOSTIC EVALUATION: ICD-10-PCS | Mod: S$GLB,,,

## 2023-09-13 PROCEDURE — 97810 ACUP 1/> WO ESTIM 1ST 15 MIN: CPT | Mod: PN | Performed by: ACUPUNCTURIST

## 2023-09-13 PROCEDURE — 99999 PR PBB SHADOW E&M-EST. PATIENT-LVL I: ICD-10-PCS | Mod: PBBFAC,,,

## 2023-09-13 PROCEDURE — 90791 PSYCH DIAGNOSTIC EVALUATION: CPT | Mod: S$GLB,,,

## 2023-09-13 PROCEDURE — 99999 PR PBB SHADOW E&M-EST. PATIENT-LVL I: CPT | Mod: PBBFAC,,,

## 2023-09-13 NOTE — PROGRESS NOTES
PSYCHO-ONCOLOGY INTAKE    Diagnostic Interview - CPT 22446    Date: 9/13/2023  Site: [place]    Evaluation Length (direct face-to-face time):  1 hour    This includes face to face time and non-face to face time preparing to see the patient, obtaining and/or reviewing separately obtained history, documenting clinical information in the electronic or other health record, independently interpreting results and communicating results to the patient/family/caregiver, or care coordinator.     Referral Source: Ambar Ayala MD   Oncologist: Ambar Ayala MD   PCP: Luis Garcia MD    Clinical status of patient: Outpatient    Aleena Duckworth, a 37 y.o. female, seen for initial evaluation visit.    Aleena Duckworth reviewed and agreed to informed consent and the limits of confidentiality.    Chief complaint/reason for encounter: adjustment to illness, depression and anxiety    Medical/Surgical History:    Patient Active Problem List   Diagnosis    Triple negative breast cancer    Insomnia    Anxiety about health    Transaminitis    Nausea    Functional diarrhea    Thrombocytopenia    Anemia in neoplastic disease    Leukopenia    Hot flushes, perimenopausal       Health Behaviors:       ETOH Use: No       Tobacco Use: Yes; vaping occasionally   Illicit Drug Use:  Yes; medical marijuana thc     Prescription Misuse:No   Caffeine: minimal use, few sips in the morning   Exercise:The patient maintains a regular, healthy exercise program.   Firearms:  No   Advanced directives:No     Family History:   Psychiatric illness: Yes; reports mother dx with bipolar disorder, youngest sister dx w/depression.      Alcohol/Drug Abuse: No     Suicide: Yes; cousin (aunt's son, 2023)      Past Psychiatric History:   Inpatient treatment: No     Outpatient treatment: Yes; saw therapist after divorce for approx. A few months. Completed pre-marital counseling, 1 year ago.      Prior substance abuse treatment: No     Suicide  Attempts: No     Psychotropic Medications:  Current: Ativan and Ambien       Past: unknown antidepressants    Current medications as per below, allergies reviewed in chart.    Current Outpatient Medications   Medication    cetirizine (ZYRTEC) 10 mg Cap    cetirizine 10 mg chewable tablet    cyclobenzaprine (FLEXERIL) 10 MG tablet    dexAMETHasone (DECADRON) 4 MG Tab    dextroamphetamine-amphetamine (ADDERALL XR) 20 MG 24 hr capsule    dextroamphetamine-amphetamine (ADDERALL) 12.5 MG tablet    dextroamphetamine-amphetamine (ADDERALL) 20 mg tablet    dextroamphetamine-amphetamine (ADDERALL) 20 mg tablet    dextroamphetamine/amphetamine (ADDERALL ORAL)    duke's soln (benadryl 30 mL, mylanta 30 mL, LIDOcaine 30 mL, nystatin 30 mL) 120mL    furosemide (LASIX) 20 MG tablet    HYDROcodone-acetaminophen (NORCO)  mg per tablet    ibuprofen (ADVIL,MOTRIN) 600 MG tablet    lactulose (CHRONULAC) 20 gram/30 mL Soln    LORazepam (ATIVAN) 1 MG tablet    NINJACOF 12.5-12.5 mg/5 mL Liqd    OLANZapine (ZYPREXA) 5 MG tablet    ondansetron (ZOFRAN) 8 MG tablet    ondansetron (ZOFRAN-ODT) 8 MG TbDL    oxyCODONE (ROXICODONE) 10 mg Tab immediate release tablet    oxyCODONE (ROXICODONE) 5 MG immediate release tablet    oxyCODONE-acetaminophen (PERCOCET)  mg per tablet    pantoprazole (PROTONIX) 40 MG tablet    polyethylene glycol (GLYCOLAX) 17 gram PwPk    potassium chloride SA (K-DUR,KLOR-CON M) 10 MEQ tablet    promethazine (PHENERGAN) 12.5 MG Tab    valACYclovir (VALTREX) 500 MG tablet    zolpidem (AMBIEN) 10 mg Tab     No current facility-administered medications for this visit.              Social situation/Stressors: Aleena Duckworth lives with  and mother-in-law in [place], [place].  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  She has been in her job for nearly 10 years.    Aleena Duckworth has been  9 months and has 2 children, 19 year old and 8 years.  Her partner is Gen.   The  patient reports excellent social support through family and friends.  Aleena Duckworth's hobbies include spending time with family, makeup.  Additional stressors: employment/finances    Strengths:Housing stability, Able to vocalize needs, Motivation, readiness for change, and Interpersonal relationships and supports available - family, relatives, friends  Liabilities: Financial strain    Current Evaluation:     Mental Status Exam: Aleena Duckworth arrived  promptly for the assessment session.  The patient was fully cooperative throughout the interview and was an adequate historian   Appearance: age appropriate, casually  dressed, well groomed  Behavior/Cooperation: friendly and cooperative  Speech: normal in rate, volume, and tone  Mood: anxious  Affect: full range and appropriate  Thought Process: goal-directed, logical  Thought Content: normal, no suicidality, no homicidality, delusions, or paranoia;did not appear to be responding to internal stimuli during the interview.   Orientation: grossly intact  Memory: grossly intact  Attention Span/Concentration: Attends to interview without distraction; reports subjective difficulty  Fund of Knowledge: average  Estimate of Intelligence: average from verbal skills and history  Cognition: grossly intact  Insight: patient has awareness of illness; good insight into own behavior and behavior of others  Judgment: the patient's behavior is adequate to circumstances      History of present illness:    Oncology History   Triple negative breast cancer   4/17/2023 Initial Diagnosis    Triple negative breast cancer     4/18/2023 Cancer Staged    Staging form: Breast, AJCC 8th Edition  - Pathologic stage from 4/18/2023: Stage IB (pT1b, pN0, cM0, G3, ER-, ND-, HER2-)     5/1/2023 -  Chemotherapy    Treatment Summary   Plan Name: OP BREAST TC - DOCETAXEL CYCLOPHOSPHAMIDE Q3W  Treatment Goal: Curative  Status: Active  Start Date: 5/1/2023  End Date: 7/11/2023 (Planned)  Provider: Ambar JUAREZ  MD Jamie  Chemotherapy: cycloPHOSphamide 600 mg/m2 = 880 mg in sodium chloride 0.9% 289.4 mL chemo infusion, 600 mg/m2 = 880 mg, Intravenous, Clinic/HOD 1 time, 2 of 4 cycles  Dose modification: 600 mg/m2 (Cycle 3), 480 mg/m2 (Cycle 3, Reason: Dose not tolerated)  Administration: 880 mg (5/1/2023)  DOCEtaxel 75 mg/m2 = 110 mg in sodium chloride 0.9% 290.5 mL chemo infusion, 75 mg/m2 = 110 mg, Intravenous, Clinic/HOD 1 time, 2 of 4 cycles  Dose modification: 60 mg/m2 (original dose 75 mg/m2, Cycle 2, Reason: Dose not tolerated)  Administration: 110 mg (5/1/2023), 90 mg (5/24/2023)           Aleena Duckworth has adjusted to illness with moderate difficulty primarily through active coping strategies, focus on alternative activities, and focus on family. She has engaged in appropriate information gathering.  The patient has excellent family/friend support.  Her support system is coping well with the diagnosis/treatment/prognosis. Illness-related psychosocial stressors include financial strain , difficulty meeting family responsibilities, and changes in ability to engage in leisure activities.  The patient has a good partnership with her [facility] treatment team. The patient reports the following barriers to cancer care:none.     NCCN Distress thermometer:       8/17/2023     9:54 AM 7/10/2023     2:13 PM 6/19/2023     9:20 AM 6/12/2023    11:48 AM 6/5/2023    11:45 AM 5/15/2023     3:39 PM 4/27/2023     1:13 PM   DISTRESS SCREENING   Distress Score 5 6 4 0 - No Distress 0 - No Distress 2 0 - No Distress   Practical Problems None of these Treatment Decisions Treatment Decisions None of these None of these None of these None of these   Family Problems None of these  None of these None of these None of these None of these None of these   Emotional Problems None of these Worry;Depression;Fears;Nervousness;Sadness Sadness None of these None of these Fears None of these   Spiritual / Hindu Concerns No No   "No No No No   Physical Problems None of these  None of these None of these None of these None of these None of these        Symptoms:   Mood: depressed mood, worthlessness/guilt, and tearfulness;  prior depression:bouts of depression after divorce ; no SI/HI; NCCN Distress Screener=5 (8/17/23)  Anxiety: Feeling nervous, anxious, or on edge, Excessive worry (interfering with interpersonal relationships), Irritability, and Fear of unknown; no prior;   Substance abuse: denied  Cognitive functioning:  "chemo brain", occasional forgetfulness, short term memory, concentration difficulties  Health behaviors: noncontributory  Sexual/Intimacy: denied  Sleep: denied  Pain: Ms. Duckworth reports no pain.     Assessment - Diagnosis - Goals:       ICD-10-CM ICD-9-CM   1. Triple negative breast cancer  C50.919 174.9   2. Anxiety about health  F41.8 300.09         Plan:individual psychotherapy    Summary and Recommendations  Aleena Duckworth is a 37 y.o. female referred by Ambar Ayala MD for psychological evaluation and treatment.  Ms. Duckworth appears to be having some difficulty coping with her diagnosis. She notes a new onset of anxiety since being diagnosed with cancer. She shares her anxiety is impacting her ability to function optimally in important areas of life. She is interested in CBT to address depression/anxiety/insomnia and will follow up with me for that purpose..    Return to clinic: as scheduled    GOALS:   Increase exercise  Relaxation exercises (instructions and options sent to patient)  Discuss referral for medication management during next visit  Develop coping skills to help manage anxiety  Challenge and reframe unhelpful thinking.                  Maame Mejia Psy.D  Clinical Health Psychology Fellow           "

## 2023-09-13 NOTE — PROGRESS NOTES
Acupuncture Follow-Up Note     Name: Aleena Duckworth  Clinic Number: [id number]    Traditional Chinese Medicine (TCM) Diagnosis: Qi Stagnation, Blood Stasis, Qi Deficiency, and Blood Deficiency  Medical Diagnosis:   Encounter Diagnosis   Name Primary?    Chronic pain after cancer treatment Yes        Evaluation Date: 9/13/2023    Visit #/Visits authorized:     Precautions: Standard    Subjective     Chief Concern: Hot Flashes (Hot flashes are 3/10), Anxiety (Anxiety is 5/10), and Abdominal Cramping (Abdominal cramping is 6/10)       Medical necessity is demonstrated by the following IMPAIRMENTS: Medical Necessity: Decreased mobility limits day to day activities, social, and emergent situations and Decreased quality of life              Aggravating Factors:  movement     Relieving Factors:  rest    Symptom Description:     Quality:  Aching and Dull  Severity:  6  Frequency:  every day      Objective     Observation: Patient responding well to acupuncture but havini      Pulse:        floating       New Findings:  na    Treatment     Treatment Principles:  move qi and blood, calm sabillon    Acupuncture points used:  4 ALMENDAREZ, REN12, REN6, Sp6, Sp9, St25, St36, and YIN HUBER    Bilateral points:  Unilateral points:  Auricular Treatment:  sabillon men    Needles In: 17  Needles Out: 17  [place] W/ STIM placed: 305  Needles W/ STIM removed: 325      Other Traditional Chinese Medicine Modalities -  na    Assessment     After treatment, patient felt less pain     Patient prognosis is Good.     Patient will continue to benefit from acupuncture treatment to address the deficits listed in the problem list box on initial evaluation, provide patient family education and to maximize pt's level of independence in the home and community environment.     Patient's spiritual, cultural and educational needs considered and pt agreeable to plan of care and goals.     Anticipated barriers to treatment: none    Plan     Recommend 1 /week for 12  sessions then re-assess.      Education:  Patient is aware of cumulative benefit of acupuncture

## 2023-09-13 NOTE — PROGRESS NOTES
4:33 PM    This LCSW met with this pt's , Gen to inform that if the pt applied for disability and was denied to appeal. He said they do plan to do that again.

## 2023-09-19 ENCOUNTER — DOCUMENTATION ONLY (OUTPATIENT)
Dept: INFUSION THERAPY | Facility: HOSPITAL | Age: 38
End: 2023-09-19
Payer: COMMERCIAL

## 2023-09-19 DIAGNOSIS — C50.919 TRIPLE NEGATIVE BREAST CANCER: ICD-10-CM

## 2023-09-19 NOTE — PROGRESS NOTES
12:26 PM    This LCSW called this pt to follow up regarding their concerns about finances.    This  discussed some options for some assistance. The pt was interested in [employer] and the [employer], so we agreed to meet tomorrow before her acupuncture to fill out the application.

## 2023-09-20 ENCOUNTER — CLINICAL SUPPORT (OUTPATIENT)
Dept: REHABILITATION | Facility: HOSPITAL | Age: 38
End: 2023-09-20
Payer: COMMERCIAL

## 2023-09-20 ENCOUNTER — DOCUMENTATION ONLY (OUTPATIENT)
Dept: INFUSION THERAPY | Facility: HOSPITAL | Age: 38
End: 2023-09-20
Payer: COMMERCIAL

## 2023-09-20 DIAGNOSIS — M54.50 CHRONIC MIDLINE LOW BACK PAIN WITHOUT SCIATICA: Primary | ICD-10-CM

## 2023-09-20 DIAGNOSIS — G89.29 CHRONIC MIDLINE LOW BACK PAIN WITHOUT SCIATICA: Primary | ICD-10-CM

## 2023-09-20 PROCEDURE — 97811 ACUP 1/> W/O ESTIM EA ADD 15: CPT | Mod: PN | Performed by: ACUPUNCTURIST

## 2023-09-20 PROCEDURE — 97810 ACUP 1/> WO ESTIM 1ST 15 MIN: CPT | Mod: PN | Performed by: ACUPUNCTURIST

## 2023-09-20 RX ORDER — LORAZEPAM 1 MG/1
1 TABLET ORAL 2 TIMES DAILY PRN
Qty: 30 TABLET | Refills: 0 | Status: SHIPPED | OUTPATIENT
Start: 2023-09-20 | End: 2023-10-13 | Stop reason: SDUPTHER

## 2023-09-20 NOTE — PROGRESS NOTES
Acupuncture Follow-Up Note     Name: Aleena Duckworth  Clinic Number: [id number]    Traditional Chinese Medicine (TCM) Diagnosis: Qi Stagnation, Blood Stasis, Qi Deficiency, Blood Deficiency, and Yin Deficiency  Medical Diagnosis: No diagnosis found.     Evaluation Date: 9/20/2023    Visit #/Visits authorized:     Precautions: Standard    Subjective     Chief Concern: Low-back Pain (Patient reports low back is 2/10, bilateral without sciatica/), Neck Pain (Neck pain is 2/10 and bilateral ), and Hot Flashes (Patient reports hot flashes are 6/10 and keeping her from getting restorative sleep.  She sweats around her head and neck on some evenings and other times it's her stomach and torso.  )       Medical necessity is demonstrated by the following IMPAIRMENTS: Medical Necessity: Decreased mobility limits day to day activities, social, and emergent situations and Decreased quality of life              Aggravating Factors:  movement     Relieving Factors:  rest    Symptom Description:     Quality:  Aching, Dull, and Variable  Severity:  2  Frequency:  every day      Objective     Observation: Patient responding well th treatment but this 3/12 and we will continue current protocol w/ reevaluation at treatment 6/12      Pulse:        thready       New Findings:  na    Treatment     Treatment Principles:  move qi and blood, calm sabillon, clear heart fire and clear toxicity from channels    Acupuncture points used:  4 ALMENDAREZ, Du20, Gb34, Ki3, Ki6, Li11, Pc6, Sp10, Sp6, Sp9, and St36    Bilateral points:HT 7  Unilateral points:  Auricular Treatment:  sabillon men    Needles In: 26  Needles Out: 26  Needles W/O STIM placed: 205  [place] W/O STIM removed: 225      Other Traditional Chinese Medicine Modalities -  na    Assessment     After treatment, patient felt less stressed and less sweating immediately after treatment.     Patient prognosis is Good.     Patient will continue to benefit from acupuncture treatment to address the deficits  listed in the problem list box on initial evaluation, provide patient family education and to maximize pt's level of independence in the home and community environment.     Patient's spiritual, cultural and educational needs considered and pt agreeable to plan of care and goals.     Anticipated barriers to treatment: none    Plan     Recommend 1 /week for 12 sessions then re-assess.      Education:  Patient is aware of cumulative benefit of acupuncture

## 2023-09-20 NOTE — PROGRESS NOTES
3:56 PM    This LCSW met with this pt to complete the [employer] application, [employer] emergency food box application and Support enrollment.     The pt was very appreciative and shared how difficult it is to accept help. She said she and her  were both laid off and then this happened to her.    The pt was provided an emergency food box today./

## 2023-09-26 ENCOUNTER — TELEPHONE (OUTPATIENT)
Dept: HEMATOLOGY/ONCOLOGY | Facility: CLINIC | Age: 38
End: 2023-09-26
Payer: COMMERCIAL

## 2023-09-26 NOTE — TELEPHONE ENCOUNTER
Lvm to r/s acup appt. Cancelled appt for today.    ----- Message from Gisela Fuller, Patient Care Assistant sent at 9/26/2023  2:23 PM CDT -----  Type: Needs Medical Advice  Who Called:  suly  Jesus Alberto Call Back Number: [phone]    Additional Information: suly is wanting to reschedule her appointment for 9/26 , please call to further discuss , thank you

## 2023-09-29 ENCOUNTER — TELEPHONE (OUTPATIENT)
Dept: HEMATOLOGY/ONCOLOGY | Facility: CLINIC | Age: 38
End: 2023-09-29
Payer: COMMERCIAL

## 2023-09-29 NOTE — TELEPHONE ENCOUNTER
Spoke with the patient and scheduled her next acup appt.      ----- Message from Sony Swartz MA sent at 9/28/2023  4:33 PM CDT -----  Call to r/s acup

## 2023-10-04 ENCOUNTER — CLINICAL SUPPORT (OUTPATIENT)
Dept: REHABILITATION | Facility: HOSPITAL | Age: 38
End: 2023-10-04
Payer: COMMERCIAL

## 2023-10-04 DIAGNOSIS — G89.3 CHRONIC PAIN AFTER CANCER TREATMENT: Primary | ICD-10-CM

## 2023-10-04 PROCEDURE — 97810 ACUP 1/> WO ESTIM 1ST 15 MIN: CPT | Mod: PN | Performed by: ACUPUNCTURIST

## 2023-10-04 PROCEDURE — 97811 ACUP 1/> W/O ESTIM EA ADD 15: CPT | Mod: PN | Performed by: ACUPUNCTURIST

## 2023-10-05 NOTE — PROGRESS NOTES
Acupuncture Follow-Up Note     Name: Aleena Duckworth  Clinic Number: [id number]    Traditional Chinese Medicine (TCM) Diagnosis: Qi Stagnation, Blood Stasis, Qi Deficiency, Blood Deficiency, and Damp  Medical Diagnosis:   Encounter Diagnosis   Name Primary?    Chronic pain after cancer treatment Yes        Evaluation Date: 10/5/2023    Visit #/Visits authorized:     Precautions: Standard    Subjective     Chief Concern: Chronic Pain (Patient reports chronic pain and cramping since chemnotherapy began.  Today pain is 3/10) and Neck Pain (Pain today in neck and shoulders is 3/10 bilaterally.)       Medical necessity is demonstrated by the following IMPAIRMENTS: Medical Necessity: Decreased mobility limits day to day activities, social, and emergent situations              Aggravating Factors:  movement     Relieving Factors:  rest    Symptom Description:     Quality:  Aching, Dull, and Deep  Severity:  3  Frequency:  every day      Objective     Observation: patient progressing nicely, not having many significant symptoms at this juncture, but we are treating proactively to keep them at bay during her journey.       Pulse:        floating       New Findings:  na    Treatment     Treatment Principles:  move qi and blood, resolve bi, drain damp and harmonize middle naomy.    Acupuncture points used:  4 ALMENDAREZ, Gb20, Gb21, Gb34, Ki3, Ki6, Li11, REN12, REN4, REN6, Sp6, Sp9, St36, and YIN HUBER    Bilateral points:  Unilateral points:  Auricular Treatment:  sabillon men    Needles In: 26  Needles Out: 26  Needles W/O STIM placed: 335  Needles W/O STIM removed: 355      Other Traditional Chinese Medicine Modalities -  na    Assessment     After treatment, patient felt less fatigued and less cramps     Patient prognosis is Good.     Patient will continue to benefit from acupuncture treatment to address the deficits listed in the problem list box on initial evaluation, provide patient family education and to maximize pt's level of  independence in the home and community environment.     Patient's spiritual, cultural and educational needs considered and pt agreeable to plan of care and goals.     Anticipated barriers to treatment: none    Plan     Recommend 1 /week for 12 sessions then re-assess.      Education:  Patient is aware of cumulative benefit of acupuncture

## 2023-10-13 DIAGNOSIS — C50.919 TRIPLE NEGATIVE BREAST CANCER: ICD-10-CM

## 2023-10-17 RX ORDER — LORAZEPAM 1 MG/1
1 TABLET ORAL 2 TIMES DAILY PRN
Qty: 30 TABLET | Refills: 0 | Status: SHIPPED | OUTPATIENT
Start: 2023-10-17 | End: 2023-11-07 | Stop reason: SDUPTHER

## 2023-10-19 ENCOUNTER — TELEPHONE (OUTPATIENT)
Dept: HEMATOLOGY/ONCOLOGY | Facility: CLINIC | Age: 38
End: 2023-10-19
Payer: COMMERCIAL

## 2023-10-19 NOTE — TELEPHONE ENCOUNTER
Spoke with the patient and got her scheduled for acup next week.     ----- Message from Gisela Fuller, Patient Care Assistant sent at 10/19/2023  1:36 PM CDT -----  Type: Needs Medical Advice  Who Called:  Aleena  Best Call Back Number: [phone]    Additional Information: Aleena is wanting to schedule her acupuncture  soon please call to further discuss ,thank you

## 2023-10-24 ENCOUNTER — TELEPHONE (OUTPATIENT)
Dept: NEUROLOGY | Facility: CLINIC | Age: 38
End: 2023-10-24
Payer: COMMERCIAL

## 2023-10-24 NOTE — TELEPHONE ENCOUNTER
Pt stated shes had cancer which has made her memory bad as is. Pt has hx of stroke. She had a stroke as a baby. Lost mobility on one side of her body. States she does not have much recollection of almost her whole life due to her memory being so bad which is why she scheduled with jus to get a base line and go from there.

## 2023-10-25 ENCOUNTER — TELEPHONE (OUTPATIENT)
Dept: HEMATOLOGY/ONCOLOGY | Facility: CLINIC | Age: 38
End: 2023-10-25
Payer: COMMERCIAL

## 2023-10-26 ENCOUNTER — CLINICAL SUPPORT (OUTPATIENT)
Dept: REHABILITATION | Facility: HOSPITAL | Age: 38
End: 2023-10-26
Payer: COMMERCIAL

## 2023-10-26 DIAGNOSIS — R45.89 ANXIETY ABOUT HEALTH: Primary | ICD-10-CM

## 2023-10-26 PROCEDURE — 97810 ACUP 1/> WO ESTIM 1ST 15 MIN: CPT | Mod: PN | Performed by: ACUPUNCTURIST

## 2023-10-26 PROCEDURE — 97811 ACUP 1/> W/O ESTIM EA ADD 15: CPT | Mod: PN | Performed by: ACUPUNCTURIST

## 2023-10-26 NOTE — PROGRESS NOTES
Acupuncture Follow-Up Note     Name: Aleena Duckworth  Clinic Number: [id number]    Traditional Chinese Medicine (TCM) Diagnosis: Qi Stagnation and Qi Deficiency  Medical Diagnosis:   1. Anxiety about health         Evaluation Date: 10/26/2023    Visit #/Visits authorized:     Precautions: Standard    Subjective     Chief Concern: Anxiety (Patient having anxiety about health issues)       Medical necessity is demonstrated by the following IMPAIRMENTS: Medical Necessity: Decreased mobility limits day to day activities, social, and emergent situations              Aggravating Factors:  movement     Relieving Factors:  rest    Symptom Description:     Quality:  Variable  Severity:  3  Frequency:  every day      Objective     Observation: patient is anxious about surgery to restore breast tissue after cancer therapy.      Pulse:        wiry       New Findings:  na    Treatment     Treatment Principles:  calm sabillon, move qi and blood.    Acupuncture points used:  4 ALMENDAREZ, Du20, Gb34, and YIN HUBER    Bilateral points:  Unilateral points:  Auricular Treatment:  sabillon men    Needles In: 10  Needles Out: 10  Needles W/O STIM placed: 135  Needles W/O STIM removed: 155      Other Traditional Chinese Medicine Modalities -  na    Assessment     After treatment, patient felt less anxious and more relaxed.  She plans to continue weekly     Patient prognosis is Good.     Patient will continue to benefit from acupuncture treatment to address the deficits listed in the problem list box on initial evaluation, provide patient family education and to maximize pt's level of independence in the home and community environment.     Patient's spiritual, cultural and educational needs considered and pt agreeable to plan of care and goals.     Anticipated barriers to treatment: none    Plan     Recommend 1 /week for 12 sessions then re-assess.      Education:  Patient is aware of cumulative benefit of acupuncture

## 2023-11-02 ENCOUNTER — CLINICAL SUPPORT (OUTPATIENT)
Dept: REHABILITATION | Facility: HOSPITAL | Age: 38
End: 2023-11-02
Payer: COMMERCIAL

## 2023-11-02 DIAGNOSIS — R45.89 ANXIETY ABOUT HEALTH: Primary | ICD-10-CM

## 2023-11-02 DIAGNOSIS — C50.919 TRIPLE NEGATIVE BREAST CANCER: ICD-10-CM

## 2023-11-02 DIAGNOSIS — G47.00 INSOMNIA, UNSPECIFIED TYPE: ICD-10-CM

## 2023-11-02 PROCEDURE — 97811 ACUP 1/> W/O ESTIM EA ADD 15: CPT | Mod: PN | Performed by: ACUPUNCTURIST

## 2023-11-02 PROCEDURE — 97810 ACUP 1/> WO ESTIM 1ST 15 MIN: CPT | Mod: PN | Performed by: ACUPUNCTURIST

## 2023-11-02 NOTE — PROGRESS NOTES
Acupuncture Follow-Up Note     Name: Aleena Duckworth  Clinic Number: [id number]    Traditional Chinese Medicine (TCM) Diagnosis: Qi Stagnation, Qi Deficiency, and Damp  Medical Diagnosis:   1. Anxiety about health    2. Insomnia, unspecified type    3. Triple negative breast cancer         Evaluation Date: 11/2/2023    Visit #/Visits authorized:     Precautions: Standard    Subjective     Chief Concern: Low-back Pain, Anxiety (Anxiety about health 4/10 today), and Insomnia (Insomnia is a 2/10 today)       Medical necessity is demonstrated by the following IMPAIRMENTS: Medical Necessity: Decreased quality of life              Aggravating Factors:  stress     Relieving Factors:  rest    Symptom Description:     Quality:  Variable  Severity:  4  Frequency:  every day      Objective     Observation: Patient is anxious and reports insomnia improving but anxiety is increasing since she was told she needed to quit vaping or she would not be ready for surgery.     Pulse:        thready       New Findings:  na    Treatment     Treatment Principles:  move qi and blood, lift qi, calm sabillon    Acupuncture points used:  4 ALMENDAREZ, Sp6, Sp9, St36, and YIN HUBER    Bilateral points:  Unilateral points:  Auricular Treatment:  sabillon men    Needles In: 13  Needles Out: 13  Needles W/O STIM placed: 235  Needles W/O STIM removed: 255      Other Traditional Chinese Medicine Modalities -  na    Assessment     After treatment, patient felt less anxious     Patient prognosis is Good.     Patient will continue to benefit from acupuncture treatment to address the deficits listed in the problem list box on initial evaluation, provide patient family education and to maximize pt's level of independence in the home and community environment.     Patient's spiritual, cultural and educational needs considered and pt agreeable to plan of care and goals.     Anticipated barriers to treatment: none    Plan     Recommend 1 /week for 12 sessions then  re-assess.      Education:  Patient is aware of cumulative benefit of acupuncture

## 2023-11-07 DIAGNOSIS — C50.919 TRIPLE NEGATIVE BREAST CANCER: ICD-10-CM

## 2023-11-08 RX ORDER — LORAZEPAM 1 MG/1
1 TABLET ORAL 2 TIMES DAILY PRN
Qty: 30 TABLET | Refills: 0 | Status: SHIPPED | OUTPATIENT
Start: 2023-11-08 | End: 2024-01-22 | Stop reason: SDUPTHER

## 2023-12-04 ENCOUNTER — TELEPHONE (OUTPATIENT)
Dept: HEMATOLOGY/ONCOLOGY | Facility: CLINIC | Age: 38
End: 2023-12-04
Payer: COMMERCIAL

## 2024-01-09 ENCOUNTER — PATIENT MESSAGE (OUTPATIENT)
Dept: HEMATOLOGY/ONCOLOGY | Facility: CLINIC | Age: 39
End: 2024-01-09
Payer: COMMERCIAL

## 2024-01-18 ENCOUNTER — LAB VISIT (OUTPATIENT)
Dept: LAB | Facility: HOSPITAL | Age: 39
End: 2024-01-18
Attending: INTERNAL MEDICINE
Payer: COMMERCIAL

## 2024-01-18 DIAGNOSIS — C50.919 TRIPLE NEGATIVE BREAST CANCER: ICD-10-CM

## 2024-01-18 LAB
ALBUMIN SERPL BCP-MCNC: 4.5 G/DL (ref 3.5–5.2)
ALP SERPL-CCNC: 44 U/L (ref 55–135)
ALT SERPL W/O P-5'-P-CCNC: 17 U/L (ref 10–44)
ANION GAP SERPL CALC-SCNC: 7 MMOL/L (ref 8–16)
AST SERPL-CCNC: 12 U/L (ref 10–40)
BASOPHILS # BLD AUTO: 0.01 K/UL (ref 0–0.2)
BASOPHILS NFR BLD: 0.3 % (ref 0–1.9)
BILIRUB SERPL-MCNC: 2 MG/DL (ref 0.1–1)
BUN SERPL-MCNC: 14 MG/DL (ref 6–20)
CALCIUM SERPL-MCNC: 8.9 MG/DL (ref 8.7–10.5)
CHLORIDE SERPL-SCNC: 108 MMOL/L (ref 95–110)
CO2 SERPL-SCNC: 25 MMOL/L (ref 23–29)
CREAT SERPL-MCNC: 0.7 MG/DL (ref 0.5–1.4)
DIFFERENTIAL METHOD BLD: ABNORMAL
EOSINOPHIL # BLD AUTO: 0.1 K/UL (ref 0–0.5)
EOSINOPHIL NFR BLD: 1.8 % (ref 0–8)
ERYTHROCYTE [DISTWIDTH] IN BLOOD BY AUTOMATED COUNT: 13.1 % (ref 11.5–14.5)
EST. GFR  (NO RACE VARIABLE): >60 ML/MIN/1.73 M^2
GLUCOSE SERPL-MCNC: 93 MG/DL (ref 70–110)
HCT VFR BLD AUTO: 31.3 % (ref 37–48.5)
HGB BLD-MCNC: 11.1 G/DL (ref 12–16)
IMM GRANULOCYTES # BLD AUTO: 0 K/UL (ref 0–0.04)
IMM GRANULOCYTES NFR BLD AUTO: 0 % (ref 0–0.5)
LYMPHOCYTES # BLD AUTO: 1.1 K/UL (ref 1–4.8)
LYMPHOCYTES NFR BLD: 33.1 % (ref 18–48)
MCH RBC QN AUTO: 30 PG (ref 27–31)
MCHC RBC AUTO-ENTMCNC: 35.5 G/DL (ref 32–36)
MCV RBC AUTO: 85 FL (ref 82–98)
MONOCYTES # BLD AUTO: 0.3 K/UL (ref 0.3–1)
MONOCYTES NFR BLD: 7.7 % (ref 4–15)
NEUTROPHILS # BLD AUTO: 1.9 K/UL (ref 1.8–7.7)
NEUTROPHILS NFR BLD: 57.1 % (ref 38–73)
NRBC BLD-RTO: 0 /100 WBC
PLATELET # BLD AUTO: 128 K/UL (ref 150–450)
PMV BLD AUTO: 8.9 FL (ref 9.2–12.9)
POTASSIUM SERPL-SCNC: 3.9 MMOL/L (ref 3.5–5.1)
PROT SERPL-MCNC: 6.3 G/DL (ref 6–8.4)
RBC # BLD AUTO: 3.7 M/UL (ref 4–5.4)
SODIUM SERPL-SCNC: 140 MMOL/L (ref 136–145)
WBC # BLD AUTO: 3.26 K/UL (ref 3.9–12.7)

## 2024-01-18 PROCEDURE — 80053 COMPREHEN METABOLIC PANEL: CPT | Mod: PN | Performed by: INTERNAL MEDICINE

## 2024-01-18 PROCEDURE — 36415 COLL VENOUS BLD VENIPUNCTURE: CPT | Mod: PN | Performed by: INTERNAL MEDICINE

## 2024-01-18 PROCEDURE — 85025 COMPLETE CBC W/AUTO DIFF WBC: CPT | Mod: PN | Performed by: INTERNAL MEDICINE

## 2024-01-19 ENCOUNTER — OFFICE VISIT (OUTPATIENT)
Dept: HEMATOLOGY/ONCOLOGY | Facility: CLINIC | Age: 39
End: 2024-01-19
Payer: COMMERCIAL

## 2024-01-19 ENCOUNTER — TELEPHONE (OUTPATIENT)
Dept: HEMATOLOGY/ONCOLOGY | Facility: CLINIC | Age: 39
End: 2024-01-19
Payer: COMMERCIAL

## 2024-01-19 ENCOUNTER — PATIENT MESSAGE (OUTPATIENT)
Dept: HEMATOLOGY/ONCOLOGY | Facility: CLINIC | Age: 39
End: 2024-01-19
Payer: COMMERCIAL

## 2024-01-19 DIAGNOSIS — C50.919 TRIPLE NEGATIVE BREAST CANCER: Primary | ICD-10-CM

## 2024-01-19 DIAGNOSIS — D63.0 ANEMIA IN NEOPLASTIC DISEASE: ICD-10-CM

## 2024-01-19 DIAGNOSIS — E80.6 HYPERBILIRUBINEMIA: ICD-10-CM

## 2024-01-19 DIAGNOSIS — D69.6 THROMBOCYTOPENIA: ICD-10-CM

## 2024-01-19 DIAGNOSIS — R74.01 TRANSAMINITIS: ICD-10-CM

## 2024-01-19 DIAGNOSIS — D72.819 LEUKOPENIA, UNSPECIFIED TYPE: ICD-10-CM

## 2024-01-19 PROCEDURE — 1159F MED LIST DOCD IN RCRD: CPT | Mod: CPTII,95,, | Performed by: INTERNAL MEDICINE

## 2024-01-19 PROCEDURE — 1160F RVW MEDS BY RX/DR IN RCRD: CPT | Mod: CPTII,95,, | Performed by: INTERNAL MEDICINE

## 2024-01-19 PROCEDURE — 99214 OFFICE O/P EST MOD 30 MIN: CPT | Mod: 95,,, | Performed by: INTERNAL MEDICINE

## 2024-01-19 NOTE — PROGRESS NOTES
FOLLOW UP TELEMEDICINE VISIT    Subjective:      Patient ID: Aleena Duckworth is a 38 y.o. female.  MRN: [id number]  : 1985    An audio and visual care visit was performed with the patient because of the COVID-19 pandemic recommendations for social distancing.    TELEMEDICINE  The patient location is: home  The chief complaint leading to consultation is: breast cancer   Visit type: Virtual visit with synchronous audio and video    20 minutes of total time spent on the encounter, which includes face to face time and non-face to face time preparing to see the patient (eg, review of tests), obtaining and/or reviewing separately obtained history, documenting clinical information in the electronic or other health record, independently interpreting results (not separately reported) and communicating results to the patient/family/caregiver, or care coordination (not separately reported).    Each patient to whom he or she provides medical services by telemedicine is:  (1) informed of the relationship between the physician and patient and the respective role of any other health care provider with respect to management of the patient; and (2) notified that he or she may decline to receive medical services by telemedicine and may withdraw from such care at any time.    History of Present Illness:   ELIANE Duckworth is a 38 y.o. female who is referred for newly diagnosed TNBC. Had her initial surgery at [facility].      As previously documented, she self palpated a left-sided breast mass.  She felt it was growing fast and reported to her primary care physician who referred her for breast imaging.  On 2023, she underwent a bilateral breast mammogram that showed a lobular hypoechoic solid mass within the left breast at 2 o'clock position 7 cm was recommended.  On 2023, she underwent an ultrasound guided biopsy that showed invasive ductal carcinoma, grade 3, ER/RI/HER2 Randall negative, Ki-67 80%.     On 23, she  underwent left partial mastectomy that revealed invasive ductal carcinoma 9 x 8 x 6 mm, single focus, approximately 1 mm focus of high-grade DCIS, closest margin 3 mm, anterior.  Two sentinel lymph nodes were negative.  pT1b pN0(sn).     Has ADHD, has been on Adderall for several years.  Was born prematurely, was diagnosed with a stroke and heart murmur at birth, was treated at [facility].     Heavy menorrhagia for the past few years, worsening recently and has developed CAROLIN.  Has been on iron supplementation.  Sees gynecologist, Dr. Almita Clark with [facility].      She did have 1 visit with the medical oncology team at [facility].  Adjuvant chemotherapy was recommended.  Workup was ongoing for mild hyperbilirubinemia.  An abdominal ultrasound was done, negative for any abnormalities.Saw Hepatology prior to chemo who suspected she has Gilbert's disease and found no contraindication to proceeding with TC.     Gyn:  Menarche 13  . Age at first pregnancy 19  OCP use a year or so   Premenopausal, regular, menorrhagia.   LMP 21     Underwent cycle 1 of Docetaxel and Cytoxan on 23.   The steroids made her irritable and she cut down dose to 1 tab for day 3.   Developed nausea and generalized pain soon after receiving chemo.   Took promethazine, zofran and ativan as needed.   Had constipation.  Is using Percocet 1-2 times a day for post op pain and now for generalized body pain.       Developed a low grade fever at home, 100.4 one episode with quick resolution. At the same time, she had hypotension and tachycardia and was referred to the ER. She was admitted for sepsis, was started on antibiotics. Has no other episodes of fever, no vasopressor needed, no ICU visit.  Cultures negative, no clear source,  was discharged on Bactrim.      Received cycle 2 of TC with 20% dose reduction of docetaxel on .   Generalized body pain and nausea and diarrhea for one week.      Then developed cough and wheezing and  diagnosed with bronchitis. On doxycycline and decadron with improvement. Nausea has been improving on Sancuso patch.   Developed significant fluid overload while on decadron, LFTs went up again while on doxycycline.   Decadron has been stopped for 5 days, on lasix with no significant response yet. Went to the ER for fluid overload .      Extensive GOC discussion held with patient and family and decided to stop chemotherapy.      Interim history:  She presents for a follow up visit today.      She is s/p bilateral mastectomy, implant removal and hybrid flap and small implants on July 26, 2023 with reconstruction.   Her further reconstruction in December with small implants and hybrid flap. It is likely the final surgery.     Has had intermittent anxiety and depression, is trying go stay off of THC gummies as she is going back to work. Will see her PMD to assess for SSRI.     We discussed her blood work. Recently had a heavy menstrual cycle. Is scheduled for endometrial ablation next week.   Mild leukopenia and thrombocytopenia is noted.     Other chemo related side effects have resolved.        Oncology History:  Oncology History   Triple negative breast cancer   4/17/2023 Initial Diagnosis    Triple negative breast cancer     4/18/2023 Cancer Staged    Staging form: Breast, AJCC 8th Edition  - Pathologic stage from 4/18/2023: Stage IB (pT1b, pN0, cM0, G3, ER-, OH-, HER2-)     5/1/2023 -  Chemotherapy    Treatment Summary   Plan Name: OP BREAST TC - DOCETAXEL CYCLOPHOSPHAMIDE Q3W  Treatment Goal: Curative  Status: Active  Start Date: 5/1/2023  End Date: 7/11/2023 (Planned)  Provider: Ambar Ayala MD  Chemotherapy: cycloPHOSphamide 600 mg/m2 = 880 mg in sodium chloride 0.9% 289.4 mL chemo infusion, 600 mg/m2 = 880 mg, Intravenous, Clinic/HOD 1 time, 2 of 4 cycles  Dose modification: 600 mg/m2 (Cycle 3), 480 mg/m2 (Cycle 3, Reason: Dose not tolerated)  Administration: 880 mg (5/1/2023)  DOCEtaxel 75 mg/m2 = 110 mg  in sodium chloride 0.9% 290.5 mL chemo infusion, 75 mg/m2 = 110 mg, Intravenous, Clinic/HOD 1 time, 2 of 4 cycles  Dose modification: 60 mg/m2 (original dose 75 mg/m2, Cycle 2, Reason: Dose not tolerated)  Administration: 110 mg (5/1/2023), 90 mg (5/24/2023)        Past medical, surgical, family, and social history were reviewed today and there are no changes of note unless mentioned in HPI.   MEDS and ALLERGIES were reviewed with patient and meds reconciled.     History:  Past Medical History:   Diagnosis Date    ADHD (attention deficit hyperactivity disorder)     Anemia     Hypertension     pre eclampsia and eclampsia    Stroke     baby      Past Surgical History:   Procedure Laterality Date    BREAST SURGERY      TUBAL LIGATION      VAGINAL DELIVERY       Family History   Problem Relation Age of Onset    Hypertension Mother     Breast cancer Mother 51        negative screening    Heart disease Father     Diabetes Maternal Grandmother     Breast cancer Maternal Grandmother       Social History     Tobacco Use    Smoking status: Former     Types: Vaping with nicotine    Smokeless tobacco: Never    Tobacco comments:     vape   Substance and Sexual Activity    Alcohol use: Not Currently    Drug use: Never    Sexual activity: Yes     Partners: Male     Birth control/protection: See Surgical Hx     Comment: tubal        ROS:  Review of Systems   Constitutional:  Negative for fever, malaise/fatigue and weight loss.   HENT:  Negative for congestion, hearing loss, nosebleeds and sore throat.    Eyes:  Negative for double vision and photophobia.   Respiratory:  Negative for cough, hemoptysis, sputum production, shortness of breath and wheezing.    Cardiovascular:  Negative for chest pain, palpitations, orthopnea and leg swelling.   Gastrointestinal:  Negative for abdominal pain, blood in stool, constipation, diarrhea, heartburn, nausea and vomiting.   Genitourinary:  Negative for dysuria, hematuria and urgency.    Musculoskeletal:  Negative for back pain, joint pain and myalgias.   Skin:  Negative for itching and rash.   Neurological:  Negative for dizziness, tingling, seizures, weakness and headaches.   Endo/Heme/Allergies:  Negative for polydipsia. Does not bruise/bleed easily.   Psychiatric/Behavioral:  Positive for depression. Negative for memory loss. The patient is nervous/anxious. The patient does not have insomnia.        Objective:   There were no vitals filed for this visit.  Wt Readings from Last 10 Encounters:   08/17/23 54.3 kg (119 lb 11.4 oz)   07/10/23 56.7 kg (125 lb)   06/22/23 60.7 kg (133 lb 13.1 oz)   06/20/23 58.5 kg (129 lb)   06/19/23 58.8 kg (129 lb 10.1 oz)   06/16/23 59.4 kg (130 lb 15.3 oz)   06/16/23 60.5 kg (133 lb 6.1 oz)   06/12/23 54.1 kg (119 lb 4.3 oz)   06/05/23 51.3 kg (113 lb 1.5 oz)   05/29/23 51.2 kg (112 lb 14 oz)       Physical Examination:   Constitutional: she is alert, pleasant, and does not appear to be in any physical distress   HENT: Mouth/Throat:  Tongue is midline without evidence of glossitis  Eyes: No obvious jaundice or conjunctivitis.  EOM are normal.   Pulmonary/Chest: No stridor noted. No excess chest muscle movement.  Neurological: she is alert and oriented to person, place, and time. No cranial nerve deficit.  Skin:  No rash noted. No erythema.   Psychiatric: she has a normal mood and affect. Speech and memory normal.     Diagnostic Tests:  Significant Imaging: I have reviewed and interpreted all pertinent imaging results/findings.    Laboratory Data:  All pertinent labs have been reviewed.    Labs:   Lab Results   Component Value Date    WBC 3.26 (L) 01/18/2024    RBC 3.70 (L) 01/18/2024    HGB 11.1 (L) 01/18/2024    HCT 31.3 (L) 01/18/2024    MCV 85 01/18/2024     (L) 01/18/2024    GLU 93 01/18/2024     01/18/2024    K 3.9 01/18/2024    BUN 14 01/18/2024    CREATININE 0.7 01/18/2024    AST 12 01/18/2024    ALT 17 01/18/2024    BILITOT 2.0 (H)  01/18/2024     Assessment/Plan:   Triple negative breast cancer  pT1b pN0 (sn) ER/IL/HER2 Randall negative, Ki-67 80%, grade 3     38 year-old premenopausal woman, presenting with early stage triple negative breast cancer.  Offered adjuvant chemotherapy with TC. S/p cycle 1 of  docetaxel and Cytoxan  with poor tolerability and hospitalization.   Dose reduced for cycle 2, docetaxel to 60 mg/m2. Has had multiple set backs including transaminitis, bronchitis, significant fluid overload.   We discussed the early stage of her tumor and that given that and the very poor tolerability, chemotherapy was stopped after 2 cycles.      She has had recent reconstruction, final pathology showed no abnormal findings. Continue active surveillance.      -     CBC Auto Differential; Standing  -     CMP; Future; Expected date: 01/19/2024    Transaminitis  Hyperbilirubinemia  Stable hyperbilirubinemia, continue to monitor.     Leukopenia, unspecified type  -     CBC Auto Differential; Standing  Anemia in neoplastic disease  Thrombocytopenia  Repeat CBC in 3 months.      ECOG SCORE    0 - Fully active-able to carry on all pre-disease performance without restriction       Discussion:   No follow-ups on file.    Plan was discussed with the patient at length, and she verbalized understanding. Aleena was given an opportunity to ask questions that were answered to her satisfaction, and she was advised to call in the interval if any problems or questions arise.  Discussed COVID-19 and social distancing in great detail, avoid all non-essential visits out of the home if possible and avoid sick contacts.     Electronically signed by Ambar Ayala MD     Route Chart for Scheduling    Med Onc Chart Routing      Follow up with physician 3 months. Refer to Dr. Cruz in person visit   Follow up with NUNO    Infusion scheduling note    Injection scheduling note    Labs CBC and CMP   Scheduling:  Preferred lab:  Lab interval:     Imaging    Pharmacy  appointment    Other referrals                  Treatment Plan Information   OP BREAST TC - DOCETAXEL CYCLOPHOSPHAMIDE Q3W   Ambar Ayala MD   Upcoming Treatment Dates - OP BREAST TC - DOCETAXEL CYCLOPHOSPHAMIDE Q3W    6/19/2023       Pre-Medications       palonosetron 0.25mg/dexAMETHasone 10mg in NS IVPB 0.25 mg 50 mL       aprepitant (CINVANTI) injection 130 mg       Chemotherapy       DOCEtaxel (TAXOTERE) 60 mg/m2 = 88 mg in sodium chloride 0.9% 254.4 mL chemo infusion       cycloPHOSphamide 480 mg/m2 = 700 mg in sodium chloride 0.9% 253.5 mL chemo infusion  6/20/2023       Growth Factor       pegfilgrastim-cbqv (UDENYCA) injection 6 mg  7/10/2023       Pre-Medications       palonosetron 0.25mg/dexAMETHasone 10mg in NS IVPB 0.25 mg 50 mL       aprepitant (CINVANTI) injection 130 mg       Chemotherapy       DOCEtaxel (TAXOTERE) 60 mg/m2 = 88 mg in sodium chloride 0.9% 254.4 mL chemo infusion       cycloPHOSphamide 480 mg/m2 = 700 mg in sodium chloride 0.9% 253.5 mL chemo infusion  7/11/2023       Growth Factor       pegfilgrastim-cbqv (UDENYCA) injection 6 mg    Therapy Plan Information  IV Fluids  sodium chloride 0.9% bolus 1,000 mL 1,000 mL  1,000 mL, Intravenous, Every visit  Flushes  sodium chloride 0.9% flush 10 mL  10 mL, Intravenous, PRN  heparin, porcine (PF) 100 unit/mL injection flush 500 Units  500 Units, Intravenous, PRN  Antiemetics  ondansetron injection 8 mg  8 mg, Intravenous, PRN  promethazine (PHENERGAN) 12.5 mg in dextrose 5 % (D5W) 50 mL IVPB  12.5 mg, Intravenous, PRN  dexAMETHasone injection 8 mg  8 mg, Intravenous, PRN

## 2024-01-19 NOTE — TELEPHONE ENCOUNTER
After communicating with Dr Ayala, placed phone call to pt to let her know Dr Ayala will allow reschedule today for 2:00.  Pt agreed, but requested 2:30 today, let Dr Ayala know.    ----- Message from Pete Gonzalez sent at 1/19/2024 12:46 PM CST -----     Type: Need Medical Advice   Who Called:Patient  Best callback number: [phone]  Additional Information: Patient missed call today about rescheduling her appointment and would like to reschedule  her appointment for later today if possible  Please call to further assist, Thanks.

## 2024-01-22 DIAGNOSIS — C50.919 TRIPLE NEGATIVE BREAST CANCER: ICD-10-CM

## 2024-01-23 ENCOUNTER — PATIENT MESSAGE (OUTPATIENT)
Dept: HEMATOLOGY/ONCOLOGY | Facility: CLINIC | Age: 39
End: 2024-01-23
Payer: COMMERCIAL

## 2024-01-23 RX ORDER — LORAZEPAM 1 MG/1
1 TABLET ORAL 2 TIMES DAILY PRN
Qty: 30 TABLET | Refills: 0 | Status: SHIPPED | OUTPATIENT
Start: 2024-01-23 | End: 2024-02-18 | Stop reason: SDUPTHER

## 2024-01-26 ENCOUNTER — CLINICAL SUPPORT (OUTPATIENT)
Dept: REHABILITATION | Facility: HOSPITAL | Age: 39
End: 2024-01-26
Payer: COMMERCIAL

## 2024-01-26 DIAGNOSIS — R45.89 ANXIETY ABOUT HEALTH: ICD-10-CM

## 2024-01-26 DIAGNOSIS — C50.919 TRIPLE NEGATIVE BREAST CANCER: Primary | ICD-10-CM

## 2024-01-26 DIAGNOSIS — G47.00 INSOMNIA, UNSPECIFIED TYPE: ICD-10-CM

## 2024-01-26 PROCEDURE — 97814 ACUP 1/> W/ESTIM EA ADDL 15: CPT | Mod: PN | Performed by: ACUPUNCTURIST

## 2024-01-26 PROCEDURE — 97813 ACUP 1/> W/ESTIM 1ST 15 MIN: CPT | Mod: PN | Performed by: ACUPUNCTURIST

## 2024-01-26 NOTE — PROGRESS NOTES
Acupuncture Follow-Up Note     Name: Aleena Duckworth  Clinic Number: [id number]    Traditional Chinese Medicine (TCM) Diagnosis: Qi Stagnation, Blood Stasis, Qi Deficiency, Blood Deficiency, Damp, and Yin Deficiency  Medical Diagnosis:   1. Triple negative breast cancer    2. Anxiety about health    3. Insomnia, unspecified type         Evaluation Date: 1/26/2024    Visit #/Visits authorized:     Precautions: Standard    Subjective     Chief Concern: Anxiety (5/10 today), Insomnia (0/10 insomnia today/), and Neck Pain (Neck pain today is 5/10 and bilateral, tension )       Medical necessity is demonstrated by the following IMPAIRMENTS: Medical Necessity: Decreased mobility limits day to day activities, social, and emergent situations and Decreased quality of life              Aggravating Factors:  movement     Relieving Factors:  rest    Symptom Description:     Quality:  Aching, Dull, Throbbing, and Tight  Severity:  5  Frequency:  every day      Objective     Observation: Patient reports headaches and neck pain which radiates into traps.  She believes it's muscle tension from being anxious about health concerns.      Pulse:        thready       New Findings:  na    Treatment     Treatment Principles:  move qi and blood, relieve bi, clear channels and calm sabillon    Acupuncture points used:  Du20, Gb20, Gb21, Gb34, Ki3, and Li11    Bilateral points:  Unilateral points:  Auricular Treatment:  sabillon men  and cervical spine  Needles In: 15  Needles Out: 15  Needles W/ STIM placed: 305  Needles W/ STIM removed: 325      Other Traditional Chinese Medicine Modalities - Cupping    Assessment     After treatment, patient felt relief and plans to continue weekly as recommended.      Patient prognosis is Good.     Patient will continue to benefit from acupuncture treatment to address the deficits listed in the problem list box on initial evaluation, provide patient family education and to maximize pt's level of independence in the  home and community environment.     Patient's spiritual, cultural and educational needs considered and pt agreeable to plan of care and goals.     Anticipated barriers to treatment: none    Plan     Recommend 1 /week for 12 sessions then re-assess.      Education:  Patient is aware of cumulative benefit of acupuncture

## 2024-02-01 ENCOUNTER — CLINICAL SUPPORT (OUTPATIENT)
Dept: REHABILITATION | Facility: HOSPITAL | Age: 39
End: 2024-02-01
Payer: COMMERCIAL

## 2024-02-01 DIAGNOSIS — C50.919 TRIPLE NEGATIVE BREAST CANCER: Primary | ICD-10-CM

## 2024-02-01 DIAGNOSIS — G47.00 INSOMNIA, UNSPECIFIED TYPE: ICD-10-CM

## 2024-02-01 DIAGNOSIS — R45.89 ANXIETY ABOUT HEALTH: ICD-10-CM

## 2024-02-01 PROCEDURE — 97811 ACUP 1/> W/O ESTIM EA ADD 15: CPT | Mod: PN | Performed by: ACUPUNCTURIST

## 2024-02-01 PROCEDURE — 97810 ACUP 1/> WO ESTIM 1ST 15 MIN: CPT | Mod: PN | Performed by: ACUPUNCTURIST

## 2024-02-01 NOTE — PROGRESS NOTES
Acupuncture Follow-Up Note     Name: Aleena Duckworth  Clinic Number: [id number]    Traditional Chinese Medicine (TCM) Diagnosis: Qi Stagnation, Blood Stasis, Qi Deficiency, Blood Deficiency, Wind , Yin Deficiency, and sabillon disturbance  Medical Diagnosis:   1. Triple negative breast cancer    2. Anxiety about health    3. Insomnia, unspecified type         Evaluation Date: 2/1/2024    Visit #/Visits authorized:     Precautions: Standard    Subjective     Chief Concern: Anxiety (Patient reports anxiety today is 6/10.  She can't wait for her life to return to normal and she can get back to work.  Not working elevating her anxiety.)       Medical necessity is demonstrated by the following IMPAIRMENTS: Medical Necessity: Decreased mobility limits day to day activities, social, and emergent situations              Aggravating Factors:  movement     Relieving Factors:  rest    Symptom Description:     Quality:  Variable Anxiety and pain  Severity:  6  Frequency:  every day      Objective     Observation: patient reports feeling anxious and can't wait for life to return to normal.  She is in mild pain in her neck and shoulders but wants to be treated for her anxiety today.     Pulse:        thready       New Findings:  na    Treatment     Treatment Principles:  move qi and blood, relieve bi, calm sabillon, clear channels    Acupuncture points used:  4 ALMENDAREZ, Du20, Gb20, Gb21, Gb34, Ki3, Ki6, Li11, Sp6, Sp9, St36, and YIN HUBER    Bilateral points:  Unilateral points:  Auricular Treatment:  sabillon men    Needles In: 27  Needles Out: 27  Needles W/O STIM placed: 335  Needles W/O STIM removed: 355      Other Traditional Chinese Medicine Modalities -  na    Assessment     After treatment, patient felt relief and plans to continue     Patient prognosis is Good.     Patient will continue to benefit from acupuncture treatment to address the deficits listed in the problem list box on initial evaluation, provide patient family education and  to maximize pt's level of independence in the home and community environment.     Patient's spiritual, cultural and educational needs considered and pt agreeable to plan of care and goals.     Anticipated barriers to treatment: none    Plan     Recommend 1 /week for 12 sessions then re-assess.      Education:  Patient is aware of cumulative benefit of acupuncture

## 2024-02-08 ENCOUNTER — CLINICAL SUPPORT (OUTPATIENT)
Dept: REHABILITATION | Facility: HOSPITAL | Age: 39
End: 2024-02-08
Payer: COMMERCIAL

## 2024-02-08 DIAGNOSIS — C50.919 TRIPLE NEGATIVE BREAST CANCER: Primary | ICD-10-CM

## 2024-02-08 DIAGNOSIS — G47.00 INSOMNIA, UNSPECIFIED TYPE: ICD-10-CM

## 2024-02-08 DIAGNOSIS — R45.89 ANXIETY ABOUT HEALTH: ICD-10-CM

## 2024-02-08 PROCEDURE — 97814 ACUP 1/> W/ESTIM EA ADDL 15: CPT | Mod: PN | Performed by: ACUPUNCTURIST

## 2024-02-08 PROCEDURE — 97813 ACUP 1/> W/ESTIM 1ST 15 MIN: CPT | Mod: PN | Performed by: ACUPUNCTURIST

## 2024-02-09 NOTE — PROGRESS NOTES
Acupuncture Follow-Up Note     Name: Aleena Duckworth  Clinic Number: [id number]    Traditional Chinese Medicine (TCM) Diagnosis: Qi Stagnation, Blood Stasis, Qi Deficiency, Blood Deficiency, Damp, Yin Deficiency, and Phlegm  Medical Diagnosis:   1. Triple negative breast cancer    2. Anxiety about health    3. Insomnia, unspecified type         Evaluation Date: 2/9/2024    Visit #/Visits authorized:     Precautions: Standard    Subjective     Chief Concern: Anxiety (5/10 over health and work related issues.) and Neck Pain (Pain today in neck and shoulders typical with anxiety related issues is 4/10, bilateral with radiculopathy/)       Medical necessity is demonstrated by the following IMPAIRMENTS: Medical Necessity: Decreased mobility limits day to day activities, social, and emergent situations and Decreased quality of life              Aggravating Factors:  movement     Relieving Factors:  rest    Symptom Description:     Quality:  Aching, Dull, Throbbing, and Variable  Severity:  5  Frequency:  every day      Objective     Observation: Patient is responding well to treatment now anxiety is turned toward returning to work while healing from surgery.  She has a very demanding job and has to carry a lot of heavy bags and other items.      Pulse:        wiry       New Findings:  na    Treatment     Treatment Principles:  move qi and blood, relieve bi, clear channels and nourish yin.     Acupuncture points used:  4 ALMENDAREZ, Du20, Gb20, Gb21, Gb34, Ht7, Pc6, Lu9 , Ki3, Ki6, and Li11    Bilateral points: SI15 and TW15  Unilateral points:  Auricular Treatment:  sabillon men    Needles In: 29  Needles Out: 29  Needles W/ STIM placed: 235  Needles W/ STIM removed: 255      Other Traditional Chinese Medicine Modalities - Cupping    Assessment     After treatment, patient felt relief andn plans to continue     Patient prognosis is Good.     Patient will continue to benefit from acupuncture treatment to address the deficits listed in  the problem list box on initial evaluation, provide patient family education and to maximize pt's level of independence in the home and community environment.     Patient's spiritual, cultural and educational needs considered and pt agreeable to plan of care and goals.     Anticipated barriers to treatment: none    Plan     Recommend 1 /week for 12 sessions then re-assess.      Education:  Patient is aware of cumulative benefit of acupuncture

## 2024-02-18 DIAGNOSIS — C50.919 TRIPLE NEGATIVE BREAST CANCER: ICD-10-CM

## 2024-02-19 RX ORDER — LORAZEPAM 1 MG/1
1 TABLET ORAL 2 TIMES DAILY PRN
Qty: 30 TABLET | Refills: 0 | Status: SHIPPED | OUTPATIENT
Start: 2024-02-19 | End: 2024-03-26 | Stop reason: SDUPTHER

## 2024-02-23 ENCOUNTER — CLINICAL SUPPORT (OUTPATIENT)
Dept: REHABILITATION | Facility: HOSPITAL | Age: 39
End: 2024-02-23
Payer: COMMERCIAL

## 2024-02-23 DIAGNOSIS — C50.919 TRIPLE NEGATIVE BREAST CANCER: Primary | ICD-10-CM

## 2024-02-23 DIAGNOSIS — R45.89 ANXIETY ABOUT HEALTH: ICD-10-CM

## 2024-02-23 DIAGNOSIS — G47.00 INSOMNIA, UNSPECIFIED TYPE: ICD-10-CM

## 2024-02-23 PROCEDURE — 97811 ACUP 1/> W/O ESTIM EA ADD 15: CPT | Mod: PN | Performed by: ACUPUNCTURIST

## 2024-02-23 PROCEDURE — 97810 ACUP 1/> WO ESTIM 1ST 15 MIN: CPT | Mod: PN | Performed by: ACUPUNCTURIST

## 2024-02-23 NOTE — PROGRESS NOTES
Acupuncture Follow-Up Note     Name: Aleena Duckworth  Clinic Number: [id number]    Traditional Chinese Medicine (TCM) Diagnosis: Qi Stagnation, Blood Stasis, Qi Deficiency, Blood Deficiency, Wind , and Yin Deficiency  Medical Diagnosis:   1. Triple negative breast cancer    2. Anxiety about health    3. Insomnia, unspecified type         Evaluation Date: 2/23/2024    Visit #/Visits authorized:     Precautions: Standard    Subjective     Chief Concern: Anxiety (Patient rates anxiety as 5/10 and attributes her neck and shoulder pain to her stress and anxiety. )       Medical necessity is demonstrated by the following IMPAIRMENTS: Medical Necessity: Decreased mobility limits day to day activities, social, and emergent situations              Aggravating Factors:  movement     Relieving Factors:  rest    Symptom Description:     Quality:  Variable  Severity:  5  Frequency:  every day      Objective     Observation: Patient is responding well to treatment but has a very stressful job which keeps her on edge.  She wishes to continue with anxiety treatments because she has a lot has happened for her this year and she still hasn't recovered fully.        Pulse:        wiry       New Findings:  na    Treatment     Treatment Principles:  move qi and blood, clear channels, calm sabillon, nourish yin    Acupuncture points used:  4 ALMENDAREZ, Du20, Gb34, Ht7, Pc6, Lu9 , Ki3, Li11, Sp6, Sp9, St36, and YIN HUBER    Bilateral points:  Unilateral points:  Auricular Treatment:  sabillon men    Needles In: 26  Needles Out: 26  Needles W/O STIM placed: 305  Needles W/O STIM removed: 325      Other Traditional Chinese Medicine Modalities -  NA    Assessment     After treatment, patient felt relaxed and plans to continue     Patient prognosis is Good.     Patient will continue to benefit from acupuncture treatment to address the deficits listed in the problem list box on initial evaluation, provide patient family education and to maximize pt's level of  independence in the home and community environment.     Patient's spiritual, cultural and educational needs considered and pt agreeable to plan of care and goals.     Anticipated barriers to treatment: none    Plan     Recommend 1 /week for 12 sessions then re-assess.      Education:  Patient is aware of cumulative benefit of acupuncture

## 2024-03-05 ENCOUNTER — TELEPHONE (OUTPATIENT)
Dept: HEMATOLOGY/ONCOLOGY | Facility: CLINIC | Age: 39
End: 2024-03-05
Payer: COMMERCIAL

## 2024-03-05 NOTE — TELEPHONE ENCOUNTER
Lmov for pt, returning pt call  ----- Message from Gisela Fuller, Patient Care Assistant sent at 3/5/2024  2:48 PM CST -----  Type: Needs Medical Advice  Who Called:  suly  Jesus Alberto Call Back Number: [phone]    Additional Information: wants to reschedule her 3/5 acupuncture , please call to further discuss ,thank you.

## 2024-03-26 DIAGNOSIS — C50.919 TRIPLE NEGATIVE BREAST CANCER: ICD-10-CM

## 2024-03-27 NOTE — TELEPHONE ENCOUNTER
----- Message from Heaven Mcdonald sent at 3/27/2024 12:07 PM CDT -----  Type: Needs Medical Advice  Who Called:  Patient   Symptoms (please be specific):    How long has patient had these symptoms:    Pharmacy name and phone #:    Best Call Back Number:[phone]   Additional Information: Patient is requesting a call back from the nurse.

## 2024-03-28 RX ORDER — LORAZEPAM 1 MG/1
1 TABLET ORAL 2 TIMES DAILY PRN
Qty: 30 TABLET | Refills: 0 | Status: SHIPPED | OUTPATIENT
Start: 2024-03-28 | End: 2025-03-28

## 2024-04-16 ENCOUNTER — LAB VISIT (OUTPATIENT)
Dept: LAB | Facility: HOSPITAL | Age: 39
End: 2024-04-16
Attending: INTERNAL MEDICINE
Payer: COMMERCIAL

## 2024-04-16 ENCOUNTER — OFFICE VISIT (OUTPATIENT)
Dept: HEMATOLOGY/ONCOLOGY | Facility: CLINIC | Age: 39
End: 2024-04-16
Payer: COMMERCIAL

## 2024-04-16 VITALS
BODY MASS INDEX: 19.44 KG/M2 | DIASTOLIC BLOOD PRESSURE: 79 MMHG | OXYGEN SATURATION: 98 % | TEMPERATURE: 97 F | SYSTOLIC BLOOD PRESSURE: 107 MMHG | HEART RATE: 112 BPM | WEIGHT: 106.25 LBS

## 2024-04-16 DIAGNOSIS — C50.919 TRIPLE NEGATIVE BREAST CANCER: ICD-10-CM

## 2024-04-16 DIAGNOSIS — D72.819 LEUKOPENIA, UNSPECIFIED TYPE: ICD-10-CM

## 2024-04-16 DIAGNOSIS — D63.0 ANEMIA IN NEOPLASTIC DISEASE: ICD-10-CM

## 2024-04-16 DIAGNOSIS — M79.622 AXILLARY PAIN, LEFT: Primary | ICD-10-CM

## 2024-04-16 DIAGNOSIS — D69.6 THROMBOCYTOPENIA: ICD-10-CM

## 2024-04-16 LAB
ALBUMIN SERPL BCP-MCNC: 4.4 G/DL (ref 3.5–5.2)
ALP SERPL-CCNC: 42 U/L (ref 55–135)
ALT SERPL W/O P-5'-P-CCNC: 18 U/L (ref 10–44)
ANION GAP SERPL CALC-SCNC: 8 MMOL/L (ref 8–16)
AST SERPL-CCNC: 12 U/L (ref 10–40)
BASOPHILS # BLD AUTO: 0.01 K/UL (ref 0–0.2)
BASOPHILS NFR BLD: 0.3 % (ref 0–1.9)
BILIRUB SERPL-MCNC: 2.3 MG/DL (ref 0.1–1)
BUN SERPL-MCNC: 16 MG/DL (ref 6–20)
CALCIUM SERPL-MCNC: 9.5 MG/DL (ref 8.7–10.5)
CHLORIDE SERPL-SCNC: 109 MMOL/L (ref 95–110)
CO2 SERPL-SCNC: 23 MMOL/L (ref 23–29)
CREAT SERPL-MCNC: 0.8 MG/DL (ref 0.5–1.4)
DIFFERENTIAL METHOD BLD: ABNORMAL
EOSINOPHIL # BLD AUTO: 0.1 K/UL (ref 0–0.5)
EOSINOPHIL NFR BLD: 1.6 % (ref 0–8)
ERYTHROCYTE [DISTWIDTH] IN BLOOD BY AUTOMATED COUNT: 12.9 % (ref 11.5–14.5)
EST. GFR  (NO RACE VARIABLE): >60 ML/MIN/1.73 M^2
GLUCOSE SERPL-MCNC: 93 MG/DL (ref 70–110)
HCT VFR BLD AUTO: 34.5 % (ref 37–48.5)
HGB BLD-MCNC: 12.2 G/DL (ref 12–16)
IMM GRANULOCYTES # BLD AUTO: 0 K/UL (ref 0–0.04)
IMM GRANULOCYTES NFR BLD AUTO: 0 % (ref 0–0.5)
LYMPHOCYTES # BLD AUTO: 1 K/UL (ref 1–4.8)
LYMPHOCYTES NFR BLD: 31.6 % (ref 18–48)
MCH RBC QN AUTO: 30.1 PG (ref 27–31)
MCHC RBC AUTO-ENTMCNC: 35.4 G/DL (ref 32–36)
MCV RBC AUTO: 85 FL (ref 82–98)
MONOCYTES # BLD AUTO: 0.3 K/UL (ref 0.3–1)
MONOCYTES NFR BLD: 9.3 % (ref 4–15)
NEUTROPHILS # BLD AUTO: 1.8 K/UL (ref 1.8–7.7)
NEUTROPHILS NFR BLD: 57.2 % (ref 38–73)
NRBC BLD-RTO: 0 /100 WBC
PLATELET # BLD AUTO: 105 K/UL (ref 150–450)
PMV BLD AUTO: 9.3 FL (ref 9.2–12.9)
POTASSIUM SERPL-SCNC: 4 MMOL/L (ref 3.5–5.1)
PROT SERPL-MCNC: 6.5 G/DL (ref 6–8.4)
RBC # BLD AUTO: 4.05 M/UL (ref 4–5.4)
SODIUM SERPL-SCNC: 140 MMOL/L (ref 136–145)
WBC # BLD AUTO: 3.13 K/UL (ref 3.9–12.7)

## 2024-04-16 PROCEDURE — 3008F BODY MASS INDEX DOCD: CPT | Mod: CPTII,S$GLB,, | Performed by: INTERNAL MEDICINE

## 2024-04-16 PROCEDURE — 3074F SYST BP LT 130 MM HG: CPT | Mod: CPTII,S$GLB,, | Performed by: INTERNAL MEDICINE

## 2024-04-16 PROCEDURE — 36415 COLL VENOUS BLD VENIPUNCTURE: CPT | Mod: PN | Performed by: INTERNAL MEDICINE

## 2024-04-16 PROCEDURE — 80053 COMPREHEN METABOLIC PANEL: CPT | Mod: PN | Performed by: INTERNAL MEDICINE

## 2024-04-16 PROCEDURE — 99215 OFFICE O/P EST HI 40 MIN: CPT | Mod: S$GLB,,, | Performed by: INTERNAL MEDICINE

## 2024-04-16 PROCEDURE — 99999 PR PBB SHADOW E&M-EST. PATIENT-LVL V: CPT | Mod: PBBFAC,,, | Performed by: INTERNAL MEDICINE

## 2024-04-16 PROCEDURE — 85025 COMPLETE CBC W/AUTO DIFF WBC: CPT | Mod: PN | Performed by: INTERNAL MEDICINE

## 2024-04-16 PROCEDURE — G2211 COMPLEX E/M VISIT ADD ON: HCPCS | Mod: S$GLB,,, | Performed by: INTERNAL MEDICINE

## 2024-04-16 PROCEDURE — 1159F MED LIST DOCD IN RCRD: CPT | Mod: CPTII,S$GLB,, | Performed by: INTERNAL MEDICINE

## 2024-04-16 PROCEDURE — 3078F DIAST BP <80 MM HG: CPT | Mod: CPTII,S$GLB,, | Performed by: INTERNAL MEDICINE

## 2024-04-16 NOTE — PROGRESS NOTES
Name: Aleena Duckworth  MRN:  [id number]  :  1985 Age 38 y.o.  Date of Service: 2024    Reason for visit:  Aleena Duckworth is a 38 y.o. female here regarding...    # invasive ductal carcinoma of the left  Date of Original Diagnosis:  2023  Original Stage: pT1b pN0(sn) triple negative, grade 3, Ki-67 80%  Current Sites of Disease:  None  Current Goals of Therapy:  Curative  Current Therapy:  Surveillance    Oncologic History/History of Present Illness:   Former patient of Dr. Ayala, per her notes  self palpated a left-sided breast mass.    2023  bilateral breast mammogram that showed a lobular hypoechoic solid mass within the left breast at 2 o'clock position 7 cm     2023, ultrasound guided biopsy = invasive ductal carcinoma, grade 3, ER/AR/HER2 Randall negative, Ki-67 80%.     23  left partial mastectomy that revealed invasive ductal carcinoma 9 x 8 x 6 mm, single focus, approximately 1 mm focus of high-grade DCIS, closest margin 3 mm, anterior.  Two sentinel lymph nodes were negative.  pT1b pN0(sn).    23 cycle 1 of Docetaxel and Cytoxan with numerous SE  2023 cycle 2 of TC with 20% dose reduction of docetaxel.  With again numerous side effects.     Extensive GOC discussion held with patient and family and decided to stop chemotherapy.     2023 s/p bilateral mastectomy, implant removal and hybrid flap and small implants    Reports to me as a new patient 2024 to establish care.  Reports left lateral axillary tail nodules.  Causing anxiety.    PHYSICAL EXAMINATION:  /79 (BP Location: Right arm, Patient Position: Sitting, BP Method: Medium (Automatic))   Pulse (!) 112   Temp 97.4 °F (36.3 °C) (Temporal)   Wt 48.2 kg (106 lb 4.2 oz)   SpO2 98%   BMI 19.44 kg/m²   Wt Readings from Last 3 Encounters:   24 48.2 kg (106 lb 4.2 oz)   23 54.3 kg (119 lb 11.4 oz)   07/10/23 56.7 kg (125 lb)     ECOG PERFORMANCE STATUS: 0  Physical Exam   General:   Well-appearing, nontoxic  Eyes:  Equal and round pupils, EOMI, no scleral icterus  Mouth:  No lesions, moist  Cardiovascular:  Warm, well-perfused, no peripheral edema  Lungs:  Unlabored on room air, no wheezing  Neurologic:  Awake, alert and oriented, participating in the exam  Psych:  Appropriate mood and affect  Skin:  Normal pallor, No rashes  Heme:  No petechiae, no purpura  Breasts: bilateral implants, no palpable abnormalities otherwise, surgical scars noted.  Normal size left axillary lymph nodes appreciated.     LABORATORY:  CBC  Lab Results   Component Value Date    WBC 3.13 (L) 04/16/2024    HGB 12.2 04/16/2024    HCT 34.5 (L) 04/16/2024    MCV 85 04/16/2024     (L) 04/16/2024         BMP  Lab Results   Component Value Date     04/16/2024    K 4.0 04/16/2024     04/16/2024    CO2 23 04/16/2024    BUN 16 04/16/2024    CREATININE 0.8 04/16/2024    CALCIUM 9.5 04/16/2024    ANIONGAP 8 04/16/2024    ESTGFRAFRICA >60.0 06/08/2019    EGFRNONAA 53.3 (A) 06/08/2019         PATHOLOGY:        RADIOLOGY:        ASSESSMENT AND PLAN:  Aleena Duckworth is a 38 y.o. female with...    # invasive ductal carcinoma of the left  Date of Original Diagnosis:  02/09/2023  Original Stage: pT1b pN0(sn) triple negative, grade 3, Ki-67 80%  Current Sites of Disease:  None  Current Goals of Therapy:  Curative  Current Therapy:  Surveillance    Continue clinical breast exams every 6  Getting left axillary ultrasound due to subjective complaint of axillary nodules appeared to be normal size lymph nodes my clinical exam.  There has no role for endocrine therapy in triple negative breast cancer.  There has no role for mammograms in the setting of bilateral mastectomies.  We will do symptom based imaging as needed.      #ADHD, has been on Adderall for several years.  Was born prematurely, was diagnosed with a stroke and heart murmur at birth, was treated at [facility].     #Heavy menorrhagia for the past few  years, worsening recently and has developed CAROLIN.  Has been on iron supplementation.  Sees gynecologist, Dr. Almita Clark with [facility].      #Hyperbilirubinemia - An abdominal ultrasound was done, negative for any abnormalities.Saw Hepatology prior to chemo who suspected she has Gilbert's disease and found no contraindication to proceeding with TC.    Eunice Cruz M.D.  Hematology/Oncology       Route Chart for Scheduling    Med Onc Chart Routing      Follow up with physician 6 months. yuridia gown   Follow up with NUNO    Infusion scheduling note    Injection scheduling note    Labs CBC and CMP   Scheduling:  Preferred lab:  Lab interval:     Imaging   US left axilla at [facility] in 2-3 weeks   Pharmacy appointment    Other referrals                  Treatment Plan Information   OP BREAST TC - DOCETAXEL CYCLOPHOSPHAMIDE Q3W   Ambar Ayala MD   Upcoming Treatment Dates - OP BREAST TC - DOCETAXEL CYCLOPHOSPHAMIDE Q3W    6/19/2023       Pre-Medications       palonosetron 0.25mg/dexAMETHasone 10mg in NS IVPB 0.25 mg 50 mL       aprepitant (CINVANTI) injection 130 mg       Chemotherapy       DOCEtaxel (TAXOTERE) 60 mg/m2 = 88 mg in sodium chloride 0.9% 254.4 mL chemo infusion       cycloPHOSphamide 480 mg/m2 = 700 mg in sodium chloride 0.9% 253.5 mL chemo infusion  6/20/2023       Growth Factor       pegfilgrastim-cbqv (UDENYCA) injection 6 mg  7/10/2023       Pre-Medications       palonosetron 0.25mg/dexAMETHasone 10mg in NS IVPB 0.25 mg 50 mL       aprepitant (CINVANTI) injection 130 mg       Chemotherapy       DOCEtaxel (TAXOTERE) 60 mg/m2 = 88 mg in sodium chloride 0.9% 254.4 mL chemo infusion       cycloPHOSphamide 480 mg/m2 = 700 mg in sodium chloride 0.9% 253.5 mL chemo infusion  7/11/2023       Growth Factor       pegfilgrastim-cbqv (UDENYCA) injection 6 mg    Therapy Plan Information  IV Fluids  sodium chloride 0.9% bolus 1,000 mL 1,000 mL  1,000 mL, Intravenous, Every visit  Flushes  sodium chloride 0.9%  flush 10 mL  10 mL, Intravenous, PRN  heparin, porcine (PF) 100 unit/mL injection flush 500 Units  500 Units, Intravenous, PRN  Antiemetics  ondansetron injection 8 mg  8 mg, Intravenous, PRN  promethazine (PHENERGAN) 12.5 mg in dextrose 5 % (D5W) 50 mL IVPB  12.5 mg, Intravenous, PRN  dexAMETHasone injection 8 mg  8 mg, Intravenous, PRN

## 2024-05-06 DIAGNOSIS — C50.919 TRIPLE NEGATIVE BREAST CANCER: Primary | ICD-10-CM

## 2024-05-09 ENCOUNTER — TELEPHONE (OUTPATIENT)
Dept: HEMATOLOGY/ONCOLOGY | Facility: CLINIC | Age: 39
End: 2024-05-09
Payer: COMMERCIAL

## 2024-05-09 NOTE — TELEPHONE ENCOUNTER
----- Message from Susan Acuña RN sent at 5/9/2024  3:00 PM CDT -----  Regarding: orders entered  Patient here today. Report to follow shortly. I entered orders for Dr Cruz for left breast ultrasound biopsy. Patient is scheduled here at [facility] on 5/15/24. Orders are ready for signing.  Thanks,  Ruth Acuña RN  [facility]

## 2024-05-17 ENCOUNTER — TELEPHONE (OUTPATIENT)
Dept: HEMATOLOGY/ONCOLOGY | Facility: CLINIC | Age: 39
End: 2024-05-17
Payer: COMMERCIAL

## 2024-05-17 PROBLEM — C50.612 MALIGNANT NEOPLASM OF AXILLARY TAIL OF LEFT FEMALE BREAST: Status: ACTIVE | Noted: 2024-05-17

## 2024-05-17 NOTE — NURSING
Pathology report received.  Pt notified by Dr. Washington.  Receptors pending.  MRI scheduled for Monday.  Spoke with pt.  Scheduled f/u with Dr. Cruz for 5/27.  Reveiwed contact information.  Asked her to call with any questions or concerns.   She thanked me and verbalized understanding.

## 2024-05-17 NOTE — TELEPHONE ENCOUNTER
----- Message from Samantha Jain RN sent at 5/17/2024  1:04 PM CDT -----  Regarding: referral placed  [facility],    I sent a surgical referral over to Dr. Valle's office. I also spoke to the patient that the referral has been made and she is in agreement. If there's anything else we can do, let us know!    Samantha Jain RN  [facility]  [phone]

## 2024-05-20 DIAGNOSIS — C50.919 TRIPLE NEGATIVE BREAST CANCER: Primary | ICD-10-CM

## 2024-05-21 ENCOUNTER — TELEPHONE (OUTPATIENT)
Dept: HEMATOLOGY/ONCOLOGY | Facility: CLINIC | Age: 39
End: 2024-05-21
Payer: COMMERCIAL

## 2024-05-21 NOTE — NURSING
Spoke with pt.  Dr. Cruz entered staging scans.  Scheduled bone & CT scans for Friday 5/24 beginning at 845am.  Location and testing instructions reviewed.  Encouraged her to call with any questions or concerns.  She thanked me and verbalized understanding.

## 2024-05-21 NOTE — TELEPHONE ENCOUNTER
----- Message from Susan Celaya sent at 5/21/2024 10:16 AM CDT -----  Contact: Self  Type: Needs Medical Advice    Who Called:  Patient for ms singh  What is this regarding?:  She is looking for additional test info as was discussed yesterday.   Best Call Back Number:  [phone]  Additional Information:  Please call the patient back at the phone number listed above to advise. Thank you!

## 2024-05-22 ENCOUNTER — TELEPHONE (OUTPATIENT)
Dept: HEMATOLOGY/ONCOLOGY | Facility: CLINIC | Age: 39
End: 2024-05-22
Payer: COMMERCIAL

## 2024-05-22 ENCOUNTER — DOCUMENTATION ONLY (OUTPATIENT)
Dept: SURGERY | Facility: CLINIC | Age: 39
End: 2024-05-22
Payer: COMMERCIAL

## 2024-05-22 NOTE — TELEPHONE ENCOUNTER
I spoke with the patient about getting an IO appt scheduled per referral. I explained in detail what we offer and listed some symptoms/side effects that we can help address using our support services. Pt is an established patient with [facility]. Pt will check her work schedule and call back to schedule a ppt

## 2024-05-23 DIAGNOSIS — C50.919 BREAST CANCER: Primary | ICD-10-CM

## 2024-05-24 ENCOUNTER — HOSPITAL ENCOUNTER (OUTPATIENT)
Dept: RADIOLOGY | Facility: HOSPITAL | Age: 39
Discharge: HOME | End: 2024-05-24
Attending: INTERNAL MEDICINE
Payer: COMMERCIAL

## 2024-05-24 DIAGNOSIS — C50.919 TRIPLE NEGATIVE BREAST CANCER: ICD-10-CM

## 2024-05-24 PROCEDURE — 25500020 PHARM REV CODE 255: Mod: PO | Performed by: INTERNAL MEDICINE

## 2024-05-24 PROCEDURE — A9503 TC99M MEDRONATE: HCPCS | Mod: PO | Performed by: INTERNAL MEDICINE

## 2024-05-24 PROCEDURE — 71260 CT THORAX DX C+: CPT | Mod: 26,,, | Performed by: RADIOLOGY

## 2024-05-24 PROCEDURE — 74177 CT ABD & PELVIS W/CONTRAST: CPT | Mod: TC,PO

## 2024-05-24 PROCEDURE — 78306 BONE IMAGING WHOLE BODY: CPT | Mod: 26,,, | Performed by: RADIOLOGY

## 2024-05-24 PROCEDURE — 78306 BONE IMAGING WHOLE BODY: CPT | Mod: TC,PO

## 2024-05-24 PROCEDURE — 74177 CT ABD & PELVIS W/CONTRAST: CPT | Mod: 26,,, | Performed by: RADIOLOGY

## 2024-05-24 RX ADMIN — IOHEXOL 75 ML: 350 INJECTION, SOLUTION INTRAVENOUS at 09:05

## 2024-05-24 RX ADMIN — TECHNETIUM TC 99M MEDRONATE 32 MILLICURIE: 25 INJECTION, POWDER, FOR SOLUTION INTRAVENOUS at 09:05

## 2024-05-27 ENCOUNTER — OFFICE VISIT (OUTPATIENT)
Dept: HEMATOLOGY/ONCOLOGY | Facility: CLINIC | Age: 39
End: 2024-05-27
Payer: COMMERCIAL

## 2024-05-27 VITALS
HEIGHT: 62 IN | SYSTOLIC BLOOD PRESSURE: 103 MMHG | DIASTOLIC BLOOD PRESSURE: 74 MMHG | TEMPERATURE: 98 F | OXYGEN SATURATION: 99 % | RESPIRATION RATE: 13 BRPM | BODY MASS INDEX: 19.31 KG/M2 | HEART RATE: 90 BPM | WEIGHT: 104.94 LBS

## 2024-05-27 DIAGNOSIS — C50.919 TRIPLE NEGATIVE BREAST CANCER: Primary | ICD-10-CM

## 2024-05-27 DIAGNOSIS — D63.0 ANEMIA IN NEOPLASTIC DISEASE: ICD-10-CM

## 2024-05-27 DIAGNOSIS — Z17.1 MALIGNANT NEOPLASM OF AXILLARY TAIL OF LEFT BREAST IN FEMALE, ESTROGEN RECEPTOR NEGATIVE: ICD-10-CM

## 2024-05-27 DIAGNOSIS — R45.89 ANXIETY ABOUT HEALTH: ICD-10-CM

## 2024-05-27 DIAGNOSIS — D72.819 LEUKOPENIA, UNSPECIFIED TYPE: ICD-10-CM

## 2024-05-27 DIAGNOSIS — C50.612 MALIGNANT NEOPLASM OF AXILLARY TAIL OF LEFT BREAST IN FEMALE, ESTROGEN RECEPTOR NEGATIVE: ICD-10-CM

## 2024-05-27 PROCEDURE — 3078F DIAST BP <80 MM HG: CPT | Mod: CPTII,S$GLB,, | Performed by: INTERNAL MEDICINE

## 2024-05-27 PROCEDURE — 1159F MED LIST DOCD IN RCRD: CPT | Mod: CPTII,S$GLB,, | Performed by: INTERNAL MEDICINE

## 2024-05-27 PROCEDURE — 3074F SYST BP LT 130 MM HG: CPT | Mod: CPTII,S$GLB,, | Performed by: INTERNAL MEDICINE

## 2024-05-27 PROCEDURE — 99215 OFFICE O/P EST HI 40 MIN: CPT | Mod: S$GLB,,, | Performed by: INTERNAL MEDICINE

## 2024-05-27 PROCEDURE — 99999 PR PBB SHADOW E&M-EST. PATIENT-LVL V: CPT | Mod: PBBFAC,,, | Performed by: INTERNAL MEDICINE

## 2024-05-27 PROCEDURE — 3008F BODY MASS INDEX DOCD: CPT | Mod: CPTII,S$GLB,, | Performed by: INTERNAL MEDICINE

## 2024-05-27 PROCEDURE — G2211 COMPLEX E/M VISIT ADD ON: HCPCS | Mod: S$GLB,,, | Performed by: INTERNAL MEDICINE

## 2024-05-27 RX ORDER — HEPARIN 100 UNIT/ML
500 SYRINGE INTRAVENOUS
Status: CANCELLED | OUTPATIENT
Start: 2024-06-11

## 2024-05-27 RX ORDER — EPINEPHRINE 0.3 MG/.3ML
0.3 INJECTION SUBCUTANEOUS ONCE AS NEEDED
Status: CANCELLED | OUTPATIENT
Start: 2024-06-04

## 2024-05-27 RX ORDER — DIPHENHYDRAMINE HYDROCHLORIDE 50 MG/ML
50 INJECTION INTRAMUSCULAR; INTRAVENOUS ONCE AS NEEDED
Status: CANCELLED | OUTPATIENT
Start: 2024-06-04

## 2024-05-27 RX ORDER — SODIUM CHLORIDE 0.9 % (FLUSH) 0.9 %
10 SYRINGE (ML) INJECTION
OUTPATIENT
Start: 2024-06-18

## 2024-05-27 RX ORDER — EPINEPHRINE 0.3 MG/.3ML
0.3 INJECTION SUBCUTANEOUS ONCE AS NEEDED
OUTPATIENT
Start: 2024-06-18

## 2024-05-27 RX ORDER — DIPHENHYDRAMINE HYDROCHLORIDE 50 MG/ML
50 INJECTION INTRAMUSCULAR; INTRAVENOUS ONCE AS NEEDED
OUTPATIENT
Start: 2024-06-18

## 2024-05-27 RX ORDER — FAMOTIDINE 10 MG/ML
20 INJECTION INTRAVENOUS
OUTPATIENT
Start: 2024-06-18

## 2024-05-27 RX ORDER — FAMOTIDINE 10 MG/ML
20 INJECTION INTRAVENOUS
Status: CANCELLED | OUTPATIENT
Start: 2024-06-04

## 2024-05-27 RX ORDER — SODIUM CHLORIDE 0.9 % (FLUSH) 0.9 %
10 SYRINGE (ML) INJECTION
Status: CANCELLED | OUTPATIENT
Start: 2024-06-04

## 2024-05-27 RX ORDER — HEPARIN 100 UNIT/ML
500 SYRINGE INTRAVENOUS
Status: CANCELLED | OUTPATIENT
Start: 2024-06-04

## 2024-05-27 RX ORDER — DIPHENHYDRAMINE HYDROCHLORIDE 50 MG/ML
50 INJECTION INTRAMUSCULAR; INTRAVENOUS ONCE AS NEEDED
Status: CANCELLED | OUTPATIENT
Start: 2024-06-11

## 2024-05-27 RX ORDER — EPINEPHRINE 0.3 MG/.3ML
0.3 INJECTION SUBCUTANEOUS ONCE AS NEEDED
Status: CANCELLED | OUTPATIENT
Start: 2024-06-11

## 2024-05-27 RX ORDER — SODIUM CHLORIDE 0.9 % (FLUSH) 0.9 %
10 SYRINGE (ML) INJECTION
Status: CANCELLED | OUTPATIENT
Start: 2024-06-11

## 2024-05-27 RX ORDER — FAMOTIDINE 10 MG/ML
20 INJECTION INTRAVENOUS
Status: CANCELLED | OUTPATIENT
Start: 2024-06-11

## 2024-05-27 RX ORDER — HEPARIN 100 UNIT/ML
500 SYRINGE INTRAVENOUS
OUTPATIENT
Start: 2024-06-18

## 2024-05-27 NOTE — PLAN OF CARE
DISCONTINUE ON PATHWAY REGIMEN - Breast    [id number]        Docetaxel (Taxotere)       Cyclophosphamide           Additional Orders: Premedicate with dexamethasone 8 mg PO bid for three   days beginning 1 day prior to therapy    **Always confirm dose/schedule in your pharmacy ordering system**    REASON: Other Reason  PRIOR TREATMENT: [id number]  TREATMENT RESPONSE: Unable to Evaluate    START ON PATHWAY REGIMEN - Breast    [id number]        Pembrolizumab (Keytruda)       Paclitaxel       Carboplatin       Filgrastim-xxxx       Pembrolizumab (Keytruda)       Doxorubicin       Cyclophosphamide       Pegfilgrastim-xxxx           Additional Orders: In the KEYNOTE-522 trial (Maranda et al., 2020),   patients received 12 weeks of the first neoadjuvant treatment with carboplatin +   paclitaxel + pembrolizumab followed by 12 weeks of the second neoadjuvant   treatment with doxorubicin + cyclophosphamide + pembrolizumab. In the adjuvant   phase, patients received up to 27 weeks of pembrolizumab. G-CSF was recommended   after each chemotherapy cycle beginning 24 hours after the last dose of   chemotherapy. For the paclitaxel + carboplatin cycles, filgrastim was   recommended and it continued daily at least until 72 hours after the last dose   of chemotherapy. For the doxorubicin + cyclophosphamide cycles, pegfilgrastim   could be used. See protocol for details. Assess LVEF prior to initiation of   doxorubicin and as clinically indicated during and after treatment. Doxorubicin   can cause myocardial damage, including acute left ventricular failure. Risk of   cardiomyopathy is generally proportional to cumulative   anthracycline/anthracenedione exposure. Use of concomitant cardiotoxic agents,   previous radiotherapy to the mediastinum, or presence/history of cardiovascular   disease can additionally increase cardiomyopathy risk. See PI for details.   Serious immune-mediated adverse events can occur with pembrolizumab. Please   monitor  your patient and refer to the linked immune-mediated adverse reaction   management materials for more information.    **Always confirm dose/schedule in your pharmacy ordering system**    Patient Characteristics:  Preoperative or Nonsurgical Candidate, M0 (Clinical Staging), Up to cT4c, Any N,   M0, Neoadjuvant Therapy followed by Surgery, Invasive Disease, Chemotherapy,   HER2 Negative, ER Negative, Platinum Therapy Indicated and Candidate for   Checkpoint Inhibitor  Therapeutic Status: Preoperative or Nonsurgical Candidate, M0 (Clinical Staging)  AJCC M Category: cM0  AJCC Grade: G3  ER Status: Negative (-)  AJCC 8 Stage Grouping: IIB  HER2 Status: Negative (-)  AJCC T Category: cT0  AJCC N Category: cN1  NJ Status: Negative (-)  Breast Surgical Plan: Neoadjuvant Therapy followed by Surgery  Intent of Therapy:  Curative Intent, Discussed with Patient

## 2024-05-27 NOTE — PROGRESS NOTES
Name: Aleena Duckworth  MRN:  [id number]  :  1985 Age 38 y.o.  Date of Service: 2024    Reason for visit:  Aleena Duckworth is a 38 y.o. female here regarding...    # Invasive Ductal Carcinoma of the Left Breast  Date of Original Diagnosis:  2023  Recurrence: 5/15/24, localized  Original Stage: pT1b pN0(sn) triple negative, grade 3, Ki-67 80%  Recurrence Stage: [id number] Stage 2B Triple negative,  grade 3, Ki-67 76%  Current Sites of Disease:   left axillary tail/lymph node  Current Goals of Therapy:  Curative  Current Therapy:   pending neoadjuvant chemotherapy    Oncologic History/History of Present Illness:   Former patient of Dr. Ayala, per her notes  self palpated a left-sided breast mass.    2023  bilateral breast mammogram that showed a lobular hypoechoic solid mass within the left breast at 2 o'clock position 7 cm     2023, ultrasound guided biopsy = invasive ductal carcinoma, grade 3, ER/DC/HER2 Randall negative, Ki-67 80%.     23  left partial mastectomy that revealed invasive ductal carcinoma 9 x 8 x 6 mm, single focus, approximately 1 mm focus of high-grade DCIS, closest margin 3 mm, anterior.  Two sentinel lymph nodes were negative.  pT1b pN0(sn).    23 cycle 1 of Docetaxel and Cytoxan with numerous SE  2023 cycle 2 of TC with 20% dose reduction of docetaxel.  With again numerous side effects.     Extensive GOC discussion held with patient and family and decided to stop chemotherapy.     2023 s/p bilateral mastectomy, implant removal and hybrid flap and small implants    2024 Reports to [facility] to establish care with me. Reports left lateral axillary tail nodules.  Causing anxiety.    24    mammogram with ultrasound BI-RADS 4- 11 mm left axillary tail mass  5/15/24   left axillary tail  and lymph node positive for  poorly differentiated carcinoma, triple negative,  staging scans negative.    24  Had a prolonged discussion with patient's ,  "father, mother she was motivated to pursue neoadjuvant chemotherapy.    PHYSICAL EXAMINATION:  /74 (BP Location: Left arm, Patient Position: Sitting, BP Method: Medium (Automatic))   Pulse 90   Temp 97.5 °F (36.4 °C) (Temporal)   Resp 13   Ht 5' 2" (1.575 m)   Wt 47.6 kg (104 lb 15 oz)   LMP 05/09/2024 (Exact Date)   SpO2 99%   BMI 19.19 kg/m²   Wt Readings from Last 3 Encounters:   05/27/24 47.6 kg (104 lb 15 oz)   05/22/24 47.6 kg (105 lb)   05/15/24 48.1 kg (106 lb)     ECOG PERFORMANCE STATUS: 0  Physical Exam   General:  Well-appearing, nontoxic  Eyes:  Equal and round pupils, EOMI, no scleral icterus  Mouth:  No lesions, moist  Cardiovascular:  Warm, well-perfused, no peripheral edema  Lungs:  Unlabored on room air, no wheezing  Neurologic:  Awake, alert and oriented, participating in the exam  Psych:  Appropriate mood and affect  Skin:  Normal pallor, No rashes  Heme:  No petechiae, no purpura  Breasts: bilateral implants, small  left infra-axillary nodule appreciated    LABORATORY:  CBC  Lab Results   Component Value Date    WBC 3.13 (L) 04/16/2024    HGB 12.2 04/16/2024    HCT 34.5 (L) 04/16/2024    MCV 85 04/16/2024     (L) 04/16/2024         BMP  Lab Results   Component Value Date     04/16/2024    K 4.0 04/16/2024     04/16/2024    CO2 23 04/16/2024    BUN 16 04/16/2024    CREATININE 0.7 05/24/2024    CALCIUM 9.5 04/16/2024    ANIONGAP 8 04/16/2024    ESTGFRAFRICA >60.0 06/08/2019    EGFRNONAA 53.3 (A) 06/08/2019         PATHOLOGY:  DIAGNOSIS:  05/17/2024 RODO/carson  LEFT AXILLARY TAIL, ULTRASOUND-GUIDED NEEDLE CORE BIOPSY:  --FRAGMENTS OF LYMPH NODE POSITIVE FOR METASTATIC POORLY DIFFERENTIATED   CARCINOMA (SEE COMMENT).  --RECEPTOR STUDIES PENDING.      RADIOLOGY:  CT chest abdomen pelvis 05/24/2024  Impression:  1. Postoperative changes of bilateral breast reconstruction with breast implants in place.  Two small subcutaneous soft tissue nodules along the left axillary tail " in this patient with biopsy-proven left axillary lymph node triple negative breast cancer, as detailed above.  Correlate with recent mammographic imaging/biopsy.  2. No evidence of metastatic disease within the chest, abdomen, or pelvis.  3. Mild bilateral pelviectasis without evidence of gina hydronephrosis or obstructive uropathy, nonspecific and may be physiologic.    NM Bone Scan  05/24/2024  Impression:  No evidence of metastatic disease      ASSESSMENT AND PLAN:  Aleena Duckworth is a 38 y.o. female with...    # Invasive Ductal Carcinoma of the Left Breast  Date of Original Diagnosis:  02/09/2023  Recurrence: 5/15/24, localized  Original Stage: pT1b pN0(sn) triple negative, grade 3, Ki-67 80%  Recurrence Stage: [id number] Stage 2B Triple negative,  grade 3, Ki-67 76%  Current Sites of Disease:   left axillary tail/lymph node  Current Goals of Therapy:  Curative  Current Therapy:   pending neoadjuvant chemotherapy        Referral to Dr. Silva for port   Echo  Chemo school  Added 1 mg of Ativan for each dose of chemotherapy given her adverse reactions in the past and anxiety surrounding chemo  Added filgrastim to  days 2,3,4/9,10,11/16,17,18  given history of neutropenic fever with TC regimen,  can give additional antiemetics if necessary  Advised patient she needs to have weekly mental health visits while on chemotherapy, referral to Oncology Psychology    Patient reports she has been on a ketogenic diet, referral to Oncology nutrition, advised patient to maintain good body weight and nutrition while on chemotherapy, encouraged  balanced protein intake    She is clinically cleared for cycle 1 of chemotherapy, we will need repeat CBC.    She and her  were told by other clinicians that she needed a PET scan, I went over the CT chest abdomen pelvis and bone scan with the patient,  no evidence of metastatic disease.    Reports intermittent headaches, given she was a recurrence of triple negative breast  cancer, MRI order    # anxiety about health- as noted above,  could be exacerbated by Adderall  # history of neutropenic fever- as above  # thrombocytopenia- fluctuating platelet levels, commonly this is associated with over-the-counter medications or other medications, will  encourage cessation of all OTCs    #ADHD, has been on Adderall for several years.  Was born prematurely, was diagnosed with a stroke and heart murmur at birth, was treated at [facility].     #Heavy menorrhagia for the past few years, worsening recently and has developed CAROLIN.  Has been on iron supplementation.  Sees gynecologist, Dr. Almita Clark with [facility].      #Hyperbilirubinemia - An abdominal ultrasound was done, negative for any abnormalities.Saw Hepatology prior to chemo who suspected she has Gilbert's disease and found no contraindication to proceeding with TC.    Eunice Cruz M.D.  Hematology/Oncology       Route Chart for Scheduling    Med Onc Chart Routing      Follow up with physician 3 weeks. 5 weeks   Follow up with NUNO 4 weeks.   Infusion scheduling note    Injection scheduling note key note 522 asap   Labs CBC and CMP   Scheduling:  Preferred lab:  Lab interval:  labs prior to cycle 1   Imaging ECHO, MRI and PET scan      Pharmacy appointment    Other referrals         Shira for port placement asap         Therapy Plan Information  IV Fluids  sodium chloride 0.9% bolus 1,000 mL 1,000 mL  1,000 mL, Intravenous, Every visit  Flushes  sodium chloride 0.9% flush 10 mL  10 mL, Intravenous, PRN  heparin, porcine (PF) 100 unit/mL injection flush 500 Units  500 Units, Intravenous, PRN  Antiemetics  ondansetron injection 8 mg  8 mg, Intravenous, PRN  promethazine (PHENERGAN) 12.5 mg in dextrose 5 % (D5W) 50 mL IVPB  12.5 mg, Intravenous, PRN  dexAMETHasone injection 8 mg  8 mg, Intravenous, PRN

## 2024-05-28 ENCOUNTER — TELEPHONE (OUTPATIENT)
Dept: HEMATOLOGY/ONCOLOGY | Facility: CLINIC | Age: 39
End: 2024-05-28
Payer: COMMERCIAL

## 2024-05-28 ENCOUNTER — TELEPHONE (OUTPATIENT)
Dept: PSYCHIATRY | Facility: CLINIC | Age: 39
End: 2024-05-28
Payer: COMMERCIAL

## 2024-05-28 DIAGNOSIS — G89.3 NEOPLASM RELATED PAIN: Primary | ICD-10-CM

## 2024-05-28 DIAGNOSIS — C50.919 TRIPLE NEGATIVE BREAST CANCER: Primary | ICD-10-CM

## 2024-05-28 RX ORDER — OXYCODONE AND ACETAMINOPHEN 10; 325 MG/1; MG/1
1 TABLET ORAL EVERY 4 HOURS PRN
Qty: 45 TABLET | Refills: 0 | Status: ON HOLD | OUTPATIENT
Start: 2024-05-28 | End: 2024-06-03 | Stop reason: HOSPADM

## 2024-05-28 NOTE — TELEPHONE ENCOUNTER
Spk to pt about scheduling IO appt. Pt stated that she would call back tomorrow to schedule. She was at work. Pt has all our contact info to call us to schedeule.

## 2024-05-28 NOTE — NURSING
Spoke with pt.  Reviewed treatment plan in detail.  Answered questions.  Scheduled chemo class for Friday.  Scheduled first infusion for Tuesday.  She is aware that authorization has been expedited and if not approved in time, she may need to be rescheduled.  She is questioning the appts with PT and Dr. Martinez.   I confirmed with Dr. Valle's office that she did not need to see PT nor Dr. Martinez at this time.  Canceled those appts. Reviewed my contact information and encouraged her to call with any questions or concerns.  She thanked me and verbalized understanding.

## 2024-05-29 ENCOUNTER — PATIENT MESSAGE (OUTPATIENT)
Dept: HEMATOLOGY/ONCOLOGY | Facility: CLINIC | Age: 39
End: 2024-05-29
Payer: COMMERCIAL

## 2024-05-29 DIAGNOSIS — T45.1X5A CINV (CHEMOTHERAPY-INDUCED NAUSEA AND VOMITING): Primary | ICD-10-CM

## 2024-05-29 DIAGNOSIS — R11.2 CINV (CHEMOTHERAPY-INDUCED NAUSEA AND VOMITING): Primary | ICD-10-CM

## 2024-05-29 NOTE — TELEPHONE ENCOUNTER
Duplicate message.  ----- Message from Heaven Mcdonald sent at 5/29/2024  3:44 PM CDT -----  Type:  Patient Returning Call    Who Called: Patient     Who Left Message for Patient:  n/a  Does the patient know what this is regarding?:  yes  Best Call Back Number:  [phone]  Additional Information:  Patient returning missed call

## 2024-05-31 ENCOUNTER — CLINICAL SUPPORT (OUTPATIENT)
Dept: HEMATOLOGY/ONCOLOGY | Facility: CLINIC | Age: 39
End: 2024-05-31
Payer: COMMERCIAL

## 2024-05-31 ENCOUNTER — PATIENT MESSAGE (OUTPATIENT)
Dept: HEMATOLOGY/ONCOLOGY | Facility: CLINIC | Age: 39
End: 2024-05-31
Payer: COMMERCIAL

## 2024-05-31 ENCOUNTER — HOSPITAL ENCOUNTER (OUTPATIENT)
Dept: CARDIOLOGY | Facility: HOSPITAL | Age: 39
Discharge: HOME | End: 2024-05-31
Attending: INTERNAL MEDICINE
Payer: COMMERCIAL

## 2024-05-31 ENCOUNTER — PATIENT MESSAGE (OUTPATIENT)
Dept: HEMATOLOGY/ONCOLOGY | Facility: CLINIC | Age: 39
End: 2024-05-31

## 2024-05-31 ENCOUNTER — HOSPITAL ENCOUNTER (OUTPATIENT)
Dept: RADIOLOGY | Facility: HOSPITAL | Age: 39
Discharge: HOME | End: 2024-05-31
Attending: INTERNAL MEDICINE
Payer: COMMERCIAL

## 2024-05-31 VITALS — BODY MASS INDEX: 19.32 KG/M2 | WEIGHT: 105 LBS | HEIGHT: 62 IN

## 2024-05-31 VITALS
OXYGEN SATURATION: 97 % | WEIGHT: 105.63 LBS | RESPIRATION RATE: 18 BRPM | SYSTOLIC BLOOD PRESSURE: 93 MMHG | DIASTOLIC BLOOD PRESSURE: 62 MMHG | TEMPERATURE: 98 F | HEART RATE: 90 BPM | HEIGHT: 62 IN | BODY MASS INDEX: 19.44 KG/M2

## 2024-05-31 DIAGNOSIS — C50.919 TRIPLE NEGATIVE BREAST CANCER: ICD-10-CM

## 2024-05-31 DIAGNOSIS — C50.919 TRIPLE NEGATIVE BREAST CANCER: Primary | ICD-10-CM

## 2024-05-31 LAB
ASCENDING AORTA: 2.16 CM
AV INDEX (PROSTH): 0.86
AV MEAN GRADIENT: 3 MMHG
AV PEAK GRADIENT: 5 MMHG
AV VALVE AREA BY VELOCITY RATIO: 2.36 CM²
AV VALVE AREA: 2.35 CM²
AV VELOCITY RATIO: 0.86
BSA FOR ECHO PROCEDURE: 1.44 M2
CV ECHO LV RWT: 0.34 CM
DOP CALC AO PEAK VEL: 1.08 M/S
DOP CALC AO VTI: 20.2 CM
DOP CALC LVOT AREA: 2.7 CM2
DOP CALC LVOT DIAMETER: 1.87 CM
DOP CALC LVOT PEAK VEL: 0.93 M/S
DOP CALC LVOT STROKE VOLUME: 47.49 CM3
DOP CALC MV VTI: 26.8 CM
DOP CALCLVOT PEAK VEL VTI: 17.3 CM
E WAVE DECELERATION TIME: 168.79 MSEC
E/A RATIO: 1.54
E/E' RATIO: 6.96 M/S
ECHO LV POSTERIOR WALL: 0.68 CM (ref 0.6–1.1)
EJECTION FRACTION: 55 %
FRACTIONAL SHORTENING: 28 % (ref 28–44)
GLOBAL LONGITUIDAL STRAIN: 17.6 %
GLUCOSE SERPL-MCNC: 83 MG/DL (ref 70–110)
INTERVENTRICULAR SEPTUM: 0.63 CM (ref 0.6–1.1)
IVC DIAMETER: 2.31 CM
LEFT ATRIUM SIZE: 2.29 CM
LEFT ATRIUM VOLUME INDEX MOD: 19.2 ML/M2
LEFT ATRIUM VOLUME MOD: 27.87 CM3
LEFT INTERNAL DIMENSION IN SYSTOLE: 2.9 CM (ref 2.1–4)
LEFT VENTRICLE DIASTOLIC VOLUME INDEX: 48.99 ML/M2
LEFT VENTRICLE DIASTOLIC VOLUME: 71.04 ML
LEFT VENTRICLE MASS INDEX: 50 G/M2
LEFT VENTRICLE SYSTOLIC VOLUME INDEX: 22.2 ML/M2
LEFT VENTRICLE SYSTOLIC VOLUME: 32.22 ML
LEFT VENTRICULAR INTERNAL DIMENSION IN DIASTOLE: 4.02 CM (ref 3.5–6)
LEFT VENTRICULAR MASS: 72.53 G
LV LATERAL E/E' RATIO: 6.67 M/S
LV SEPTAL E/E' RATIO: 7.27 M/S
LVOT MG: 1.79 MMHG
LVOT MV: 0.63 CM/S
MV MEAN GRADIENT: 2 MMHG
MV PEAK A VEL: 0.52 M/S
MV PEAK E VEL: 0.8 M/S
MV PEAK GRADIENT: 4 MMHG
MV STENOSIS PRESSURE HALF TIME: 54.73 MS
MV VALVE AREA BY CONTINUITY EQUATION: 1.77 CM2
MV VALVE AREA P 1/2 METHOD: 4.02 CM2
OHS CV RV/LV RATIO: 0.81 CM
PISA MRMAX VEL: 4.69 M/S
PISA TR MAX VEL: 2.09 M/S
PULM VEIN S/D RATIO: 1.35
PV PEAK D VEL: 0.4 M/S
PV PEAK S VEL: 0.54 M/S
RA PRESSURE ESTIMATED: 8 MMHG
RA VOL SYS: 25.3 ML
RIGHT ATRIAL AREA: 9.8 CM2
RIGHT VENTRICULAR END-DIASTOLIC DIMENSION: 3.27 CM
RIGHT VENTRICULAR LENGTH IN DIASTOLE (APICAL 4-CHAMBER VIEW): 5.95 CM
RV MID DIAMA: 2.45 CM
RV TB RVSP: 10 MMHG
RV TISSUE DOPPLER FREE WALL SYSTOLIC VELOCITY 1 (APICAL 4 CHAMBER VIEW): 15.07 CM/S
SINUS: 2.54 CM
STJ: 2.31 CM
TDI LATERAL: 0.12 M/S
TDI SEPTAL: 0.11 M/S
TDI: 0.12 M/S
TR MAX PG: 17 MMHG
TRICUSPID ANNULAR PLANE SYSTOLIC EXCURSION: 2.13 CM
TV REST PULMONARY ARTERY PRESSURE: 25 MMHG
Z-SCORE OF LEFT VENTRICULAR DIMENSION IN END DIASTOLE: -0.87
Z-SCORE OF LEFT VENTRICULAR DIMENSION IN END SYSTOLE: 0.5

## 2024-05-31 PROCEDURE — 93306 TTE W/DOPPLER COMPLETE: CPT | Mod: 26,,, | Performed by: INTERNAL MEDICINE

## 2024-05-31 PROCEDURE — 99999 PR PBB SHADOW E&M-EST. PATIENT-LVL V: CPT | Mod: PBBFAC,,, | Performed by: NURSE PRACTITIONER

## 2024-05-31 PROCEDURE — 78815 PET IMAGE W/CT SKULL-THIGH: CPT | Mod: 26,PI,, | Performed by: STUDENT IN AN ORGANIZED HEALTH CARE EDUCATION/TRAINING PROGRAM

## 2024-05-31 PROCEDURE — 93356 MYOCRD STRAIN IMG SPCKL TRCK: CPT | Mod: PO

## 2024-05-31 PROCEDURE — 93356 MYOCRD STRAIN IMG SPCKL TRCK: CPT | Mod: ,,, | Performed by: INTERNAL MEDICINE

## 2024-05-31 PROCEDURE — 99215 OFFICE O/P EST HI 40 MIN: CPT | Mod: S$GLB,,, | Performed by: NURSE PRACTITIONER

## 2024-05-31 PROCEDURE — A9552 F18 FDG: HCPCS | Mod: PN | Performed by: INTERNAL MEDICINE

## 2024-05-31 PROCEDURE — 78815 PET IMAGE W/CT SKULL-THIGH: CPT | Mod: TC,PN

## 2024-05-31 RX ORDER — OLANZAPINE 5 MG/1
5 TABLET ORAL NIGHTLY
Qty: 30 TABLET | Refills: 1 | Status: SHIPPED | OUTPATIENT
Start: 2024-06-03

## 2024-05-31 RX ORDER — PROMETHAZINE HYDROCHLORIDE 12.5 MG/1
TABLET ORAL
Qty: 40 TABLET | Refills: 3 | Status: SHIPPED | OUTPATIENT
Start: 2024-05-31

## 2024-05-31 RX ORDER — ONDANSETRON 8 MG/1
8 TABLET, ORALLY DISINTEGRATING ORAL EVERY 8 HOURS PRN
Qty: 60 TABLET | Refills: 5 | Status: SHIPPED | OUTPATIENT
Start: 2024-06-03

## 2024-05-31 RX ORDER — CHLORHEXIDINE GLUCONATE ORAL RINSE 1.2 MG/ML
SOLUTION DENTAL
COMMUNITY
Start: 2024-05-30

## 2024-05-31 RX ORDER — DEXTROAMPHETAMINE SACCHARATE, AMPHETAMINE ASPARTATE MONOHYDRATE, DEXTROAMPHETAMINE SULFATE AND AMPHETAMINE SULFATE 5; 5; 5; 5 MG/1; MG/1; MG/1; MG/1
20 CAPSULE, EXTENDED RELEASE ORAL EVERY MORNING
COMMUNITY
Start: 2024-05-29

## 2024-05-31 RX ORDER — FLUDEOXYGLUCOSE F18 500 MCI/ML
12 INJECTION INTRAVENOUS
Status: COMPLETED | OUTPATIENT
Start: 2024-05-31 | End: 2024-05-31

## 2024-05-31 RX ORDER — DEXAMETHASONE 4 MG/1
8 TABLET ORAL DAILY
Qty: 24 TABLET | Refills: 2 | Status: SHIPPED | OUTPATIENT
Start: 2024-06-04

## 2024-05-31 RX ORDER — LIDOCAINE AND PRILOCAINE 25; 25 MG/G; MG/G
CREAM TOPICAL
Qty: 30 G | Refills: 2 | Status: SHIPPED | OUTPATIENT
Start: 2024-05-31

## 2024-05-31 RX ORDER — DEXTROAMPHETAMINE SACCHARATE, AMPHETAMINE ASPARTATE, DEXTROAMPHETAMINE SULFATE AND AMPHETAMINE SULFATE 5; 5; 5; 5 MG/1; MG/1; MG/1; MG/1
1 TABLET ORAL
COMMUNITY
Start: 2024-05-29

## 2024-05-31 RX ORDER — MUPIROCIN 20 MG/G
OINTMENT TOPICAL
COMMUNITY
Start: 2024-05-30

## 2024-05-31 RX ADMIN — FLUDEOXYGLUCOSE F-18 10.8 MILLICURIE: 500 INJECTION INTRAVENOUS at 09:05

## 2024-05-31 NOTE — PROGRESS NOTES
Subjective:      Name: Aleena Duckworth  : 1985  MRN: [id number]    CC:  Education    # Invasive Ductal Carcinoma of the Left Breast  Date of Original Diagnosis:  2023  Recurrence: 5/15/24, localized  Original Stage: pT1b pN0(sn) triple negative, grade 3, Ki-67 80%  Recurrence Stage: [id number] Stage 2B Triple negative,  grade 3, Ki-67 76%  Current Sites of Disease:   left axillary tail/lymph node  Current Goals of Therapy:  Curative  Current Therapy:   pending neoadjuvant chemotherapy    HPI:   Aleena Duckworth is a 38 y.o. female presents to the clinic with her  for education for recurrence/treatment of breast cancer.  Plans for [facility] for recurrent Stage IIB triple negative breast cancer presenting in left axilary tail/lymph node.  Pmhx:  Left breast cancer, ; anemia, HTN, Stroke @ birth.            Oncologic History/History of Present Illness:   Former patient of Dr. Ayala, per her notes  self palpated a left-sided breast mass.    2023  bilateral breast mammogram that showed a lobular hypoechoic solid mass within the left breast at 2 o'clock position 7 cm      2023, ultrasound guided biopsy = invasive ductal carcinoma, grade 3, ER/MS/HER2 Randall negative, Ki-67 80%.     23  left partial mastectomy that revealed invasive ductal carcinoma 9 x 8 x 6 mm, single focus, approximately 1 mm focus of high-grade DCIS, closest margin 3 mm, anterior.  Two sentinel lymph nodes were negative.  pT1b pN0(sn).     23 cycle 1 of Docetaxel and Cytoxan with numerous SE  2023 cycle 2 of TC with 20% dose reduction of docetaxel.  With again numerous side effects.     Extensive GOC discussion held with patient and family and decided to stop chemotherapy.      2023 s/p bilateral mastectomy, implant removal and hybrid flap and small implants     2024 Reports to [facility] to establish care with me. Reports left lateral axillary tail nodules.  Causing anxiety.     24    mammogram  with ultrasound BI-RADS 4- 11 mm left axillary tail mass  5/15/24   left axillary tail  and lymph node positive for  poorly differentiated carcinoma, triple negative,  staging scans negative.     5/27/24  Had a prolonged discussion with patient's , father, mother she was motivated to pursue neoadjuvant chemotherapy.      Oncology History   Triple negative breast cancer   4/17/2023 Initial Diagnosis    Triple negative breast cancer     4/18/2023 Cancer Staged    Staging form: Breast, AJCC 8th Edition  - Pathologic stage from 4/18/2023: Stage IB (pT1b, pN0, cM0, G3, ER-, GA-, HER2-)     5/1/2023 - 5/25/2023 Chemotherapy    Treatment Summary   Plan Name: OP BREAST TC - DOCETAXEL CYCLOPHOSPHAMIDE Q3W  Treatment Goal: Curative  Status: Inactive  Start Date: 5/1/2023  End Date: 5/25/2023  Provider: Ambar Ayala MD  Chemotherapy: cycloPHOSphamide 600 mg/m2 = 880 mg in sodium chloride 0.9% 289.4 mL chemo infusion, 600 mg/m2 = 880 mg, Intravenous, Clinic/HOD 1 time, 2 of 4 cycles  Dose modification: 600 mg/m2 (Cycle 3), 480 mg/m2 (Cycle 3, Reason: Dose not tolerated)  Administration: 880 mg (5/1/2023)  DOCEtaxel 75 mg/m2 = 110 mg in sodium chloride 0.9% 290.5 mL chemo infusion, 75 mg/m2 = 110 mg, Intravenous, Clinic/HOD 1 time, 2 of 4 cycles  Dose modification: 60 mg/m2 (original dose 75 mg/m2, Cycle 2, Reason: Dose not tolerated)  Administration: 110 mg (5/1/2023), 90 mg (5/24/2023)     5/22/2024 Cancer Staged    Staging form: Breast, AJCC 8th Edition  - Clinical stage from 5/22/2024: Stage IIB (rcT0, cN1(f), cM0, G3, ER-, GA-, HER2-)     6/4/2024 -  Chemotherapy    Treatment Summary   Plan Name: OP PEMBROLIZUMAB CARBOPLATIN (AUC 5) WITH WEEKLY PACLITAXEL FOLLOWED BY PEMBROLIZUMAB DOXORUBICIN CYCLOPHOSPHAMIDE FOLLOWED BY PEMBROLIZUMAB 200 MG Q3W  Treatment Goal: Curative  Status: Active  Start Date: 6/4/2024 (Planned)  End Date: 5/6/2025 (Planned)  Provider: Eunice Cruz MD  Chemotherapy: DOXOrubicin chemo  injection 86 mg, 60 mg/m2 = 86 mg, Intravenous, Clinic/HOD 1 time, 0 of 4 cycles  CARBOplatin (PARAPLATIN) 525 mg in sodium chloride 0.9% 302.5 mL chemo infusion, 525 mg, Intravenous, Clinic/HOD 1 time, 0 of 4 cycles  cycloPHOSphamide 600 mg/m2 = 860 mg in sodium chloride 0.9% 254.3 mL chemo infusion, 600 mg/m2 = 860 mg, Intravenous, Clinic/HOD 1 time, 0 of 4 cycles  PACLitaxeL (TAXOL) 80 mg/m2 = 114 mg in sodium chloride 0.9% 250 mL chemo infusion, 80 mg/m2 = 114 mg, Intravenous, Clinic/HOD 1 time, 0 of 4 cycles            Past Medical History:   Diagnosis Date    ADHD (attention deficit hyperactivity disorder)     Anemia     Breast cancer     triple negative    Hypertension     pre eclampsia and eclampsia    Stroke     at birth       Past Surgical History:   Procedure Laterality Date    ABLATION      BREAST RECONSTRUCTION  11/2023    BREAST SURGERY      lumpectomy    CYSTOSCOPY      LITHOTRIPSY      MASTECTOMY  11/2023    TUBAL LIGATION      VAGINAL DELIVERY         Family History   Problem Relation Name Age of Onset    Hypertension Mother      Breast cancer Mother  51        negative screening    Heart disease Father      Kidney cancer Father      Breast cancer Paternal Aunt      Diabetes Maternal Grandmother      Breast cancer Maternal Grandmother      Breast cancer Other Both GGM        Social History     Socioeconomic History    Marital status:    Tobacco Use    Smoking status: Every Day     Types: Vaping with nicotine    Smokeless tobacco: Never    Tobacco comments:     vape   Substance and Sexual Activity    Alcohol use: Not Currently    Drug use: Never    Sexual activity: Yes     Partners: Male     Birth control/protection: See Surgical Hx     Comment: tubal     Social Determinants of Health     Financial Resource Strain: Medium Risk (1/18/2024)    Overall Financial Resource Strain (CARDIA)     Difficulty of Paying Living Expenses: Somewhat hard   Food Insecurity: Patient Declined (1/18/2024)     "Hunger Vital Sign     Worried About Running Out of Food in the Last Year: Patient declined     Ran Out of Food in the Last Year: Patient declined   Transportation Needs: No Transportation Needs (1/18/2024)    PRAPARE - Transportation     Lack of Transportation (Medical): No     Lack of Transportation (Non-Medical): No   Physical Activity: Insufficiently Active (1/18/2024)    Exercise Vital Sign     Days of Exercise per Week: 2 days     Minutes of Exercise per Session: 30 min   Stress: Stress Concern Present (1/18/2024)    [employer] [place] [employer] - Occupational Stress Questionnaire     Feeling of Stress : Rather much   Housing Stability: High Risk (1/18/2024)    Housing Stability Vital Sign     Unable to Pay for Housing in the Last Year: Yes     Number of Places Lived in the Last Year: 1     Unstable Housing in the Last Year: No       Review of patient's allergies indicates:  No Known Allergies    Review of Systems   Eyes:  Negative for visual disturbance.   Cardiovascular:  Negative for chest pain.   Respiratory:  Negative for cough and shortness of breath.    Hematologic/Lymphatic: Negative for adenopathy.   Skin:  Negative for rash.   Musculoskeletal:  Negative for back pain.   Gastrointestinal:  Negative for abdominal pain and diarrhea.   Genitourinary:  Negative for frequency.   Neurological:  Negative for headaches.   Psychiatric/Behavioral:  The patient is nervous/anxious.    All other systems reviewed and are negative.           Objective:     Vitals:    05/31/24 1310   BP: 93/62   BP Location: Right arm   Patient Position: Sitting   BP Method: Medium (Automatic)   Pulse: 90   Resp: 18   Temp: 97.8 °F (36.6 °C)   TempSrc: Temporal   SpO2: 97%   Weight: 47.9 kg (105 lb 9.6 oz)   Height: 5' 2" (1.575 m)        Physical Exam  Vitals reviewed.   Constitutional:       General: She is not in acute distress.  HENT:      Head: Normocephalic and atraumatic.   Pulmonary:      Effort: Pulmonary effort " is normal.   Neurological:      Mental Status: She is alert and oriented to person, place, and time.   Psychiatric:         Behavior: Behavior normal.         Thought Content: Thought content normal.             Current Outpatient Medications on File Prior to Visit   Medication Sig    cetirizine (ZYRTEC) 10 mg Cap Take 5 mg by mouth once daily.    LORazepam (ATIVAN) 1 MG tablet Take 1 tablet (1 mg total) by mouth 2 (two) times daily as needed for Anxiety (nausea).    oxyCODONE-acetaminophen (PERCOCET)  mg per tablet Take 1 tablet by mouth every 4 (four) hours as needed for Pain. (Patient not taking: Reported on 5/29/2024)    varenicline (CHANTIX STARTING MONTH BOX) 0.5 mg (11)- 1 mg (42) tablet Take one 0.5mg tab by mouth once daily X3 days,then increase to one 0.5mg tab twice daily X4 days,then increase to one 1mg tab twice daily    zolpidem (AMBIEN) 10 mg Tab Take 5 mg by mouth nightly as needed.     No current facility-administered medications on file prior to visit.       CBC:  Lab Results   Component Value Date    WBC 3.13 (L) 04/16/2024    HGB 12.2 04/16/2024    HCT 34.5 (L) 04/16/2024    MCV 85 04/16/2024     (L) 04/16/2024         CMP:  Sodium   Date Value Ref Range Status   04/16/2024 140 136 - 145 mmol/L Final     Potassium   Date Value Ref Range Status   04/16/2024 4.0 3.5 - 5.1 mmol/L Final     Chloride   Date Value Ref Range Status   04/16/2024 109 95 - 110 mmol/L Final     CO2   Date Value Ref Range Status   04/16/2024 23 23 - 29 mmol/L Final     Glucose   Date Value Ref Range Status   04/16/2024 93 70 - 110 mg/dL Final     BUN   Date Value Ref Range Status   04/16/2024 16 6 - 20 mg/dL Final     Creatinine   Date Value Ref Range Status   05/24/2024 0.7 0.5 - 1.4 mg/dL Final     Calcium   Date Value Ref Range Status   04/16/2024 9.5 8.7 - 10.5 mg/dL Final     Total Protein   Date Value Ref Range Status   04/16/2024 6.5 6.0 - 8.4 g/dL Final     Albumin   Date Value Ref Range Status    04/16/2024 4.4 3.5 - 5.2 g/dL Final     Total Bilirubin   Date Value Ref Range Status   04/16/2024 2.3 (H) 0.1 - 1.0 mg/dL Final     Comment:     For infants and newborns, interpretation of results should be based  on gestational age, weight and in agreement with clinical  observations.    Premature Infant recommended reference ranges:  Up to 24 hours.............<8.0 mg/dL  Up to 48 hours............<12.0 mg/dL  3-5 days..................<15.0 mg/dL  6-29 days.................<15.0 mg/dL       Alkaline Phosphatase   Date Value Ref Range Status   04/16/2024 42 (L) 55 - 135 U/L Final     AST   Date Value Ref Range Status   04/16/2024 12 10 - 40 U/L Final     ALT   Date Value Ref Range Status   04/16/2024 18 10 - 44 U/L Final     Anion Gap   Date Value Ref Range Status   04/16/2024 8 8 - 16 mmol/L Final     eGFR if    Date Value Ref Range Status   06/08/2019 >60.0 >60 mL/min/1.73 m^2 Final     eGFR if non    Date Value Ref Range Status   06/08/2019 53.3 (A) >60 mL/min/1.73 m^2 Final     Comment:     Calculation used to obtain the estimated glomerular filtration  rate (eGFR) is the CKD-EPI equation.          NM PET CT FDG Skull Base to Mid Thigh  Narrative: EXAMINATION:  NM PET CT FDG SKULL BASE TO MID THIGH    CLINICAL HISTORY:  pt requesting to rule out metastatic disease, CTs already done; Malignant neoplasm of unspecified site of unspecified female breast    TECHNIQUE:  10.8 mCi of F18-FDG was administered intravenously in the right antecubital vein.  After an approximately 60 min distribution time, PET/CT images were acquired from the skull base to the mid thigh. Transmission images were acquired to correct for attenuation using a whole body low-dose CT scan without contrast with the arms positioned above the head.    COMPARISON:  CT of the chest abdomen and pelvis 05/24/2024.    FINDINGS:  Head and neck: There is symmetric and physiologic distribution of radiotracer throughout  the included brain.  There is no hemorrhage, hydrocephalus, or midline shift.  There is no FDG avid cervical lymphadenopathy.    Chest: There are postsurgical changes in the bilateral breast.  There are 2 hypermetabolic nodules along the superolateral aspect of the left breast implant in the region of the axillary tail.  These are best visualized on images 93 and 95.  The larger of these measures 9 x 9 mm with an SUV max of 6.4.  There is no internal mammary lymphadenopathy.  There is no FDG avid mediastinal or hilar lymphadenopathy.    There is no FDG avid pulmonary nodule or mass.  There is no pleural effusion.    Abdomen and pelvis: There is physiologic radiotracer throughout the liver, spleen, and collecting system.  There is no FDG avid pelvic or retroperitoneal lymphadenopathy.    Musculoskeletal: There is no FDG avid lytic or blastic osseous lesion.  Impression: Two hypermetabolic metastatic nodules along the lateral aspect of the left breast implant in the region of the axillary tail.    Electronically signed by: Doni Wright MD  Date:    05/31/2024  Time:    11:37  Echo Saline Bubble? No    Left Ventricle: The left ventricle is normal in size. Normal wall   thickness. There is normal systolic function. Ejection fraction by visual   approximation is 55%. Global longitudinal strain is -17.6%. There is   normal diastolic function.    Right Ventricle: Normal right ventricular cavity size. Wall thickness   is normal. Systolic function is normal.    Aortic Valve: The aortic valve is a trileaflet valve.    Pulmonary Artery: The estimated pulmonary artery systolic pressure is   25 mmHg.    IVC/SVC: Intermediate venous pressure at 8 mmHg.            Assessment:       1. Triple negative breast cancer         Plan:     Triple negative breast cancer       Referral to Dr. Silva for port   Echo  Chemo school  Added 1 mg of Ativan for each dose of chemotherapy given her adverse reactions in the past and anxiety surrounding  chemo  Added filgrastim to  days 2,3,4/9,10,11/16,17,18  given history of neutropenic fever with TC regimen,  can give additional antiemetics if necessary  Advised patient she needs to have weekly mental health visits while on chemotherapy, referral to Oncology Psychology     Patient reports she has been on a ketogenic diet, referral to Oncology nutrition, advised patient to maintain good body weight and nutrition while on chemotherapy, encouraged  balanced protein intake     She is clinically cleared for cycle 1 of chemotherapy, we will need repeat CBC.    Today, 05/31/24:  Education  1.  Treatment plan of [facility]:  Pembro/Carbo Day 1/Taxol Days 1, 8, 15 every 3 weeks x 4 cycles; followed by Pembro/AC Day 1 every 3 weeks x 4 cycles; surgery; followed by Pembro every 3 weeks x 9 additional cycles discussed with patient.  Growth factor:  1st 4 cycles on Days 2 - 4, 9 - 11, 16 - 18; 2nd 4 cycles:  Days 2 - 4.  Zyprexa 5 mg on evenings of Days 1 - 4.    Dexamethasone 8 mg daily on Days 2 - 4.  2.  Handouts provided on chemo & immuno therapy agents.    3.  Discussed the mechanism of action, potential side effects of this treatment.   4.  Dietary modifications discussed.  Detail instructions already provided by dietician.   5.  Chemotherapy Folder supplied with contact information.   6.  Discussed follow-up with the physician for toxicity monitoring throughout treatment.    7.  Scripts were escribed (Zyprexa, Dexamethasone, Zofran ODT, Phenergan, EMLA) and explained for home use.       8.  Consented the patient to the treatment plan and the patient was educated on the planned duration of the treatment and schedule of the treatment administration.         60 minutes were spent in coordination of patient's care, record review and counseling.       Answers submitted by the patient for this visit:  Review of Systems Questionnaire (Submitted on 5/31/2024)  appetite change : No  unexpected weight change: No  mouth sores:  No

## 2024-05-31 NOTE — PROGRESS NOTES
PET Imaging Questionnaire    Are you a Diabetic? Recent Blood Sugar level? No    Are you anemic? Bone Marrow Stimulation Meds? Yes    Have you had a CT Scan, if so when & where was your last one? Yes -     Have you had a PET Scan, if so when & where was your last one? No    Chemotherapy or currently on Chemotherapy? No    Radiation therapy? No    Surgical History:   Past Surgical History:   Procedure Laterality Date    ABLATION      BREAST RECONSTRUCTION  11/2023    BREAST SURGERY      lumpectomy    CYSTOSCOPY      LITHOTRIPSY      MASTECTOMY  11/2023    TUBAL LIGATION      VAGINAL DELIVERY          Have you been fasting for at least 6 hours? Yes    Is there any chance you may be pregnant or breastfeeding? No    Assay: 11.86 MCi@:912   Injection Site:rt ac     Residual: 1.05 mCi@: 914   Technologist: Giselle Lim Injected:10.8mCi

## 2024-06-03 ENCOUNTER — TUMOR BOARD CONFERENCE (OUTPATIENT)
Dept: SURGERY | Facility: CLINIC | Age: 39
End: 2024-06-03
Payer: COMMERCIAL

## 2024-06-03 ENCOUNTER — PATIENT MESSAGE (OUTPATIENT)
Dept: HEMATOLOGY/ONCOLOGY | Facility: CLINIC | Age: 39
End: 2024-06-03
Payer: COMMERCIAL

## 2024-06-04 ENCOUNTER — DOCUMENTATION ONLY (OUTPATIENT)
Dept: INFUSION THERAPY | Facility: HOSPITAL | Age: 39
End: 2024-06-04
Payer: COMMERCIAL

## 2024-06-04 ENCOUNTER — DOCUMENTATION ONLY (OUTPATIENT)
Dept: INFUSION THERAPY | Facility: HOSPITAL | Age: 39
End: 2024-06-04

## 2024-06-04 ENCOUNTER — INFUSION (OUTPATIENT)
Dept: INFUSION THERAPY | Facility: HOSPITAL | Age: 39
End: 2024-06-04
Attending: INTERNAL MEDICINE
Payer: COMMERCIAL

## 2024-06-04 VITALS
HEIGHT: 62 IN | OXYGEN SATURATION: 100 % | RESPIRATION RATE: 16 BRPM | BODY MASS INDEX: 19.71 KG/M2 | DIASTOLIC BLOOD PRESSURE: 66 MMHG | WEIGHT: 107.13 LBS | SYSTOLIC BLOOD PRESSURE: 97 MMHG | HEART RATE: 81 BPM | TEMPERATURE: 98 F

## 2024-06-04 DIAGNOSIS — C50.919 TRIPLE NEGATIVE BREAST CANCER: Primary | ICD-10-CM

## 2024-06-04 PROCEDURE — 96417 CHEMO IV INFUS EACH ADDL SEQ: CPT | Mod: PN

## 2024-06-04 PROCEDURE — 63600175 PHARM REV CODE 636 W HCPCS: Mod: PN | Performed by: INTERNAL MEDICINE

## 2024-06-04 PROCEDURE — 96367 TX/PROPH/DG ADDL SEQ IV INF: CPT | Mod: PN

## 2024-06-04 PROCEDURE — 25000003 PHARM REV CODE 250: Mod: PN | Performed by: INTERNAL MEDICINE

## 2024-06-04 PROCEDURE — A4216 STERILE WATER/SALINE, 10 ML: HCPCS | Mod: PN | Performed by: INTERNAL MEDICINE

## 2024-06-04 PROCEDURE — 96375 TX/PRO/DX INJ NEW DRUG ADDON: CPT | Mod: PN

## 2024-06-04 PROCEDURE — 96413 CHEMO IV INFUSION 1 HR: CPT | Mod: PN

## 2024-06-04 RX ORDER — PROCHLORPERAZINE EDISYLATE 5 MG/ML
2.5 INJECTION INTRAMUSCULAR; INTRAVENOUS
Status: CANCELLED
Start: 2024-06-06

## 2024-06-04 RX ORDER — HEPARIN 100 UNIT/ML
500 SYRINGE INTRAVENOUS
Status: DISCONTINUED | OUTPATIENT
Start: 2024-06-04 | End: 2024-06-04 | Stop reason: HOSPADM

## 2024-06-04 RX ORDER — EPINEPHRINE 0.3 MG/.3ML
0.3 INJECTION SUBCUTANEOUS ONCE AS NEEDED
Status: DISCONTINUED | OUTPATIENT
Start: 2024-06-04 | End: 2024-06-04 | Stop reason: HOSPADM

## 2024-06-04 RX ORDER — FAMOTIDINE 10 MG/ML
20 INJECTION INTRAVENOUS
Status: COMPLETED | OUTPATIENT
Start: 2024-06-04 | End: 2024-06-04

## 2024-06-04 RX ORDER — DIPHENHYDRAMINE HYDROCHLORIDE 50 MG/ML
50 INJECTION INTRAMUSCULAR; INTRAVENOUS ONCE AS NEEDED
Status: DISCONTINUED | OUTPATIENT
Start: 2024-06-04 | End: 2024-06-04 | Stop reason: HOSPADM

## 2024-06-04 RX ORDER — SODIUM CHLORIDE 0.9 % (FLUSH) 0.9 %
10 SYRINGE (ML) INJECTION
Status: DISCONTINUED | OUTPATIENT
Start: 2024-06-04 | End: 2024-06-04 | Stop reason: HOSPADM

## 2024-06-04 RX ORDER — PROCHLORPERAZINE EDISYLATE 5 MG/ML
2.5 INJECTION INTRAMUSCULAR; INTRAVENOUS
Status: CANCELLED
Start: 2024-06-05

## 2024-06-04 RX ORDER — PROCHLORPERAZINE EDISYLATE 5 MG/ML
2.5 INJECTION INTRAMUSCULAR; INTRAVENOUS
Status: CANCELLED
Start: 2024-06-07

## 2024-06-04 RX ADMIN — CARBOPLATIN 525 MG: 10 INJECTION, SOLUTION INTRAVENOUS at 01:06

## 2024-06-04 RX ADMIN — APREPITANT 130 MG: 130 INJECTION, EMULSION INTRAVENOUS at 11:06

## 2024-06-04 RX ADMIN — SODIUM CHLORIDE 200 MG: 9 INJECTION, SOLUTION INTRAVENOUS at 10:06

## 2024-06-04 RX ADMIN — FAMOTIDINE 20 MG: 10 INJECTION INTRAVENOUS at 11:06

## 2024-06-04 RX ADMIN — DIPHENHYDRAMINE HYDROCHLORIDE 50 MG: 50 INJECTION, SOLUTION INTRAMUSCULAR; INTRAVENOUS at 11:06

## 2024-06-04 RX ADMIN — SODIUM CHLORIDE: 9 INJECTION, SOLUTION INTRAVENOUS at 10:06

## 2024-06-04 RX ADMIN — DEXAMETHASONE SODIUM PHOSPHATE 0.25 MG: 10 INJECTION, SOLUTION INTRAMUSCULAR; INTRAVENOUS at 11:06

## 2024-06-04 RX ADMIN — Medication 10 ML: at 02:06

## 2024-06-04 RX ADMIN — PACLITAXEL 114 MG: 6 INJECTION, SOLUTION INTRAVENOUS at 12:06

## 2024-06-04 NOTE — PROGRESS NOTES
Oncology Nutrition   New Patient Education  Aleena Duckworth   1985    Nutrition Education   This is a 38 y.o.female with a medical diagnosis of breast cancer.     Met w/ pt and her , Gen, at chairside to discuss current nutritional status and nutrition as it relates to cancer and cancer treatment. Pt currently low nutrition risk. Pt actually familiar to RD service from previous cancer tx about one year ago. Pt denies any significant nutrition changes since that time.     Wt Readings from Last 10 Encounters:   06/04/24 48.6 kg (107 lb 2.3 oz)   06/03/24 47.6 kg (105 lb)   06/03/24 47.6 kg (104 lb 15 oz)   05/31/24 47.9 kg (105 lb 9.6 oz)   05/31/24 47.6 kg (105 lb)   05/29/24 47.6 kg (104 lb 15 oz)   05/27/24 47.6 kg (104 lb 15 oz)   05/22/24 47.6 kg (105 lb)   05/15/24 48.1 kg (106 lb)   04/16/24 48.2 kg (106 lb 4.2 oz)      [x] PMHx reviewed  [x] Labs reviewed    Reinforced role of RD in POC. Patient with questions regarding nutrition supplements. Answered as appropriately and encouraged pt to let Dr. Cruz know of anything she is taking. Pt VU.    Answered all other nutrition related questions as appropriate.     Patient provided with dietitian contact number and advised to call with questions or make future appointment if further intervention is needed. RD to follow throughout tx PRN.    Kaci Mast, MS, RD, LDN  06/04/2024  1:47 PM

## 2024-06-04 NOTE — PROGRESS NOTES
LCSW met with patient at the chairside during infusion. Patient denied any emotional or practical needs at this time. Patient only reported feeling tired, otherwise doing well.

## 2024-06-04 NOTE — PROGRESS NOTES
LATE ENTRY May 31, 2024    Oncology   LCSW met with patient for new pt education. Reviewed role of  during cancer treatment. Educated on role of SW on care team and resources available at the cancer center.   Patient is a 38 y.o.female with a medical diagnosis of recurrent breast cancer.  Current Support System: Pt is here today with her .   SW answered all psychosocial/socioeconomic related questions. Patient denied financial concerns at this time.   SW acknowledged that a cancer diagnosis can have negative effects of individual's income and encouraged that patient to reach out if this need arises.  The patient displayed a strong ability to cope and has already made weekly appointments with a psychologist. SW provided the patient with social work office contact number and encouraged the patient to reach out with any further questions or concerns. SW will follow patient throughout the course of treatment.

## 2024-06-04 NOTE — PLAN OF CARE
Problem: Adult Inpatient Plan of Care  Goal: Patient-Specific Goal (Individualized)  Outcome: Progressing  Flowsheets (Taken 6/4/2024 1500)  Individualized Care Needs: Private room  Anxieties, Fears or Concerns: None     Problem: Fatigue  Goal: Improved Activity Tolerance  Intervention: Promote Improved Energy  Flowsheets (Taken 6/4/2024 1500)  Fatigue Management:   paced activity encouraged   fatigue-related activity identified  Sleep/Rest Enhancement:   relaxation techniques promoted   regular sleep/rest pattern promoted  Activity Management:   Ambulated -L4   Ambulated in canales - L4  Environmental Support:   environmental consistency promoted   calm environment promoted     Problem: Adult Inpatient Plan of Care  Goal: Plan of Care Review  Outcome: Progressing  Flowsheets (Taken 6/4/2024 1500)  Plan of Care Reviewed With: patient  Tolerated treatment with no known distress.  Ambulated from infusion center with steady gait.

## 2024-06-05 ENCOUNTER — INFUSION (OUTPATIENT)
Dept: INFUSION THERAPY | Facility: HOSPITAL | Age: 39
End: 2024-06-05
Attending: INTERNAL MEDICINE
Payer: COMMERCIAL

## 2024-06-05 VITALS
RESPIRATION RATE: 18 BRPM | OXYGEN SATURATION: 100 % | SYSTOLIC BLOOD PRESSURE: 100 MMHG | HEART RATE: 83 BPM | DIASTOLIC BLOOD PRESSURE: 67 MMHG | TEMPERATURE: 98 F

## 2024-06-05 DIAGNOSIS — C50.919 TRIPLE NEGATIVE BREAST CANCER: Primary | ICD-10-CM

## 2024-06-05 PROCEDURE — 96372 THER/PROPH/DIAG INJ SC/IM: CPT | Mod: PN

## 2024-06-05 PROCEDURE — 63600175 PHARM REV CODE 636 W HCPCS: Mod: JZ,JB,JG,PN | Performed by: INTERNAL MEDICINE

## 2024-06-05 RX ORDER — PROCHLORPERAZINE EDISYLATE 5 MG/ML
2.5 INJECTION INTRAMUSCULAR; INTRAVENOUS
Status: DISCONTINUED | OUTPATIENT
Start: 2024-06-05 | End: 2024-06-05 | Stop reason: HOSPADM

## 2024-06-05 RX ADMIN — FILGRASTIM-SNDZ 300 MCG: 300 INJECTION, SOLUTION INTRAVENOUS; SUBCUTANEOUS at 01:06

## 2024-06-06 ENCOUNTER — OFFICE VISIT (OUTPATIENT)
Dept: PSYCHIATRY | Facility: CLINIC | Age: 39
End: 2024-06-06
Payer: COMMERCIAL

## 2024-06-06 ENCOUNTER — INFUSION (OUTPATIENT)
Dept: INFUSION THERAPY | Facility: HOSPITAL | Age: 39
End: 2024-06-06
Attending: INTERNAL MEDICINE
Payer: COMMERCIAL

## 2024-06-06 VITALS
OXYGEN SATURATION: 100 % | BODY MASS INDEX: 19.71 KG/M2 | TEMPERATURE: 99 F | RESPIRATION RATE: 18 BRPM | DIASTOLIC BLOOD PRESSURE: 76 MMHG | WEIGHT: 107.13 LBS | HEIGHT: 62 IN | HEART RATE: 80 BPM | SYSTOLIC BLOOD PRESSURE: 106 MMHG

## 2024-06-06 DIAGNOSIS — R45.89 ANXIETY ABOUT HEALTH: Primary | ICD-10-CM

## 2024-06-06 DIAGNOSIS — C50.919 TRIPLE NEGATIVE BREAST CANCER: Primary | ICD-10-CM

## 2024-06-06 DIAGNOSIS — C50.919 TRIPLE NEGATIVE BREAST CANCER: ICD-10-CM

## 2024-06-06 PROCEDURE — 96372 THER/PROPH/DIAG INJ SC/IM: CPT | Mod: PN

## 2024-06-06 PROCEDURE — 63600175 PHARM REV CODE 636 W HCPCS: Mod: JZ,JB,JG,PN | Performed by: INTERNAL MEDICINE

## 2024-06-06 PROCEDURE — 90832 PSYTX W PT 30 MINUTES: CPT | Mod: S$GLB,,,

## 2024-06-06 PROCEDURE — 99999 PR PBB SHADOW E&M-EST. PATIENT-LVL II: CPT | Mod: PBBFAC,,,

## 2024-06-06 RX ADMIN — FILGRASTIM-SNDZ 300 MCG: 300 INJECTION, SOLUTION INTRAVENOUS; SUBCUTANEOUS at 11:06

## 2024-06-06 NOTE — PLAN OF CARE
Problem: Adult Inpatient Plan of Care  Goal: Plan of Care Review  Outcome: Progressing  Goal: Patient-Specific Goal (Individualized)  Outcome: Progressing     Problem: Fatigue  Goal: Improved Activity Tolerance  Outcome: Progressing   Pt tolerated zarxio injection well.   No adverse reaction noted.  All questions answered.  Pt left clinic in no acute distress.

## 2024-06-06 NOTE — PROGRESS NOTES
PSYCHO-ONCOLOGY NOTE/ Individual Psychotherapy     Date: 6/6/2024   Site:  [facility]      Therapeutic Intervention: Met with patient and spouse.  Outpatient - Supportive psychotherapy 30 min - CPT Code 08243    This includes face to face time and non-face to face time preparing to see the patient, obtaining and/or reviewing separately obtained history, documenting clinical information in the electronic or other health record, independently interpreting results and communicating results to the patient/family/caregiver, or care coordinator.      Patient was last seen by me on Visit date not found    Problem list  Patient Active Problem List   Diagnosis    Triple negative breast cancer    Insomnia    Anxiety about health    Transaminitis    Nausea    Functional diarrhea    Thrombocytopenia    Anemia in neoplastic disease    Leukopenia    Hot flushes, perimenopausal    Malignant neoplasm of axillary tail of left female breast       Chief complaint/reason for encounter: anxiety   Met with patient to evaluate psychosocial adaptation to diagnosis/treatment/survivorship of breast cancer. She reviewed and agreed to informed consent and the limits of confidentiality.    Current Medications  Current Outpatient Medications   Medication    cetirizine (ZYRTEC) 10 mg Cap    chlorhexidine (PERIDEX) 0.12 % solution    dexAMETHasone (DECADRON) 4 MG Tab    dextroamphetamine-amphetamine (ADDERALL XR) 20 MG 24 hr capsule    dextroamphetamine-amphetamine (ADDERALL) 20 mg tablet    LIDOcaine-prilocaine (EMLA) cream    LORazepam (ATIVAN) 1 MG tablet    mupirocin (BACTROBAN) 2 % ointment    OLANZapine (ZYPREXA) 5 MG tablet    ondansetron (ZOFRAN-ODT) 8 MG TbDL    oxyCODONE-acetaminophen (PERCOCET) 5-325 mg per tablet    promethazine (PHENERGAN) 12.5 MG Tab    varenicline (CHANTIX STARTING MONTH BOX) 0.5 mg (11)- 1 mg (42) tablet    zolpidem (AMBIEN) 10 mg Tab     No current facility-administered medications for this visit.        ONCOLOGY HISTORY  Oncology History   Triple negative breast cancer   4/17/2023 Initial Diagnosis    Triple negative breast cancer     4/18/2023 Cancer Staged    Staging form: Breast, AJCC 8th Edition  - Pathologic stage from 4/18/2023: Stage IB (pT1b, pN0, cM0, G3, ER-, [place]-, HER2-)     5/1/2023 - 5/25/2023 Chemotherapy    Treatment Summary   Plan Name: OP BREAST TC - DOCETAXEL CYCLOPHOSPHAMIDE Q3W  Treatment Goal: Curative  Status: Inactive  Start Date: 5/1/2023  End Date: 5/25/2023  Provider: Ambar Ayala MD  Chemotherapy: cycloPHOSphamide 600 mg/m2 = 880 mg in sodium chloride 0.9% 289.4 mL chemo infusion, 600 mg/m2 = 880 mg, Intravenous, Clinic/HOD 1 time, 2 of 4 cycles  Dose modification: 600 mg/m2 (Cycle 3), 480 mg/m2 (Cycle 3, Reason: Dose not tolerated)  Administration: 880 mg (5/1/2023)  DOCEtaxel 75 mg/m2 = 110 mg in sodium chloride 0.9% 290.5 mL chemo infusion, 75 mg/m2 = 110 mg, Intravenous, Clinic/HOD 1 time, 2 of 4 cycles  Dose modification: 60 mg/m2 (original dose 75 mg/m2, Cycle 2, Reason: Dose not tolerated)  Administration: 110 mg (5/1/2023), 90 mg (5/24/2023)     5/22/2024 Cancer Staged    Staging form: Breast, AJCC 8th Edition  - Clinical stage from 5/22/2024: Stage IIB (rcT0, cN1(f), cM0, G3, ER-, KS-, HER2-)     6/4/2024 -  Chemotherapy    Treatment Summary   Plan Name: OP PEMBROLIZUMAB CARBOPLATIN (AUC 5) WITH WEEKLY PACLITAXEL FOLLOWED BY PEMBROLIZUMAB DOXORUBICIN CYCLOPHOSPHAMIDE FOLLOWED BY PEMBROLIZUMAB 200 MG Q3W  Treatment Goal: Curative  Status: Active  Start Date: 6/4/2024  End Date: 5/6/2025 (Planned)  Provider: Eunice Cruz MD  Chemotherapy: DOXOrubicin chemo injection 86 mg, 60 mg/m2 = 86 mg, Intravenous, Clinic/HOD 1 time, 0 of 4 cycles  CARBOplatin (PARAPLATIN) 525 mg in sodium chloride 0.9% 302.5 mL chemo infusion, 525 mg, Intravenous, Clinic/HOD 1 time, 1 of 4 cycles  Administration: 525 mg (6/4/2024)  cycloPHOSphamide 600 mg/m2 = 860 mg in sodium chloride 0.9% 254.3  mL chemo infusion, 600 mg/m2 = 860 mg, Intravenous, Clinic/HOD 1 time, 0 of 4 cycles  PACLitaxeL (TAXOL) 80 mg/m2 = 114 mg in sodium chloride 0.9% 250 mL chemo infusion, 80 mg/m2 = 114 mg, Intravenous, Clinic/HOD 1 time, 1 of 4 cycles  Administration: 114 mg (6/4/2024)         Objective:  Aleena Duckworth arrived promptly for the session. Her , Gen, was also present during the interview, with the consent of Aleena Duckworth.   Ms. Duckworth was independently ambulatory at the time of session. The patient was fully cooperative throughout the session.  Appearance: age appropriate, appropriately  dressed, adequately  groomed  Behavior/Cooperation: friendly and cooperative  Speech: normal in rate, volume, and tone  Mood: euthymic  Affect: mood congruent and appropriate  Thought Process: goal-directed, logical  Thought Content: normal,  No delusions or paranoia; did not appear to be responding to internal stimuli during the session  Orientation: grossly intact  Memory: grossly intact  Attention Span/Concentration: Attends to session without distraction; reports no difficulty  Fund of Knowledge: average  Estimate of Intelligence: average from verbal skills and history  Cognition: grossly intact  Insight: patient has awareness of illness; good insight into own behavior and behavior of others  Judgment: the patient's behavior is adequate to circumstances    NCCN Distress thermometer:       5/31/2024     1:15 PM 5/31/2024     1:00 PM 5/27/2024     9:06 AM 5/21/2024    10:44 AM 4/16/2024    11:17 AM 1/19/2024     2:23 PM 1/18/2024     4:50 PM   DISTRESS SCREENING   Distress Score 3 4 0 - No Distress 6 0 - No Distress 4 4   Practical Concerns None of these None of these None of these Work;Finances;Treatment decisions None of these None of these Work   Social Concerns None of these None of these None of these Communication with health care team None of these None of these None of these   Emotional Concerns Worry or  anxiety;Fear Worry or anxiety;Fear None of these Worry or anxiety None of these Worry or anxiety Worry or anxiety;Sadness or depression   Spiritual or Evangelical Concerns None of these None of these None of these None of these None of these None of these None of these   Physical Concerns  None of these None of these Pain;Fatigue Fatigue Sleep;Fatigue Fatigue;Memory or concentration        Interval history and content of current session: Ms. Duckworth was initially here to complete an intake session. She was informed during confidentiality that this provider will be departing the clinic and that she could meet with Dr. Shanel Bailey for therapy services. Ms. Duckworth is interested in weekly therapy and according to Dr. Bailey's schedule, she does not have weekly appointments available at this time. Ms. Duckworth was provided with information on how to find weekly providers in the community ([employer], through her insurance). Ms. Duckworth was encouraged to contact multiple providers in the community and be placed on waitlists. She was provided with an opportunity to learn a coping skill to help manage her anxiety but she declined because she has been doing well so far. She would like a long-term weekly therapist to help as she continues on her cancer journey.      Risk parameters:   Patient reports no suicidal ideation  Patient reports no homicidal ideation  Patient reports no self-injurious behavior  Patient reports no violent behavior   Safety needs:  None at this time      Verbal deficits: None    Treatment Plan:individual psychotherapy  Target symptoms: anxiety   Why chosen therapy is appropriate versus another modality: evidence based practice  Outcome monitoring methods: self-report  Therapeutic intervention type: supportive psychotherapy  Prognosis: Fair     Return to clinic: At this time, Ms. Duckworth declined beginning therapy services before she establishes with a provider in the community. However, she  was encouraged to reach out if she requires support before establishing with a provider in the community.     Length of Service (minutes direct face-to-face contact): 17 minutes    Diagnosis:     ICD-10-CM ICD-9-CM   1. Anxiety about health  R45.89 799.29   2. Triple negative breast cancer  C50.919 174.9                     Janki Herrera, PhD  Clinical Health Psychology Fellow

## 2024-06-07 ENCOUNTER — DOCUMENTATION ONLY (OUTPATIENT)
Dept: INFUSION THERAPY | Facility: HOSPITAL | Age: 39
End: 2024-06-07

## 2024-06-07 ENCOUNTER — INFUSION (OUTPATIENT)
Dept: INFUSION THERAPY | Facility: HOSPITAL | Age: 39
End: 2024-06-07
Attending: INTERNAL MEDICINE
Payer: COMMERCIAL

## 2024-06-07 VITALS
HEIGHT: 62 IN | TEMPERATURE: 98 F | DIASTOLIC BLOOD PRESSURE: 66 MMHG | OXYGEN SATURATION: 100 % | RESPIRATION RATE: 18 BRPM | WEIGHT: 107.13 LBS | HEART RATE: 88 BPM | SYSTOLIC BLOOD PRESSURE: 108 MMHG | BODY MASS INDEX: 19.71 KG/M2

## 2024-06-07 DIAGNOSIS — C50.919 TRIPLE NEGATIVE BREAST CANCER: Primary | ICD-10-CM

## 2024-06-07 PROCEDURE — 63600175 PHARM REV CODE 636 W HCPCS: Mod: JZ,JB,JG,PN | Performed by: INTERNAL MEDICINE

## 2024-06-07 PROCEDURE — 96372 THER/PROPH/DIAG INJ SC/IM: CPT | Mod: PN

## 2024-06-07 RX ADMIN — FILGRASTIM-SNDZ 300 MCG: 300 INJECTION, SOLUTION INTRAVENOUS; SUBCUTANEOUS at 01:06

## 2024-06-07 NOTE — PROGRESS NOTES
"2:04 PM    This SARAHW met with this pt and her . The pt said this treatment as not been as hard on her as last time, so "I am doing much much better"  She said she met LATOYA Coleman and she was supportive to her as well.   The pt reported no practical needs at this time.  This LCSW reminded her to reach out to SW for any questions, concerns or if she would like to chat.      "

## 2024-06-11 ENCOUNTER — DOCUMENTATION ONLY (OUTPATIENT)
Dept: INFUSION THERAPY | Facility: HOSPITAL | Age: 39
End: 2024-06-11

## 2024-06-11 ENCOUNTER — LAB VISIT (OUTPATIENT)
Dept: LAB | Facility: HOSPITAL | Age: 39
End: 2024-06-11
Attending: INTERNAL MEDICINE
Payer: COMMERCIAL

## 2024-06-11 ENCOUNTER — INFUSION (OUTPATIENT)
Dept: INFUSION THERAPY | Facility: HOSPITAL | Age: 39
End: 2024-06-11
Attending: INTERNAL MEDICINE
Payer: COMMERCIAL

## 2024-06-11 VITALS
SYSTOLIC BLOOD PRESSURE: 97 MMHG | TEMPERATURE: 98 F | WEIGHT: 108.25 LBS | HEIGHT: 62 IN | HEART RATE: 98 BPM | DIASTOLIC BLOOD PRESSURE: 66 MMHG | BODY MASS INDEX: 19.92 KG/M2 | RESPIRATION RATE: 18 BRPM

## 2024-06-11 DIAGNOSIS — C50.919 TRIPLE NEGATIVE BREAST CANCER: ICD-10-CM

## 2024-06-11 DIAGNOSIS — C50.919 TRIPLE NEGATIVE BREAST CANCER: Primary | ICD-10-CM

## 2024-06-11 LAB
ALBUMIN SERPL BCP-MCNC: 4.3 G/DL (ref 3.5–5.2)
ALP SERPL-CCNC: 55 U/L (ref 55–135)
ALT SERPL W/O P-5'-P-CCNC: 44 U/L (ref 10–44)
ANION GAP SERPL CALC-SCNC: 12 MMOL/L (ref 8–16)
AST SERPL-CCNC: 24 U/L (ref 10–40)
B-HCG UR QL: NEGATIVE
BASOPHILS # BLD AUTO: 0.02 K/UL (ref 0–0.2)
BASOPHILS NFR BLD: 0.5 % (ref 0–1.9)
BILIRUB SERPL-MCNC: 1 MG/DL (ref 0.1–1)
BUN SERPL-MCNC: 13 MG/DL (ref 6–20)
CALCIUM SERPL-MCNC: 9.7 MG/DL (ref 8.7–10.5)
CHLORIDE SERPL-SCNC: 103 MMOL/L (ref 95–110)
CO2 SERPL-SCNC: 24 MMOL/L (ref 23–29)
CREAT SERPL-MCNC: 0.7 MG/DL (ref 0.5–1.4)
DIFFERENTIAL METHOD BLD: ABNORMAL
EOSINOPHIL # BLD AUTO: 0.1 K/UL (ref 0–0.5)
EOSINOPHIL NFR BLD: 2.3 % (ref 0–8)
ERYTHROCYTE [DISTWIDTH] IN BLOOD BY AUTOMATED COUNT: 13.2 % (ref 11.5–14.5)
EST. GFR  (NO RACE VARIABLE): >60 ML/MIN/1.73 M^2
GLUCOSE SERPL-MCNC: 87 MG/DL (ref 70–110)
HCT VFR BLD AUTO: 35.1 % (ref 37–48.5)
HGB BLD-MCNC: 11.9 G/DL (ref 12–16)
IMM GRANULOCYTES # BLD AUTO: 0.04 K/UL (ref 0–0.04)
IMM GRANULOCYTES NFR BLD AUTO: 0.9 % (ref 0–0.5)
LYMPHOCYTES # BLD AUTO: 1.4 K/UL (ref 1–4.8)
LYMPHOCYTES NFR BLD: 33.3 % (ref 18–48)
MAGNESIUM SERPL-MCNC: 2 MG/DL (ref 1.6–2.6)
MCH RBC QN AUTO: 30.7 PG (ref 27–31)
MCHC RBC AUTO-ENTMCNC: 33.9 G/DL (ref 32–36)
MCV RBC AUTO: 91 FL (ref 82–98)
MONOCYTES # BLD AUTO: 0.5 K/UL (ref 0.3–1)
MONOCYTES NFR BLD: 11.6 % (ref 4–15)
NEUTROPHILS # BLD AUTO: 2.2 K/UL (ref 1.8–7.7)
NEUTROPHILS NFR BLD: 51.4 % (ref 38–73)
NRBC BLD-RTO: 0 /100 WBC
PLATELET # BLD AUTO: 117 K/UL (ref 150–450)
PMV BLD AUTO: 9.1 FL (ref 9.2–12.9)
POTASSIUM SERPL-SCNC: 3.8 MMOL/L (ref 3.5–5.1)
PROT SERPL-MCNC: 6.6 G/DL (ref 6–8.4)
RBC # BLD AUTO: 3.87 M/UL (ref 4–5.4)
SODIUM SERPL-SCNC: 139 MMOL/L (ref 136–145)
WBC # BLD AUTO: 4.3 K/UL (ref 3.9–12.7)

## 2024-06-11 PROCEDURE — 36415 COLL VENOUS BLD VENIPUNCTURE: CPT | Mod: PN | Performed by: INTERNAL MEDICINE

## 2024-06-11 PROCEDURE — 96413 CHEMO IV INFUSION 1 HR: CPT | Mod: PN

## 2024-06-11 PROCEDURE — 83735 ASSAY OF MAGNESIUM: CPT | Mod: PN | Performed by: INTERNAL MEDICINE

## 2024-06-11 PROCEDURE — 81025 URINE PREGNANCY TEST: CPT | Mod: PN | Performed by: INTERNAL MEDICINE

## 2024-06-11 PROCEDURE — 80053 COMPREHEN METABOLIC PANEL: CPT | Mod: PN | Performed by: INTERNAL MEDICINE

## 2024-06-11 PROCEDURE — 25000003 PHARM REV CODE 250: Mod: PN | Performed by: INTERNAL MEDICINE

## 2024-06-11 PROCEDURE — 96375 TX/PRO/DX INJ NEW DRUG ADDON: CPT | Mod: PN

## 2024-06-11 PROCEDURE — 96367 TX/PROPH/DG ADDL SEQ IV INF: CPT | Mod: PN

## 2024-06-11 PROCEDURE — 85025 COMPLETE CBC W/AUTO DIFF WBC: CPT | Mod: PN | Performed by: INTERNAL MEDICINE

## 2024-06-11 PROCEDURE — 63600175 PHARM REV CODE 636 W HCPCS: Mod: PN | Performed by: INTERNAL MEDICINE

## 2024-06-11 RX ORDER — EPINEPHRINE 0.3 MG/.3ML
0.3 INJECTION SUBCUTANEOUS ONCE AS NEEDED
Status: DISCONTINUED | OUTPATIENT
Start: 2024-06-11 | End: 2024-06-13 | Stop reason: HOSPADM

## 2024-06-11 RX ORDER — PROCHLORPERAZINE EDISYLATE 5 MG/ML
2.5 INJECTION INTRAMUSCULAR; INTRAVENOUS
Status: CANCELLED
Start: 2024-06-12

## 2024-06-11 RX ORDER — SODIUM CHLORIDE 0.9 % (FLUSH) 0.9 %
10 SYRINGE (ML) INJECTION
Status: DISCONTINUED | OUTPATIENT
Start: 2024-06-11 | End: 2024-06-13 | Stop reason: HOSPADM

## 2024-06-11 RX ORDER — PROCHLORPERAZINE EDISYLATE 5 MG/ML
2.5 INJECTION INTRAMUSCULAR; INTRAVENOUS
Status: CANCELLED
Start: 2024-06-14

## 2024-06-11 RX ORDER — FAMOTIDINE 10 MG/ML
20 INJECTION INTRAVENOUS
Status: COMPLETED | OUTPATIENT
Start: 2024-06-11 | End: 2024-06-11

## 2024-06-11 RX ORDER — DIPHENHYDRAMINE HYDROCHLORIDE 50 MG/ML
50 INJECTION INTRAMUSCULAR; INTRAVENOUS ONCE AS NEEDED
Status: DISCONTINUED | OUTPATIENT
Start: 2024-06-11 | End: 2024-06-13 | Stop reason: HOSPADM

## 2024-06-11 RX ORDER — PROCHLORPERAZINE EDISYLATE 5 MG/ML
2.5 INJECTION INTRAMUSCULAR; INTRAVENOUS
Status: CANCELLED
Start: 2024-06-13

## 2024-06-11 RX ORDER — HEPARIN 100 UNIT/ML
500 SYRINGE INTRAVENOUS
Status: DISCONTINUED | OUTPATIENT
Start: 2024-06-11 | End: 2024-06-13 | Stop reason: HOSPADM

## 2024-06-11 RX ADMIN — FAMOTIDINE 20 MG: 10 INJECTION INTRAVENOUS at 01:06

## 2024-06-11 RX ADMIN — PACLITAXEL 114 MG: 6 INJECTION, SOLUTION INTRAVENOUS at 02:06

## 2024-06-11 RX ADMIN — DEXAMETHASONE SODIUM PHOSPHATE 10 MG: 10 INJECTION, SOLUTION INTRAMUSCULAR; INTRAVENOUS at 01:06

## 2024-06-11 RX ADMIN — DIPHENHYDRAMINE HYDROCHLORIDE 50 MG: 50 INJECTION, SOLUTION INTRAMUSCULAR; INTRAVENOUS at 02:06

## 2024-06-11 NOTE — PROGRESS NOTES
[facility]      BREAST MULTIDISCIPLINARY TUMOR BOARD    Aleena Cespedes    Date Presented to Tumor Board: 06/03/24    Presenting Hospital / Clinic: [facility]    Tumor Laterality: Left    Breast Tumor Site: UOQ    Presenter: Dr. Tia Valle    Reason for Consultation: Initial Presentation    Specialties Present: Medical Oncology;Hematology;Radiation Oncology;Surgical Oncology;Pathology;Navigation;Integrative Oncology;Plastic Surgery    Patient Status: a current patient    Treatment to Date: None    Clinical Trial Eligibility: None available    ER: Negative    WV: Negative    Her2: Negative    Cancer Staging   Triple negative breast cancer  Staging form: Breast, AJCC 8th Edition  - Pathologic stage from 4/18/2023: Stage IB (pT1b, pN0, cM0, G3, ER-, [place]-, HER2-) - Signed by Ambar Ayala MD on 4/18/2023  - Clinical stage from 5/22/2024: Stage IIB (rcT0, cN1(f), cM0, G3, ER-, [place]-, HER2-) - Signed by Tia Valle MD on 5/22/2024    Recommended Therapy:  NACT- Keynote 522  Surgery - Targeted Axillary Dissection  Radiation - Post surgical XRT plus Boosted  Final recommendations pending surgical  path        Presentation at Cancer Conference: [facility]

## 2024-06-11 NOTE — PLAN OF CARE
Problem: Adult Inpatient Plan of Care  Goal: Plan of Care Review  Outcome: Progressing  Goal: Patient-Specific Goal (Individualized)  Outcome: Progressing     Problem: Fatigue  Goal: Improved Activity Tolerance  Outcome: Progressing   Pt tolerated taxol infusion well.   Pt used dignicap and cryotherapy while here, along with 90 min cool down.  No adverse reaction noted.  PAC flushed with NS and de-accessed per protocol.   Pt left clinic in no acute distress.

## 2024-06-12 ENCOUNTER — INFUSION (OUTPATIENT)
Dept: INFUSION THERAPY | Facility: HOSPITAL | Age: 39
End: 2024-06-12
Attending: INTERNAL MEDICINE
Payer: COMMERCIAL

## 2024-06-12 ENCOUNTER — TELEPHONE (OUTPATIENT)
Dept: INFUSION THERAPY | Facility: HOSPITAL | Age: 39
End: 2024-06-12
Payer: COMMERCIAL

## 2024-06-12 VITALS
HEIGHT: 62 IN | SYSTOLIC BLOOD PRESSURE: 100 MMHG | OXYGEN SATURATION: 100 % | HEART RATE: 95 BPM | BODY MASS INDEX: 19.92 KG/M2 | TEMPERATURE: 98 F | WEIGHT: 108.25 LBS | RESPIRATION RATE: 18 BRPM | DIASTOLIC BLOOD PRESSURE: 67 MMHG

## 2024-06-12 DIAGNOSIS — C50.919 TRIPLE NEGATIVE BREAST CANCER: Primary | ICD-10-CM

## 2024-06-12 PROCEDURE — 96372 THER/PROPH/DIAG INJ SC/IM: CPT | Mod: PN

## 2024-06-12 PROCEDURE — 63600175 PHARM REV CODE 636 W HCPCS: Mod: JZ,JB,JG,PN | Performed by: INTERNAL MEDICINE

## 2024-06-12 RX ADMIN — FILGRASTIM-SNDZ 300 MCG: 300 INJECTION, SOLUTION INTRAVENOUS; SUBCUTANEOUS at 12:06

## 2024-06-12 NOTE — TELEPHONE ENCOUNTER
----- Message from Gisela Fuller, Patient Care Assistant sent at 6/12/2024 11:31 AM CDT -----  Type: Needs Medical Advice  Who Called:  suly  Jesus Alberto Call Back Number: [phone]    Additional Information: usly states he was there yesterday 6/11 at thinks she left her bluish green big pill box out in waiting area or in there treatment room bay 13 , please call to further discuss ,

## 2024-06-12 NOTE — PLAN OF CARE
Problem: Adult Inpatient Plan of Care  Goal: Plan of Care Review  Outcome: Progressing  Flowsheets (Taken 6/12/2024 1246)  Plan of Care Reviewed With: patient  Goal: Patient-Specific Goal (Individualized)  Outcome: Progressing  Flowsheets (Taken 6/12/2024 1246)  Individualized Care Needs: recliner/conversation  Anxieties, Fears or Concerns: none     Problem: Fatigue  Goal: Improved Activity Tolerance  Outcome: Progressing  Intervention: Promote Improved Energy  Flowsheets (Taken 6/12/2024 1246)  Fatigue Management:   activity schedule adjusted   fatigue-related activity identified   frequent rest breaks encouraged   paced activity encouraged  Sleep/Rest Enhancement:   noise level reduced   reading promoted   regular sleep/rest pattern promoted   relaxation techniques promoted   room darkened  Activity Management:   Ambulated -L4   Ambulated to bathroom - L4   Ambulated in canales - L4   Up in stretcher chair - L1  Environmental Support: calm environment promoted   Pt tolerated Zarxio well today. NAD/no concerns voiced. Reviewed follow-up appointments. All questions were answered, ambulated independently at d/c.

## 2024-06-13 ENCOUNTER — INFUSION (OUTPATIENT)
Dept: INFUSION THERAPY | Facility: HOSPITAL | Age: 39
End: 2024-06-13
Attending: INTERNAL MEDICINE
Payer: COMMERCIAL

## 2024-06-13 VITALS
OXYGEN SATURATION: 97 % | HEIGHT: 62 IN | SYSTOLIC BLOOD PRESSURE: 101 MMHG | BODY MASS INDEX: 19.92 KG/M2 | RESPIRATION RATE: 20 BRPM | TEMPERATURE: 97 F | WEIGHT: 108.25 LBS | HEART RATE: 93 BPM | DIASTOLIC BLOOD PRESSURE: 68 MMHG

## 2024-06-13 DIAGNOSIS — C50.919 TRIPLE NEGATIVE BREAST CANCER: Primary | ICD-10-CM

## 2024-06-13 PROCEDURE — 63600175 PHARM REV CODE 636 W HCPCS: Mod: JZ,JB,JG,PN | Performed by: INTERNAL MEDICINE

## 2024-06-13 PROCEDURE — 96372 THER/PROPH/DIAG INJ SC/IM: CPT | Mod: PN

## 2024-06-13 RX ADMIN — FILGRASTIM-SNDZ 300 MCG: 300 INJECTION, SOLUTION INTRAVENOUS; SUBCUTANEOUS at 12:06

## 2024-06-14 ENCOUNTER — INFUSION (OUTPATIENT)
Dept: INFUSION THERAPY | Facility: HOSPITAL | Age: 39
End: 2024-06-14
Attending: INTERNAL MEDICINE
Payer: COMMERCIAL

## 2024-06-14 ENCOUNTER — HOSPITAL ENCOUNTER (OUTPATIENT)
Dept: RADIOLOGY | Facility: HOSPITAL | Age: 39
Discharge: HOME | End: 2024-06-14
Attending: INTERNAL MEDICINE
Payer: COMMERCIAL

## 2024-06-14 VITALS
WEIGHT: 108.25 LBS | HEART RATE: 93 BPM | DIASTOLIC BLOOD PRESSURE: 62 MMHG | TEMPERATURE: 97 F | SYSTOLIC BLOOD PRESSURE: 110 MMHG | OXYGEN SATURATION: 97 % | RESPIRATION RATE: 20 BRPM | HEIGHT: 62 IN | BODY MASS INDEX: 19.92 KG/M2

## 2024-06-14 DIAGNOSIS — C50.919 TRIPLE NEGATIVE BREAST CANCER: ICD-10-CM

## 2024-06-14 DIAGNOSIS — C50.919 TRIPLE NEGATIVE BREAST CANCER: Primary | ICD-10-CM

## 2024-06-14 PROCEDURE — 63600175 PHARM REV CODE 636 W HCPCS: Mod: JZ,JB,JG,PN | Performed by: INTERNAL MEDICINE

## 2024-06-14 PROCEDURE — 96372 THER/PROPH/DIAG INJ SC/IM: CPT | Mod: 59,PN

## 2024-06-14 PROCEDURE — 70553 MRI BRAIN STEM W/O & W/DYE: CPT | Mod: TC,PO

## 2024-06-14 PROCEDURE — A9585 GADOBUTROL INJECTION: HCPCS | Mod: PO | Performed by: INTERNAL MEDICINE

## 2024-06-14 PROCEDURE — 25500020 PHARM REV CODE 255: Mod: PO | Performed by: INTERNAL MEDICINE

## 2024-06-14 PROCEDURE — 70553 MRI BRAIN STEM W/O & W/DYE: CPT | Mod: 26,,, | Performed by: RADIOLOGY

## 2024-06-14 RX ORDER — GADOBUTROL 604.72 MG/ML
4 INJECTION INTRAVENOUS
Status: COMPLETED | OUTPATIENT
Start: 2024-06-14 | End: 2024-06-14

## 2024-06-14 RX ADMIN — FILGRASTIM-SNDZ 300 MCG: 300 INJECTION, SOLUTION INTRAVENOUS; SUBCUTANEOUS at 01:06

## 2024-06-14 RX ADMIN — GADOBUTROL 4 ML: 604.72 INJECTION INTRAVENOUS at 01:06

## 2024-06-17 ENCOUNTER — OFFICE VISIT (OUTPATIENT)
Dept: HEMATOLOGY/ONCOLOGY | Facility: CLINIC | Age: 39
End: 2024-06-17
Payer: COMMERCIAL

## 2024-06-17 ENCOUNTER — LAB VISIT (OUTPATIENT)
Dept: LAB | Facility: HOSPITAL | Age: 39
End: 2024-06-17
Attending: INTERNAL MEDICINE
Payer: COMMERCIAL

## 2024-06-17 VITALS
WEIGHT: 112.44 LBS | RESPIRATION RATE: 19 BRPM | HEART RATE: 94 BPM | OXYGEN SATURATION: 97 % | SYSTOLIC BLOOD PRESSURE: 97 MMHG | TEMPERATURE: 97 F | DIASTOLIC BLOOD PRESSURE: 70 MMHG | HEIGHT: 62 IN | BODY MASS INDEX: 20.69 KG/M2

## 2024-06-17 DIAGNOSIS — G89.3 NEOPLASM RELATED PAIN: Primary | ICD-10-CM

## 2024-06-17 DIAGNOSIS — C50.919 TRIPLE NEGATIVE BREAST CANCER: ICD-10-CM

## 2024-06-17 DIAGNOSIS — D72.819 LEUKOPENIA, UNSPECIFIED TYPE: ICD-10-CM

## 2024-06-17 DIAGNOSIS — R45.89 ANXIETY ABOUT HEALTH: ICD-10-CM

## 2024-06-17 DIAGNOSIS — R74.01 TRANSAMINITIS: ICD-10-CM

## 2024-06-17 DIAGNOSIS — D63.0 ANEMIA IN NEOPLASTIC DISEASE: ICD-10-CM

## 2024-06-17 DIAGNOSIS — Z17.1 MALIGNANT NEOPLASM OF AXILLARY TAIL OF LEFT BREAST IN FEMALE, ESTROGEN RECEPTOR NEGATIVE: ICD-10-CM

## 2024-06-17 DIAGNOSIS — C50.612 MALIGNANT NEOPLASM OF AXILLARY TAIL OF LEFT BREAST IN FEMALE, ESTROGEN RECEPTOR NEGATIVE: ICD-10-CM

## 2024-06-17 LAB
ALBUMIN SERPL BCP-MCNC: 3.9 G/DL (ref 3.5–5.2)
ALP SERPL-CCNC: 72 U/L (ref 55–135)
ALT SERPL W/O P-5'-P-CCNC: 133 U/L (ref 10–44)
ANION GAP SERPL CALC-SCNC: 9 MMOL/L (ref 8–16)
AST SERPL-CCNC: 96 U/L (ref 10–40)
B-HCG UR QL: NEGATIVE
BASOPHILS # BLD AUTO: ABNORMAL K/UL (ref 0–0.2)
BASOPHILS NFR BLD: 1 % (ref 0–1.9)
BILIRUB SERPL-MCNC: 0.4 MG/DL (ref 0.1–1)
BUN SERPL-MCNC: 15 MG/DL (ref 6–20)
CALCIUM SERPL-MCNC: 9.3 MG/DL (ref 8.7–10.5)
CHLORIDE SERPL-SCNC: 103 MMOL/L (ref 95–110)
CO2 SERPL-SCNC: 27 MMOL/L (ref 23–29)
CREAT SERPL-MCNC: 0.7 MG/DL (ref 0.5–1.4)
DIFFERENTIAL METHOD BLD: ABNORMAL
EOSINOPHIL # BLD AUTO: ABNORMAL K/UL (ref 0–0.5)
EOSINOPHIL NFR BLD: 1 % (ref 0–8)
ERYTHROCYTE [DISTWIDTH] IN BLOOD BY AUTOMATED COUNT: 13.5 % (ref 11.5–14.5)
EST. GFR  (NO RACE VARIABLE): >60 ML/MIN/1.73 M^2
GLUCOSE SERPL-MCNC: 72 MG/DL (ref 70–110)
HCT VFR BLD AUTO: 31 % (ref 37–48.5)
HGB BLD-MCNC: 10.5 G/DL (ref 12–16)
IMM GRANULOCYTES # BLD AUTO: ABNORMAL K/UL (ref 0–0.04)
IMM GRANULOCYTES NFR BLD AUTO: ABNORMAL % (ref 0–0.5)
LYMPHOCYTES # BLD AUTO: ABNORMAL K/UL (ref 1–4.8)
LYMPHOCYTES NFR BLD: 30 % (ref 18–48)
MAGNESIUM SERPL-MCNC: 2 MG/DL (ref 1.6–2.6)
MCH RBC QN AUTO: 30.5 PG (ref 27–31)
MCHC RBC AUTO-ENTMCNC: 33.9 G/DL (ref 32–36)
MCV RBC AUTO: 90 FL (ref 82–98)
METAMYELOCYTES NFR BLD MANUAL: 3 %
MONOCYTES # BLD AUTO: ABNORMAL K/UL (ref 0.3–1)
MONOCYTES NFR BLD: 7 % (ref 4–15)
MYELOCYTES NFR BLD MANUAL: 3 %
NEUTROPHILS NFR BLD: 50 % (ref 38–73)
NEUTS BAND NFR BLD MANUAL: 5 %
NRBC BLD-RTO: 0 /100 WBC
PLATELET # BLD AUTO: 91 K/UL (ref 150–450)
PLATELET BLD QL SMEAR: ABNORMAL
PMV BLD AUTO: 9.4 FL (ref 9.2–12.9)
POTASSIUM SERPL-SCNC: 4.1 MMOL/L (ref 3.5–5.1)
PROT SERPL-MCNC: 5.9 G/DL (ref 6–8.4)
RBC # BLD AUTO: 3.44 M/UL (ref 4–5.4)
SODIUM SERPL-SCNC: 139 MMOL/L (ref 136–145)
WBC # BLD AUTO: 3.71 K/UL (ref 3.9–12.7)

## 2024-06-17 PROCEDURE — 99999 PR PBB SHADOW E&M-EST. PATIENT-LVL IV: CPT | Mod: PBBFAC,,, | Performed by: INTERNAL MEDICINE

## 2024-06-17 PROCEDURE — 83735 ASSAY OF MAGNESIUM: CPT | Mod: PN | Performed by: INTERNAL MEDICINE

## 2024-06-17 PROCEDURE — G2211 COMPLEX E/M VISIT ADD ON: HCPCS | Mod: S$GLB,,, | Performed by: INTERNAL MEDICINE

## 2024-06-17 PROCEDURE — 1159F MED LIST DOCD IN RCRD: CPT | Mod: CPTII,S$GLB,, | Performed by: INTERNAL MEDICINE

## 2024-06-17 PROCEDURE — 81025 URINE PREGNANCY TEST: CPT | Mod: PN | Performed by: INTERNAL MEDICINE

## 2024-06-17 PROCEDURE — 3008F BODY MASS INDEX DOCD: CPT | Mod: CPTII,S$GLB,, | Performed by: INTERNAL MEDICINE

## 2024-06-17 PROCEDURE — 85027 COMPLETE CBC AUTOMATED: CPT | Mod: PN | Performed by: INTERNAL MEDICINE

## 2024-06-17 PROCEDURE — 80053 COMPREHEN METABOLIC PANEL: CPT | Mod: PN | Performed by: INTERNAL MEDICINE

## 2024-06-17 PROCEDURE — 99215 OFFICE O/P EST HI 40 MIN: CPT | Mod: S$GLB,,, | Performed by: INTERNAL MEDICINE

## 2024-06-17 PROCEDURE — 85007 BL SMEAR W/DIFF WBC COUNT: CPT | Mod: PN | Performed by: INTERNAL MEDICINE

## 2024-06-17 PROCEDURE — 3074F SYST BP LT 130 MM HG: CPT | Mod: CPTII,S$GLB,, | Performed by: INTERNAL MEDICINE

## 2024-06-17 PROCEDURE — 36415 COLL VENOUS BLD VENIPUNCTURE: CPT | Mod: PN | Performed by: INTERNAL MEDICINE

## 2024-06-17 PROCEDURE — 3078F DIAST BP <80 MM HG: CPT | Mod: CPTII,S$GLB,, | Performed by: INTERNAL MEDICINE

## 2024-06-17 RX ORDER — OXYCODONE HYDROCHLORIDE 10 MG/1
10 TABLET ORAL EVERY 4 HOURS PRN
Qty: 45 TABLET | Refills: 0 | Status: SHIPPED | OUTPATIENT
Start: 2024-06-17

## 2024-06-17 NOTE — PROGRESS NOTES
Name: Aleena Duckworth  MRN:  [id number]  :  1985 Age 38 y.o.  Date of Service: 2024    Reason for visit:  Aleena Duckworth is a 38 y.o. female here regarding...    # Invasive Ductal Carcinoma of the Left Breast  Date of Original Diagnosis:  2023  Recurrence: 5/15/24, localized  Original Stage: pT1b pN0(sn) triple negative, grade 3, Ki-67 80%  Recurrence Stage: [id number] Stage 2B Triple negative,  grade 3, Ki-67 76%  Current Sites of Disease:   left axillary tail/lymph node  Current Goals of Therapy:  Curative  Current Therapy:   keynote 522 NACT     Oncologic History/History of Present Illness:   Former patient of Dr. Ayala, per her notes  self palpated a left-sided breast mass.    2023  bilateral breast mammogram that showed a lobular hypoechoic solid mass within the left breast at 2 o'clock position 7 cm     2023, ultrasound guided biopsy = invasive ductal carcinoma, grade 3, ER/NE/HER2 Randall negative, Ki-67 80%.     23  left partial mastectomy that revealed invasive ductal carcinoma 9 x 8 x 6 mm, single focus, approximately 1 mm focus of high-grade DCIS, closest margin 3 mm, anterior.  Two sentinel lymph nodes were negative.  pT1b pN0(sn).    23 cycle 1 of Docetaxel and Cytoxan with numerous SE  2023 cycle 2 of TC with 20% dose reduction of docetaxel.  With again numerous side effects.     Extensive GOC discussion held with patient and family and decided to stop chemotherapy.     2023 s/p bilateral mastectomy, implant removal and hybrid flap and small implants    2024 Reports to [facility] to establish care with me. Reports left lateral axillary tail nodules.  Causing anxiety.    24    mammogram with ultrasound BI-RADS 4- 11 mm left axillary tail mass  5/15/24   left axillary tail  and lymph node positive for  poorly differentiated carcinoma, triple negative,  staging scans negative.    24  Had a prolonged discussion with patient's , father, mother  "she was motivated to pursue neoadjuvant chemotherapy.    Interval history- presents for consideration of cycle 1 day 15, reports doing very well symptomatically with premeds and patch, reports no nausea, eating and drinking normally, pain controlled.    PHYSICAL EXAMINATION:  BP 97/70 (BP Location: Right arm, Patient Position: Sitting, BP Method: Medium (Automatic))   Pulse 94   Temp 97.4 °F (36.3 °C) (Temporal)   Resp 19   Ht 5' 2" (1.575 m)   Wt 51 kg (112 lb 7 oz)   SpO2 97%   BMI 20.56 kg/m²   Wt Readings from Last 3 Encounters:   06/17/24 51 kg (112 lb 7 oz)   06/14/24 49.1 kg (108 lb 3.9 oz)   06/13/24 49.1 kg (108 lb 3.9 oz)     ECOG PERFORMANCE STATUS: 0  Physical Exam   General:  Well-appearing, nontoxic  Eyes:  Equal and round pupils, EOMI, no scleral icterus  Mouth:  No lesions, moist  Cardiovascular:  Warm, well-perfused, no peripheral edema  Lungs:  Unlabored on room air, no wheezing  Neurologic:  Awake, alert and oriented, participating in the exam  Psych:  Appropriate mood and affect  Skin:  Normal pallor, No rashes  Heme:  No petechiae, no purpura  Breasts:     LABORATORY:  CBC  Lab Results   Component Value Date    WBC 3.71 (L) 06/17/2024    HGB 10.5 (L) 06/17/2024    HCT 31.0 (L) 06/17/2024    MCV 90 06/17/2024    PLT 91 (L) 06/17/2024         BMP  Lab Results   Component Value Date     06/17/2024    K 4.1 06/17/2024     06/17/2024    CO2 27 06/17/2024    BUN 15 06/17/2024    CREATININE 0.7 06/17/2024    CALCIUM 9.3 06/17/2024    ANIONGAP 9 06/17/2024    ESTGFRAFRICA >60.0 06/08/2019    EGFRNONAA 53.3 (A) 06/08/2019         PATHOLOGY:  DIAGNOSIS:  05/17/2024 LORENAH/carson  LEFT AXILLARY TAIL, ULTRASOUND-GUIDED NEEDLE CORE BIOPSY:  --FRAGMENTS OF LYMPH NODE POSITIVE FOR METASTATIC POORLY DIFFERENTIATED   CARCINOMA (SEE COMMENT).  --RECEPTOR STUDIES PENDING.      RADIOLOGY:  CT chest abdomen pelvis 05/24/2024  Impression:  1. Postoperative changes of bilateral breast reconstruction with " breast implants in place.  Two small subcutaneous soft tissue nodules along the left axillary tail in this patient with biopsy-proven left axillary lymph node triple negative breast cancer, as detailed above.  Correlate with recent mammographic imaging/biopsy.  2. No evidence of metastatic disease within the chest, abdomen, or pelvis.  3. Mild bilateral pelviectasis without evidence of gina hydronephrosis or obstructive uropathy, nonspecific and may be physiologic.    NM Bone Scan  05/24/2024  Impression:  No evidence of metastatic disease      ASSESSMENT AND PLAN:  Aleena Duckworth is a 38 y.o. female with...    # Invasive Ductal Carcinoma of the Left Breast  Date of Original Diagnosis:  02/09/2023  Recurrence: 5/15/24, localized  Original Stage: pT1b pN0(sn) triple negative, grade 3, Ki-67 80%  Recurrence Stage: [id number] Stage 2B Triple negative,  grade 3, Ki-67 76%  Current Sites of Disease:   left axillary tail/lymph node  Current Goals of Therapy:  Curative  Current Therapy:   pending neoadjuvant chemotherapy        Staging CT and PET negative for distant disease    Added 1 mg of Ativan for each dose of chemotherapy given her adverse reactions in the past and anxiety surrounding chemo  Added filgrastim to  days 2,3,4/9,10,11/16,17,18  given history of neutropenic fever with TC regimen,  can give additional antiemetics if necessary  Advised patient she needs to have weekly mental health visits while on chemotherapy, referral to Oncology Psychology    6/17/24--one-week delay for cycle 1 day 15 of chemotherapy due to thrombocytopenia and LFT elevation, platelets 91,000, repeat CBC and CMP on 06/24.     Will see her weekly during Cycles 1-4     #LFT elevation- advised to d/c OTC meds, one week delay in chemotherapy  # medication induced constipation- advised to take BID docusate and PRN senna   # anxiety about health- as noted above,  could be exacerbated by Adderall  # history of neutropenic fever- as above  #  thrombocytopenia- fluctuating platelet levels, commonly this is associated with over-the-counter medications or other medications, encouraged cessation of all OTCs, changed Percocet to Niki    #ADHD, has been on Adderall for several years.  Was born prematurely, was diagnosed with a stroke and heart murmur at birth, was treated at [facility].     #Heavy menorrhagia for the past few years, worsening recently and has developed CAROLIN.  Has been on iron supplementation.  Sees gynecologist, Dr. Almita Clark with [facility].      #Hyperbilirubinemia - An abdominal ultrasound was done, negative for any abnormalities.Saw Hepatology prior to chemo who suspected she has Gilbert's disease and found no contraindication to proceeding with TC.    Eunice Cruz M.D.  Hematology/Oncology       Route Chart for Scheduling    Med Onc Chart Routing      Follow up with physician . Needs weekly appointments with me or Wyatt though cycle 4 of chemotherapy, wants Mondays if possible   Follow up with NUNO    Infusion scheduling note    Injection scheduling note one week delay due to LFTs and thrombocytopenia, repeat cbc and cmp on Monday 6/24 for clearance of c1d15   Labs   Scheduling:  Preferred lab:  Lab interval:  Standing labs on Monday, no TSH   Imaging   No imaging   Pharmacy appointment    Other referrals                Treatment Plan Information   OP PEMBROLIZUMAB CARBOPLATIN (AUC 5) WITH WEEKLY PACLITAXEL FOLLOWED BY PEMBROLIZUMAB DOXORUBICIN CYCLOPHOSPHAMIDE FOLLOWED BY PEMBROLIZUMAB 200 MG Q3W   Eunice Cruz MD   Upcoming Treatment Dates - OP PEMBROLIZUMAB CARBOPLATIN (AUC 5) WITH WEEKLY PACLITAXEL FOLLOWED BY PEMBROLIZUMAB DOXORUBICIN CYCLOPHOSPHAMIDE FOLLOWED BY PEMBROLIZUMAB 200 MG Q3W    6/18/2024       Pre-Medications       dexAMETHasone (DECADRON) 10 mg in sodium chloride 0.9% 50 mL IVPB       diphenhydrAMINE (BENADRYL) 50 mg in NS 50 mL IVPB       famotidine (PF) injection 20 mg       Chemotherapy       PACLitaxeL  (TAXOL) 80 mg/m2 = 114 mg in sodium chloride 0.9% 250 mL chemo infusion       Supportive Care       LORazepam injection 1 mg       prochlorperazine injection Soln 10 mg  6/19/2024       Growth Factor       filgrastim-sndz (ZARXIO) injection 300 mcg/0.5 mL (Preferred Regimen)  6/20/2024       Growth Factor       filgrastim-sndz (ZARXIO) injection 300 mcg/0.5 mL (Preferred Regimen)  6/21/2024       Growth Factor       filgrastim-sndz (ZARXIO) injection 300 mcg/0.5 mL (Preferred Regimen)    Therapy Plan Information  IV Fluids  sodium chloride 0.9% bolus 1,000 mL 1,000 mL  1,000 mL, Intravenous, Every visit  Flushes  sodium chloride 0.9% flush 10 mL  10 mL, Intravenous, PRN  heparin, porcine (PF) 100 unit/mL injection flush 500 Units  500 Units, Intravenous, PRN  Antiemetics  ondansetron injection 8 mg  8 mg, Intravenous, PRN  promethazine (PHENERGAN) 12.5 mg in dextrose 5 % (D5W) 50 mL IVPB  12.5 mg, Intravenous, PRN  dexAMETHasone injection 8 mg  8 mg, Intravenous, PRN

## 2024-06-18 ENCOUNTER — TELEPHONE (OUTPATIENT)
Dept: HEMATOLOGY/ONCOLOGY | Facility: CLINIC | Age: 39
End: 2024-06-18
Payer: COMMERCIAL

## 2024-06-18 NOTE — NURSING
Spoke with pt.  She has been feeling pretty good.  She is trying to work as much as she can (to keep her health insurance) so she asked me if I could help her with her schedule.  She needs short appts to be as late as they can be.  Zarxio injections were changed to 445pm.  Answered her questions.  Encouraged her to call anytime.  She thanked me and verbalized understanding.

## 2024-06-19 ENCOUNTER — TELEPHONE (OUTPATIENT)
Dept: PSYCHIATRY | Facility: CLINIC | Age: 39
End: 2024-06-19
Payer: COMMERCIAL

## 2024-06-19 NOTE — TELEPHONE ENCOUNTER
Pt was contacted to follow up from her intake session. She reported that she is doing well. She does not require assistance at this time with finding a community provider.

## 2024-06-24 ENCOUNTER — OFFICE VISIT (OUTPATIENT)
Dept: HEMATOLOGY/ONCOLOGY | Facility: CLINIC | Age: 39
End: 2024-06-24
Payer: COMMERCIAL

## 2024-06-24 ENCOUNTER — INFUSION (OUTPATIENT)
Dept: INFUSION THERAPY | Facility: HOSPITAL | Age: 39
End: 2024-06-24
Attending: INTERNAL MEDICINE
Payer: COMMERCIAL

## 2024-06-24 ENCOUNTER — LAB VISIT (OUTPATIENT)
Dept: LAB | Facility: HOSPITAL | Age: 39
End: 2024-06-24
Attending: INTERNAL MEDICINE
Payer: COMMERCIAL

## 2024-06-24 VITALS
DIASTOLIC BLOOD PRESSURE: 68 MMHG | SYSTOLIC BLOOD PRESSURE: 98 MMHG | BODY MASS INDEX: 20.05 KG/M2 | HEIGHT: 62 IN | WEIGHT: 108.94 LBS | OXYGEN SATURATION: 100 % | RESPIRATION RATE: 17 BRPM | HEART RATE: 84 BPM | TEMPERATURE: 97 F

## 2024-06-24 VITALS
BODY MASS INDEX: 20.05 KG/M2 | SYSTOLIC BLOOD PRESSURE: 95 MMHG | RESPIRATION RATE: 18 BRPM | HEIGHT: 62 IN | HEART RATE: 82 BPM | DIASTOLIC BLOOD PRESSURE: 66 MMHG | OXYGEN SATURATION: 100 % | WEIGHT: 108.94 LBS | TEMPERATURE: 97 F

## 2024-06-24 DIAGNOSIS — C50.919 TRIPLE NEGATIVE BREAST CANCER: Primary | ICD-10-CM

## 2024-06-24 DIAGNOSIS — C50.919 TRIPLE NEGATIVE BREAST CANCER: ICD-10-CM

## 2024-06-24 LAB
ALBUMIN SERPL BCP-MCNC: 4 G/DL (ref 3.5–5.2)
ALP SERPL-CCNC: 85 U/L (ref 55–135)
ALT SERPL W/O P-5'-P-CCNC: 74 U/L (ref 10–44)
ANION GAP SERPL CALC-SCNC: 10 MMOL/L (ref 8–16)
AST SERPL-CCNC: 32 U/L (ref 10–40)
B-HCG UR QL: NEGATIVE
BASOPHILS # BLD AUTO: 0.01 K/UL (ref 0–0.2)
BASOPHILS NFR BLD: 0.3 % (ref 0–1.9)
BILIRUB SERPL-MCNC: 1.1 MG/DL (ref 0.1–1)
BUN SERPL-MCNC: 16 MG/DL (ref 6–20)
CALCIUM SERPL-MCNC: 9.2 MG/DL (ref 8.7–10.5)
CHLORIDE SERPL-SCNC: 106 MMOL/L (ref 95–110)
CO2 SERPL-SCNC: 23 MMOL/L (ref 23–29)
CREAT SERPL-MCNC: 0.7 MG/DL (ref 0.5–1.4)
DIFFERENTIAL METHOD BLD: ABNORMAL
EOSINOPHIL # BLD AUTO: 0 K/UL (ref 0–0.5)
EOSINOPHIL NFR BLD: 0.6 % (ref 0–8)
ERYTHROCYTE [DISTWIDTH] IN BLOOD BY AUTOMATED COUNT: 13.7 % (ref 11.5–14.5)
EST. GFR  (NO RACE VARIABLE): >60 ML/MIN/1.73 M^2
GLUCOSE SERPL-MCNC: 82 MG/DL (ref 70–110)
HCT VFR BLD AUTO: 31.5 % (ref 37–48.5)
HGB BLD-MCNC: 10.8 G/DL (ref 12–16)
IMM GRANULOCYTES # BLD AUTO: 0.01 K/UL (ref 0–0.04)
IMM GRANULOCYTES NFR BLD AUTO: 0.3 % (ref 0–0.5)
LYMPHOCYTES # BLD AUTO: 0.7 K/UL (ref 1–4.8)
LYMPHOCYTES NFR BLD: 21.1 % (ref 18–48)
MAGNESIUM SERPL-MCNC: 2 MG/DL (ref 1.6–2.6)
MCH RBC QN AUTO: 30.4 PG (ref 27–31)
MCHC RBC AUTO-ENTMCNC: 34.3 G/DL (ref 32–36)
MCV RBC AUTO: 89 FL (ref 82–98)
MONOCYTES # BLD AUTO: 0.3 K/UL (ref 0.3–1)
MONOCYTES NFR BLD: 7.9 % (ref 4–15)
NEUTROPHILS # BLD AUTO: 2.3 K/UL (ref 1.8–7.7)
NEUTROPHILS NFR BLD: 69.8 % (ref 38–73)
NRBC BLD-RTO: 0 /100 WBC
PLATELET # BLD AUTO: 91 K/UL (ref 150–450)
PMV BLD AUTO: 9 FL (ref 9.2–12.9)
POTASSIUM SERPL-SCNC: 3.9 MMOL/L (ref 3.5–5.1)
PROT SERPL-MCNC: 5.4 G/DL (ref 6–8.4)
RBC # BLD AUTO: 3.55 M/UL (ref 4–5.4)
SODIUM SERPL-SCNC: 139 MMOL/L (ref 136–145)
TSH SERPL DL<=0.005 MIU/L-ACNC: 0.86 UIU/ML (ref 0.4–4)
WBC # BLD AUTO: 3.31 K/UL (ref 3.9–12.7)

## 2024-06-24 PROCEDURE — 80053 COMPREHEN METABOLIC PANEL: CPT | Mod: PN | Performed by: INTERNAL MEDICINE

## 2024-06-24 PROCEDURE — 3008F BODY MASS INDEX DOCD: CPT | Mod: CPTII,S$GLB,, | Performed by: NURSE PRACTITIONER

## 2024-06-24 PROCEDURE — 96413 CHEMO IV INFUSION 1 HR: CPT | Mod: PN

## 2024-06-24 PROCEDURE — 25000003 PHARM REV CODE 250: Mod: PN | Performed by: INTERNAL MEDICINE

## 2024-06-24 PROCEDURE — 3078F DIAST BP <80 MM HG: CPT | Mod: CPTII,S$GLB,, | Performed by: NURSE PRACTITIONER

## 2024-06-24 PROCEDURE — 3074F SYST BP LT 130 MM HG: CPT | Mod: CPTII,S$GLB,, | Performed by: NURSE PRACTITIONER

## 2024-06-24 PROCEDURE — 81025 URINE PREGNANCY TEST: CPT | Mod: PN | Performed by: INTERNAL MEDICINE

## 2024-06-24 PROCEDURE — 96367 TX/PROPH/DG ADDL SEQ IV INF: CPT | Mod: PN

## 2024-06-24 PROCEDURE — 99999 PR PBB SHADOW E&M-EST. PATIENT-LVL IV: CPT | Mod: PBBFAC,,, | Performed by: NURSE PRACTITIONER

## 2024-06-24 PROCEDURE — A4216 STERILE WATER/SALINE, 10 ML: HCPCS | Mod: PN | Performed by: INTERNAL MEDICINE

## 2024-06-24 PROCEDURE — 63600175 PHARM REV CODE 636 W HCPCS: Mod: PN | Performed by: INTERNAL MEDICINE

## 2024-06-24 PROCEDURE — 96375 TX/PRO/DX INJ NEW DRUG ADDON: CPT | Mod: PN

## 2024-06-24 PROCEDURE — 83735 ASSAY OF MAGNESIUM: CPT | Mod: PN | Performed by: INTERNAL MEDICINE

## 2024-06-24 PROCEDURE — 99213 OFFICE O/P EST LOW 20 MIN: CPT | Mod: S$GLB,,, | Performed by: NURSE PRACTITIONER

## 2024-06-24 PROCEDURE — 85025 COMPLETE CBC W/AUTO DIFF WBC: CPT | Mod: PN | Performed by: INTERNAL MEDICINE

## 2024-06-24 PROCEDURE — 36415 COLL VENOUS BLD VENIPUNCTURE: CPT | Mod: PN | Performed by: INTERNAL MEDICINE

## 2024-06-24 PROCEDURE — 84443 ASSAY THYROID STIM HORMONE: CPT | Performed by: INTERNAL MEDICINE

## 2024-06-24 RX ORDER — EPINEPHRINE 0.3 MG/.3ML
0.3 INJECTION SUBCUTANEOUS ONCE AS NEEDED
Status: DISCONTINUED | OUTPATIENT
Start: 2024-06-24 | End: 2024-06-24 | Stop reason: HOSPADM

## 2024-06-24 RX ORDER — FAMOTIDINE 10 MG/ML
20 INJECTION INTRAVENOUS
Status: COMPLETED | OUTPATIENT
Start: 2024-06-24 | End: 2024-06-24

## 2024-06-24 RX ORDER — LORAZEPAM 2 MG/ML
1 INJECTION INTRAMUSCULAR ONCE AS NEEDED
Status: CANCELLED
Start: 2024-06-24

## 2024-06-24 RX ORDER — PROCHLORPERAZINE EDISYLATE 5 MG/ML
10 INJECTION INTRAMUSCULAR; INTRAVENOUS ONCE AS NEEDED
Status: CANCELLED
Start: 2024-06-24

## 2024-06-24 RX ORDER — LORAZEPAM 2 MG/ML
1 INJECTION INTRAMUSCULAR ONCE AS NEEDED
Status: DISCONTINUED | OUTPATIENT
Start: 2024-06-24 | End: 2024-06-24 | Stop reason: HOSPADM

## 2024-06-24 RX ORDER — SODIUM CHLORIDE 0.9 % (FLUSH) 0.9 %
10 SYRINGE (ML) INJECTION
Status: DISCONTINUED | OUTPATIENT
Start: 2024-06-24 | End: 2024-06-24 | Stop reason: HOSPADM

## 2024-06-24 RX ORDER — HEPARIN 100 UNIT/ML
500 SYRINGE INTRAVENOUS
Status: DISCONTINUED | OUTPATIENT
Start: 2024-06-24 | End: 2024-06-24 | Stop reason: HOSPADM

## 2024-06-24 RX ORDER — DIPHENHYDRAMINE HYDROCHLORIDE 50 MG/ML
50 INJECTION INTRAMUSCULAR; INTRAVENOUS ONCE AS NEEDED
Status: DISCONTINUED | OUTPATIENT
Start: 2024-06-24 | End: 2024-06-24 | Stop reason: HOSPADM

## 2024-06-24 RX ORDER — PROCHLORPERAZINE EDISYLATE 5 MG/ML
10 INJECTION INTRAMUSCULAR; INTRAVENOUS ONCE AS NEEDED
Status: DISCONTINUED | OUTPATIENT
Start: 2024-06-24 | End: 2024-06-24 | Stop reason: HOSPADM

## 2024-06-24 RX ORDER — GRANISETRON 3.1 MG/24H
PATCH TRANSDERMAL
COMMUNITY
Start: 2024-05-31

## 2024-06-24 RX ADMIN — PACLITAXEL 114 MG: 6 INJECTION, SOLUTION INTRAVENOUS at 12:06

## 2024-06-24 RX ADMIN — Medication 10 ML: at 01:06

## 2024-06-24 RX ADMIN — FAMOTIDINE 20 MG: 10 INJECTION INTRAVENOUS at 11:06

## 2024-06-24 RX ADMIN — DEXAMETHASONE SODIUM PHOSPHATE 10 MG: 10 INJECTION, SOLUTION INTRAMUSCULAR; INTRAVENOUS at 11:06

## 2024-06-24 RX ADMIN — SODIUM CHLORIDE: 9 INJECTION, SOLUTION INTRAVENOUS at 10:06

## 2024-06-24 RX ADMIN — DIPHENHYDRAMINE HYDROCHLORIDE 50 MG: 50 INJECTION, SOLUTION INTRAMUSCULAR; INTRAVENOUS at 11:06

## 2024-06-24 NOTE — PLAN OF CARE
"  Problem: Adult Inpatient Plan of Care  Goal: Patient-Specific Goal (Individualized)  Outcome: Progressing  Flowsheets (Taken 6/24/2024 1050)  Individualized Care Needs:   recliner, offered refreshments/amenities   cryotherapy for hands/feet   declined dignicaps   conversation   lights dimmed  Anxieties, Fears or Concerns: neuropathy, generalized pain     Problem: Oncology Care  Goal: Effective Coping  Intervention: Support and Enhance Coping Strategies  Flowsheets (Taken 6/24/2024 1050)  Supportive Measures: active listening utilized  Environmental Support:   calm environment promoted   rest periods encouraged     Patient presents for C1D15 Taxol. Platelets are 91 today, "ok to treat" provided by WES Torres NP.   UPT done prior to treatment.  Port accessed to right chest without difficulty; patent with blood return.  Reviewed symptoms, side effects, and home medications, I.e. Dexamethasone, Zyprexa.    "

## 2024-06-24 NOTE — PLAN OF CARE
Problem: Adult Inpatient Plan of Care  Goal: Plan of Care Review  Outcome: Progressing  Flowsheets (Taken 6/24/2024 1320)  Plan of Care Reviewed With: patient     Patient tolerated Taxol without incident. Port flushed with blood return, saline locked, and de-accessed. AVS provided ;schedule reviewed. Ambulates per self.   No acute distress noted.  Next appointment 6/25/24 for Zarxio.  Cryotherapy provided for wrists and ankles. Patient tolerated for the most part. Encouraged patient to continue for future appointment to reduce the effects of neuropathy.    Message sent to charge nurses to contact clinic staff regarding patient's appointment change request for 7/1.

## 2024-06-24 NOTE — PROGRESS NOTES
Name: Aleena Duckworth  MRN:  [id number]  :  1985 Age 38 y.o.  Date of Service: 2024    Reason for visit:  Aleena Duckworth is a 38 y.o. female here regarding...clearance for C1D15 of Taxol    # Invasive Ductal Carcinoma of the Left Breast  Date of Original Diagnosis:  2023  Recurrence: 5/15/24, localized  Original Stage: pT1b pN0(sn) triple negative, grade 3, Ki-67 80%  Recurrence Stage: [id number] Stage 2B Triple negative,  grade 3, Ki-67 76%  Current Sites of Disease:   left axillary tail/lymph node  Current Goals of Therapy:  Curative  Current Therapy:   keynote 522 NACT     Oncologic History/History of Present Illness:   Former patient of Dr. Ayala, per her notes  self palpated a left-sided breast mass.    2023  bilateral breast mammogram that showed a lobular hypoechoic solid mass within the left breast at 2 o'clock position 7 cm     2023, ultrasound guided biopsy = invasive ductal carcinoma, grade 3, ER/HI/HER2 Randall negative, Ki-67 80%.     23  left partial mastectomy that revealed invasive ductal carcinoma 9 x 8 x 6 mm, single focus, approximately 1 mm focus of high-grade DCIS, closest margin 3 mm, anterior.  Two sentinel lymph nodes were negative.  pT1b pN0(sn).    23 cycle 1 of Docetaxel and Cytoxan with numerous SE  2023 cycle 2 of TC with 20% dose reduction of docetaxel.  With again numerous side effects.     Extensive GOC discussion held with patient and family and decided to stop chemotherapy.     2023 s/p bilateral mastectomy, implant removal and hybrid flap and small implants    2024 Reports to [facility] to establish care with me. Reports left lateral axillary tail nodules.  Causing anxiety.    24    mammogram with ultrasound BI-RADS 4- 11 mm left axillary tail mass  5/15/24   left axillary tail  and lymph node positive for  poorly differentiated carcinoma, triple negative,  staging scans negative.    24  Had a prolonged discussion with patient's  , father, mother she was motivated to pursue neoadjuvant chemotherapy.    06/24/24:  C1D15 delayed last week due to platelets = 91k.  Presents to the clinic for clearance for C1D15; Denies any N/V/D; appetite fair; weight loss of 3 1/2 pounds in 1 week.  Remains working FT; needs to move C2D1 to Tuesday of next week 07/02 due to work.  Dr. Cruz reviewed labs & is ok to proceed with today's labs of Platelets = 91k       Oncology History   Triple negative breast cancer   4/17/2023 Initial Diagnosis    Triple negative breast cancer     4/18/2023 Cancer Staged    Staging form: Breast, AJCC 8th Edition  - Pathologic stage from 4/18/2023: Stage IB (pT1b, pN0, cM0, G3, ER-, IA-, HER2-)     5/1/2023 - 5/25/2023 Chemotherapy    Treatment Summary   Plan Name: OP BREAST TC - DOCETAXEL CYCLOPHOSPHAMIDE Q3W  Treatment Goal: Curative  Status: Inactive  Start Date: 5/1/2023  End Date: 5/25/2023  Provider: Ambar Ayala MD  Chemotherapy: cycloPHOSphamide 600 mg/m2 = 880 mg in sodium chloride 0.9% 289.4 mL chemo infusion, 600 mg/m2 = 880 mg, Intravenous, Clinic/HOD 1 time, 2 of 4 cycles  Dose modification: 600 mg/m2 (Cycle 3), 480 mg/m2 (Cycle 3, Reason: Dose not tolerated)  Administration: 880 mg (5/1/2023)  DOCEtaxel 75 mg/m2 = 110 mg in sodium chloride 0.9% 290.5 mL chemo infusion, 75 mg/m2 = 110 mg, Intravenous, Clinic/HOD 1 time, 2 of 4 cycles  Dose modification: 60 mg/m2 (original dose 75 mg/m2, Cycle 2, Reason: Dose not tolerated)  Administration: 110 mg (5/1/2023), 90 mg (5/24/2023)     5/22/2024 Cancer Staged    Staging form: Breast, AJCC 8th Edition  - Clinical stage from 5/22/2024: Stage IIB (rcT0, cN1(f), cM0, G3, ER-, IA-, HER2-)     6/4/2024 -  Chemotherapy    Treatment Summary   Plan Name: OP PEMBROLIZUMAB CARBOPLATIN (AUC 5) WITH WEEKLY PACLITAXEL FOLLOWED BY PEMBROLIZUMAB DOXORUBICIN CYCLOPHOSPHAMIDE FOLLOWED BY PEMBROLIZUMAB 200 MG Q3W  Treatment Goal: Curative  Status: Active  Start Date: 6/4/2024  End  "Date: 5/12/2025 (Planned)  Provider: Eunice Cruz MD  Chemotherapy: DOXOrubicin chemo injection 86 mg, 60 mg/m2 = 86 mg, Intravenous, Clinic/HOD 1 time, 0 of 4 cycles  CARBOplatin (PARAPLATIN) 525 mg in sodium chloride 0.9% 302.5 mL chemo infusion, 525 mg, Intravenous, Clinic/HOD 1 time, 1 of 4 cycles  Administration: 525 mg (6/4/2024)  cycloPHOSphamide 600 mg/m2 = 860 mg in sodium chloride 0.9% 254.3 mL chemo infusion, 600 mg/m2 = 860 mg, Intravenous, Clinic/HOD 1 time, 0 of 4 cycles  PACLitaxeL (TAXOL) 80 mg/m2 = 114 mg in sodium chloride 0.9% 250 mL chemo infusion, 80 mg/m2 = 114 mg, Intravenous, Clinic/HOD 1 time, 1 of 4 cycles  Administration: 114 mg (6/4/2024), 114 mg (6/11/2024)           PHYSICAL EXAMINATION:  BP 95/66 (BP Location: Left arm, Patient Position: Sitting, BP Method: Medium (Automatic))   Pulse 82   Temp 97.4 °F (36.3 °C) (Temporal)   Resp 18   Ht 5' 2" (1.575 m)   Wt 49.4 kg (108 lb 14.5 oz)   SpO2 100%   BMI 19.92 kg/m²   Wt Readings from Last 3 Encounters:   06/24/24 49.4 kg (108 lb 14.5 oz)   06/17/24 51 kg (112 lb 7 oz)   06/14/24 49.1 kg (108 lb 3.9 oz)     ECOG PERFORMANCE STATUS: 0  Physical Exam   General:  Well-appearing, nontoxic  Eyes:  Equal and round pupils, EOMI, no scleral icterus  Mouth:  No lesions, moist  Cardiovascular:  Warm, well-perfused, no peripheral edema  Lungs:  Unlabored on room air, no wheezing  Neurologic:  Awake, alert and oriented, participating in the exam  Psych:  Appropriate mood and affect  Skin:  Pale pallor, No rashes  Heme:  No petechiae, no purpura       LABORATORY:  CBC  Lab Results   Component Value Date    WBC 3.31 (L) 06/24/2024    HGB 10.8 (L) 06/24/2024    HCT 31.5 (L) 06/24/2024    MCV 89 06/24/2024    PLT 91 (L) 06/24/2024     ANC = 2.3    BMP  Lab Results   Component Value Date     06/24/2024    K 3.9 06/24/2024     06/24/2024    CO2 23 06/24/2024    BUN 16 06/24/2024    CREATININE 0.7 06/24/2024    CALCIUM 9.2 06/24/2024 "    ANIONGAP 10 06/24/2024    ESTGFRAFRICA >60.0 06/08/2019    EGFRNONAA 53.3 (A) 06/08/2019     UPT:  negative  Magnesium:  2.0   Lab Results   Component Value Date    TSH 1.891 05/31/2024    FREET4 1.00 03/06/2024     TSH:  pending today    PATHOLOGY:  DIAGNOSIS:  05/17/2024 [facility]  LEFT AXILLARY TAIL, ULTRASOUND-GUIDED NEEDLE CORE BIOPSY:  --FRAGMENTS OF LYMPH NODE POSITIVE FOR METASTATIC POORLY DIFFERENTIATED   CARCINOMA (SEE COMMENT).  --RECEPTOR STUDIES PENDING.      RADIOLOGY:  CT chest abdomen pelvis 05/24/2024  Impression:  1. Postoperative changes of bilateral breast reconstruction with breast implants in place.  Two small subcutaneous soft tissue nodules along the left axillary tail in this patient with biopsy-proven left axillary lymph node triple negative breast cancer, as detailed above.  Correlate with recent mammographic imaging/biopsy.  2. No evidence of metastatic disease within the chest, abdomen, or pelvis.  3. Mild bilateral pelviectasis without evidence of gina hydronephrosis or obstructive uropathy, nonspecific and may be physiologic.    NM Bone Scan  05/24/2024  Impression:  No evidence of metastatic disease      ASSESSMENT AND PLAN:  Aleena Duckworth is a 38 y.o. female with...    # Invasive Ductal Carcinoma of the Left Breast  Date of Original Diagnosis:  02/09/2023  Recurrence: 5/15/24, localized  Original Stage: pT1b pN0(sn) triple negative, grade 3, Ki-67 80%  Recurrence Stage: [id number] Stage 2B Triple negative,  grade 3, Ki-67 76%  Current Sites of Disease:   left axillary tail/lymph node  Current Goals of Therapy:  Curative  Current Therapy:   pending neoadjuvant chemotherapy        Staging CT and PET negative for distant disease    Added 1 mg of Ativan for each dose of chemotherapy given her adverse reactions in the past and anxiety surrounding chemo  Added filgrastim to  days 2,3,4/9,10,11/16,17,18  given history of neutropenic fever with TC regimen,  can give additional antiemetics  if necessary  Advised patient she needs to have weekly mental health visits while on chemotherapy, referral to Oncology Psychology    6/17/24--one-week delay for cycle 1 day 15 of chemotherapy due to thrombocytopenia and LFT elevation, platelets 91,000, repeat CBC and CMP on 06/24.   06/24/24:  Dr. Cruz reviewed labs; ok to proceed with Platelets + 91k again this week; Proceed with C1D15 today; f/u next week 07/01 with Dr. Cruz for C2D1 with labs prior.    Will see her weekly during Cycles 1-4     #LFT elevation- advised to d/c OTC meds, one week delay in chemotherapy  # medication induced constipation- advised to take BID docusate and PRN senna   # anxiety about health- as noted above,  could be exacerbated by Adderall  # history of neutropenic fever- as above  # thrombocytopenia- fluctuating platelet levels, commonly this is associated with over-the-counter medications or other medications, encouraged cessation of all OTCs, changed Percocet to Niki    #ADHD, has been on Adderall for several years.  Was born prematurely, was diagnosed with a stroke and heart murmur at birth, was treated at [facility].     #Heavy menorrhagia for the past few years, worsening recently and has developed CAROLIN.  Has been on iron supplementation.  Sees gynecologist, Dr. Almita Clark with [facility].      #Hyperbilirubinemia - An abdominal ultrasound was done, negative for any abnormalities.Saw Hepatology prior to chemo who suspected she has Gilbert's disease and found no contraindication to proceeding with TC.    CACHORRO Ga, FNP-C  [facility]  20 minutes were spent in coordination of patient's care, record review and counseling.        Route Chart for Scheduling    Med Onc Chart Routing      Follow up with physician . F/u with Dr. Cruz on 07/01 as scheduled with labs prior   Follow up with NUNO    Infusion scheduling note   Proceed with C1D15 today; Patient would like C2D1 to be  next Tues 07/02 instead of 07/01 due to work   Injection scheduling note    Labs    Imaging    Pharmacy appointment    Other referrals                Treatment Plan Information   OP PEMBROLIZUMAB CARBOPLATIN (AUC 5) WITH WEEKLY PACLITAXEL FOLLOWED BY PEMBROLIZUMAB DOXORUBICIN CYCLOPHOSPHAMIDE FOLLOWED BY PEMBROLIZUMAB 200 MG Q3W   Eunice Cruz MD   Upcoming Treatment Dates - OP PEMBROLIZUMAB CARBOPLATIN (AUC 5) WITH WEEKLY PACLITAXEL FOLLOWED BY PEMBROLIZUMAB DOXORUBICIN CYCLOPHOSPHAMIDE FOLLOWED BY PEMBROLIZUMAB 200 MG Q3W    6/24/2024       Pre-Medications       dexAMETHasone (DECADRON) 10 mg in sodium chloride 0.9% 50 mL IVPB       diphenhydrAMINE (BENADRYL) 50 mg in NS 50 mL IVPB       famotidine (PF) injection 20 mg       Chemotherapy       PACLitaxeL (TAXOL) 80 mg/m2 = 114 mg in sodium chloride 0.9% 250 mL chemo infusion       Supportive Care       LORazepam injection 1 mg       prochlorperazine injection Soln 10 mg  6/25/2024       Growth Factor       filgrastim-sndz (ZARXIO) injection 300 mcg/0.5 mL (Preferred Regimen)  6/26/2024       Growth Factor       filgrastim-sndz (ZARXIO) injection 300 mcg/0.5 mL (Preferred Regimen)  6/27/2024       Growth Factor       filgrastim-sndz (ZARXIO) injection 300 mcg/0.5 mL (Preferred Regimen)    Therapy Plan Information  IV Fluids  sodium chloride 0.9% bolus 1,000 mL 1,000 mL  1,000 mL, Intravenous, Every visit  Flushes  sodium chloride 0.9% flush 10 mL  10 mL, Intravenous, PRN  heparin, porcine (PF) 100 unit/mL injection flush 500 Units  500 Units, Intravenous, PRN  Antiemetics  ondansetron injection 8 mg  8 mg, Intravenous, PRN  promethazine (PHENERGAN) 12.5 mg in dextrose 5 % (D5W) 50 mL IVPB  12.5 mg, Intravenous, PRN  dexAMETHasone injection 8 mg  8 mg, Intravenous, PRN

## 2024-06-25 ENCOUNTER — INFUSION (OUTPATIENT)
Dept: INFUSION THERAPY | Facility: HOSPITAL | Age: 39
End: 2024-06-25
Attending: INTERNAL MEDICINE
Payer: COMMERCIAL

## 2024-06-25 ENCOUNTER — TELEPHONE (OUTPATIENT)
Dept: HEMATOLOGY/ONCOLOGY | Facility: CLINIC | Age: 39
End: 2024-06-25
Payer: COMMERCIAL

## 2024-06-25 ENCOUNTER — PATIENT MESSAGE (OUTPATIENT)
Dept: HEMATOLOGY/ONCOLOGY | Facility: CLINIC | Age: 39
End: 2024-06-25
Payer: COMMERCIAL

## 2024-06-25 VITALS
TEMPERATURE: 98 F | DIASTOLIC BLOOD PRESSURE: 62 MMHG | HEART RATE: 89 BPM | SYSTOLIC BLOOD PRESSURE: 97 MMHG | RESPIRATION RATE: 16 BRPM

## 2024-06-25 DIAGNOSIS — C50.919 TRIPLE NEGATIVE BREAST CANCER: Primary | ICD-10-CM

## 2024-06-25 PROCEDURE — 96372 THER/PROPH/DIAG INJ SC/IM: CPT | Mod: PN

## 2024-06-25 PROCEDURE — 63600175 PHARM REV CODE 636 W HCPCS: Mod: JZ,JB,JG,PN | Performed by: INTERNAL MEDICINE

## 2024-06-25 RX ORDER — PROCHLORPERAZINE EDISYLATE 5 MG/ML
2.5 INJECTION INTRAMUSCULAR; INTRAVENOUS
Status: CANCELLED
Start: 2024-06-27

## 2024-06-25 RX ORDER — PROCHLORPERAZINE EDISYLATE 5 MG/ML
2.5 INJECTION INTRAMUSCULAR; INTRAVENOUS
Status: CANCELLED
Start: 2024-06-26

## 2024-06-25 RX ORDER — PROCHLORPERAZINE EDISYLATE 5 MG/ML
2.5 INJECTION INTRAMUSCULAR; INTRAVENOUS
Status: CANCELLED
Start: 2024-06-25

## 2024-06-25 RX ADMIN — FILGRASTIM-SNDZ 300 MCG: 300 INJECTION, SOLUTION INTRAVENOUS; SUBCUTANEOUS at 04:06

## 2024-06-25 NOTE — TELEPHONE ENCOUNTER
Called pt and r/s her chairside appt with Celestina. Pt verbalized understanding and was very thankful or me following up to r/s.

## 2024-06-26 ENCOUNTER — INFUSION (OUTPATIENT)
Dept: INFUSION THERAPY | Facility: HOSPITAL | Age: 39
End: 2024-06-26
Attending: INTERNAL MEDICINE
Payer: COMMERCIAL

## 2024-06-26 VITALS
TEMPERATURE: 99 F | SYSTOLIC BLOOD PRESSURE: 110 MMHG | DIASTOLIC BLOOD PRESSURE: 77 MMHG | HEART RATE: 113 BPM | RESPIRATION RATE: 18 BRPM

## 2024-06-26 DIAGNOSIS — C50.919 TRIPLE NEGATIVE BREAST CANCER: Primary | ICD-10-CM

## 2024-06-26 PROCEDURE — 63600175 PHARM REV CODE 636 W HCPCS: Mod: JZ,JB,JG,PN | Performed by: INTERNAL MEDICINE

## 2024-06-26 PROCEDURE — 96372 THER/PROPH/DIAG INJ SC/IM: CPT | Mod: PN

## 2024-06-26 RX ADMIN — FILGRASTIM-SNDZ 300 MCG: 300 INJECTION, SOLUTION INTRAVENOUS; SUBCUTANEOUS at 03:06

## 2024-06-27 ENCOUNTER — INFUSION (OUTPATIENT)
Dept: INFUSION THERAPY | Facility: HOSPITAL | Age: 39
End: 2024-06-27
Attending: INTERNAL MEDICINE
Payer: COMMERCIAL

## 2024-06-27 ENCOUNTER — TELEPHONE (OUTPATIENT)
Dept: HEMATOLOGY/ONCOLOGY | Facility: CLINIC | Age: 39
End: 2024-06-27
Payer: COMMERCIAL

## 2024-06-27 ENCOUNTER — PATIENT MESSAGE (OUTPATIENT)
Dept: HEMATOLOGY/ONCOLOGY | Facility: CLINIC | Age: 39
End: 2024-06-27
Payer: COMMERCIAL

## 2024-06-27 VITALS
SYSTOLIC BLOOD PRESSURE: 101 MMHG | RESPIRATION RATE: 18 BRPM | DIASTOLIC BLOOD PRESSURE: 62 MMHG | TEMPERATURE: 98 F | HEART RATE: 99 BPM | OXYGEN SATURATION: 98 %

## 2024-06-27 DIAGNOSIS — C50.919 TRIPLE NEGATIVE BREAST CANCER: Primary | ICD-10-CM

## 2024-06-27 PROCEDURE — 96372 THER/PROPH/DIAG INJ SC/IM: CPT | Mod: PN

## 2024-06-27 PROCEDURE — 63600175 PHARM REV CODE 636 W HCPCS: Mod: JZ,JB,JG,PN | Performed by: INTERNAL MEDICINE

## 2024-06-27 RX ADMIN — FILGRASTIM-SNDZ 300 MCG: 300 INJECTION, SOLUTION INTRAVENOUS; SUBCUTANEOUS at 11:06

## 2024-06-27 NOTE — TELEPHONE ENCOUNTER
Received msg from nurse in IO :    Aleena Duckworth  Female, 38 y.o., 1985  MRN:  [id number] She has an appt with Celestina on 7/2 @ 9:30, I changed her appt with Wyatt to 9:30 same day, per her request, also had the chemo and labs adjusted also.  Could  you reschedule with Celestina at a different time?  Appt was changed to 11:30 the same day. Celestina will see pt chairside. Message sent to nurse to inform of time change.

## 2024-06-27 NOTE — PLAN OF CARE
Tolerated injection well today  Discharge instructions given and pt d/c to home per w/c  NAD  Problem: Adult Inpatient Plan of Care  Goal: Readiness for Transition of Care  Intervention: Mutually Develop Transition Plan  Flowsheets (Taken 6/27/2024 1118)  Transportation Anticipated: car, drives self  Communicated TINA with patient/caregiver: Yes  Do you have help at home or someone to help you manage your care at home?: Yes  Readmission within 30 days?: No  Do you currently have service(s) that help you manage your care at home?: No     Problem: Adult Inpatient Plan of Care  Goal: Readiness for Transition of Care  Intervention: Mutually Develop Transition Plan  Flowsheets (Taken 6/27/2024 1117)  Transportation Anticipated: car, drives self  Communicated TINA with patient/caregiver: Yes  Do you have help at home or someone to help you manage your care at home?: Yes  Readmission within 30 days?: No  Do you currently have service(s) that help you manage your care at home?: No

## 2024-07-02 ENCOUNTER — INFUSION (OUTPATIENT)
Dept: INFUSION THERAPY | Facility: HOSPITAL | Age: 39
End: 2024-07-02
Attending: INTERNAL MEDICINE
Payer: COMMERCIAL

## 2024-07-02 ENCOUNTER — LAB VISIT (OUTPATIENT)
Dept: LAB | Facility: HOSPITAL | Age: 39
End: 2024-07-02
Attending: INTERNAL MEDICINE
Payer: COMMERCIAL

## 2024-07-02 ENCOUNTER — DOCUMENTATION ONLY (OUTPATIENT)
Dept: INFUSION THERAPY | Facility: HOSPITAL | Age: 39
End: 2024-07-02
Payer: COMMERCIAL

## 2024-07-02 ENCOUNTER — OFFICE VISIT (OUTPATIENT)
Dept: HEMATOLOGY/ONCOLOGY | Facility: CLINIC | Age: 39
End: 2024-07-02
Payer: COMMERCIAL

## 2024-07-02 VITALS
SYSTOLIC BLOOD PRESSURE: 101 MMHG | RESPIRATION RATE: 18 BRPM | HEART RATE: 93 BPM | OXYGEN SATURATION: 98 % | TEMPERATURE: 98 F | DIASTOLIC BLOOD PRESSURE: 68 MMHG | BODY MASS INDEX: 20.93 KG/M2 | WEIGHT: 113.75 LBS | HEIGHT: 62 IN

## 2024-07-02 VITALS
DIASTOLIC BLOOD PRESSURE: 73 MMHG | OXYGEN SATURATION: 98 % | BODY MASS INDEX: 20.93 KG/M2 | TEMPERATURE: 98 F | HEART RATE: 88 BPM | RESPIRATION RATE: 18 BRPM | HEIGHT: 62 IN | WEIGHT: 113.75 LBS | SYSTOLIC BLOOD PRESSURE: 95 MMHG

## 2024-07-02 DIAGNOSIS — G89.3 NEOPLASM RELATED PAIN: ICD-10-CM

## 2024-07-02 DIAGNOSIS — C50.919 TRIPLE NEGATIVE BREAST CANCER: Primary | ICD-10-CM

## 2024-07-02 DIAGNOSIS — C50.919 TRIPLE NEGATIVE BREAST CANCER: ICD-10-CM

## 2024-07-02 DIAGNOSIS — R45.89 ANXIETY ABOUT HEALTH: Primary | ICD-10-CM

## 2024-07-02 DIAGNOSIS — G47.00 INSOMNIA, UNSPECIFIED TYPE: ICD-10-CM

## 2024-07-02 LAB
ALBUMIN SERPL BCP-MCNC: 3.9 G/DL (ref 3.5–5.2)
ALP SERPL-CCNC: 69 U/L (ref 55–135)
ALT SERPL W/O P-5'-P-CCNC: 54 U/L (ref 10–44)
ANION GAP SERPL CALC-SCNC: 9 MMOL/L (ref 8–16)
AST SERPL-CCNC: 28 U/L (ref 10–40)
B-HCG UR QL: NEGATIVE
BASOPHILS # BLD AUTO: ABNORMAL K/UL (ref 0–0.2)
BASOPHILS NFR BLD: 1 % (ref 0–1.9)
BILIRUB SERPL-MCNC: 0.6 MG/DL (ref 0.1–1)
BUN SERPL-MCNC: 14 MG/DL (ref 6–20)
CALCIUM SERPL-MCNC: 9.1 MG/DL (ref 8.7–10.5)
CHLORIDE SERPL-SCNC: 104 MMOL/L (ref 95–110)
CO2 SERPL-SCNC: 26 MMOL/L (ref 23–29)
CREAT SERPL-MCNC: 0.7 MG/DL (ref 0.5–1.4)
DIFFERENTIAL METHOD BLD: ABNORMAL
EOSINOPHIL # BLD AUTO: ABNORMAL K/UL (ref 0–0.5)
EOSINOPHIL NFR BLD: 1 % (ref 0–8)
ERYTHROCYTE [DISTWIDTH] IN BLOOD BY AUTOMATED COUNT: 14.2 % (ref 11.5–14.5)
EST. GFR  (NO RACE VARIABLE): >60 ML/MIN/1.73 M^2
GLUCOSE SERPL-MCNC: 89 MG/DL (ref 70–110)
HCT VFR BLD AUTO: 29.7 % (ref 37–48.5)
HGB BLD-MCNC: 10.3 G/DL (ref 12–16)
IMM GRANULOCYTES # BLD AUTO: ABNORMAL K/UL (ref 0–0.04)
IMM GRANULOCYTES NFR BLD AUTO: ABNORMAL % (ref 0–0.5)
LYMPHOCYTES # BLD AUTO: ABNORMAL K/UL (ref 1–4.8)
LYMPHOCYTES NFR BLD: 54 % (ref 18–48)
MAGNESIUM SERPL-MCNC: 2.2 MG/DL (ref 1.6–2.6)
MCH RBC QN AUTO: 31.3 PG (ref 27–31)
MCHC RBC AUTO-ENTMCNC: 34.7 G/DL (ref 32–36)
MCV RBC AUTO: 90 FL (ref 82–98)
METAMYELOCYTES NFR BLD MANUAL: 4 %
MONOCYTES # BLD AUTO: ABNORMAL K/UL (ref 0.3–1)
MONOCYTES NFR BLD: 8 % (ref 4–15)
MYELOCYTES NFR BLD MANUAL: 1 %
NEUTROPHILS NFR BLD: 28 % (ref 38–73)
NEUTS BAND NFR BLD MANUAL: 3 %
NRBC BLD-RTO: 0 /100 WBC
PLATELET # BLD AUTO: 95 K/UL (ref 150–450)
PLATELET BLD QL SMEAR: ABNORMAL
PMV BLD AUTO: 9 FL (ref 9.2–12.9)
POTASSIUM SERPL-SCNC: 4 MMOL/L (ref 3.5–5.1)
PROT SERPL-MCNC: 6 G/DL (ref 6–8.4)
RBC # BLD AUTO: 3.29 M/UL (ref 4–5.4)
SODIUM SERPL-SCNC: 139 MMOL/L (ref 136–145)
WBC # BLD AUTO: 4.2 K/UL (ref 3.9–12.7)

## 2024-07-02 PROCEDURE — 96375 TX/PRO/DX INJ NEW DRUG ADDON: CPT | Mod: PN

## 2024-07-02 PROCEDURE — 99214 OFFICE O/P EST MOD 30 MIN: CPT | Mod: S$GLB,,, | Performed by: NURSE PRACTITIONER

## 2024-07-02 PROCEDURE — 99999 PR PBB SHADOW E&M-EST. PATIENT-LVL IV: CPT | Mod: PBBFAC,,, | Performed by: NURSE PRACTITIONER

## 2024-07-02 PROCEDURE — 36415 COLL VENOUS BLD VENIPUNCTURE: CPT | Mod: PN | Performed by: INTERNAL MEDICINE

## 2024-07-02 PROCEDURE — A4216 STERILE WATER/SALINE, 10 ML: HCPCS | Mod: PN | Performed by: NURSE PRACTITIONER

## 2024-07-02 PROCEDURE — 81025 URINE PREGNANCY TEST: CPT | Mod: PN | Performed by: INTERNAL MEDICINE

## 2024-07-02 PROCEDURE — 25000003 PHARM REV CODE 250: Mod: PN | Performed by: NURSE PRACTITIONER

## 2024-07-02 PROCEDURE — 83735 ASSAY OF MAGNESIUM: CPT | Mod: PN | Performed by: INTERNAL MEDICINE

## 2024-07-02 PROCEDURE — 3008F BODY MASS INDEX DOCD: CPT | Mod: CPTII,S$GLB,, | Performed by: NURSE PRACTITIONER

## 2024-07-02 PROCEDURE — 99215 OFFICE O/P EST HI 40 MIN: CPT | Mod: S$GLB,,, | Performed by: NURSE PRACTITIONER

## 2024-07-02 PROCEDURE — 99999 PR PBB SHADOW E&M-EST. PATIENT-LVL III: CPT | Mod: PBBFAC,,, | Performed by: NURSE PRACTITIONER

## 2024-07-02 PROCEDURE — 96367 TX/PROPH/DG ADDL SEQ IV INF: CPT | Mod: PN

## 2024-07-02 PROCEDURE — 3078F DIAST BP <80 MM HG: CPT | Mod: CPTII,S$GLB,, | Performed by: NURSE PRACTITIONER

## 2024-07-02 PROCEDURE — 3074F SYST BP LT 130 MM HG: CPT | Mod: CPTII,S$GLB,, | Performed by: NURSE PRACTITIONER

## 2024-07-02 PROCEDURE — 1160F RVW MEDS BY RX/DR IN RCRD: CPT | Mod: CPTII,S$GLB,, | Performed by: NURSE PRACTITIONER

## 2024-07-02 PROCEDURE — 63600175 PHARM REV CODE 636 W HCPCS: Mod: PN | Performed by: NURSE PRACTITIONER

## 2024-07-02 PROCEDURE — 1159F MED LIST DOCD IN RCRD: CPT | Mod: CPTII,S$GLB,, | Performed by: NURSE PRACTITIONER

## 2024-07-02 PROCEDURE — 96413 CHEMO IV INFUSION 1 HR: CPT | Mod: PN

## 2024-07-02 PROCEDURE — 96417 CHEMO IV INFUS EACH ADDL SEQ: CPT | Mod: PN

## 2024-07-02 PROCEDURE — 85007 BL SMEAR W/DIFF WBC COUNT: CPT | Mod: PN | Performed by: INTERNAL MEDICINE

## 2024-07-02 PROCEDURE — 85027 COMPLETE CBC AUTOMATED: CPT | Mod: PN | Performed by: INTERNAL MEDICINE

## 2024-07-02 PROCEDURE — 80053 COMPREHEN METABOLIC PANEL: CPT | Mod: PN | Performed by: INTERNAL MEDICINE

## 2024-07-02 RX ORDER — PROCHLORPERAZINE EDISYLATE 5 MG/ML
10 INJECTION INTRAMUSCULAR; INTRAVENOUS ONCE AS NEEDED
Status: DISCONTINUED | OUTPATIENT
Start: 2024-07-02 | End: 2024-07-02 | Stop reason: HOSPADM

## 2024-07-02 RX ORDER — PROCHLORPERAZINE EDISYLATE 5 MG/ML
10 INJECTION INTRAMUSCULAR; INTRAVENOUS ONCE AS NEEDED
Status: CANCELLED
Start: 2024-07-02

## 2024-07-02 RX ORDER — SODIUM CHLORIDE 0.9 % (FLUSH) 0.9 %
10 SYRINGE (ML) INJECTION
Status: DISCONTINUED | OUTPATIENT
Start: 2024-07-02 | End: 2024-07-02 | Stop reason: HOSPADM

## 2024-07-02 RX ORDER — DIPHENHYDRAMINE HYDROCHLORIDE 50 MG/ML
50 INJECTION INTRAMUSCULAR; INTRAVENOUS ONCE AS NEEDED
Status: CANCELLED | OUTPATIENT
Start: 2024-07-02

## 2024-07-02 RX ORDER — EPINEPHRINE 0.3 MG/.3ML
0.3 INJECTION SUBCUTANEOUS ONCE AS NEEDED
Status: CANCELLED | OUTPATIENT
Start: 2024-07-02

## 2024-07-02 RX ORDER — FAMOTIDINE 10 MG/ML
20 INJECTION INTRAVENOUS
Status: CANCELLED | OUTPATIENT
Start: 2024-07-02

## 2024-07-02 RX ORDER — DIPHENHYDRAMINE HYDROCHLORIDE 50 MG/ML
50 INJECTION INTRAMUSCULAR; INTRAVENOUS ONCE AS NEEDED
Status: DISCONTINUED | OUTPATIENT
Start: 2024-07-02 | End: 2024-07-02 | Stop reason: HOSPADM

## 2024-07-02 RX ORDER — SODIUM CHLORIDE 0.9 % (FLUSH) 0.9 %
10 SYRINGE (ML) INJECTION
Status: CANCELLED | OUTPATIENT
Start: 2024-07-02

## 2024-07-02 RX ORDER — HEPARIN 100 UNIT/ML
500 SYRINGE INTRAVENOUS
Status: CANCELLED | OUTPATIENT
Start: 2024-07-02

## 2024-07-02 RX ORDER — LORAZEPAM 2 MG/ML
1 INJECTION INTRAMUSCULAR ONCE AS NEEDED
Status: DISCONTINUED | OUTPATIENT
Start: 2024-07-02 | End: 2024-07-02 | Stop reason: HOSPADM

## 2024-07-02 RX ORDER — FAMOTIDINE 10 MG/ML
20 INJECTION INTRAVENOUS
Status: COMPLETED | OUTPATIENT
Start: 2024-07-02 | End: 2024-07-02

## 2024-07-02 RX ORDER — LORAZEPAM 2 MG/ML
1 INJECTION INTRAMUSCULAR ONCE AS NEEDED
Status: CANCELLED
Start: 2024-07-02

## 2024-07-02 RX ORDER — PROCHLORPERAZINE EDISYLATE 5 MG/ML
2.5 INJECTION INTRAMUSCULAR; INTRAVENOUS
Status: CANCELLED
Start: 2024-07-05

## 2024-07-02 RX ORDER — PROCHLORPERAZINE EDISYLATE 5 MG/ML
2.5 INJECTION INTRAMUSCULAR; INTRAVENOUS
Status: CANCELLED
Start: 2024-07-03

## 2024-07-02 RX ORDER — OXYCODONE HYDROCHLORIDE 10 MG/1
10 TABLET ORAL EVERY 4 HOURS PRN
Qty: 45 TABLET | Refills: 0 | Status: SHIPPED | OUTPATIENT
Start: 2024-07-02

## 2024-07-02 RX ADMIN — DIPHENHYDRAMINE HYDROCHLORIDE 50 MG: 50 INJECTION, SOLUTION INTRAMUSCULAR; INTRAVENOUS at 12:07

## 2024-07-02 RX ADMIN — CARBOPLATIN 525 MG: 10 INJECTION, SOLUTION INTRAVENOUS at 01:07

## 2024-07-02 RX ADMIN — SODIUM CHLORIDE: 9 INJECTION, SOLUTION INTRAVENOUS at 10:07

## 2024-07-02 RX ADMIN — FAMOTIDINE 20 MG: 10 INJECTION INTRAVENOUS at 11:07

## 2024-07-02 RX ADMIN — PACLITAXEL 114 MG: 6 INJECTION, SOLUTION INTRAVENOUS at 12:07

## 2024-07-02 RX ADMIN — Medication 10 ML: at 02:07

## 2024-07-02 RX ADMIN — SODIUM CHLORIDE 200 MG: 9 INJECTION, SOLUTION INTRAVENOUS at 11:07

## 2024-07-02 RX ADMIN — DEXAMETHASONE SODIUM PHOSPHATE 0.25 MG: 10 INJECTION, SOLUTION INTRAMUSCULAR; INTRAVENOUS at 11:07

## 2024-07-02 RX ADMIN — APREPITANT 130 MG: 130 INJECTION, EMULSION INTRAVENOUS at 11:07

## 2024-07-02 NOTE — PROGRESS NOTES
Name: Aleena Duckworth  MRN:  [id number]  :  1985 Age 38 y.o.  Date of Service: 2024    Reason for visit:  Aleena Duckworth is a 38 y.o. female here regarding...clearance for C2D1 of Keytruda, Taxol, Carbo    # Invasive Ductal Carcinoma of the Left Breast  Date of Original Diagnosis:  2023  Recurrence: 5/15/24, localized  Original Stage: pT1b pN0(sn) triple negative, grade 3, Ki-67 80%  Recurrence Stage: [id number] Stage 2B Triple negative,  grade 3, Ki-67 76%  Current Sites of Disease:   left axillary tail/lymph node  Current Goals of Therapy:  Curative  Current Therapy:   keynote 522 NACT     Oncologic History/History of Present Illness:   Former patient of Dr. Ayala, per her notes  self palpated a left-sided breast mass.    2023  bilateral breast mammogram that showed a lobular hypoechoic solid mass within the left breast at 2 o'clock position 7 cm     2023, ultrasound guided biopsy = invasive ductal carcinoma, grade 3, ER/PA/HER2 Randall negative, Ki-67 80%.     23  left partial mastectomy that revealed invasive ductal carcinoma 9 x 8 x 6 mm, single focus, approximately 1 mm focus of high-grade DCIS, closest margin 3 mm, anterior.  Two sentinel lymph nodes were negative.  pT1b pN0(sn).    23 cycle 1 of Docetaxel and Cytoxan with numerous SE  2023 cycle 2 of TC with 20% dose reduction of docetaxel.  With again numerous side effects.     Extensive GOC discussion held with patient and family and decided to stop chemotherapy.     2023 s/p bilateral mastectomy, implant removal and hybrid flap and small implants    2024 Reports to [facility] to establish care with me. Reports left lateral axillary tail nodules.  Causing anxiety.    24    mammogram with ultrasound BI-RADS 4- 11 mm left axillary tail mass  5/15/24   left axillary tail  and lymph node positive for  poorly differentiated carcinoma, triple negative,  staging scans negative.    24  Had a prolonged discussion  with patient's , father, mother she was motivated to pursue neoadjuvant chemotherapy.    06/24/24:  C1D15 delayed last week due to platelets = 91k.  Presents to the clinic for clearance for C1D15; Denies any N/V/D; appetite fair; weight loss of 3 1/2 pounds in 1 week.  Remains working FT; needs to move C2D1 to Tuesday of next week 07/02 due to work.  Dr. Cruz reviewed labs & is ok to proceed with today's labs of Platelets = 91k    07/02/24:  Presents for clearance for C2D1; appetite better; hydrating well; no N/V/D  Platelets slightly improved from 91 to 95k this week; grans down; getting Zarxio on Days 2 & 4        Oncology History   Triple negative breast cancer   4/17/2023 Initial Diagnosis    Triple negative breast cancer     4/18/2023 Cancer Staged    Staging form: Breast, AJCC 8th Edition  - Pathologic stage from 4/18/2023: Stage IB (pT1b, pN0, cM0, G3, ER-, AZ-, HER2-)     5/1/2023 - 5/25/2023 Chemotherapy    Treatment Summary   Plan Name: OP BREAST TC - DOCETAXEL CYCLOPHOSPHAMIDE Q3W  Treatment Goal: Curative  Status: Inactive  Start Date: 5/1/2023  End Date: 5/25/2023  Provider: Ambar Ayala MD  Chemotherapy: cycloPHOSphamide 600 mg/m2 = 880 mg in sodium chloride 0.9% 289.4 mL chemo infusion, 600 mg/m2 = 880 mg, Intravenous, Clinic/HOD 1 time, 2 of 4 cycles  Dose modification: 600 mg/m2 (Cycle 3), 480 mg/m2 (Cycle 3, Reason: Dose not tolerated)  Administration: 880 mg (5/1/2023)  DOCEtaxel 75 mg/m2 = 110 mg in sodium chloride 0.9% 290.5 mL chemo infusion, 75 mg/m2 = 110 mg, Intravenous, Clinic/HOD 1 time, 2 of 4 cycles  Dose modification: 60 mg/m2 (original dose 75 mg/m2, Cycle 2, Reason: Dose not tolerated)  Administration: 110 mg (5/1/2023), 90 mg (5/24/2023)     5/22/2024 Cancer Staged    Staging form: Breast, AJCC 8th Edition  - Clinical stage from 5/22/2024: Stage IIB (rcT0, cN1(f), cM0, G3, ER-, AZ-, HER2-)     6/4/2024 -  Chemotherapy    Treatment Summary   Plan Name: OP PEMBROLIZUMAB  "CARBOPLATIN (AUC 5) WITH WEEKLY PACLITAXEL FOLLOWED BY PEMBROLIZUMAB DOXORUBICIN CYCLOPHOSPHAMIDE FOLLOWED BY PEMBROLIZUMAB 200 MG Q3W  Treatment Goal: Curative  Status: Active  Start Date: 6/4/2024  End Date: 5/13/2025 (Planned)  Provider: Eunice Cruz MD  Chemotherapy: DOXOrubicin chemo injection 86 mg, 60 mg/m2 = 86 mg, Intravenous, Clinic/HOD 1 time, 0 of 4 cycles  CARBOplatin (PARAPLATIN) 525 mg in sodium chloride 0.9% 302.5 mL chemo infusion, 525 mg, Intravenous, Clinic/HOD 1 time, 1 of 4 cycles  Administration: 525 mg (6/4/2024)  cycloPHOSphamide 600 mg/m2 = 860 mg in sodium chloride 0.9% 254.3 mL chemo infusion, 600 mg/m2 = 860 mg, Intravenous, Clinic/HOD 1 time, 0 of 4 cycles  PACLitaxeL (TAXOL) 80 mg/m2 = 114 mg in sodium chloride 0.9% 250 mL chemo infusion, 80 mg/m2 = 114 mg, Intravenous, Clinic/HOD 1 time, 1 of 4 cycles  Administration: 114 mg (6/4/2024), 114 mg (6/11/2024), 114 mg (6/24/2024)           PHYSICAL EXAMINATION:  Weight:  Gain of 5 pounds in 1 week  /68 (BP Location: Left arm, Patient Position: Sitting, BP Method: Medium (Automatic))   Pulse 93   Temp 97.5 °F (36.4 °C) (Temporal)   Resp 18   Ht 5' 2" (1.575 m)   Wt 51.6 kg (113 lb 12.1 oz)   SpO2 98%   BMI 20.81 kg/m²   Wt Readings from Last 3 Encounters:   07/02/24 51.6 kg (113 lb 12.1 oz)   06/24/24 49.4 kg (108 lb 14.5 oz)   06/24/24 49.4 kg (108 lb 14.5 oz)     ECOG PERFORMANCE STATUS: 0  Physical Exam   General:  Well-appearing, nontoxic  Eyes:  Equal and round pupils, EOMI, no scleral icterus  Mouth:  No lesions, moist  Cardiovascular:  Warm, well-perfused, no peripheral edema  Lungs:  Unlabored on room air, no wheezing  Neurologic:  Awake, alert and oriented, participating in the exam  Psych:  Appropriate mood and affect  Skin:  Pale pallor, No rashes  Heme:  No petechiae, no purpura       LABORATORY:  CBC  Lab Results   Component Value Date    WBC 4.20 07/02/2024    HGB 10.3 (L) 07/02/2024    HCT 29.7 (L) 07/02/2024 "    MCV 90 07/02/2024    PLT 95 (L) 07/02/2024     ANC = 1302 (Grans 28%, Bands 3)    CMP  Sodium   Date Value Ref Range Status   07/02/2024 139 136 - 145 mmol/L Final     Potassium   Date Value Ref Range Status   07/02/2024 4.0 3.5 - 5.1 mmol/L Final     Chloride   Date Value Ref Range Status   07/02/2024 104 95 - 110 mmol/L Final     CO2   Date Value Ref Range Status   07/02/2024 26 23 - 29 mmol/L Final     Glucose   Date Value Ref Range Status   07/02/2024 89 70 - 110 mg/dL Final     BUN   Date Value Ref Range Status   07/02/2024 14 6 - 20 mg/dL Final     Creatinine   Date Value Ref Range Status   07/02/2024 0.7 0.5 - 1.4 mg/dL Final     Calcium   Date Value Ref Range Status   07/02/2024 9.1 8.7 - 10.5 mg/dL Final     Total Protein   Date Value Ref Range Status   07/02/2024 6.0 6.0 - 8.4 g/dL Final     Albumin   Date Value Ref Range Status   07/02/2024 3.9 3.5 - 5.2 g/dL Final     Total Bilirubin   Date Value Ref Range Status   07/02/2024 0.6 0.1 - 1.0 mg/dL Final     Comment:     For infants and newborns, interpretation of results should be based  on gestational age, weight and in agreement with clinical  observations.    Premature Infant recommended reference ranges:  Up to 24 hours.............<8.0 mg/dL  Up to 48 hours............<12.0 mg/dL  3-5 days..................<15.0 mg/dL  6-29 days.................<15.0 mg/dL       Alkaline Phosphatase   Date Value Ref Range Status   07/02/2024 69 55 - 135 U/L Final     AST   Date Value Ref Range Status   07/02/2024 28 10 - 40 U/L Final     ALT   Date Value Ref Range Status   07/02/2024 54 (H) 10 - 44 U/L Final     Anion Gap   Date Value Ref Range Status   07/02/2024 9 8 - 16 mmol/L Final     eGFR   Date Value Ref Range Status   07/02/2024 >60.0 >60 mL/min/1.73 m^2 Final     UPT:  negative  Magnesium:  2.2  Lab Results   Component Value Date    TSH 0.864 06/24/2024    FREET4 1.00 03/06/2024     TSH:  pending today    PATHOLOGY:  DIAGNOSIS:  05/17/2024 RODO/carson  LEFT  AXILLARY TAIL, ULTRASOUND-GUIDED NEEDLE CORE BIOPSY:  --FRAGMENTS OF LYMPH NODE POSITIVE FOR METASTATIC POORLY DIFFERENTIATED   CARCINOMA (SEE COMMENT).  --RECEPTOR STUDIES PENDING.      RADIOLOGY:  CT chest abdomen pelvis 05/24/2024  Impression:  1. Postoperative changes of bilateral breast reconstruction with breast implants in place.  Two small subcutaneous soft tissue nodules along the left axillary tail in this patient with biopsy-proven left axillary lymph node triple negative breast cancer, as detailed above.  Correlate with recent mammographic imaging/biopsy.  2. No evidence of metastatic disease within the chest, abdomen, or pelvis.  3. Mild bilateral pelviectasis without evidence of gina hydronephrosis or obstructive uropathy, nonspecific and may be physiologic.    NM Bone Scan  05/24/2024  Impression:  No evidence of metastatic disease      ASSESSMENT AND PLAN:  Aleena Duckworth is a 38 y.o. female with...    # Invasive Ductal Carcinoma of the Left Breast  Date of Original Diagnosis:  02/09/2023  Recurrence: 5/15/24, localized  Original Stage: pT1b pN0(sn) triple negative, grade 3, Ki-67 80%  Recurrence Stage: [id number] Stage 2B Triple negative,  grade 3, Ki-67 76%  Current Sites of Disease:   left axillary tail/lymph node  Current Goals of Therapy:  Curative  Current Therapy:   pending neoadjuvant chemotherapy        Staging CT and PET negative for distant disease    Added 1 mg of Ativan for each dose of chemotherapy given her adverse reactions in the past and anxiety surrounding chemo  Added filgrastim to  days 2,3,4/9,10,11/16,17,18  given history of neutropenic fever with TC regimen,  can give additional antiemetics if necessary  Advised patient she needs to have weekly mental health visits while on chemotherapy, referral to Oncology Psychology  Will see her weekly during Cycles 1-4   6/17/24--one-week delay for cycle 1 day 15 of chemotherapy due to thrombocytopenia and LFT elevation, platelets  91,000, repeat CBC and CMP on 06/24.   06/24/24:  Dr. Cruz reviewed labs; ok to proceed with Platelets + 91k again this week; Proceed with C1D15 today; f/u next week 07/01 with Dr. Cruz for C2D1 with labs prior.  07/02/24:  Proceed with C2D1 today with Zarxio on Days 2, 4 (holiday on day 3); f/u next week as scheduled on 07/08 with labs prior for clearance for C2D8      #LFT elevation- advised to d/c OTC meds, one week delay in chemotherapy  07/02/24:  Continues to trend down  # medication induced constipation- advised to take BID docusate and PRN senna   07/02:  stable  # anxiety about health- as noted above,  could be exacerbated by Adderall  # history of neutropenic fever- as above  # thrombocytopenia- fluctuating platelet levels, commonly this is associated with over-the-counter medications or other medications, encouraged cessation of all OTCs, changed Percocet to Niki  07/02/24:  stable    #ADHD, has been on Adderall for several years.  Was born prematurely, was diagnosed with a stroke and heart murmur at birth, was treated at [facility].     #Heavy menorrhagia for the past few years, worsening recently and has developed CAROLIN.  Has been on iron supplementation.  Sees gynecologist, Dr. Almita Clark [place].      #Hyperbilirubinemia - An abdominal ultrasound was done, negative for any abnormalities.Saw Hepatology prior to chemo who suspected she has Gilbert's disease and found no contraindication to proceeding with TC.    CACHORRO Ga, FNP-C  [facility]  30 minutes were spent in coordination of patient's care, record review and counseling.        Route Chart for Scheduling    Med Onc Chart Routing      Follow up with physician    Follow up with NUNO . F/u with me next week on 07/08 as scheduled; NEEDS LAB APPT PRIOR: (STANDING OF RE:  CBC, CMP, MG, UPT)   Infusion scheduling note   Proceed with C2D1 today; D2 & D4 Zarxio   Injection scheduling note     Labs    Imaging    Pharmacy appointment    Other referrals                Treatment Plan Information   OP PEMBROLIZUMAB CARBOPLATIN (AUC 5) WITH WEEKLY PACLITAXEL FOLLOWED BY PEMBROLIZUMAB DOXORUBICIN CYCLOPHOSPHAMIDE FOLLOWED BY PEMBROLIZUMAB 200 MG Q3W   Eunice Cruz MD   Upcoming Treatment Dates - OP PEMBROLIZUMAB CARBOPLATIN (AUC 5) WITH WEEKLY PACLITAXEL FOLLOWED BY PEMBROLIZUMAB DOXORUBICIN CYCLOPHOSPHAMIDE FOLLOWED BY PEMBROLIZUMAB 200 MG Q3W    7/2/2024       Pre-Medications       diphenhydrAMINE (BENADRYL) 50 mg in NS 50 mL IVPB       famotidine (PF) injection 20 mg       Chemotherapy       PACLitaxeL (TAXOL) 80 mg/m2 = 114 mg in sodium chloride 0.9% 250 mL chemo infusion       CARBOplatin (PARAPLATIN) 525 mg in sodium chloride 0.9% 302.5 mL chemo infusion       Supportive Care       LORazepam injection 1 mg       prochlorperazine injection Soln 10 mg       Antiemetics       aprepitant (CINVANTI) injection 130 mg       palonosetron 0.25mg/dexAMETHasone 12mg in NS IVPB 0.25 mg 50 mL       Immunotherapy       pembrolizumab (KEYTRUDA) 200 mg in sodium chloride 0.9% SolP 108 mL infusion  7/3/2024       Growth Factor       filgrastim-sndz (ZARXIO) injection 300 mcg/0.5 mL (Preferred Regimen)  7/5/2024       Growth Factor       filgrastim-sndz (ZARXIO) injection 300 mcg/0.5 mL (Preferred Regimen)  7/9/2024       Pre-Medications       dexAMETHasone (DECADRON) 10 mg in sodium chloride 0.9% 50 mL IVPB       diphenhydrAMINE (BENADRYL) 50 mg in NS 50 mL IVPB       famotidine (PF) injection 20 mg       Chemotherapy       PACLitaxeL (TAXOL) 80 mg/m2 = 114 mg in sodium chloride 0.9% 250 mL chemo infusion       Supportive Care       LORazepam injection 1 mg       prochlorperazine injection Soln 10 mg    Therapy Plan Information  IV Fluids  sodium chloride 0.9% bolus 1,000 mL 1,000 mL  1,000 mL, Intravenous, Every visit  Flushes  sodium chloride 0.9% flush 10 mL  10 mL, Intravenous, PRN  heparin, porcine (PF) 100  unit/mL injection flush 500 Units  500 Units, Intravenous, PRN  Antiemetics  ondansetron injection 8 mg  8 mg, Intravenous, PRN  promethazine (PHENERGAN) 12.5 mg in dextrose 5 % (D5W) 50 mL IVPB  12.5 mg, Intravenous, PRN  dexAMETHasone injection 8 mg  8 mg, Intravenous, PRN

## 2024-07-02 NOTE — PLAN OF CARE
Problem: Adult Inpatient Plan of Care  Goal: Plan of Care Review  7/2/2024 1643 by Tana Moreno RN  Outcome: Progressing  Flowsheets (Taken 7/2/2024 1435)  Plan of Care Reviewed With:   patient   spouse   Pt tolerated her chemo infusions well, NAD. Pt did cryotherapy with her Taxol. No new c/o voiced. Pt given a schedule and reviewed, pt verbalized understanding. Pt ambulated out of the clinic without difficulty accompanied by her .

## 2024-07-02 NOTE — PROGRESS NOTES
Oncology Nutrition   Chemotherapy Infusion Visit    Nutrition Follow Up   RD met with patient at chairside during infusion tx. Pt states she is on her 2nd cycle of this new treatment and handled the first cycle well. Pt states she has been very conscious of what she is eating and she feels that is helping. Denies N/V/D/C.     Wt Readings from Last 10 Encounters:   07/02/24 51.6 kg (113 lb 12.1 oz)   07/02/24 51.6 kg (113 lb 12.1 oz)   06/24/24 49.4 kg (108 lb 14.5 oz)   06/24/24 49.4 kg (108 lb 14.5 oz)   06/17/24 51 kg (112 lb 7 oz)   06/14/24 49.1 kg (108 lb 3.9 oz)   06/13/24 49.1 kg (108 lb 3.9 oz)   06/12/24 49.1 kg (108 lb 3.9 oz)   06/11/24 49.1 kg (108 lb 3.9 oz)   06/07/24 48.6 kg (107 lb 2.3 oz)       All other nutrition questions/concerns addressed as appropriate. Will continue to follow and monitor throughout treatment PRN.     Kaci Mast, MS, RD, LDN  07/02/2024  2:16 PM

## 2024-07-02 NOTE — PROGRESS NOTES
12:27 PM    This LCSW met with this pt and her  to check in and provide support.   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   She said this will help her to help others.  The pt was in good spirits and reported no practical needs at this time.

## 2024-07-02 NOTE — PROGRESS NOTES
"Aleena Duckworth  38 y.o. is here to seek an integrative approach to discuss side effects related to breast cancer treatment. Aleena Duckworth  was referred by Dr. Carolina will. provider found     HPI  Mrs. Duckworth is here today with her  for cycle 2 of treatment. . She reports she noticed a lump in February and then was diagnosed with triple negative breast cancer. She had a partial mastectomy and will have a bilateral mastectomy with reconstruction at the end of the hear. She reports she sleeps well with Ambien, but has difficulty sleeping without it. She reports her stress level varies from moderate to high with daily life in addition to the diagnosis.   She has a good appetite and eats a healthy diet.   They recently  and Aleena has 2 children, a 7 year old and an 18 year old. 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     Today's Visit  Aleena is here today getting treatment with her  . She had neoadjuvant chemo and then a bilateral mastectomy with reconstruction.  Approximately 6 months later she noted more swollen nodes and had to restart treatment.  She reports she is doing well and she is back to work 1-3 days a week. She states, "it is good for my mental health being back at work." She reports her appetite is good and she is gaining weight. She is active throughout the day, no formal exercise. She is not sleeping well.  The Ambien helps her sleep for about 4 hours and then she wakes up.  She has been waking up due to generalized itching and will be started benadryl.       Pillars Assessment    Sleep  How many hours of sleep per night? 8 hours  Do you have trouble falling asleep, staying asleep or waking up earlier than you need to? yes  Do you have daytime fatigue? yes  Do you need medication for sleep? yes Ambien as needed  Do you use any supplements or other interventions for sleep? no    Resilience  Rate your current level of stress- moderate to high  How do you " manage stress?  Watches TV and reads    Nutrition   Food allergies or sensitivities: yes rice and lactose  Do you adhere to a particular type of diet? no  Do you have any concerns with your eating habits? no    Exercise  How would you describe your physical activity level? low    Past Medical History  Past Medical History:   Diagnosis Date    ADHD (attention deficit hyperactivity disorder)     Anemia     Breast cancer     triple negative    Hypertension     pre eclampsia and eclampsia    Stroke     at birth        Past Surgical History   Past Surgical History:   Procedure Laterality Date    ABLATION      BREAST RECONSTRUCTION  11/2023    BREAST SURGERY      lumpectomy    CYSTOSCOPY      INSERTION OF TUNNELED CENTRAL VENOUS CATHETER (CVC) WITH SUBCUTANEOUS PORT Right 6/3/2024    Procedure: [id number]-[id number];  Surgeon: Jj Silva MD;  Location: [facility];  Service: General;  Laterality: Right;    LITHOTRIPSY      MASTECTOMY  11/2023    TUBAL LIGATION      VAGINAL DELIVERY        Family History   Family History   Problem Relation Name Age of Onset    Hypertension Mother      Breast cancer Mother  51        negative screening    Heart disease Father      Kidney cancer Father      Breast cancer Paternal Aunt      Diabetes Maternal Grandmother      Breast cancer Maternal Grandmother      Breast cancer Other Both GGM       Social History  Social History     Socioeconomic History    Marital status:    Tobacco Use    Smoking status: Every Day    Smokeless tobacco: Never    Tobacco comments:     quitting   Substance and Sexual Activity    Alcohol use: Not Currently    Drug use: Never    Sexual activity: Yes     Partners: Male     Birth control/protection: See Surgical Hx     Comment: tubal     Social Determinants of Health     Financial Resource Strain: Medium Risk (1/18/2024)    Overall Financial Resource Strain (CARDIA)     Difficulty of Paying Living Expenses: Somewhat hard   Food Insecurity: Patient  Declined (1/18/2024)    Hunger Vital Sign     Worried About Running Out of Food in the Last Year: Patient declined     Ran Out of Food in the Last Year: Patient declined   Transportation Needs: No Transportation Needs (1/18/2024)    PRAPARE - Transportation     Lack of Transportation (Medical): No     Lack of Transportation (Non-Medical): No   Physical Activity: Insufficiently Active (1/18/2024)    Exercise Vital Sign     Days of Exercise per Week: 2 days     Minutes of Exercise per Session: 30 min   Stress: Stress Concern Present (1/18/2024)    [employer] - Occupational Stress Questionnaire     Feeling of Stress : Rather much   Housing Stability: High Risk (1/18/2024)    Housing Stability Vital Sign     Unable to Pay for Housing in the Last Year: Yes     Number of Places Lived in the Last Year: 1     Unstable Housing in the Last Year: No      Allergies  Review of patient's allergies indicates:  No Known Allergies   Current Medications:    Current Outpatient Medications:     cetirizine (ZYRTEC) 10 mg Cap, Take 5 mg by mouth once daily., Disp: , Rfl:     chlorhexidine (PERIDEX) 0.12 % solution, , Disp: , Rfl:     dexAMETHasone (DECADRON) 4 MG Tab, Take 2 tablets (8 mg total) by mouth once daily. Take 2 tablets (8 mg) by mouth once daily on days 2,3,4 following chemotherapy., Disp: 24 tablet, Rfl: 2    dextroamphetamine-amphetamine (ADDERALL XR) 20 MG 24 hr capsule, Take 20 mg by mouth every morning., Disp: , Rfl:     dextroamphetamine-amphetamine (ADDERALL) 20 mg tablet, Take 1 tablet by mouth., Disp: , Rfl:     LIDOcaine-prilocaine (EMLA) cream, Apply topically as needed (apply 30 to 60 minutes prior to chemo)., Disp: 30 g, Rfl: 2    LORazepam (ATIVAN) 1 MG tablet, Take 1 tablet (1 mg total) by mouth 2 (two) times daily as needed for Anxiety (nausea)., Disp: 30 tablet, Rfl: 0    mupirocin (BACTROBAN) 2 % ointment, , Disp: , Rfl:     OLANZapine (ZYPREXA) 5 MG tablet, Take 1 tablet (5 mg  total) by mouth every evening. Take 1 tablet by mouth every evening on days 1-4 of each chemotherapy cycle, Disp: 30 tablet, Rfl: 1    ondansetron (ZOFRAN-ODT) 8 MG TbDL, Take 1 tablet (8 mg total) by mouth every 8 (eight) hours as needed (nausea/vomiting). Take 1 tablet (8 mg) by mouth every 8 hours as needed for nausea/vomiting., Disp: 60 tablet, Rfl: 5    oxyCODONE (ROXICODONE) 10 mg Tab immediate release tablet, Take 1 tablet (10 mg total) by mouth every 4 (four) hours as needed for Pain., Disp: 45 tablet, Rfl: 0    promethazine (PHENERGAN) 12.5 MG Tab, (Take 1-2 tabs every 6 hours as needed for nausea persistent despite zofran), Disp: 40 tablet, Rfl: 3    SANCUSO 3.1 mg/24 hour, PLACE 1 PATCH ONTO THE SKIN ONCE, Disp: , Rfl:     varenicline (CHANTIX STARTING MONTH BOX) 0.5 mg (11)- 1 mg (42) tablet, Take one 0.5mg tab by mouth once daily X3 days,then increase to one 0.5mg tab twice daily X4 days,then increase to one 1mg tab twice daily, Disp: 1 each, Rfl: 0    zolpidem (AMBIEN) 10 mg Tab, Take 5 mg by mouth nightly as needed., Disp: , Rfl:   No current facility-administered medications for this visit.    Facility-Administered Medications Ordered in Other Visits:     0.9% NaCl 250 mL flush bag, , Intravenous, PRN, Wyatt Torres, NP    aprepitant (CINVANTI) injection 130 mg, 130 mg, Intravenous, 1 time in Clinic/HODBrian Robin A., MAR[facility]ELL    CARBOplatin (PARAPLATIN) 525 mg in 0.9% NaCl 302.5 mL chemo infusion, 525 mg, Intravenous, 1 time in Clinic/[facility]ANG, Wyatt Torres, MARIBELL    diphenhydrAMINE (BENADRYL) 50 mg in NS 50 mL IVPB, 50 mg, Intravenous, 1 time in Clinic/HOD, Wyatt Torres, MARIBELL    diphenhydrAMINE injection 50 mg, 50 mg, Intravenous, Once PRN, Wyatt Torres, NP    famotidine (PF) injection 20 mg, 20 mg, Intravenous, 1 time in Clinic/HOD, Wyatt Torres, NP    hydrocortisone sodium succinate injection 100 mg, 100 mg, Intravenous, Once PRN, Wyatt Torres, NP    LORazepam injection 1 mg, 1 mg,  Intravenous, Once PRN, Wyatt Torres, NP    PACLitaxeL (TAXOL) 80 mg/m2 = 114 mg in 0.9% NaCl 250 mL chemo infusion, 80 mg/m2 (Treatment Plan Recorded), Intravenous, 1 time in Clinic/HOD, Wyatt Torres, MARIBELL    palonosetron 0.25mg/dexAMETHasone 12mg in NS IVPB 0.25 mg 50 mL, 0.25 mg, Intravenous, 1 time in Clinic/HOD, Wyatt Torres, MARIBELL    pembrolizumab (KEYTRUDA) 200 mg in 0.9% NaCl SolP 123 mL infusion, 200 mg, Intravenous, 1 time in Clinic/HOD, Wyatt Torres, MARIBELL    prochlorperazine injection Soln 10 mg, 10 mg, Intravenous, Once PRN, Wyatt Torres, NP    sodium chloride 0.9% flush 10 mL, 10 mL, Intravenous, PRN, Wyatt Torres, NP     Review of Systems  Review of Systems   Constitutional: Negative.    HENT: Negative.     Eyes: Negative.    Respiratory: Negative.     Cardiovascular: Negative.    Gastrointestinal: Negative.    Genitourinary: Negative.    Musculoskeletal: Negative.    Skin: Negative.    Neurological: Negative.    Endo/Heme/Allergies: Negative.    Psychiatric/Behavioral:  The patient is nervous/anxious and has insomnia.       Physical Exam      BP 95/73   HR 88  Temp 97.5  RR 15  Physical Exam  Vitals reviewed.   Constitutional:       Appearance: Normal appearance.   Neurological:      Mental Status: She is alert.   Psychiatric:         Mood and Affect: Mood normal.         Behavior: Behavior normal.        ASSESSMENT :  1. Anxiety about health    2. Insomnia, unspecified type    3. Triple negative breast cancer      PLAN:  Reviewed all information discussed at today's visit and all questions were answered.    Counseled on healthy lifestyle and behavior modifications:   See Nutrition as needed during treatment  Referral to Acupuncture  I explained acupuncture can reduce fatigue,  pain, and neuropathy. It can also assist with behavioral health such as depression and anxiety and improve overall sleep quality. Acupuncture helps improve overall symptoms from treatment.   I discussed and  recommended the following support services:  Tim Chi and Yoga I suggested Tim Chi and/or Yoga as these practices reduce stress, increases flexibility and muscle strength, improves balance and promotes serenity in the power of movement to help fight disease and boost your immune system.   Music and relaxation therapy and Meditation which can decrease stress by lowering blood pressure, slowing breathing, and helping you be more present in the moment. It improves sleep by relaxing the body and mind at the end of the day.Meditation also trains you how to focus on one thing at a time, improving concentration. It also promotes emotional well-being by decreasing depression and anxiety, and helping create a more positive outlook on life.  Art Therapy     Follow up with [facility] in 3 months    I spent a total of 40 minutes on the day of the visit.This includes face to face time and non-face to face time preparing to see the patient (eg, review of tests), obtaining and/or reviewing separately obtained history, documenting clinical information in the electronic or other health record, independently interpreting results and communicating results to the patient/family/caregiver, or care coordinator.

## 2024-07-02 NOTE — PLAN OF CARE
Problem: Fatigue  Goal: Improved Activity Tolerance  Intervention: Promote Improved Energy  Flowsheets (Taken 7/2/2024 1043)  Fatigue Management:   activity schedule adjusted   fatigue-related activity identified   frequent rest breaks encouraged   paced activity encouraged  Sleep/Rest Enhancement:   relaxation techniques promoted   regular sleep/rest pattern promoted   natural light exposure provided  Activity Management:   Ambulated -L4   Ambulated to bathroom - L4   Ambulated in canales - L4   Up in stretcher chair - L1  Environmental Support:   calm environment promoted   personal routine supported     Problem: Adult Inpatient Plan of Care  Goal: Patient-Specific Goal (Individualized)  Outcome: Progressing  Flowsheets (Taken 7/2/2024 1043)  Individualized Care Needs: recliner, warm blanket, dim lights, cryotherapy,  at chairside  Anxieties, Fears or Concerns: upset stomach  Patient/Family-Specific Goals (Include Timeframe): no s/s of reaction during treatment

## 2024-07-03 ENCOUNTER — INFUSION (OUTPATIENT)
Dept: INFUSION THERAPY | Facility: HOSPITAL | Age: 39
End: 2024-07-03
Attending: INTERNAL MEDICINE
Payer: COMMERCIAL

## 2024-07-03 ENCOUNTER — TELEPHONE (OUTPATIENT)
Dept: HEMATOLOGY/ONCOLOGY | Facility: CLINIC | Age: 39
End: 2024-07-03
Payer: COMMERCIAL

## 2024-07-03 ENCOUNTER — PATIENT MESSAGE (OUTPATIENT)
Dept: HEMATOLOGY/ONCOLOGY | Facility: CLINIC | Age: 39
End: 2024-07-03
Payer: COMMERCIAL

## 2024-07-03 DIAGNOSIS — C50.919 TRIPLE NEGATIVE BREAST CANCER: Primary | ICD-10-CM

## 2024-07-03 PROCEDURE — 96372 THER/PROPH/DIAG INJ SC/IM: CPT

## 2024-07-03 PROCEDURE — 63600175 PHARM REV CODE 636 W HCPCS: Mod: JZ,JB,JG | Performed by: NURSE PRACTITIONER

## 2024-07-03 RX ADMIN — FILGRASTIM-SNDZ 300 MCG: 300 INJECTION, SOLUTION INTRAVENOUS; SUBCUTANEOUS at 04:07

## 2024-07-03 NOTE — TELEPHONE ENCOUNTER
----- Message from Heaven Mcdonald sent at 7/3/2024 11:28 AM CDT -----  Type: Needs Medical Advice  Who Called:  Patient   Symptoms (please be specific):    How long has patient had these symptoms:    Pharmacy name and phone #:    Best Call Back Number: [phone]  Additional Information: Patient requesting call back from Angelica MILES regarding appt. on today.

## 2024-07-03 NOTE — NURSING
Spoke to pt.  She no longer needed to talk to me.  Her scheduling issues had been handled.  Encouraged her to call with any questions or concerns.  She thanked me and verbalized understanding.

## 2024-07-03 NOTE — TELEPHONE ENCOUNTER
----- Message from Heaven Mcdonald sent at 7/3/2024 11:32 AM CDT -----  Type: Needs Medical Advice  Who Called:  Patient   Symptoms (please be specific):    How long has patient had these symptoms:   Pharmacy name and phone #:    Best Call Back Number: [phone]  Additional Information: Patient is requesting a call back from the nurse.

## 2024-07-05 ENCOUNTER — INFUSION (OUTPATIENT)
Dept: INFUSION THERAPY | Facility: HOSPITAL | Age: 39
End: 2024-07-05
Attending: INTERNAL MEDICINE
Payer: COMMERCIAL

## 2024-07-05 VITALS
HEIGHT: 62 IN | SYSTOLIC BLOOD PRESSURE: 101 MMHG | BODY MASS INDEX: 20.93 KG/M2 | WEIGHT: 113.75 LBS | OXYGEN SATURATION: 98 % | HEART RATE: 84 BPM | TEMPERATURE: 99 F | RESPIRATION RATE: 18 BRPM | DIASTOLIC BLOOD PRESSURE: 57 MMHG

## 2024-07-05 DIAGNOSIS — C50.919 TRIPLE NEGATIVE BREAST CANCER: Primary | ICD-10-CM

## 2024-07-05 PROCEDURE — 96372 THER/PROPH/DIAG INJ SC/IM: CPT | Mod: PN

## 2024-07-05 PROCEDURE — 63600175 PHARM REV CODE 636 W HCPCS: Mod: JZ,JB,JG,PN | Performed by: NURSE PRACTITIONER

## 2024-07-05 RX ADMIN — FILGRASTIM-SNDZ 300 MCG: 300 INJECTION, SOLUTION INTRAVENOUS; SUBCUTANEOUS at 03:07

## 2024-07-05 NOTE — PLAN OF CARE
Problem: Adult Inpatient Plan of Care  Goal: Plan of Care Review  Outcome: Progressing  Flowsheets (Taken 7/5/2024 1455)  Plan of Care Reviewed With: patient  Goal: Patient-Specific Goal (Individualized)  Outcome: Progressing  Flowsheets (Taken 7/5/2024 1455)  Anxieties, Fears or Concerns: none     Problem: Fatigue  Goal: Improved Activity Tolerance  Outcome: Progressing  Intervention: Promote Improved Energy  Flowsheets (Taken 7/5/2024 1455)  Fatigue Management: frequent rest breaks encouraged  Sleep/Rest Enhancement: noise level reduced  Activity Management:   Ambulated -L4   Ambulated to bathroom - L4   Ambulated in canales - L4  Environmental Support: calm environment promoted   Pt tolerated zarxio injection well.   No adverse reaction noted.  All questions answered.  Pt left clinic in no acute distress.

## 2024-07-06 ENCOUNTER — PATIENT MESSAGE (OUTPATIENT)
Dept: HEMATOLOGY/ONCOLOGY | Facility: CLINIC | Age: 39
End: 2024-07-06
Payer: COMMERCIAL

## 2024-07-08 ENCOUNTER — PATIENT MESSAGE (OUTPATIENT)
Dept: HEMATOLOGY/ONCOLOGY | Facility: CLINIC | Age: 39
End: 2024-07-08

## 2024-07-08 ENCOUNTER — INFUSION (OUTPATIENT)
Dept: INFUSION THERAPY | Facility: HOSPITAL | Age: 39
End: 2024-07-08
Attending: INTERNAL MEDICINE
Payer: COMMERCIAL

## 2024-07-08 ENCOUNTER — OFFICE VISIT (OUTPATIENT)
Dept: HEMATOLOGY/ONCOLOGY | Facility: CLINIC | Age: 39
End: 2024-07-08
Payer: COMMERCIAL

## 2024-07-08 ENCOUNTER — LAB VISIT (OUTPATIENT)
Dept: LAB | Facility: HOSPITAL | Age: 39
End: 2024-07-08
Attending: INTERNAL MEDICINE
Payer: COMMERCIAL

## 2024-07-08 VITALS
SYSTOLIC BLOOD PRESSURE: 100 MMHG | HEART RATE: 97 BPM | TEMPERATURE: 98 F | HEIGHT: 62 IN | DIASTOLIC BLOOD PRESSURE: 63 MMHG | WEIGHT: 109.13 LBS | BODY MASS INDEX: 20.08 KG/M2 | RESPIRATION RATE: 16 BRPM | OXYGEN SATURATION: 99 %

## 2024-07-08 VITALS
BODY MASS INDEX: 20.08 KG/M2 | RESPIRATION RATE: 18 BRPM | SYSTOLIC BLOOD PRESSURE: 109 MMHG | HEIGHT: 62 IN | DIASTOLIC BLOOD PRESSURE: 74 MMHG | OXYGEN SATURATION: 99 % | WEIGHT: 109.13 LBS | HEART RATE: 100 BPM | TEMPERATURE: 98 F

## 2024-07-08 DIAGNOSIS — C50.919 TRIPLE NEGATIVE BREAST CANCER: Primary | ICD-10-CM

## 2024-07-08 DIAGNOSIS — D69.6 THROMBOCYTOPENIA: ICD-10-CM

## 2024-07-08 DIAGNOSIS — C50.919 TRIPLE NEGATIVE BREAST CANCER: ICD-10-CM

## 2024-07-08 DIAGNOSIS — D63.0 ANEMIA IN NEOPLASTIC DISEASE: ICD-10-CM

## 2024-07-08 DIAGNOSIS — F41.9 ANXIETY: ICD-10-CM

## 2024-07-08 LAB
ALBUMIN SERPL BCP-MCNC: 4.1 G/DL (ref 3.5–5.2)
ALP SERPL-CCNC: 74 U/L (ref 55–135)
ALT SERPL W/O P-5'-P-CCNC: 64 U/L (ref 10–44)
ANION GAP SERPL CALC-SCNC: 11 MMOL/L (ref 8–16)
AST SERPL-CCNC: 28 U/L (ref 10–40)
B-HCG UR QL: NEGATIVE
BASOPHILS # BLD AUTO: 0.01 K/UL (ref 0–0.2)
BASOPHILS NFR BLD: 0.2 % (ref 0–1.9)
BILIRUB SERPL-MCNC: 0.5 MG/DL (ref 0.1–1)
BUN SERPL-MCNC: 21 MG/DL (ref 6–20)
CALCIUM SERPL-MCNC: 9.6 MG/DL (ref 8.7–10.5)
CHLORIDE SERPL-SCNC: 102 MMOL/L (ref 95–110)
CO2 SERPL-SCNC: 26 MMOL/L (ref 23–29)
CREAT SERPL-MCNC: 0.7 MG/DL (ref 0.5–1.4)
DIFFERENTIAL METHOD BLD: ABNORMAL
EOSINOPHIL # BLD AUTO: 0 K/UL (ref 0–0.5)
EOSINOPHIL NFR BLD: 0.9 % (ref 0–8)
ERYTHROCYTE [DISTWIDTH] IN BLOOD BY AUTOMATED COUNT: 14.4 % (ref 11.5–14.5)
EST. GFR  (NO RACE VARIABLE): >60 ML/MIN/1.73 M^2
GLUCOSE SERPL-MCNC: 107 MG/DL (ref 70–110)
HCT VFR BLD AUTO: 30.5 % (ref 37–48.5)
HGB BLD-MCNC: 10.5 G/DL (ref 12–16)
IMM GRANULOCYTES # BLD AUTO: 0.03 K/UL (ref 0–0.04)
IMM GRANULOCYTES NFR BLD AUTO: 0.6 % (ref 0–0.5)
LYMPHOCYTES # BLD AUTO: 1.6 K/UL (ref 1–4.8)
LYMPHOCYTES NFR BLD: 34.8 % (ref 18–48)
MAGNESIUM SERPL-MCNC: 2.2 MG/DL (ref 1.6–2.6)
MCH RBC QN AUTO: 30.7 PG (ref 27–31)
MCHC RBC AUTO-ENTMCNC: 34.4 G/DL (ref 32–36)
MCV RBC AUTO: 89 FL (ref 82–98)
MONOCYTES # BLD AUTO: 0.2 K/UL (ref 0.3–1)
MONOCYTES NFR BLD: 3.7 % (ref 4–15)
NEUTROPHILS # BLD AUTO: 2.8 K/UL (ref 1.8–7.7)
NEUTROPHILS NFR BLD: 59.8 % (ref 38–73)
NRBC BLD-RTO: 0 /100 WBC
PLATELET # BLD AUTO: 112 K/UL (ref 150–450)
PMV BLD AUTO: 9.1 FL (ref 9.2–12.9)
POTASSIUM SERPL-SCNC: 3.8 MMOL/L (ref 3.5–5.1)
PROT SERPL-MCNC: 6.2 G/DL (ref 6–8.4)
RBC # BLD AUTO: 3.42 M/UL (ref 4–5.4)
SODIUM SERPL-SCNC: 139 MMOL/L (ref 136–145)
WBC # BLD AUTO: 4.63 K/UL (ref 3.9–12.7)

## 2024-07-08 PROCEDURE — 80053 COMPREHEN METABOLIC PANEL: CPT | Mod: PN | Performed by: INTERNAL MEDICINE

## 2024-07-08 PROCEDURE — 3078F DIAST BP <80 MM HG: CPT | Mod: CPTII,S$GLB,, | Performed by: NURSE PRACTITIONER

## 2024-07-08 PROCEDURE — 3008F BODY MASS INDEX DOCD: CPT | Mod: CPTII,S$GLB,, | Performed by: NURSE PRACTITIONER

## 2024-07-08 PROCEDURE — A4216 STERILE WATER/SALINE, 10 ML: HCPCS | Mod: PN | Performed by: NURSE PRACTITIONER

## 2024-07-08 PROCEDURE — 3074F SYST BP LT 130 MM HG: CPT | Mod: CPTII,S$GLB,, | Performed by: NURSE PRACTITIONER

## 2024-07-08 PROCEDURE — 25000003 PHARM REV CODE 250: Mod: PN | Performed by: NURSE PRACTITIONER

## 2024-07-08 PROCEDURE — 85025 COMPLETE CBC W/AUTO DIFF WBC: CPT | Mod: PN | Performed by: INTERNAL MEDICINE

## 2024-07-08 PROCEDURE — 99214 OFFICE O/P EST MOD 30 MIN: CPT | Mod: S$GLB,,, | Performed by: NURSE PRACTITIONER

## 2024-07-08 PROCEDURE — 83735 ASSAY OF MAGNESIUM: CPT | Mod: PN | Performed by: INTERNAL MEDICINE

## 2024-07-08 PROCEDURE — 99999 PR PBB SHADOW E&M-EST. PATIENT-LVL IV: CPT | Mod: PBBFAC,,, | Performed by: NURSE PRACTITIONER

## 2024-07-08 PROCEDURE — 1159F MED LIST DOCD IN RCRD: CPT | Mod: CPTII,S$GLB,, | Performed by: NURSE PRACTITIONER

## 2024-07-08 PROCEDURE — 81025 URINE PREGNANCY TEST: CPT | Mod: PN | Performed by: INTERNAL MEDICINE

## 2024-07-08 PROCEDURE — 96375 TX/PRO/DX INJ NEW DRUG ADDON: CPT | Mod: PN

## 2024-07-08 PROCEDURE — 63600175 PHARM REV CODE 636 W HCPCS: Mod: PN | Performed by: NURSE PRACTITIONER

## 2024-07-08 PROCEDURE — 96367 TX/PROPH/DG ADDL SEQ IV INF: CPT | Mod: PN

## 2024-07-08 PROCEDURE — 96413 CHEMO IV INFUSION 1 HR: CPT | Mod: PN

## 2024-07-08 PROCEDURE — 36415 COLL VENOUS BLD VENIPUNCTURE: CPT | Mod: PN | Performed by: INTERNAL MEDICINE

## 2024-07-08 RX ORDER — LORAZEPAM 2 MG/ML
1 INJECTION INTRAMUSCULAR ONCE AS NEEDED
Status: CANCELLED
Start: 2024-07-08

## 2024-07-08 RX ORDER — HEPARIN 100 UNIT/ML
500 SYRINGE INTRAVENOUS
Status: CANCELLED | OUTPATIENT
Start: 2024-07-08

## 2024-07-08 RX ORDER — PROCHLORPERAZINE EDISYLATE 5 MG/ML
2.5 INJECTION INTRAMUSCULAR; INTRAVENOUS
Status: CANCELLED
Start: 2024-07-09

## 2024-07-08 RX ORDER — PROCHLORPERAZINE EDISYLATE 5 MG/ML
10 INJECTION INTRAMUSCULAR; INTRAVENOUS ONCE AS NEEDED
Status: CANCELLED
Start: 2024-07-08

## 2024-07-08 RX ORDER — SODIUM CHLORIDE 0.9 % (FLUSH) 0.9 %
10 SYRINGE (ML) INJECTION
Status: DISCONTINUED | OUTPATIENT
Start: 2024-07-08 | End: 2024-07-08 | Stop reason: HOSPADM

## 2024-07-08 RX ORDER — PROCHLORPERAZINE EDISYLATE 5 MG/ML
2.5 INJECTION INTRAMUSCULAR; INTRAVENOUS
Status: CANCELLED
Start: 2024-07-10

## 2024-07-08 RX ORDER — LORAZEPAM 1 MG/1
1 TABLET ORAL 2 TIMES DAILY PRN
Qty: 60 TABLET | Refills: 0 | Status: SHIPPED | OUTPATIENT
Start: 2024-07-08 | End: 2025-07-08

## 2024-07-08 RX ORDER — FAMOTIDINE 10 MG/ML
20 INJECTION INTRAVENOUS
Status: COMPLETED | OUTPATIENT
Start: 2024-07-08 | End: 2024-07-08

## 2024-07-08 RX ORDER — LORAZEPAM 2 MG/ML
1 INJECTION INTRAMUSCULAR ONCE AS NEEDED
Status: COMPLETED | OUTPATIENT
Start: 2024-07-08 | End: 2024-07-08

## 2024-07-08 RX ORDER — PROCHLORPERAZINE EDISYLATE 5 MG/ML
2.5 INJECTION INTRAMUSCULAR; INTRAVENOUS
Status: CANCELLED
Start: 2024-07-11

## 2024-07-08 RX ORDER — DIPHENHYDRAMINE HYDROCHLORIDE 50 MG/ML
50 INJECTION INTRAMUSCULAR; INTRAVENOUS ONCE AS NEEDED
Status: CANCELLED | OUTPATIENT
Start: 2024-07-08

## 2024-07-08 RX ORDER — TRETINOIN 0.5 MG/G
CREAM TOPICAL
COMMUNITY
Start: 2024-06-28

## 2024-07-08 RX ORDER — SODIUM CHLORIDE 0.9 % (FLUSH) 0.9 %
10 SYRINGE (ML) INJECTION
Status: CANCELLED | OUTPATIENT
Start: 2024-07-08

## 2024-07-08 RX ORDER — FAMOTIDINE 10 MG/ML
20 INJECTION INTRAVENOUS
Status: CANCELLED | OUTPATIENT
Start: 2024-07-08

## 2024-07-08 RX ORDER — EPINEPHRINE 0.3 MG/.3ML
0.3 INJECTION SUBCUTANEOUS ONCE AS NEEDED
Status: CANCELLED | OUTPATIENT
Start: 2024-07-08

## 2024-07-08 RX ADMIN — DEXAMETHASONE SODIUM PHOSPHATE 10 MG: 10 INJECTION, SOLUTION INTRAMUSCULAR; INTRAVENOUS at 02:07

## 2024-07-08 RX ADMIN — Medication 10 ML: at 04:07

## 2024-07-08 RX ADMIN — FAMOTIDINE 20 MG: 10 INJECTION INTRAVENOUS at 03:07

## 2024-07-08 RX ADMIN — DIPHENHYDRAMINE HYDROCHLORIDE 50 MG: 50 INJECTION, SOLUTION INTRAMUSCULAR; INTRAVENOUS at 03:07

## 2024-07-08 RX ADMIN — LORAZEPAM 1 MG: 2 INJECTION INTRAMUSCULAR; INTRAVENOUS at 03:07

## 2024-07-08 RX ADMIN — SODIUM CHLORIDE: 9 INJECTION, SOLUTION INTRAVENOUS at 02:07

## 2024-07-08 RX ADMIN — PACLITAXEL 114 MG: 6 INJECTION, SOLUTION INTRAVENOUS at 03:07

## 2024-07-08 NOTE — PROGRESS NOTES
Name: Aleena Duckworth  MRN:  [id number]  :  1985 Age 38 y.o.  Date of Service: 2024    Reason for visit:  Aleena Duckworth is a 38 y.o. female here regarding...clearance for C2D8 of Taxol    # Invasive Ductal Carcinoma of the Left Breast  Date of Original Diagnosis:  2023  Recurrence: 5/15/24, localized  Original Stage: pT1b pN0(sn) triple negative, grade 3, Ki-67 80%  Recurrence Stage: [id number] Stage 2B Triple negative,  grade 3, Ki-67 76%  Current Sites of Disease:   left axillary tail/lymph node  Current Goals of Therapy:  Curative  Current Therapy:   keynote 522 NACT     Oncologic History/History of Present Illness:   Former patient of Dr. Ayala, per her notes  self palpated a left-sided breast mass.    2023  bilateral breast mammogram that showed a lobular hypoechoic solid mass within the left breast at 2 o'clock position 7 cm     2023, ultrasound guided biopsy = invasive ductal carcinoma, grade 3, ER/IN/HER2 Randall negative, Ki-67 80%.     23  left partial mastectomy that revealed invasive ductal carcinoma 9 x 8 x 6 mm, single focus, approximately 1 mm focus of high-grade DCIS, closest margin 3 mm, anterior.  Two sentinel lymph nodes were negative.  pT1b pN0(sn).    23 cycle 1 of Docetaxel and Cytoxan with numerous SE  2023 cycle 2 of TC with 20% dose reduction of docetaxel.  With again numerous side effects.     Extensive GOC discussion held with patient and family and decided to stop chemotherapy.     2023 s/p bilateral mastectomy, implant removal and hybrid flap and small implants    2024 Reports to [facility] to establish care with me. Reports left lateral axillary tail nodules.  Causing anxiety.    24    mammogram with ultrasound BI-RADS 4- 11 mm left axillary tail mass  5/15/24   left axillary tail  and lymph node positive for  poorly differentiated carcinoma, triple negative,  staging scans negative.    24  Had a prolonged discussion with patient's  , father, mother she was motivated to pursue neoadjuvant chemotherapy.    06/24/24:  C1D15 delayed last week due to platelets = 91k.  Presents to the clinic for clearance for C1D15; Denies any N/V/D; appetite fair; weight loss of 3 1/2 pounds in 1 week.  Remains working FT; needs to move C2D1 to Tuesday of next week 07/02 due to work.  Dr. Cruz reviewed labs & is ok to proceed with today's labs of Platelets = 91k    07/02/24:  Presents for clearance for C2D1; appetite better; hydrating well; no N/V/D  Platelets slightly improved from 91 to 95k this week; grans down; getting Zarxio on Days 2 & 4     07/08/24:  Presents for clearance for C2D8; feels good overall.  Eating well & hydrating; no nausea or diarrhea  Some difficulties with hot flashes with anxiety.  Reports that Ativan helped when given by Dr. Ayala.  Denies fevers, chills, abdominal discomfort, N/V/D, melena, etc.       Oncology History   Triple negative breast cancer   4/17/2023 Initial Diagnosis    Triple negative breast cancer     4/18/2023 Cancer Staged    Staging form: Breast, AJCC 8th Edition  - Pathologic stage from 4/18/2023: Stage IB (pT1b, pN0, cM0, G3, ER-, DE-, HER2-)     5/1/2023 - 5/25/2023 Chemotherapy    Treatment Summary   Plan Name: OP BREAST TC - DOCETAXEL CYCLOPHOSPHAMIDE Q3W  Treatment Goal: Curative  Status: Inactive  Start Date: 5/1/2023  End Date: 5/25/2023  Provider: Ambar Ayala MD  Chemotherapy: cycloPHOSphamide 600 mg/m2 = 880 mg in sodium chloride 0.9% 289.4 mL chemo infusion, 600 mg/m2 = 880 mg, Intravenous, Clinic/HOD 1 time, 2 of 4 cycles  Dose modification: 600 mg/m2 (Cycle 3), 480 mg/m2 (Cycle 3, Reason: Dose not tolerated)  Administration: 880 mg (5/1/2023)  DOCEtaxel 75 mg/m2 = 110 mg in sodium chloride 0.9% 290.5 mL chemo infusion, 75 mg/m2 = 110 mg, Intravenous, Clinic/HOD 1 time, 2 of 4 cycles  Dose modification: 60 mg/m2 (original dose 75 mg/m2, Cycle 2, Reason: Dose not tolerated)  Administration: 110  "mg (5/1/2023), 90 mg (5/24/2023)     5/22/2024 Cancer Staged    Staging form: Breast, AJCC 8th Edition  - Clinical stage from 5/22/2024: Stage IIB (rcT0, cN1(f), cM0, G3, ER-, NH-, HER2-)     6/4/2024 -  Chemotherapy    Treatment Summary   Plan Name: OP PEMBROLIZUMAB CARBOPLATIN (AUC 5) WITH WEEKLY PACLITAXEL FOLLOWED BY PEMBROLIZUMAB DOXORUBICIN CYCLOPHOSPHAMIDE FOLLOWED BY PEMBROLIZUMAB 200 MG Q3W  Treatment Goal: Curative  Status: Active  Start Date: 6/4/2024  End Date: 5/13/2025 (Planned)  Provider: Eunice Cruz MD  Chemotherapy: DOXOrubicin chemo injection 86 mg, 60 mg/m2 = 86 mg, Intravenous, Clinic/HOD 1 time, 0 of 4 cycles  CARBOplatin (PARAPLATIN) 525 mg in sodium chloride 0.9% 302.5 mL chemo infusion, 525 mg, Intravenous, Clinic/HOD 1 time, 2 of 4 cycles  Administration: 525 mg (6/4/2024), 525 mg (7/2/2024)  cycloPHOSphamide 600 mg/m2 = 860 mg in sodium chloride 0.9% 254.3 mL chemo infusion, 600 mg/m2 = 860 mg, Intravenous, Clinic/HOD 1 time, 0 of 4 cycles  PACLitaxeL (TAXOL) 80 mg/m2 = 114 mg in sodium chloride 0.9% 250 mL chemo infusion, 80 mg/m2 = 114 mg, Intravenous, Clinic/HOD 1 time, 2 of 4 cycles  Administration: 114 mg (6/4/2024), 114 mg (6/11/2024), 114 mg (6/24/2024), 114 mg (7/2/2024)           PHYSICAL EXAMINATION:     /74 (BP Location: Left arm, Patient Position: Sitting, BP Method: Medium (Automatic))   Pulse 100   Temp 97.7 °F (36.5 °C) (Temporal)   Resp 18   Ht 5' 2" (1.575 m)   Wt 49.5 kg (109 lb 2 oz)   SpO2 99%   BMI 19.96 kg/m²   Wt Readings from Last 3 Encounters:   07/05/24 51.6 kg (113 lb 12.1 oz)   07/02/24 51.6 kg (113 lb 12.1 oz)   07/02/24 51.6 kg (113 lb 12.1 oz)     ECOG PERFORMANCE STATUS: 0  Physical Exam   General:  Well-appearing, nontoxic  Eyes:  Equal and round pupils, EOMI, no scleral icterus  Mouth:  No lesions, moist  Cardiovascular:  Warm, well-perfused, no peripheral edema  Lungs:  Unlabored on room air, no wheezing  Neurologic:  Awake, alert and " oriented, participating in the exam  Psych:  Appropriate mood and affect  Skin:  Pale pallor, No rashes  Heme:  No petechiae, no purpura       LABORATORY:  CBC  Lab Results   Component Value Date    WBC 4.63 07/08/2024    HGB 10.5 (L) 07/08/2024    HCT 30.5 (L) 07/08/2024    MCV 89 07/08/2024     (L) 07/08/2024     ANC = 2.8     CMP  Sodium   Date Value Ref Range Status   07/08/2024 139 136 - 145 mmol/L Final     Potassium   Date Value Ref Range Status   07/08/2024 3.8 3.5 - 5.1 mmol/L Final     Chloride   Date Value Ref Range Status   07/08/2024 102 95 - 110 mmol/L Final     CO2   Date Value Ref Range Status   07/08/2024 26 23 - 29 mmol/L Final     Glucose   Date Value Ref Range Status   07/08/2024 107 70 - 110 mg/dL Final     BUN   Date Value Ref Range Status   07/08/2024 21 (H) 6 - 20 mg/dL Final     Creatinine   Date Value Ref Range Status   07/08/2024 0.7 0.5 - 1.4 mg/dL Final     Calcium   Date Value Ref Range Status   07/08/2024 9.6 8.7 - 10.5 mg/dL Final     Total Protein   Date Value Ref Range Status   07/08/2024 6.2 6.0 - 8.4 g/dL Final     Albumin   Date Value Ref Range Status   07/08/2024 4.1 3.5 - 5.2 g/dL Final     Total Bilirubin   Date Value Ref Range Status   07/08/2024 0.5 0.1 - 1.0 mg/dL Final     Comment:     For infants and newborns, interpretation of results should be based  on gestational age, weight and in agreement with clinical  observations.    Premature Infant recommended reference ranges:  Up to 24 hours.............<8.0 mg/dL  Up to 48 hours............<12.0 mg/dL  3-5 days..................<15.0 mg/dL  6-29 days.................<15.0 mg/dL       Alkaline Phosphatase   Date Value Ref Range Status   07/08/2024 74 55 - 135 U/L Final     AST   Date Value Ref Range Status   07/08/2024 28 10 - 40 U/L Final     ALT   Date Value Ref Range Status   07/08/2024 64 (H) 10 - 44 U/L Final     Anion Gap   Date Value Ref Range Status   07/08/2024 11 8 - 16 mmol/L Final     eGFR   Date Value Ref  Range Status   07/08/2024 >60.0 >60 mL/min/1.73 m^2 Final     UPT:  negative  Magnesium:  2.2  Lab Results   Component Value Date    TSH 0.864 06/24/2024    FREET4 1.00 03/06/2024        PATHOLOGY:  DIAGNOSIS:  05/17/2024 [facility]  LEFT AXILLARY TAIL, ULTRASOUND-GUIDED NEEDLE CORE BIOPSY:  --FRAGMENTS OF LYMPH NODE POSITIVE FOR METASTATIC POORLY DIFFERENTIATED   CARCINOMA (SEE COMMENT).  --RECEPTOR STUDIES PENDING.      RADIOLOGY:  CT chest abdomen pelvis 05/24/2024  Impression:  1. Postoperative changes of bilateral breast reconstruction with breast implants in place.  Two small subcutaneous soft tissue nodules along the left axillary tail in this patient with biopsy-proven left axillary lymph node triple negative breast cancer, as detailed above.  Correlate with recent mammographic imaging/biopsy.  2. No evidence of metastatic disease within the chest, abdomen, or pelvis.  3. Mild bilateral pelviectasis without evidence of gina hydronephrosis or obstructive uropathy, nonspecific and may be physiologic.    NM Bone Scan  05/24/2024  Impression:  No evidence of metastatic disease      ASSESSMENT AND PLAN:  Aleena Duckworth is a 38 y.o. female with...    # Invasive Ductal Carcinoma of the Left Breast  Date of Original Diagnosis:  02/09/2023  Recurrence: 5/15/24, localized  Original Stage: pT1b pN0(sn) triple negative, grade 3, Ki-67 80%  Recurrence Stage: [id number] Stage 2B Triple negative,  grade 3, Ki-67 76%  Current Sites of Disease:   left axillary tail/lymph node  Current Goals of Therapy:  Curative  Current Therapy:   pending neoadjuvant chemotherapy        Staging CT and PET negative for distant disease    Added 1 mg of Ativan for each dose of chemotherapy given her adverse reactions in the past and anxiety surrounding chemo  Added filgrastim to  days 2,3,4/9,10,11/16,17,18  given history of neutropenic fever with TC regimen,  can give additional antiemetics if necessary  Advised patient she needs to have weekly  mental health visits while on chemotherapy, referral to Oncology Psychology  Will see her weekly during Cycles 1-4   6/17/24--one-week delay for cycle 1 day 15 of chemotherapy due to thrombocytopenia and LFT elevation, platelets 91,000, repeat CBC and CMP on 06/24.   06/24/24:  Dr. Cruz reviewed labs; ok to proceed with Platelets + 91k again this week; Proceed with C1D15 today; f/u next week 07/01 with Dr. Cruz for C2D1 with labs prior.  07/02/24:  Proceed with C2D1 today with Zarxio on Days 2, 4 (holiday on day 3); f/u next week as scheduled on 07/08 with labs prior for clearance for C2D8  07/08/24:  Proceed with C2D8 today; Zarxio on Days 9, 10, 11; f/u in 1 week for clearance for C2D15 with labs prior.    #LFT elevation- advised to d/c OTC meds, one week delay in chemotherapy  07/02/24:  Continues to trend down  # medication induced constipation- advised to take BID docusate and PRN senna   07/02:  stable  # anxiety about health- as noted above,  could be exacerbated by Adderall  # history of neutropenic fever- as above  # thrombocytopenia- fluctuating platelet levels, commonly this is associated with over-the-counter medications or other medications, encouraged cessation of all OTCs, changed Percocet to Niki  07/02/24:  stable  07/08/24:  stable    #ADHD, has been on Adderall for several years.  Was born prematurely, was diagnosed with a stroke and heart murmur at birth, was treated at [facility].     #Heavy menorrhagia for the past few years, worsening recently and has developed CAROLIN.  Has been on iron supplementation.  Sees gynecologist, [facility]. Almita Clark with [facility].      #Hyperbilirubinemia - An abdominal ultrasound was done, negative for any abnormalities.Saw Hepatology prior to chemo who suspected she has Gilbert's disease and found no contraindication to proceeding with TC.    #Anxiety  Rx Ativan 1mg bid prn sent.    CACHORRO Ga, FNP-C  [facility]  [place]  30 minutes were spent in coordination of patient's care, record review and counseling.        Route Chart for Scheduling    Med Onc Chart Routing      Follow up with physician    Follow up with NUNO . F/U as scheduled with me in 1 week 07/15 with labs:  cbc, cmp, mg, upt (standing)   Infusion scheduling note    Injection scheduling note    Labs    Imaging    Pharmacy appointment    Other referrals                Treatment Plan Information   OP PEMBROLIZUMAB CARBOPLATIN (AUC 5) WITH WEEKLY PACLITAXEL FOLLOWED BY PEMBROLIZUMAB DOXORUBICIN CYCLOPHOSPHAMIDE FOLLOWED BY PEMBROLIZUMAB 200 MG Q3W   Eunice Cruz MD   Upcoming Treatment Dates - OP PEMBROLIZUMAB CARBOPLATIN (AUC 5) WITH WEEKLY PACLITAXEL FOLLOWED BY PEMBROLIZUMAB DOXORUBICIN CYCLOPHOSPHAMIDE FOLLOWED BY PEMBROLIZUMAB 200 MG Q3W    7/9/2024       Pre-Medications       dexAMETHasone (DECADRON) 10 mg in sodium chloride 0.9% 50 mL IVPB       diphenhydrAMINE (BENADRYL) 50 mg in NS 50 mL IVPB       famotidine (PF) injection 20 mg       Chemotherapy       PACLitaxeL (TAXOL) 80 mg/m2 = 114 mg in sodium chloride 0.9% 250 mL chemo infusion       Supportive Care       LORazepam injection 1 mg       prochlorperazine injection Soln 10 mg  7/10/2024       Growth Factor       filgrastim-sndz (ZARXIO) injection 300 mcg/0.5 mL (Preferred Regimen)  7/11/2024       Growth Factor       filgrastim-sndz (ZARXIO) injection 300 mcg/0.5 mL (Preferred Regimen)  7/12/2024       Growth Factor       filgrastim-sndz (ZARXIO) injection 300 mcg/0.5 mL (Preferred Regimen)    Therapy Plan Information  IV Fluids  sodium chloride 0.9% bolus 1,000 mL 1,000 mL  1,000 mL, Intravenous, Every visit  Flushes  sodium chloride 0.9% flush 10 mL  10 mL, Intravenous, PRN  heparin, porcine (PF) 100 unit/mL injection flush 500 Units  500 Units, Intravenous, PRN  Antiemetics  ondansetron injection 8 mg  8 mg, Intravenous, PRN  promethazine (PHENERGAN) 12.5 mg in dextrose 5 % (D5W) 50 mL IVPB  12.5  mg, Intravenous, PRN  dexAMETHasone injection 8 mg  8 mg, Intravenous, PRN

## 2024-07-08 NOTE — PLAN OF CARE
Pt tolerated Taxol infusion well.  No s/s of infusion reaction noted.  Pt did cryotherapy to wrist and ankles during Taxol.  Instructed to call MD with any problems

## 2024-07-09 ENCOUNTER — INFUSION (OUTPATIENT)
Dept: INFUSION THERAPY | Facility: HOSPITAL | Age: 39
End: 2024-07-09
Attending: INTERNAL MEDICINE
Payer: COMMERCIAL

## 2024-07-09 VITALS
SYSTOLIC BLOOD PRESSURE: 110 MMHG | TEMPERATURE: 98 F | OXYGEN SATURATION: 98 % | RESPIRATION RATE: 16 BRPM | DIASTOLIC BLOOD PRESSURE: 62 MMHG | HEART RATE: 99 BPM

## 2024-07-09 DIAGNOSIS — C50.612 MALIGNANT NEOPLASM OF AXILLARY TAIL OF LEFT FEMALE BREAST, UNSPECIFIED ESTROGEN RECEPTOR STATUS: Primary | ICD-10-CM

## 2024-07-09 DIAGNOSIS — C50.919 TRIPLE NEGATIVE BREAST CANCER: Primary | ICD-10-CM

## 2024-07-09 DIAGNOSIS — C50.919 TRIPLE NEGATIVE BREAST CANCER: ICD-10-CM

## 2024-07-09 PROCEDURE — 63600175 PHARM REV CODE 636 W HCPCS: Mod: JZ,JB,JG,PN | Performed by: NURSE PRACTITIONER

## 2024-07-09 PROCEDURE — 96372 THER/PROPH/DIAG INJ SC/IM: CPT | Mod: PN

## 2024-07-09 RX ADMIN — FILGRASTIM-SNDZ 300 MCG: 300 INJECTION, SOLUTION INTRAVENOUS; SUBCUTANEOUS at 03:07

## 2024-07-09 NOTE — PLAN OF CARE
Problem: Fever with Neutropenia  Goal: Absence of Infection  Intervention: Prevent Infection and Maximize Resistance  Flowsheets (Taken 7/9/2024 1518)  Bathing/Skin Care: other (see comments)     Problem: Fever with Neutropenia  Goal: Baseline Body Temperature  Intervention: Promote Normothermia  Flowsheets (Taken 7/9/2024 1518)  Fever Reduction/Comfort Measures: fluid intake increased      Hospitalist Progress Note    Patient: Raffi Gutierrez MRN: [id number]  CSN: [id number]    YOB: 1959  Age: 57 y.o. Sex: male    DOA: 7/15/2021 LOS:  LOS: 2 days            Assessment/Plan   1. Acute diverticulitis w abscess sp drainage  2. Elevated blood pressure with out diagnosis of HTN  3. Abdominal pain    Plan:  - continue IV antiboitics  - analgesia as required  - monitor blood pressure      Patient Active Problem List   Diagnosis Code    Abscess of sigmoid colon due to diverticulitis K57.20               Subjective:    cc: \" I feel OK\"  No acute events overnight  Tolerates abx  Moving bowels  Remains afebrile      REVIEW OF SYSTEMS:  General: No fevers or chills. Cardiovascular: No chest pain or pressure. No palpitations. Pulmonary: No shortness of breath. Gastrointestinal: No nausea, vomiting. Objective:        Vital signs/Intake and Output:  Visit Vitals  BP (!) 146/70 (BP 1 Location: Right lower arm, BP Patient Position: Lying left side)   Pulse 70   Temp 97.8 °F (36.6 °C)   Resp 16   Ht 5' 11\" (1.803 m)   Wt 107.8 kg (237 lb 9.6 oz)   SpO2 97%   BMI 33.14 kg/m²     Current Shift:  07/17 0701 - 07/17 1900  In: 2204 [P.O.:120; I.V.:2084]  Out: 20 [Drains:20]  Last three shifts:  07/15 1901 - 07/17 0700  In: 0   Out: 31 [Drains:30]    Body mass index is 33.14 kg/m².     Physical Exam:  GEN: Alert and oriented times three NAD  EYES: conjunctiva normal, lids with out lesions  HEENT: MMM, No thyromegaly, no lymphadenopathy  HEART: RRR +S1 +S2, no JVD, pulses 2+ distally  LUNGS: CTA B/L no wheezes, rales or rhonchi  ABDOMEN: + BS, soft NT/ND no organomegaly,  No rebound  EXTREMITIES: No edema cyanosis, cap refill normal   SKIN: no rashes or skin breakdown, no nodules, normal turgor  Current Facility-Administered Medications   Medication Dose Route Frequency    Saccharomyces boulardii (FLORASTOR) capsule 500 mg  500 mg Oral BID    famotidine (PEPCID) tablet 20 mg  20 mg Oral DAILY    piperacillin-tazobactam (ZOSYN) 4.5 g in 0.9% sodium chloride (MBP/ADV) 100 mL MBP  4.5 g IntraVENous Q6H    acetaminophen (TYLENOL) tablet 650 mg  650 mg Oral Q6H PRN    ondansetron (ZOFRAN) injection 4 mg  4 mg IntraVENous Q6H PRN    morphine injection 1 mg  1 mg IntraVENous Q3H PRN    HYDROcodone-acetaminophen (NORCO) 5-325 mg per tablet 1 Tablet  1 Tablet Oral Q4H PRN    metroNIDAZOLE (FLAGYL) IVPB premix 500 mg  500 mg IntraVENous Q8H    0.9% sodium chloride infusion  50 mL/hr IntraVENous CONTINUOUS         All the patient's labs over the past 24 hours were reviewed both during my initial daily workflow process and at the time notated as \"note time\" in [employer]. (It is not time stamped separately in this workflow.)  Select labs are listed below.         Labs: Results:       Chemistry Recent Labs     07/17/21  0457 07/16/21  0340 07/15/21  1554   GLU 85 98 81    142 143   K 4.1 4.2 3.2*    108 109   CO2 27 25 26   BUN 7 7 11   CREA 0.63 0.64 0.55*   CA 8.5 8.6 7.6*   AGAP 5 9 8   BUCR 11* 11* 20   AP 47 51 54   TP 6.0* 6.3* 5.5*   ALB 2.7* 2.8* 2.5*   GLOB 3.3 3.5 3.0   AGRAT 0.8 0.8 0.8      CBC w/Diff Recent Labs     07/17/21 0457 07/16/21  0340 07/15/21  1440   WBC 7.4 8.1 10.3   RBC 4.37 4.12* 4.41   HGB 11.6* 11.1* 11.8*   HCT 36.3 34.0* 36.3    307 335   GRANS 71 58 66   LYMPH 13* 25 17*   EOS 5 3 2          Coagulation Recent Labs     07/15/21  1440   PTP 13.3   INR 1.0   APTT 32.0               Liver Enzyme Recent Labs     07/17/21  0457   TP 6.0*   ALB 2.7*   AP 47          Procedures/imaging: see electronic medical records for all procedures/Xrays and details which were not copied into this note but were reviewed prior to creation of Scott Noguera DO  Internal Medicine/Geriatrics

## 2024-07-10 ENCOUNTER — INFUSION (OUTPATIENT)
Dept: INFUSION THERAPY | Facility: HOSPITAL | Age: 39
End: 2024-07-10
Attending: INTERNAL MEDICINE
Payer: COMMERCIAL

## 2024-07-10 DIAGNOSIS — C50.919 TRIPLE NEGATIVE BREAST CANCER: Primary | ICD-10-CM

## 2024-07-10 PROCEDURE — 96372 THER/PROPH/DIAG INJ SC/IM: CPT

## 2024-07-10 PROCEDURE — 63600175 PHARM REV CODE 636 W HCPCS: Mod: JZ,JB,JG | Performed by: NURSE PRACTITIONER

## 2024-07-10 RX ADMIN — FILGRASTIM-SNDZ 300 MCG: 300 INJECTION, SOLUTION INTRAVENOUS; SUBCUTANEOUS at 03:07

## 2024-07-11 ENCOUNTER — TELEPHONE (OUTPATIENT)
Dept: HEMATOLOGY/ONCOLOGY | Facility: CLINIC | Age: 39
End: 2024-07-11
Payer: COMMERCIAL

## 2024-07-11 ENCOUNTER — INFUSION (OUTPATIENT)
Dept: INFUSION THERAPY | Facility: HOSPITAL | Age: 39
End: 2024-07-11
Attending: INTERNAL MEDICINE
Payer: COMMERCIAL

## 2024-07-11 DIAGNOSIS — C50.919 TRIPLE NEGATIVE BREAST CANCER: Primary | ICD-10-CM

## 2024-07-11 PROCEDURE — 96372 THER/PROPH/DIAG INJ SC/IM: CPT

## 2024-07-11 PROCEDURE — 63600175 PHARM REV CODE 636 W HCPCS: Mod: JZ,JB,JG | Performed by: NURSE PRACTITIONER

## 2024-07-11 RX ADMIN — FILGRASTIM-SNDZ 300 MCG: 300 INJECTION, SOLUTION INTRAVENOUS; SUBCUTANEOUS at 03:07

## 2024-07-11 NOTE — TELEPHONE ENCOUNTER
Spoke to pt to remind of appt tomorrow with Dr. Hein. Pt verbalized understanding and confirmed appt Location was discussed.

## 2024-07-15 ENCOUNTER — INFUSION (OUTPATIENT)
Dept: INFUSION THERAPY | Facility: HOSPITAL | Age: 39
End: 2024-07-15
Attending: INTERNAL MEDICINE
Payer: COMMERCIAL

## 2024-07-15 ENCOUNTER — OFFICE VISIT (OUTPATIENT)
Dept: HEMATOLOGY/ONCOLOGY | Facility: CLINIC | Age: 39
End: 2024-07-15
Payer: COMMERCIAL

## 2024-07-15 ENCOUNTER — LAB VISIT (OUTPATIENT)
Dept: LAB | Facility: HOSPITAL | Age: 39
End: 2024-07-15
Attending: INTERNAL MEDICINE
Payer: COMMERCIAL

## 2024-07-15 VITALS
OXYGEN SATURATION: 98 % | BODY MASS INDEX: 21.83 KG/M2 | TEMPERATURE: 98 F | HEART RATE: 89 BPM | DIASTOLIC BLOOD PRESSURE: 69 MMHG | HEIGHT: 62 IN | WEIGHT: 118.63 LBS | RESPIRATION RATE: 18 BRPM | SYSTOLIC BLOOD PRESSURE: 111 MMHG

## 2024-07-15 VITALS
WEIGHT: 118.63 LBS | BODY MASS INDEX: 21.83 KG/M2 | DIASTOLIC BLOOD PRESSURE: 60 MMHG | SYSTOLIC BLOOD PRESSURE: 95 MMHG | RESPIRATION RATE: 18 BRPM | OXYGEN SATURATION: 98 % | HEART RATE: 86 BPM | TEMPERATURE: 98 F | HEIGHT: 62 IN

## 2024-07-15 DIAGNOSIS — C50.919 TRIPLE NEGATIVE BREAST CANCER: ICD-10-CM

## 2024-07-15 DIAGNOSIS — C50.919 TRIPLE NEGATIVE BREAST CANCER: Primary | ICD-10-CM

## 2024-07-15 DIAGNOSIS — G89.3 NEOPLASM RELATED PAIN: ICD-10-CM

## 2024-07-15 LAB
ALBUMIN SERPL BCP-MCNC: 3.8 G/DL (ref 3.5–5.2)
ALP SERPL-CCNC: 75 U/L (ref 55–135)
ALT SERPL W/O P-5'-P-CCNC: 68 U/L (ref 10–44)
ANION GAP SERPL CALC-SCNC: 8 MMOL/L (ref 8–16)
AST SERPL-CCNC: 30 U/L (ref 10–40)
B-HCG UR QL: NEGATIVE
BASOPHILS # BLD AUTO: ABNORMAL K/UL (ref 0–0.2)
BASOPHILS NFR BLD: 0 % (ref 0–1.9)
BILIRUB SERPL-MCNC: 0.4 MG/DL (ref 0.1–1)
BUN SERPL-MCNC: 16 MG/DL (ref 6–20)
CALCIUM SERPL-MCNC: 9.3 MG/DL (ref 8.7–10.5)
CHLORIDE SERPL-SCNC: 104 MMOL/L (ref 95–110)
CO2 SERPL-SCNC: 28 MMOL/L (ref 23–29)
CREAT SERPL-MCNC: 0.7 MG/DL (ref 0.5–1.4)
DIFFERENTIAL METHOD BLD: ABNORMAL
EOSINOPHIL # BLD AUTO: ABNORMAL K/UL (ref 0–0.5)
EOSINOPHIL NFR BLD: 1 % (ref 0–8)
ERYTHROCYTE [DISTWIDTH] IN BLOOD BY AUTOMATED COUNT: 15.3 % (ref 11.5–14.5)
EST. GFR  (NO RACE VARIABLE): >60 ML/MIN/1.73 M^2
GLUCOSE SERPL-MCNC: 93 MG/DL (ref 70–110)
HCT VFR BLD AUTO: 27.2 % (ref 37–48.5)
HGB BLD-MCNC: 9.4 G/DL (ref 12–16)
IMM GRANULOCYTES # BLD AUTO: ABNORMAL K/UL (ref 0–0.04)
IMM GRANULOCYTES NFR BLD AUTO: ABNORMAL % (ref 0–0.5)
LYMPHOCYTES # BLD AUTO: ABNORMAL K/UL (ref 1–4.8)
LYMPHOCYTES NFR BLD: 37 % (ref 18–48)
MAGNESIUM SERPL-MCNC: 2.1 MG/DL (ref 1.6–2.6)
MCH RBC QN AUTO: 31.6 PG (ref 27–31)
MCHC RBC AUTO-ENTMCNC: 34.6 G/DL (ref 32–36)
MCV RBC AUTO: 92 FL (ref 82–98)
METAMYELOCYTES NFR BLD MANUAL: 8 %
MONOCYTES # BLD AUTO: ABNORMAL K/UL (ref 0.3–1)
MONOCYTES NFR BLD: 10 % (ref 4–15)
MYELOCYTES NFR BLD MANUAL: 3 %
NEUTROPHILS NFR BLD: 38 % (ref 38–73)
NEUTS BAND NFR BLD MANUAL: 2 %
NRBC BLD-RTO: 0 /100 WBC
PLATELET # BLD AUTO: 94 K/UL (ref 150–450)
PLATELET BLD QL SMEAR: ABNORMAL
PMV BLD AUTO: 8.2 FL (ref 9.2–12.9)
POTASSIUM SERPL-SCNC: 4 MMOL/L (ref 3.5–5.1)
PROMYELOCYTES NFR BLD MANUAL: 1 %
PROT SERPL-MCNC: 5.8 G/DL (ref 6–8.4)
RBC # BLD AUTO: 2.97 M/UL (ref 4–5.4)
SODIUM SERPL-SCNC: 140 MMOL/L (ref 136–145)
TSH SERPL DL<=0.005 MIU/L-ACNC: 0.4 UIU/ML (ref 0.4–4)
WBC # BLD AUTO: 4.68 K/UL (ref 3.9–12.7)

## 2024-07-15 PROCEDURE — 85007 BL SMEAR W/DIFF WBC COUNT: CPT | Mod: PN | Performed by: INTERNAL MEDICINE

## 2024-07-15 PROCEDURE — 3074F SYST BP LT 130 MM HG: CPT | Mod: CPTII,S$GLB,, | Performed by: NURSE PRACTITIONER

## 2024-07-15 PROCEDURE — 80053 COMPREHEN METABOLIC PANEL: CPT | Mod: PN | Performed by: INTERNAL MEDICINE

## 2024-07-15 PROCEDURE — 99999 PR PBB SHADOW E&M-EST. PATIENT-LVL IV: CPT | Mod: PBBFAC,,, | Performed by: NURSE PRACTITIONER

## 2024-07-15 PROCEDURE — 84443 ASSAY THYROID STIM HORMONE: CPT | Performed by: INTERNAL MEDICINE

## 2024-07-15 PROCEDURE — 96367 TX/PROPH/DG ADDL SEQ IV INF: CPT | Mod: PN

## 2024-07-15 PROCEDURE — 63600175 PHARM REV CODE 636 W HCPCS: Mod: PN | Performed by: NURSE PRACTITIONER

## 2024-07-15 PROCEDURE — 81025 URINE PREGNANCY TEST: CPT | Mod: PN | Performed by: INTERNAL MEDICINE

## 2024-07-15 PROCEDURE — 99214 OFFICE O/P EST MOD 30 MIN: CPT | Mod: S$GLB,,, | Performed by: NURSE PRACTITIONER

## 2024-07-15 PROCEDURE — 3078F DIAST BP <80 MM HG: CPT | Mod: CPTII,S$GLB,, | Performed by: NURSE PRACTITIONER

## 2024-07-15 PROCEDURE — 83735 ASSAY OF MAGNESIUM: CPT | Mod: PN | Performed by: INTERNAL MEDICINE

## 2024-07-15 PROCEDURE — 1159F MED LIST DOCD IN RCRD: CPT | Mod: CPTII,S$GLB,, | Performed by: NURSE PRACTITIONER

## 2024-07-15 PROCEDURE — 36415 COLL VENOUS BLD VENIPUNCTURE: CPT | Mod: PN | Performed by: INTERNAL MEDICINE

## 2024-07-15 PROCEDURE — 96413 CHEMO IV INFUSION 1 HR: CPT | Mod: PN

## 2024-07-15 PROCEDURE — 3008F BODY MASS INDEX DOCD: CPT | Mod: CPTII,S$GLB,, | Performed by: NURSE PRACTITIONER

## 2024-07-15 PROCEDURE — 85027 COMPLETE CBC AUTOMATED: CPT | Mod: PN | Performed by: INTERNAL MEDICINE

## 2024-07-15 PROCEDURE — 25000003 PHARM REV CODE 250: Mod: PN | Performed by: NURSE PRACTITIONER

## 2024-07-15 PROCEDURE — 96375 TX/PRO/DX INJ NEW DRUG ADDON: CPT | Mod: PN

## 2024-07-15 RX ORDER — DIPHENHYDRAMINE HYDROCHLORIDE 50 MG/ML
50 INJECTION INTRAMUSCULAR; INTRAVENOUS ONCE AS NEEDED
Status: DISCONTINUED | OUTPATIENT
Start: 2024-07-15 | End: 2024-07-15 | Stop reason: HOSPADM

## 2024-07-15 RX ORDER — SODIUM CHLORIDE 0.9 % (FLUSH) 0.9 %
10 SYRINGE (ML) INJECTION
Status: DISCONTINUED | OUTPATIENT
Start: 2024-07-15 | End: 2024-07-15 | Stop reason: HOSPADM

## 2024-07-15 RX ORDER — PROCHLORPERAZINE EDISYLATE 5 MG/ML
10 INJECTION INTRAMUSCULAR; INTRAVENOUS ONCE AS NEEDED
Status: DISCONTINUED | OUTPATIENT
Start: 2024-07-15 | End: 2024-07-15 | Stop reason: HOSPADM

## 2024-07-15 RX ORDER — FAMOTIDINE 10 MG/ML
20 INJECTION INTRAVENOUS
Status: COMPLETED | OUTPATIENT
Start: 2024-07-15 | End: 2024-07-15

## 2024-07-15 RX ORDER — FAMOTIDINE 10 MG/ML
20 INJECTION INTRAVENOUS
Status: CANCELLED | OUTPATIENT
Start: 2024-07-15

## 2024-07-15 RX ORDER — DIPHENHYDRAMINE HYDROCHLORIDE 50 MG/ML
50 INJECTION INTRAMUSCULAR; INTRAVENOUS ONCE AS NEEDED
Status: CANCELLED | OUTPATIENT
Start: 2024-07-15

## 2024-07-15 RX ORDER — SODIUM CHLORIDE 0.9 % (FLUSH) 0.9 %
10 SYRINGE (ML) INJECTION
Status: CANCELLED | OUTPATIENT
Start: 2024-07-15

## 2024-07-15 RX ORDER — EPINEPHRINE 0.3 MG/.3ML
0.3 INJECTION SUBCUTANEOUS ONCE AS NEEDED
Status: DISCONTINUED | OUTPATIENT
Start: 2024-07-15 | End: 2024-07-15 | Stop reason: HOSPADM

## 2024-07-15 RX ORDER — LORAZEPAM 2 MG/ML
1 INJECTION INTRAMUSCULAR ONCE AS NEEDED
Status: COMPLETED | OUTPATIENT
Start: 2024-07-15 | End: 2024-07-15

## 2024-07-15 RX ORDER — EPINEPHRINE 0.3 MG/.3ML
0.3 INJECTION SUBCUTANEOUS ONCE AS NEEDED
Status: CANCELLED | OUTPATIENT
Start: 2024-07-15

## 2024-07-15 RX ORDER — LORAZEPAM 2 MG/ML
1 INJECTION INTRAMUSCULAR ONCE AS NEEDED
Status: CANCELLED
Start: 2024-07-15

## 2024-07-15 RX ORDER — PROCHLORPERAZINE EDISYLATE 5 MG/ML
2.5 INJECTION INTRAMUSCULAR; INTRAVENOUS
Status: CANCELLED
Start: 2024-07-18

## 2024-07-15 RX ORDER — PROCHLORPERAZINE EDISYLATE 5 MG/ML
10 INJECTION INTRAMUSCULAR; INTRAVENOUS ONCE AS NEEDED
Status: CANCELLED
Start: 2024-07-15

## 2024-07-15 RX ORDER — PROCHLORPERAZINE EDISYLATE 5 MG/ML
2.5 INJECTION INTRAMUSCULAR; INTRAVENOUS
Status: CANCELLED
Start: 2024-07-17

## 2024-07-15 RX ORDER — HEPARIN 100 UNIT/ML
500 SYRINGE INTRAVENOUS
Status: DISCONTINUED | OUTPATIENT
Start: 2024-07-15 | End: 2024-07-15 | Stop reason: HOSPADM

## 2024-07-15 RX ORDER — PROCHLORPERAZINE EDISYLATE 5 MG/ML
2.5 INJECTION INTRAMUSCULAR; INTRAVENOUS
Status: CANCELLED
Start: 2024-07-16

## 2024-07-15 RX ORDER — HEPARIN 100 UNIT/ML
500 SYRINGE INTRAVENOUS
Status: CANCELLED | OUTPATIENT
Start: 2024-07-15

## 2024-07-15 RX ADMIN — LORAZEPAM 1 MG: 2 INJECTION INTRAMUSCULAR; INTRAVENOUS at 03:07

## 2024-07-15 RX ADMIN — PACLITAXEL 120 MG: 6 INJECTION, SOLUTION INTRAVENOUS at 04:07

## 2024-07-15 RX ADMIN — SODIUM CHLORIDE: 9 INJECTION, SOLUTION INTRAVENOUS at 03:07

## 2024-07-15 RX ADMIN — FAMOTIDINE 20 MG: 10 INJECTION INTRAVENOUS at 04:07

## 2024-07-15 RX ADMIN — DEXAMETHASONE SODIUM PHOSPHATE 10 MG: 10 INJECTION, SOLUTION INTRAMUSCULAR; INTRAVENOUS at 03:07

## 2024-07-15 RX ADMIN — DIPHENHYDRAMINE HYDROCHLORIDE 50 MG: 50 INJECTION, SOLUTION INTRAMUSCULAR; INTRAVENOUS at 03:07

## 2024-07-15 NOTE — PLAN OF CARE
Problem: Adult Inpatient Plan of Care  Goal: Plan of Care Review  Outcome: Progressing  Flowsheets (Taken 7/15/2024 1549)  Plan of Care Reviewed With:   patient   spouse  Goal: Patient-Specific Goal (Individualized)  Outcome: Progressing  Flowsheets (Taken 7/15/2024 1549)  Individualized Care Needs: recliner/personal blanket/cryotherapy to hands and feet/ at chairside.  Anxieties, Fears or Concerns: none  Goal: Absence of Hospital-Acquired Illness or Injury  Outcome: Progressing  Goal: Optimal Comfort and Wellbeing  Outcome: Progressing  Goal: Readiness for Transition of Care  Outcome: Progressing     Problem: Fatigue  Goal: Improved Activity Tolerance  Outcome: Progressing  Intervention: Promote Improved Energy  Flowsheets (Taken 7/15/2024 1549)  Fatigue Management:   activity schedule adjusted   fatigue-related activity identified   frequent rest breaks encouraged   paced activity encouraged  Sleep/Rest Enhancement:   noise level reduced   reading promoted   regular sleep/rest pattern promoted   relaxation techniques promoted   room darkened   family presence promoted  Activity Management:   Ambulated -L4   Ambulated to bathroom - L4   Ambulated in canales - L4   Up in stretcher chair - L1  Environmental Support: calm environment promoted  Pt tolerated Taxol well today. NAD/no concerns voiced. Reviewed follow-up appointments. All questions were answered, ambulated independently at d/c with spouse.

## 2024-07-15 NOTE — PROGRESS NOTES
Name: Aleena Duckworth  MRN:  [id number]  :  1985 Age 38 y.o.  Date of Service: 7/15/2024    Reason for visit:  Aleena Duckworth is a 38 y.o. female here regarding...clearance for C2D15 of Taxol    # Invasive Ductal Carcinoma of the Left Breast  Date of Original Diagnosis:  2023  Recurrence: 5/15/24, localized  Original Stage: pT1b pN0(sn) triple negative, grade 3, Ki-67 80%  Recurrence Stage: [id number] Stage 2B Triple negative,  grade 3, Ki-67 76%  Current Sites of Disease:   left axillary tail/lymph node  Current Goals of Therapy:  Curative  Current Therapy:   keynote 522 NACT     Oncologic History/History of Present Illness:   Former patient of Dr. Ayala, per her notes  self palpated a left-sided breast mass.    2023  bilateral breast mammogram that showed a lobular hypoechoic solid mass within the left breast at 2 o'clock position 7 cm     2023, ultrasound guided biopsy = invasive ductal carcinoma, grade 3, ER/MD/HER2 Randall negative, Ki-67 80%.     23  left partial mastectomy that revealed invasive ductal carcinoma 9 x 8 x 6 mm, single focus, approximately 1 mm focus of high-grade DCIS, closest margin 3 mm, anterior.  Two sentinel lymph nodes were negative.  pT1b pN0(sn).    23 cycle 1 of Docetaxel and Cytoxan with numerous SE  2023 cycle 2 of TC with 20% dose reduction of docetaxel.  With again numerous side effects.     Extensive GOC discussion held with patient and family and decided to stop chemotherapy.     2023 s/p bilateral mastectomy, implant removal and hybrid flap and small implants    2024 Reports to [facility] to establish care with me. Reports left lateral axillary tail nodules.  Causing anxiety.    24    mammogram with ultrasound BI-RADS 4- 11 mm left axillary tail mass  5/15/24   left axillary tail  and lymph node positive for  poorly differentiated carcinoma, triple negative,  staging scans negative.    24  Had a prolonged discussion with patient's  , father, mother she was motivated to pursue neoadjuvant chemotherapy.    06/24/24:  C1D15 delayed last week due to platelets = 91k.  Presents to the clinic for clearance for C1D15; Denies any N/V/D; appetite fair; weight loss of 3 1/2 pounds in 1 week.  Remains working FT; needs to move C2D1 to Tuesday of next week 07/02 due to work.  Dr. Cruz reviewed labs & is ok to proceed with today's labs of Platelets = 91k    07/02/24:  Presents for clearance for C2D1; appetite better; hydrating well; no N/V/D  Platelets slightly improved from 91 to 95k this week; grans down; getting Zarxio on Days 2 & 4     07/08/24:  Presents for clearance for C2D15; feels good overall.  Eating well & hydrating; no nausea or diarrhea  Ativan helping with hot flashes & anxiety.  Denies fevers, chills, abdominal discomfort, N/V/D, melena, etc.       Oncology History   Triple negative breast cancer   4/17/2023 Initial Diagnosis    Triple negative breast cancer     4/18/2023 Cancer Staged    Staging form: Breast, AJCC 8th Edition  - Pathologic stage from 4/18/2023: Stage IB (pT1b, pN0, cM0, G3, ER-, MN-, HER2-)     5/1/2023 - 5/25/2023 Chemotherapy    Treatment Summary   Plan Name: OP BREAST TC - DOCETAXEL CYCLOPHOSPHAMIDE Q3W  Treatment Goal: Curative  Status: Inactive  Start Date: 5/1/2023  End Date: 5/25/2023  Provider: Ambar Ayala MD  Chemotherapy: cycloPHOSphamide 600 mg/m2 = 880 mg in sodium chloride 0.9% 289.4 mL chemo infusion, 600 mg/m2 = 880 mg, Intravenous, Clinic/HOD 1 time, 2 of 4 cycles  Dose modification: 600 mg/m2 (Cycle 3), 480 mg/m2 (Cycle 3, Reason: Dose not tolerated)  Administration: 880 mg (5/1/2023)  DOCEtaxel 75 mg/m2 = 110 mg in sodium chloride 0.9% 290.5 mL chemo infusion, 75 mg/m2 = 110 mg, Intravenous, Clinic/HOD 1 time, 2 of 4 cycles  Dose modification: 60 mg/m2 (original dose 75 mg/m2, Cycle 2, Reason: Dose not tolerated)  Administration: 110 mg (5/1/2023), 90 mg (5/24/2023)     5/22/2024 Cancer  "Staged    Staging form: Breast, AJCC 8th Edition  - Clinical stage from 5/22/2024: Stage IIB (rcT0, cN1(f), cM0, G3, ER-, SD-, HER2-)     6/4/2024 -  Chemotherapy    Treatment Summary   Plan Name: OP PEMBROLIZUMAB CARBOPLATIN (AUC 5) WITH WEEKLY PACLITAXEL FOLLOWED BY PEMBROLIZUMAB DOXORUBICIN CYCLOPHOSPHAMIDE FOLLOWED BY PEMBROLIZUMAB 200 MG Q3W  Treatment Goal: Curative  Status: Active  Start Date: 6/4/2024  End Date: 5/12/2025 (Planned)  Provider: Eunice Cruz MD  Chemotherapy: DOXOrubicin chemo injection 86 mg, 60 mg/m2 = 86 mg, Intravenous, Clinic/HOD 1 time, 0 of 4 cycles  CARBOplatin (PARAPLATIN) 525 mg in sodium chloride 0.9% 302.5 mL chemo infusion, 525 mg, Intravenous, Clinic/HOD 1 time, 2 of 4 cycles  Administration: 525 mg (6/4/2024), 525 mg (7/2/2024)  cycloPHOSphamide 600 mg/m2 = 860 mg in sodium chloride 0.9% 254.3 mL chemo infusion, 600 mg/m2 = 860 mg, Intravenous, Clinic/HOD 1 time, 0 of 4 cycles  PACLitaxeL (TAXOL) 80 mg/m2 = 114 mg in sodium chloride 0.9% 250 mL chemo infusion, 80 mg/m2 = 114 mg, Intravenous, Clinic/HOD 1 time, 2 of 4 cycles  Administration: 114 mg (6/4/2024), 114 mg (6/11/2024), 114 mg (6/24/2024), 114 mg (7/2/2024), 114 mg (7/8/2024)           PHYSICAL EXAMINATION:   Weight:  Gain of 9 pounds in 1 week  BP 95/60 (BP Location: Left arm, Patient Position: Sitting, BP Method: Medium (Automatic))   Pulse 86   Temp 97.6 °F (36.4 °C) (Temporal)   Resp 18   Ht 5' 2" (1.575 m)   Wt 53.8 kg (118 lb 9.7 oz)   SpO2 98%   BMI 21.69 kg/m²   Wt Readings from Last 3 Encounters:   07/15/24 53.8 kg (118 lb 9.7 oz)   07/08/24 49.5 kg (109 lb 2 oz)   07/08/24 49.5 kg (109 lb 2 oz)     ECOG PERFORMANCE STATUS: 0  Physical Exam   General:  Well-appearing, nontoxic  Eyes:  Equal and round pupils, EOMI, no scleral icterus  Mouth:  No lesions, moist  Cardiovascular:  Warm, well-perfused, no peripheral edema  Lungs:  Unlabored on room air, no wheezing  Neurologic:  Awake, alert and oriented, " participating in the exam  Psych:  Appropriate mood and affect  Skin:  Pale pallor, No rashes  Heme:  No petechiae, no purpura       LABORATORY:  CBC  Lab Results   Component Value Date    WBC 4.68 07/15/2024    HGB 9.4 (L) 07/15/2024    HCT 27.2 (L) 07/15/2024    MCV 92 07/15/2024    PLT 94 (L) 07/15/2024     ANC = 1872     CMP  Sodium   Date Value Ref Range Status   07/15/2024 140 136 - 145 mmol/L Final     Potassium   Date Value Ref Range Status   07/15/2024 4.0 3.5 - 5.1 mmol/L Final     Chloride   Date Value Ref Range Status   07/15/2024 104 95 - 110 mmol/L Final     CO2   Date Value Ref Range Status   07/15/2024 28 23 - 29 mmol/L Final     Glucose   Date Value Ref Range Status   07/15/2024 93 70 - 110 mg/dL Final     BUN   Date Value Ref Range Status   07/15/2024 16 6 - 20 mg/dL Final     Creatinine   Date Value Ref Range Status   07/15/2024 0.7 0.5 - 1.4 mg/dL Final     Calcium   Date Value Ref Range Status   07/15/2024 9.3 8.7 - 10.5 mg/dL Final     Total Protein   Date Value Ref Range Status   07/15/2024 5.8 (L) 6.0 - 8.4 g/dL Final     Albumin   Date Value Ref Range Status   07/15/2024 3.8 3.5 - 5.2 g/dL Final     Total Bilirubin   Date Value Ref Range Status   07/15/2024 0.4 0.1 - 1.0 mg/dL Final     Comment:     For infants and newborns, interpretation of results should be based  on gestational age, weight and in agreement with clinical  observations.    Premature Infant recommended reference ranges:  Up to 24 hours.............<8.0 mg/dL  Up to 48 hours............<12.0 mg/dL  3-5 days..................<15.0 mg/dL  6-29 days.................<15.0 mg/dL       Alkaline Phosphatase   Date Value Ref Range Status   07/15/2024 75 55 - 135 U/L Final     AST   Date Value Ref Range Status   07/15/2024 30 10 - 40 U/L Final     ALT   Date Value Ref Range Status   07/15/2024 68 (H) 10 - 44 U/L Final     Anion Gap   Date Value Ref Range Status   07/15/2024 8 8 - 16 mmol/L Final     eGFR   Date Value Ref Range Status    07/15/2024 >60.0 >60 mL/min/1.73 m^2 Final     UPT:  negative  Magnesium:  2.1  Lab Results   Component Value Date    TSH 0.864 06/24/2024    FREET4 1.00 03/06/2024        PATHOLOGY:  DIAGNOSIS:  05/17/2024 LORENAH/carson  LEFT AXILLARY TAIL, ULTRASOUND-GUIDED NEEDLE CORE BIOPSY:  --FRAGMENTS OF LYMPH NODE POSITIVE FOR METASTATIC POORLY DIFFERENTIATED   CARCINOMA (SEE COMMENT).  --RECEPTOR STUDIES PENDING.      RADIOLOGY:  CT chest abdomen pelvis 05/24/2024  Impression:  1. Postoperative changes of bilateral breast reconstruction with breast implants in place.  Two small subcutaneous soft tissue nodules along the left axillary tail in this patient with biopsy-proven left axillary lymph node triple negative breast cancer, as detailed above.  Correlate with recent mammographic imaging/biopsy.  2. No evidence of metastatic disease within the chest, abdomen, or pelvis.  3. Mild bilateral pelviectasis without evidence of gina hydronephrosis or obstructive uropathy, nonspecific and may be physiologic.    NM Bone Scan  05/24/2024  Impression:  No evidence of metastatic disease      ASSESSMENT AND PLAN:  Aleena Duckworth is a 38 y.o. female with...    # Invasive Ductal Carcinoma of the Left Breast  Date of Original Diagnosis:  02/09/2023  Recurrence: 5/15/24, localized  Original Stage: pT1b pN0(sn) triple negative, grade 3, Ki-67 80%  Recurrence Stage: [id number] Stage 2B Triple negative,  grade 3, Ki-67 76%  Current Sites of Disease:   left axillary tail/lymph node  Current Goals of Therapy:  Curative  Current Therapy:   pending neoadjuvant chemotherapy        Staging CT and PET negative for distant disease    Added 1 mg of Ativan for each dose of chemotherapy given her adverse reactions in the past and anxiety surrounding chemo  Added filgrastim to  days 2,3,4/9,10,11/16,17,18  given history of neutropenic fever with TC regimen,  can give additional antiemetics if necessary  Advised patient she needs to have weekly mental health  visits while on chemotherapy, referral to Oncology Psychology  Will see her weekly during Cycles 1-4   6/17/24--one-week delay for cycle 1 day 15 of chemotherapy due to thrombocytopenia and LFT elevation, platelets 91,000, repeat CBC and CMP on 06/24.   06/24/24:  Dr. Cruz reviewed labs; ok to proceed with Platelets + 91k again this week; Proceed with C1D15 today; f/u next week 07/01 with Dr. Cruz for C2D1 with labs prior.  07/02/24:  Proceed with C2D1 today with Zarxio on Days 2, 4 (holiday on day 3); f/u next week as scheduled on 07/08 with labs prior for clearance for C2D8  07/08/24:  Proceed with C2D8 today; Zarxio on Days 9, 10, 11; f/u in 1 week for clearance for C2D15 with labs prior.  07/15/24:  Proceed with C2D15 today; Zarxio on Days 16,17,18; f/u in 1 week for clearance for C3D1 with labs prior.    #LFT elevation- advised to d/c OTC meds, one week delay in chemotherapy  07/02/24:  Continues to trend down  # medication induced constipation- advised to take BID docusate and PRN senna   07/02:  stable  # anxiety about health- as noted above,  could be exacerbated by Adderall  # history of neutropenic fever- as above  # thrombocytopenia- fluctuating platelet levels, commonly this is associated with over-the-counter medications or other medications, encouraged cessation of all OTCs, changed Percocet to Niki  07/02/24:  stable  07/08/24:  stable    #ADHD, has been on Adderall for several years.  Was born prematurely, was diagnosed with a stroke and heart murmur at birth, was treated at [facility].     #Heavy menorrhagia for the past few years, worsening recently and has developed CAROLIN.  Has been on iron supplementation.  Sees gynecologist, Dr. Almita Clark with [facility].      #Hyperbilirubinemia - An abdominal ultrasound was done, negative for any abnormalities.Saw Hepatology prior to chemo who suspected she has Gilbert's disease and found no contraindication to proceeding with TC.    #Anxiety  07/15:   CACHORRO Villagran, FNP-C  [facility]  [facility]  30 minutes were spent in coordination of patient's care, record review and counseling.        Route Chart for Scheduling    Med Onc Chart Routing      Follow up with physician    Follow up with NUNO 1 week. f/u next week with me; needs labs prior:  CBC, CMP, MG, UPT   Infusion scheduling note   Proceed with C2D15 today. Zarxio on TWTh   Injection scheduling note    Labs    Imaging    Pharmacy appointment    Other referrals                Treatment Plan Information   OP PEMBROLIZUMAB CARBOPLATIN (AUC 5) WITH WEEKLY PACLITAXEL FOLLOWED BY PEMBROLIZUMAB DOXORUBICIN CYCLOPHOSPHAMIDE FOLLOWED BY PEMBROLIZUMAB 200 MG Q3W   Eunice Cruz MD   Upcoming Treatment Dates - OP PEMBROLIZUMAB CARBOPLATIN (AUC 5) WITH WEEKLY PACLITAXEL FOLLOWED BY PEMBROLIZUMAB DOXORUBICIN CYCLOPHOSPHAMIDE FOLLOWED BY PEMBROLIZUMAB 200 MG Q3W    7/16/2024       Growth Factor       filgrastim-sndz (ZARXIO) injection 300 mcg/0.5 mL (Preferred Regimen)  7/17/2024       Growth Factor       filgrastim-sndz (ZARXIO) injection 300 mcg/0.5 mL (Preferred Regimen)  7/18/2024       Growth Factor       filgrastim-sndz (ZARXIO) injection 300 mcg/0.5 mL (Preferred Regimen)  7/22/2024       Pre-Medications       diphenhydrAMINE (BENADRYL) 50 mg in NS 50 mL IVPB       famotidine (PF) injection 20 mg       Chemotherapy       PACLitaxeL (TAXOL) 80 mg/m2 = 114 mg in 0.9% NaCl 250 mL chemo infusion       CARBOplatin (PARAPLATIN) 525 mg in 0.9% NaCl 302.5 mL chemo infusion       Supportive Care       LORazepam injection 1 mg       prochlorperazine injection Soln 10 mg       Antiemetics       aprepitant (CINVANTI) injection 130 mg       palonosetron 0.25mg/dexAMETHasone 12mg in NS IVPB 0.25 mg 50 mL       Immunotherapy       pembrolizumab (KEYTRUDA) 200 mg in 0.9% NaCl SolP 108 mL infusion    Therapy Plan Information  IV Fluids  sodium chloride 0.9% bolus 1,000 mL  1,000 mL  1,000 mL, Intravenous, Every visit  Flushes  sodium chloride 0.9% flush 10 mL  10 mL, Intravenous, PRN  heparin, porcine (PF) 100 unit/mL injection flush 500 Units  500 Units, Intravenous, PRN  Antiemetics  ondansetron injection 8 mg  8 mg, Intravenous, PRN  promethazine (PHENERGAN) 12.5 mg in dextrose 5 % (D5W) 50 mL IVPB  12.5 mg, Intravenous, PRN  dexAMETHasone injection 8 mg  8 mg, Intravenous, PRN

## 2024-07-16 ENCOUNTER — INFUSION (OUTPATIENT)
Dept: INFUSION THERAPY | Facility: HOSPITAL | Age: 39
End: 2024-07-16
Attending: INTERNAL MEDICINE
Payer: COMMERCIAL

## 2024-07-16 VITALS
OXYGEN SATURATION: 98 % | HEART RATE: 88 BPM | TEMPERATURE: 98 F | DIASTOLIC BLOOD PRESSURE: 62 MMHG | SYSTOLIC BLOOD PRESSURE: 113 MMHG | RESPIRATION RATE: 18 BRPM

## 2024-07-16 DIAGNOSIS — C50.919 TRIPLE NEGATIVE BREAST CANCER: Primary | ICD-10-CM

## 2024-07-16 PROCEDURE — 63600175 PHARM REV CODE 636 W HCPCS: Mod: JZ,JB,JG,PN | Performed by: NURSE PRACTITIONER

## 2024-07-16 PROCEDURE — 96372 THER/PROPH/DIAG INJ SC/IM: CPT | Mod: PN

## 2024-07-16 RX ORDER — PROCHLORPERAZINE EDISYLATE 5 MG/ML
2.5 INJECTION INTRAMUSCULAR; INTRAVENOUS
Status: DISCONTINUED | OUTPATIENT
Start: 2024-07-16 | End: 2024-07-16 | Stop reason: HOSPADM

## 2024-07-16 RX ORDER — OXYCODONE HYDROCHLORIDE 10 MG/1
10 TABLET ORAL EVERY 4 HOURS PRN
Qty: 45 TABLET | Refills: 0 | Status: SHIPPED | OUTPATIENT
Start: 2024-07-16

## 2024-07-16 RX ADMIN — FILGRASTIM-SNDZ 300 MCG: 300 INJECTION, SOLUTION INTRAVENOUS; SUBCUTANEOUS at 03:07

## 2024-07-16 NOTE — PLAN OF CARE
Problem: Adult Inpatient Plan of Care  Goal: Plan of Care Review  Outcome: Met     Problem: Fatigue  Goal: Improved Activity Tolerance  Outcome: Progressing   Tolerated injection well today  Discharge instructions given and pt d/c to home   NAD

## 2024-07-17 ENCOUNTER — INFUSION (OUTPATIENT)
Dept: INFUSION THERAPY | Facility: HOSPITAL | Age: 39
End: 2024-07-17
Attending: INTERNAL MEDICINE
Payer: COMMERCIAL

## 2024-07-17 VITALS
DIASTOLIC BLOOD PRESSURE: 69 MMHG | RESPIRATION RATE: 16 BRPM | TEMPERATURE: 98 F | HEART RATE: 95 BPM | SYSTOLIC BLOOD PRESSURE: 114 MMHG

## 2024-07-17 DIAGNOSIS — C50.919 TRIPLE NEGATIVE BREAST CANCER: Primary | ICD-10-CM

## 2024-07-17 PROCEDURE — 96372 THER/PROPH/DIAG INJ SC/IM: CPT | Mod: PN

## 2024-07-17 PROCEDURE — 63600175 PHARM REV CODE 636 W HCPCS: Mod: JZ,JB,JG,PN | Performed by: NURSE PRACTITIONER

## 2024-07-17 RX ADMIN — FILGRASTIM-SNDZ 300 MCG: 300 INJECTION, SOLUTION INTRAVENOUS; SUBCUTANEOUS at 03:07

## 2024-07-22 ENCOUNTER — LAB VISIT (OUTPATIENT)
Dept: LAB | Facility: HOSPITAL | Age: 39
End: 2024-07-22
Attending: INTERNAL MEDICINE
Payer: COMMERCIAL

## 2024-07-22 ENCOUNTER — OFFICE VISIT (OUTPATIENT)
Dept: HEMATOLOGY/ONCOLOGY | Facility: CLINIC | Age: 39
End: 2024-07-22
Payer: COMMERCIAL

## 2024-07-22 VITALS
SYSTOLIC BLOOD PRESSURE: 106 MMHG | TEMPERATURE: 98 F | HEART RATE: 96 BPM | WEIGHT: 119.25 LBS | HEIGHT: 62 IN | OXYGEN SATURATION: 96 % | DIASTOLIC BLOOD PRESSURE: 69 MMHG | RESPIRATION RATE: 18 BRPM | BODY MASS INDEX: 21.94 KG/M2

## 2024-07-22 DIAGNOSIS — C50.919 TRIPLE NEGATIVE BREAST CANCER: ICD-10-CM

## 2024-07-22 DIAGNOSIS — D69.6 THROMBOCYTOPENIA: ICD-10-CM

## 2024-07-22 DIAGNOSIS — C50.919 TRIPLE NEGATIVE BREAST CANCER: Primary | ICD-10-CM

## 2024-07-22 LAB
ALBUMIN SERPL BCP-MCNC: 3.8 G/DL (ref 3.5–5.2)
ALP SERPL-CCNC: 71 U/L (ref 55–135)
ALT SERPL W/O P-5'-P-CCNC: 87 U/L (ref 10–44)
ANION GAP SERPL CALC-SCNC: 9 MMOL/L (ref 8–16)
AST SERPL-CCNC: 45 U/L (ref 10–40)
B-HCG UR QL: NEGATIVE
BASOPHILS # BLD AUTO: ABNORMAL K/UL (ref 0–0.2)
BASOPHILS NFR BLD: 0 % (ref 0–1.9)
BILIRUB SERPL-MCNC: 0.5 MG/DL (ref 0.1–1)
BUN SERPL-MCNC: 15 MG/DL (ref 6–20)
CALCIUM SERPL-MCNC: 9.1 MG/DL (ref 8.7–10.5)
CHLORIDE SERPL-SCNC: 102 MMOL/L (ref 95–110)
CO2 SERPL-SCNC: 28 MMOL/L (ref 23–29)
CREAT SERPL-MCNC: 0.7 MG/DL (ref 0.5–1.4)
DIFFERENTIAL METHOD BLD: ABNORMAL
EOSINOPHIL # BLD AUTO: ABNORMAL K/UL (ref 0–0.5)
EOSINOPHIL NFR BLD: 0 % (ref 0–8)
ERYTHROCYTE [DISTWIDTH] IN BLOOD BY AUTOMATED COUNT: 16 % (ref 11.5–14.5)
EST. GFR  (NO RACE VARIABLE): >60 ML/MIN/1.73 M^2
GLUCOSE SERPL-MCNC: 86 MG/DL (ref 70–110)
HCT VFR BLD AUTO: 27.7 % (ref 37–48.5)
HGB BLD-MCNC: 9.3 G/DL (ref 12–16)
IMM GRANULOCYTES # BLD AUTO: ABNORMAL K/UL (ref 0–0.04)
IMM GRANULOCYTES NFR BLD AUTO: ABNORMAL % (ref 0–0.5)
LYMPHOCYTES # BLD AUTO: ABNORMAL K/UL (ref 1–4.8)
LYMPHOCYTES NFR BLD: 45 % (ref 18–48)
MAGNESIUM SERPL-MCNC: 2.2 MG/DL (ref 1.6–2.6)
MCH RBC QN AUTO: 30.9 PG (ref 27–31)
MCHC RBC AUTO-ENTMCNC: 33.6 G/DL (ref 32–36)
MCV RBC AUTO: 92 FL (ref 82–98)
METAMYELOCYTES NFR BLD MANUAL: 4 %
MONOCYTES # BLD AUTO: ABNORMAL K/UL (ref 0.3–1)
MONOCYTES NFR BLD: 9 % (ref 4–15)
MYELOCYTES NFR BLD MANUAL: 3 %
NEUTROPHILS NFR BLD: 35 % (ref 38–73)
NEUTS BAND NFR BLD MANUAL: 4 %
NRBC BLD-RTO: 0 /100 WBC
PLATELET # BLD AUTO: 66 K/UL (ref 150–450)
PLATELET BLD QL SMEAR: ABNORMAL
PMV BLD AUTO: 8.6 FL (ref 9.2–12.9)
POTASSIUM SERPL-SCNC: 3.9 MMOL/L (ref 3.5–5.1)
PROT SERPL-MCNC: 5.8 G/DL (ref 6–8.4)
RBC # BLD AUTO: 3.01 M/UL (ref 4–5.4)
SODIUM SERPL-SCNC: 139 MMOL/L (ref 136–145)
WBC # BLD AUTO: 3.92 K/UL (ref 3.9–12.7)

## 2024-07-22 PROCEDURE — 3074F SYST BP LT 130 MM HG: CPT | Mod: CPTII,S$GLB,, | Performed by: NURSE PRACTITIONER

## 2024-07-22 PROCEDURE — 80053 COMPREHEN METABOLIC PANEL: CPT | Mod: PN | Performed by: INTERNAL MEDICINE

## 2024-07-22 PROCEDURE — 99213 OFFICE O/P EST LOW 20 MIN: CPT | Mod: S$GLB,,, | Performed by: NURSE PRACTITIONER

## 2024-07-22 PROCEDURE — 85007 BL SMEAR W/DIFF WBC COUNT: CPT | Mod: PN | Performed by: INTERNAL MEDICINE

## 2024-07-22 PROCEDURE — 99999 PR PBB SHADOW E&M-EST. PATIENT-LVL IV: CPT | Mod: PBBFAC,,, | Performed by: NURSE PRACTITIONER

## 2024-07-22 PROCEDURE — 1159F MED LIST DOCD IN RCRD: CPT | Mod: CPTII,S$GLB,, | Performed by: NURSE PRACTITIONER

## 2024-07-22 PROCEDURE — 36415 COLL VENOUS BLD VENIPUNCTURE: CPT | Mod: PN | Performed by: INTERNAL MEDICINE

## 2024-07-22 PROCEDURE — 81025 URINE PREGNANCY TEST: CPT | Mod: PN | Performed by: INTERNAL MEDICINE

## 2024-07-22 PROCEDURE — 85027 COMPLETE CBC AUTOMATED: CPT | Mod: PN | Performed by: INTERNAL MEDICINE

## 2024-07-22 PROCEDURE — 3078F DIAST BP <80 MM HG: CPT | Mod: CPTII,S$GLB,, | Performed by: NURSE PRACTITIONER

## 2024-07-22 PROCEDURE — 3008F BODY MASS INDEX DOCD: CPT | Mod: CPTII,S$GLB,, | Performed by: NURSE PRACTITIONER

## 2024-07-22 PROCEDURE — 83735 ASSAY OF MAGNESIUM: CPT | Mod: PN | Performed by: INTERNAL MEDICINE

## 2024-07-22 NOTE — PROGRESS NOTES
Name: Aleena Duckworth  MRN:  [id number]  :  1985 Age 38 y.o.  Date of Service: 2024    Reason for visit:  Aleena Duckworth is a 38 y.o. female here regarding...clearance for C3D1 of  Pembro, Carbo, Taxol    # Invasive Ductal Carcinoma of the Left Breast  Date of Original Diagnosis:  2023  Recurrence: 5/15/24, localized  Original Stage: pT1b pN0(sn) triple negative, grade 3, Ki-67 80%  Recurrence Stage: [id number] Stage 2B Triple negative,  grade 3, Ki-67 76%  Current Sites of Disease:   left axillary tail/lymph node  Current Goals of Therapy:  Curative  Current Therapy:   keynote 522 NACT     Oncologic History/History of Present Illness:   Former patient of Dr. Ayala, per her notes  self palpated a left-sided breast mass.    2023  bilateral breast mammogram that showed a lobular hypoechoic solid mass within the left breast at 2 o'clock position 7 cm     2023, ultrasound guided biopsy = invasive ductal carcinoma, grade 3, ER/NH/HER2 Randall negative, Ki-67 80%.     23  left partial mastectomy that revealed invasive ductal carcinoma 9 x 8 x 6 mm, single focus, approximately 1 mm focus of high-grade DCIS, closest margin 3 mm, anterior.  Two sentinel lymph nodes were negative.  pT1b pN0(sn).    23 cycle 1 of Docetaxel and Cytoxan with numerous SE  2023 cycle 2 of TC with 20% dose reduction of docetaxel.  With again numerous side effects.     Extensive GOC discussion held with patient and family and decided to stop chemotherapy.     2023 s/p bilateral mastectomy, implant removal and hybrid flap and small implants    2024 Reports to [facility] to establish care with me. Reports left lateral axillary tail nodules.  Causing anxiety.    24    mammogram with ultrasound BI-RADS 4- 11 mm left axillary tail mass  5/15/24   left axillary tail  and lymph node positive for  poorly differentiated carcinoma, triple negative,  staging scans negative.    24  Had a prolonged discussion  with patient's , father, mother she was motivated to pursue neoadjuvant chemotherapy.    06/24/24:  C1D15 delayed last week due to platelets = 91k.  Presents to the clinic for clearance for C1D15; Denies any N/V/D; appetite fair; weight loss of 3 1/2 pounds in 1 week.  Remains working FT; needs to move C2D1 to Tuesday of next week 07/02 due to work.  Dr. Cruz reviewed labs & is ok to proceed with today's labs of Platelets = 91k    07/02/24:  Presents for clearance for C2D1; appetite better; hydrating well; no N/V/D  Platelets slightly improved from 91 to 95k this week; grans down; getting Zarxio on Days 2 & 4     07/22/24:  Presents for clearance for C3D1; feels good overall.  Eating well & hydrating; no nausea or diarrhea. No bleeding or bruising.  Denies fevers, chills, abdominal discomfort, N/V/D, melena, etc.       Oncology History   Triple negative breast cancer   4/17/2023 Initial Diagnosis    Triple negative breast cancer     4/18/2023 Cancer Staged    Staging form: Breast, AJCC 8th Edition  - Pathologic stage from 4/18/2023: Stage IB (pT1b, pN0, cM0, G3, ER-, AK-, HER2-)     5/1/2023 - 5/25/2023 Chemotherapy    Treatment Summary   Plan Name: OP BREAST TC - DOCETAXEL CYCLOPHOSPHAMIDE Q3W  Treatment Goal: Curative  Status: Inactive  Start Date: 5/1/2023  End Date: 5/25/2023  Provider: Ambar Ayala MD  Chemotherapy: cycloPHOSphamide 600 mg/m2 = 880 mg in sodium chloride 0.9% 289.4 mL chemo infusion, 600 mg/m2 = 880 mg, Intravenous, Clinic/HOD 1 time, 2 of 4 cycles  Dose modification: 600 mg/m2 (Cycle 3), 480 mg/m2 (Cycle 3, Reason: Dose not tolerated)  Administration: 880 mg (5/1/2023)  DOCEtaxel 75 mg/m2 = 110 mg in sodium chloride 0.9% 290.5 mL chemo infusion, 75 mg/m2 = 110 mg, Intravenous, Clinic/HOD 1 time, 2 of 4 cycles  Dose modification: 60 mg/m2 (original dose 75 mg/m2, Cycle 2, Reason: Dose not tolerated)  Administration: 110 mg (5/1/2023), 90 mg (5/24/2023)     5/22/2024 Cancer Staged     "Staging form: Breast, AJCC 8th Edition  - Clinical stage from 5/22/2024: Stage IIB (rcT0, cN1(f), cM0, G3, ER-, ME-, HER2-)     6/4/2024 -  Chemotherapy    Treatment Summary   Plan Name: OP PEMBROLIZUMAB CARBOPLATIN (AUC 5) WITH WEEKLY PACLITAXEL FOLLOWED BY PEMBROLIZUMAB DOXORUBICIN CYCLOPHOSPHAMIDE FOLLOWED BY PEMBROLIZUMAB 200 MG Q3W  Treatment Goal: Curative  Status: Active  Start Date: 6/4/2024  End Date: 5/19/2025 (Planned)  Provider: Eunice Cruz MD  Chemotherapy: DOXOrubicin chemo injection 86 mg, 60 mg/m2 = 86 mg, Intravenous, Clinic/HOD 1 time, 0 of 4 cycles  CARBOplatin (PARAPLATIN) 525 mg in sodium chloride 0.9% 302.5 mL chemo infusion, 525 mg, Intravenous, Clinic/HOD 1 time, 3 of 4 cycles  Administration: 525 mg (6/4/2024), 525 mg (7/2/2024)  cycloPHOSphamide 600 mg/m2 = 860 mg in sodium chloride 0.9% 254.3 mL chemo infusion, 600 mg/m2 = 860 mg, Intravenous, Clinic/HOD 1 time, 0 of 4 cycles  PACLitaxeL (TAXOL) 80 mg/m2 = 114 mg in sodium chloride 0.9% 250 mL chemo infusion, 80 mg/m2 = 114 mg, Intravenous, Clinic/HOD 1 time, 3 of 4 cycles  Administration: 114 mg (6/4/2024), 114 mg (6/11/2024), 114 mg (6/24/2024), 114 mg (7/2/2024), 114 mg (7/8/2024), 120 mg (7/15/2024)           PHYSICAL EXAMINATION:   Weight:  stable  /69 (BP Location: Left arm, Patient Position: Sitting, BP Method: Medium (Automatic))   Pulse 96   Temp 97.7 °F (36.5 °C) (Temporal)   Resp 18   Ht 5' 2" (1.575 m)   Wt 54.1 kg (119 lb 4.3 oz)   SpO2 96%   BMI 21.81 kg/m²   Wt Readings from Last 3 Encounters:   07/22/24 54.1 kg (119 lb 4.3 oz)   07/18/24 53.8 kg (118 lb 9.7 oz)   07/15/24 53.8 kg (118 lb 9.7 oz)     ECOG PERFORMANCE STATUS: 0  Physical Exam   General:  Well-appearing, nontoxic  Eyes:  Equal and round pupils, EOMI, no scleral icterus  Mouth:  No lesions, moist  Cardiovascular:  Warm, well-perfused, no peripheral edema  Lungs:  Unlabored on room air, no wheezing  Neurologic:  Awake, alert and oriented, " participating in the exam  Psych:  Appropriate mood and affect  Skin:  Pale pallor, No rashes  Heme:  No petechiae, no purpura       LABORATORY:  CBC  Lab Results   Component Value Date    WBC 3.92 07/22/2024    HGB 9.3 (L) 07/22/2024    HCT 27.7 (L) 07/22/2024    MCV 92 07/22/2024    PLT 66 (L) 07/22/2024     ANC = 1529 (Grans 35%, Bands 4)     CMP  Sodium   Date Value Ref Range Status   07/22/2024 139 136 - 145 mmol/L Final     Potassium   Date Value Ref Range Status   07/22/2024 3.9 3.5 - 5.1 mmol/L Final     Chloride   Date Value Ref Range Status   07/22/2024 102 95 - 110 mmol/L Final     CO2   Date Value Ref Range Status   07/22/2024 28 23 - 29 mmol/L Final     Glucose   Date Value Ref Range Status   07/22/2024 86 70 - 110 mg/dL Final     BUN   Date Value Ref Range Status   07/22/2024 15 6 - 20 mg/dL Final     Creatinine   Date Value Ref Range Status   07/22/2024 0.7 0.5 - 1.4 mg/dL Final     Calcium   Date Value Ref Range Status   07/22/2024 9.1 8.7 - 10.5 mg/dL Final     Total Protein   Date Value Ref Range Status   07/22/2024 5.8 (L) 6.0 - 8.4 g/dL Final     Albumin   Date Value Ref Range Status   07/22/2024 3.8 3.5 - 5.2 g/dL Final     Total Bilirubin   Date Value Ref Range Status   07/22/2024 0.5 0.1 - 1.0 mg/dL Final     Comment:     For infants and newborns, interpretation of results should be based  on gestational age, weight and in agreement with clinical  observations.    Premature Infant recommended reference ranges:  Up to 24 hours.............<8.0 mg/dL  Up to 48 hours............<12.0 mg/dL  3-5 days..................<15.0 mg/dL  6-29 days.................<15.0 mg/dL       Alkaline Phosphatase   Date Value Ref Range Status   07/22/2024 71 55 - 135 U/L Final     AST   Date Value Ref Range Status   07/22/2024 45 (H) 10 - 40 U/L Final     ALT   Date Value Ref Range Status   07/22/2024 87 (H) 10 - 44 U/L Final     Anion Gap   Date Value Ref Range Status   07/22/2024 9 8 - 16 mmol/L Final     eGFR    Date Value Ref Range Status   07/22/2024 >60.0 >60 mL/min/1.73 m^2 Final     UPT:  negative  Magnesium:  2.2  Lab Results   Component Value Date    TSH 0.401 07/15/2024    FREET4 1.00 03/06/2024        PATHOLOGY:  DIAGNOSIS:  05/17/2024 LORENAH/carson  LEFT AXILLARY TAIL, ULTRASOUND-GUIDED NEEDLE CORE BIOPSY:  --FRAGMENTS OF LYMPH NODE POSITIVE FOR METASTATIC POORLY DIFFERENTIATED   CARCINOMA (SEE COMMENT).  --RECEPTOR STUDIES PENDING.      RADIOLOGY:  CT chest abdomen pelvis 05/24/2024  Impression:  1. Postoperative changes of bilateral breast reconstruction with breast implants in place.  Two small subcutaneous soft tissue nodules along the left axillary tail in this patient with biopsy-proven left axillary lymph node triple negative breast cancer, as detailed above.  Correlate with recent mammographic imaging/biopsy.  2. No evidence of metastatic disease within the chest, abdomen, or pelvis.  3. Mild bilateral pelviectasis without evidence of gina hydronephrosis or obstructive uropathy, nonspecific and may be physiologic.    NM Bone Scan  05/24/2024  Impression:  No evidence of metastatic disease      ASSESSMENT AND PLAN:  Aleena Duckworth is a 38 y.o. female with...    # Invasive Ductal Carcinoma of the Left Breast  Date of Original Diagnosis:  02/09/2023  Recurrence: 5/15/24, localized  Original Stage: pT1b pN0(sn) triple negative, grade 3, Ki-67 80%  Recurrence Stage: [id number] Stage 2B Triple negative,  grade 3, Ki-67 76%  Current Sites of Disease:   left axillary tail/lymph node  Current Goals of Therapy:  Curative  Current Therapy:   pending neoadjuvant chemotherapy        Staging CT and PET negative for distant disease    Added 1 mg of Ativan for each dose of chemotherapy given her adverse reactions in the past and anxiety surrounding chemo  Added filgrastim to  days 2,3,4/9,10,11/16,17,18  given history of neutropenic fever with TC regimen,  can give additional antiemetics if necessary  Advised patient she  needs to have weekly mental health visits while on chemotherapy, referral to Oncology Psychology  Will see her weekly during Cycles 1-4   6/17/24--one-week delay for cycle 1 day 15 of chemotherapy due to thrombocytopenia and LFT elevation, platelets 91,000, repeat CBC and CMP on 06/24.   06/24/24:  Dr. Cruz reviewed labs; ok to proceed with Platelets + 91k again this week; Proceed with C1D15 today; f/u next week 07/01 with Dr. Cruz for C2D1 with labs prior.  07/02/24:  Proceed with C2D1 today with Zarxio on Days 2, 4 (holiday on day 3); f/u next week as scheduled on 07/08 with labs prior for clearance for C2D8  07/08/24:  Proceed with C2D8 today; Zarxio on Days 9, 10, 11; f/u in 1 week for clearance for C2D15 with labs prior.  07/15/24:  Proceed with C2D15 today; Zarxio on Days 16,17,18; f/u in 1 week for clearance for C3D1 with labs prior.  07/22/24:  Defer x 1 week C3D1 with platelets 66k; f/u in 1 week with CBC, CMP, MG, UPT (TSH done 07/15)    #LFT elevation- advised to d/c OTC meds, one week delay in chemotherapy  07/02/24:  Continues to trend down  07/22/24:  elevated    # medication induced constipation- advised to take BID docusate and PRN senna   07/02:  stable  # anxiety about health- as noted above,  could be exacerbated by Adderall  # history of neutropenic fever- as above  # thrombocytopenia- fluctuating platelet levels, commonly this is associated with over-the-counter medications or other medications, encouraged cessation of all OTCs, changed Percocet to Niki  07/02/24:  stable  07/08/24:  stable  07/22/24:  66k; asymptomatic    #ADHD, has been on Adderall for several years.  Was born prematurely, was diagnosed with a stroke and heart murmur at birth, was treated at [facility].     #Heavy menorrhagia for the past few years, worsening recently and has developed CAROLIN.  Has been on iron supplementation.  Sees gynecologist, Dr. Almita Clark with [facility].      #Hyperbilirubinemia - An abdominal  ultrasound was done, negative for any abnormalities.Saw Hepatology prior to chemo who suspected she has Gilbert's disease and found no contraindication to proceeding with TC.    #Anxiety  07/15:  Atmeg barone.    CACHORRO Ga, JULIANNAP-C  [facility]  20 minutes were spent in coordination of patient's care, record review and counseling.        Route Chart for Scheduling    Med Onc Chart Routing      Follow up with physician . F/u in 1 week with Dr. Cruz with labs prior:  CBC, CMP, MAGNESIUM, UPT (standing)   Follow up with NUNO    Infusion scheduling note   DEFER C3D1 x 1 week   Injection scheduling note    Labs    Imaging    Pharmacy appointment    Other referrals                Treatment Plan Information   OP PEMBROLIZUMAB CARBOPLATIN (AUC 5) WITH WEEKLY PACLITAXEL FOLLOWED BY PEMBROLIZUMAB DOXORUBICIN CYCLOPHOSPHAMIDE FOLLOWED BY PEMBROLIZUMAB 200 MG Q3W   Eunice Cruz MD   Upcoming Treatment Dates - OP PEMBROLIZUMAB CARBOPLATIN (AUC 5) WITH WEEKLY PACLITAXEL FOLLOWED BY PEMBROLIZUMAB DOXORUBICIN CYCLOPHOSPHAMIDE FOLLOWED BY PEMBROLIZUMAB 200 MG Q3W    7/29/2024       Pre-Medications       diphenhydrAMINE (BENADRYL) 50 mg in NS 50 mL IVPB       famotidine (PF) injection 20 mg       Chemotherapy       PACLitaxeL (TAXOL) 80 mg/m2 = 114 mg in 0.9% NaCl 250 mL chemo infusion       CARBOplatin (PARAPLATIN) 525 mg in 0.9% NaCl 302.5 mL chemo infusion       Supportive Care       LORazepam injection 1 mg       prochlorperazine injection Soln 10 mg       Antiemetics       aprepitant (CINVANTI) injection 130 mg       palonosetron 0.25mg/dexAMETHasone 12mg in NS IVPB 0.25 mg 50 mL       Immunotherapy       pembrolizumab (KEYTRUDA) 200 mg in 0.9% NaCl SolP 108 mL infusion  7/30/2024       Growth Factor       filgrastim-sndz (ZARXIO) injection 300 mcg/0.5 mL (Preferred Regimen)  7/31/2024       Growth Factor       filgrastim-sndz (ZARXIO) injection 300 mcg/0.5 mL (Preferred  Regimen)  8/1/2024       Growth Factor       filgrastim-sndz (ZARXIO) injection 300 mcg/0.5 mL (Preferred Regimen)    Therapy Plan Information  IV Fluids  sodium chloride 0.9% bolus 1,000 mL 1,000 mL  1,000 mL, Intravenous, Every visit  Flushes  sodium chloride 0.9% flush 10 mL  10 mL, Intravenous, PRN  heparin, porcine (PF) 100 unit/mL injection flush 500 Units  500 Units, Intravenous, PRN  Antiemetics  ondansetron injection 8 mg  8 mg, Intravenous, PRN  promethazine (PHENERGAN) 12.5 mg in dextrose 5 % (D5W) 50 mL IVPB  12.5 mg, Intravenous, PRN  dexAMETHasone injection 8 mg  8 mg, Intravenous, PRN

## 2024-07-25 ENCOUNTER — TELEPHONE (OUTPATIENT)
Dept: HEMATOLOGY/ONCOLOGY | Facility: CLINIC | Age: 39
End: 2024-07-25
Payer: COMMERCIAL

## 2024-07-25 NOTE — TELEPHONE ENCOUNTER
LMOVM for pt to remind of appt tomorrow with Dr. Hein. Asked pt if unable to attend to please let us know, All contact info was left for pt.

## 2024-07-26 DIAGNOSIS — C50.919 TRIPLE NEGATIVE BREAST CANCER: ICD-10-CM

## 2024-07-27 RX ORDER — OLANZAPINE 5 MG/1
TABLET ORAL
Qty: 30 TABLET | Refills: 1 | Status: SHIPPED | OUTPATIENT
Start: 2024-07-27

## 2024-07-29 ENCOUNTER — PATIENT MESSAGE (OUTPATIENT)
Dept: HEMATOLOGY/ONCOLOGY | Facility: CLINIC | Age: 39
End: 2024-07-29

## 2024-07-29 ENCOUNTER — OFFICE VISIT (OUTPATIENT)
Dept: HEMATOLOGY/ONCOLOGY | Facility: CLINIC | Age: 39
End: 2024-07-29
Payer: COMMERCIAL

## 2024-07-29 ENCOUNTER — LAB VISIT (OUTPATIENT)
Dept: LAB | Facility: HOSPITAL | Age: 39
End: 2024-07-29
Attending: INTERNAL MEDICINE
Payer: COMMERCIAL

## 2024-07-29 ENCOUNTER — INFUSION (OUTPATIENT)
Dept: INFUSION THERAPY | Facility: HOSPITAL | Age: 39
End: 2024-07-29
Attending: INTERNAL MEDICINE
Payer: COMMERCIAL

## 2024-07-29 VITALS
OXYGEN SATURATION: 99 % | WEIGHT: 125.44 LBS | SYSTOLIC BLOOD PRESSURE: 107 MMHG | HEIGHT: 62 IN | TEMPERATURE: 97 F | HEART RATE: 95 BPM | BODY MASS INDEX: 23.08 KG/M2 | RESPIRATION RATE: 18 BRPM | DIASTOLIC BLOOD PRESSURE: 71 MMHG

## 2024-07-29 VITALS
BODY MASS INDEX: 23.08 KG/M2 | TEMPERATURE: 97 F | RESPIRATION RATE: 18 BRPM | SYSTOLIC BLOOD PRESSURE: 111 MMHG | DIASTOLIC BLOOD PRESSURE: 70 MMHG | HEART RATE: 89 BPM | OXYGEN SATURATION: 99 % | HEIGHT: 62 IN | WEIGHT: 125.44 LBS

## 2024-07-29 DIAGNOSIS — C50.919 TRIPLE NEGATIVE BREAST CANCER: Primary | ICD-10-CM

## 2024-07-29 DIAGNOSIS — G89.3 NEOPLASM RELATED PAIN: ICD-10-CM

## 2024-07-29 DIAGNOSIS — D69.6 THROMBOCYTOPENIA: ICD-10-CM

## 2024-07-29 DIAGNOSIS — C50.919 TRIPLE NEGATIVE BREAST CANCER: ICD-10-CM

## 2024-07-29 LAB
ALBUMIN SERPL BCP-MCNC: 3.9 G/DL (ref 3.5–5.2)
ALP SERPL-CCNC: 71 U/L (ref 55–135)
ALT SERPL W/O P-5'-P-CCNC: 75 U/L (ref 10–44)
ANION GAP SERPL CALC-SCNC: 9 MMOL/L (ref 8–16)
AST SERPL-CCNC: 25 U/L (ref 10–40)
B-HCG UR QL: NEGATIVE
BASOPHILS # BLD AUTO: 0.02 K/UL (ref 0–0.2)
BASOPHILS NFR BLD: 0.4 % (ref 0–1.9)
BILIRUB SERPL-MCNC: 0.7 MG/DL (ref 0.1–1)
BUN SERPL-MCNC: 18 MG/DL (ref 6–20)
CALCIUM SERPL-MCNC: 9.3 MG/DL (ref 8.7–10.5)
CHLORIDE SERPL-SCNC: 105 MMOL/L (ref 95–110)
CO2 SERPL-SCNC: 27 MMOL/L (ref 23–29)
CREAT SERPL-MCNC: 0.7 MG/DL (ref 0.5–1.4)
DIFFERENTIAL METHOD BLD: ABNORMAL
EOSINOPHIL # BLD AUTO: 0 K/UL (ref 0–0.5)
EOSINOPHIL NFR BLD: 0.4 % (ref 0–8)
ERYTHROCYTE [DISTWIDTH] IN BLOOD BY AUTOMATED COUNT: 16.8 % (ref 11.5–14.5)
EST. GFR  (NO RACE VARIABLE): >60 ML/MIN/1.73 M^2
GLUCOSE SERPL-MCNC: 94 MG/DL (ref 70–110)
HCT VFR BLD AUTO: 31 % (ref 37–48.5)
HGB BLD-MCNC: 10.3 G/DL (ref 12–16)
IMM GRANULOCYTES # BLD AUTO: 0.08 K/UL (ref 0–0.04)
IMM GRANULOCYTES NFR BLD AUTO: 1.7 % (ref 0–0.5)
LYMPHOCYTES # BLD AUTO: 1.5 K/UL (ref 1–4.8)
LYMPHOCYTES NFR BLD: 31.2 % (ref 18–48)
MAGNESIUM SERPL-MCNC: 2.1 MG/DL (ref 1.6–2.6)
MCH RBC QN AUTO: 31.5 PG (ref 27–31)
MCHC RBC AUTO-ENTMCNC: 33.2 G/DL (ref 32–36)
MCV RBC AUTO: 95 FL (ref 82–98)
MONOCYTES # BLD AUTO: 0.4 K/UL (ref 0.3–1)
MONOCYTES NFR BLD: 8.4 % (ref 4–15)
NEUTROPHILS # BLD AUTO: 2.7 K/UL (ref 1.8–7.7)
NEUTROPHILS NFR BLD: 57.9 % (ref 38–73)
NRBC BLD-RTO: 0 /100 WBC
PLATELET # BLD AUTO: 104 K/UL (ref 150–450)
PMV BLD AUTO: 8.4 FL (ref 9.2–12.9)
POTASSIUM SERPL-SCNC: 4 MMOL/L (ref 3.5–5.1)
PROT SERPL-MCNC: 6.2 G/DL (ref 6–8.4)
RBC # BLD AUTO: 3.27 M/UL (ref 4–5.4)
SODIUM SERPL-SCNC: 141 MMOL/L (ref 136–145)
WBC # BLD AUTO: 4.65 K/UL (ref 3.9–12.7)

## 2024-07-29 PROCEDURE — 96415 CHEMO IV INFUSION ADDL HR: CPT | Mod: PN

## 2024-07-29 PROCEDURE — 83735 ASSAY OF MAGNESIUM: CPT | Mod: PN | Performed by: INTERNAL MEDICINE

## 2024-07-29 PROCEDURE — 99999 PR PBB SHADOW E&M-EST. PATIENT-LVL V: CPT | Mod: PBBFAC,,, | Performed by: NURSE PRACTITIONER

## 2024-07-29 PROCEDURE — 96417 CHEMO IV INFUS EACH ADDL SEQ: CPT | Mod: PN

## 2024-07-29 PROCEDURE — 3074F SYST BP LT 130 MM HG: CPT | Mod: CPTII,S$GLB,, | Performed by: NURSE PRACTITIONER

## 2024-07-29 PROCEDURE — 96413 CHEMO IV INFUSION 1 HR: CPT | Mod: PN

## 2024-07-29 PROCEDURE — 36415 COLL VENOUS BLD VENIPUNCTURE: CPT | Mod: PN | Performed by: INTERNAL MEDICINE

## 2024-07-29 PROCEDURE — 25000003 PHARM REV CODE 250: Mod: PN | Performed by: NURSE PRACTITIONER

## 2024-07-29 PROCEDURE — 3078F DIAST BP <80 MM HG: CPT | Mod: CPTII,S$GLB,, | Performed by: NURSE PRACTITIONER

## 2024-07-29 PROCEDURE — 85025 COMPLETE CBC W/AUTO DIFF WBC: CPT | Mod: PN | Performed by: INTERNAL MEDICINE

## 2024-07-29 PROCEDURE — 63600175 PHARM REV CODE 636 W HCPCS: Mod: PN | Performed by: NURSE PRACTITIONER

## 2024-07-29 PROCEDURE — 80053 COMPREHEN METABOLIC PANEL: CPT | Mod: PN | Performed by: INTERNAL MEDICINE

## 2024-07-29 PROCEDURE — 96375 TX/PRO/DX INJ NEW DRUG ADDON: CPT | Mod: PN

## 2024-07-29 PROCEDURE — 81025 URINE PREGNANCY TEST: CPT | Mod: PN | Performed by: INTERNAL MEDICINE

## 2024-07-29 PROCEDURE — 99213 OFFICE O/P EST LOW 20 MIN: CPT | Mod: S$GLB,,, | Performed by: NURSE PRACTITIONER

## 2024-07-29 PROCEDURE — 3008F BODY MASS INDEX DOCD: CPT | Mod: CPTII,S$GLB,, | Performed by: NURSE PRACTITIONER

## 2024-07-29 PROCEDURE — 96367 TX/PROPH/DG ADDL SEQ IV INF: CPT | Mod: PN

## 2024-07-29 RX ORDER — PROCHLORPERAZINE EDISYLATE 5 MG/ML
2.5 INJECTION INTRAMUSCULAR; INTRAVENOUS
Status: CANCELLED
Start: 2024-07-31

## 2024-07-29 RX ORDER — SODIUM CHLORIDE 0.9 % (FLUSH) 0.9 %
10 SYRINGE (ML) INJECTION
Status: DISCONTINUED | OUTPATIENT
Start: 2024-07-29 | End: 2024-07-29 | Stop reason: HOSPADM

## 2024-07-29 RX ORDER — FAMOTIDINE 10 MG/ML
20 INJECTION INTRAVENOUS
Status: CANCELLED | OUTPATIENT
Start: 2024-07-29

## 2024-07-29 RX ORDER — HEPARIN 100 UNIT/ML
500 SYRINGE INTRAVENOUS
Status: CANCELLED | OUTPATIENT
Start: 2024-07-29

## 2024-07-29 RX ORDER — DIPHENHYDRAMINE HYDROCHLORIDE 50 MG/ML
50 INJECTION INTRAMUSCULAR; INTRAVENOUS ONCE AS NEEDED
Status: CANCELLED | OUTPATIENT
Start: 2024-07-29

## 2024-07-29 RX ORDER — LORAZEPAM 2 MG/ML
1 INJECTION INTRAMUSCULAR ONCE AS NEEDED
Status: DISCONTINUED | OUTPATIENT
Start: 2024-07-29 | End: 2024-07-29 | Stop reason: HOSPADM

## 2024-07-29 RX ORDER — HEPARIN 100 UNIT/ML
500 SYRINGE INTRAVENOUS
Status: DISCONTINUED | OUTPATIENT
Start: 2024-07-29 | End: 2024-07-29 | Stop reason: HOSPADM

## 2024-07-29 RX ORDER — FAMOTIDINE 10 MG/ML
20 INJECTION INTRAVENOUS
Status: COMPLETED | OUTPATIENT
Start: 2024-07-29 | End: 2024-07-29

## 2024-07-29 RX ORDER — OXYCODONE HYDROCHLORIDE 10 MG/1
10 TABLET ORAL EVERY 4 HOURS PRN
Qty: 45 TABLET | Refills: 0 | Status: SHIPPED | OUTPATIENT
Start: 2024-07-29

## 2024-07-29 RX ORDER — LORAZEPAM 2 MG/ML
1 INJECTION INTRAMUSCULAR ONCE AS NEEDED
Status: CANCELLED
Start: 2024-07-29

## 2024-07-29 RX ORDER — SODIUM CHLORIDE 0.9 % (FLUSH) 0.9 %
10 SYRINGE (ML) INJECTION
Status: CANCELLED | OUTPATIENT
Start: 2024-07-29

## 2024-07-29 RX ORDER — DIPHENHYDRAMINE HYDROCHLORIDE 50 MG/ML
50 INJECTION INTRAMUSCULAR; INTRAVENOUS ONCE AS NEEDED
Status: DISCONTINUED | OUTPATIENT
Start: 2024-07-29 | End: 2024-07-29 | Stop reason: HOSPADM

## 2024-07-29 RX ORDER — PROCHLORPERAZINE EDISYLATE 5 MG/ML
2.5 INJECTION INTRAMUSCULAR; INTRAVENOUS
Status: CANCELLED
Start: 2024-07-30

## 2024-07-29 RX ORDER — PROCHLORPERAZINE EDISYLATE 5 MG/ML
2.5 INJECTION INTRAMUSCULAR; INTRAVENOUS
Status: CANCELLED
Start: 2024-08-01

## 2024-07-29 RX ORDER — PROCHLORPERAZINE EDISYLATE 5 MG/ML
10 INJECTION INTRAMUSCULAR; INTRAVENOUS ONCE AS NEEDED
Status: CANCELLED
Start: 2024-07-29

## 2024-07-29 RX ORDER — PROCHLORPERAZINE EDISYLATE 5 MG/ML
10 INJECTION INTRAMUSCULAR; INTRAVENOUS ONCE AS NEEDED
Status: DISCONTINUED | OUTPATIENT
Start: 2024-07-29 | End: 2024-07-29 | Stop reason: HOSPADM

## 2024-07-29 RX ORDER — EPINEPHRINE 0.3 MG/.3ML
0.3 INJECTION SUBCUTANEOUS ONCE AS NEEDED
Status: DISCONTINUED | OUTPATIENT
Start: 2024-07-29 | End: 2024-07-29 | Stop reason: HOSPADM

## 2024-07-29 RX ORDER — EPINEPHRINE 0.3 MG/.3ML
0.3 INJECTION SUBCUTANEOUS ONCE AS NEEDED
Status: CANCELLED | OUTPATIENT
Start: 2024-07-29

## 2024-07-29 RX ADMIN — APREPITANT 130 MG: 130 INJECTION, EMULSION INTRAVENOUS at 03:07

## 2024-07-29 RX ADMIN — PACLITAXEL 126 MG: 6 INJECTION, SOLUTION INTRAVENOUS at 04:07

## 2024-07-29 RX ADMIN — DIPHENHYDRAMINE HYDROCHLORIDE 50 MG: 50 INJECTION, SOLUTION INTRAMUSCULAR; INTRAVENOUS at 03:07

## 2024-07-29 RX ADMIN — DEXAMETHASONE SODIUM PHOSPHATE 0.25 MG: 10 INJECTION, SOLUTION INTRAMUSCULAR; INTRAVENOUS at 03:07

## 2024-07-29 RX ADMIN — FAMOTIDINE 20 MG: 10 INJECTION INTRAVENOUS at 03:07

## 2024-07-29 RX ADMIN — SODIUM CHLORIDE 200 MG: 9 INJECTION, SOLUTION INTRAVENOUS at 02:07

## 2024-07-29 RX ADMIN — CARBOPLATIN 525 MG: 10 INJECTION, SOLUTION INTRAVENOUS at 05:07

## 2024-07-29 NOTE — PLAN OF CARE
Problem: Fatigue  Goal: Improved Activity Tolerance  Outcome: Progressing  Intervention: Promote Improved Energy  Flowsheets (Taken 7/29/2024 1400)  Fatigue Management:   frequent rest breaks encouraged   paced activity encouraged  Sleep/Rest Enhancement: regular sleep/rest pattern promoted  Activity Management: Ambulated -L4  Environmental Support: rest periods encouraged     Problem: Adult Inpatient Plan of Care  Goal: Patient-Specific Goal (Individualized)  Outcome: Progressing  Flowsheets (Taken 7/29/2024 1400)  Individualized Care Needs: Recliner, warm blanket,  at chairside, cryotherapy with Taxol  Anxieties, Fears or Concerns: None  Patient/Family-Specific Goals (Include Timeframe): Free of S/S of reaction with infusion.    Patient to Infusion for Keytruda, Taxol and Carbo following appointment with the provider. Accompanied by her . Treatment plan reviewed with patient. VSS. Cryotherapy initiated with Taxol. Tolerated treatment. Prefers using patient portal for upcoming appointment schedule. Escorted to the front lobby in no distress for discharge to home.

## 2024-07-29 NOTE — PROGRESS NOTES
Name: Aleena Duckworth  MRN:  [id number]  :  1985 Age 38 y.o.  Date of Service: 2024    Reason for visit:  Aleena Duckworth is a 38 y.o. female here regarding...clearance for C3D1 of  Pembro, Carbo, Taxol    # Invasive Ductal Carcinoma of the Left Breast  Date of Original Diagnosis:  2023  Recurrence: 5/15/24, localized  Original Stage: pT1b pN0(sn) triple negative, grade 3, Ki-67 80%  Recurrence Stage: [id number] Stage 2B Triple negative,  grade 3, Ki-67 76%  Current Sites of Disease:   left axillary tail/lymph node  Current Goals of Therapy:  Curative  Current Therapy:   keynote 522 NACT     Oncologic History/History of Present Illness:   Former patient of Dr. Ayala, per her notes  self palpated a left-sided breast mass.    2023  bilateral breast mammogram that showed a lobular hypoechoic solid mass within the left breast at 2 o'clock position 7 cm     2023, ultrasound guided biopsy = invasive ductal carcinoma, grade 3, ER/AK/HER2 Randall negative, Ki-67 80%.     23  left partial mastectomy that revealed invasive ductal carcinoma 9 x 8 x 6 mm, single focus, approximately 1 mm focus of high-grade DCIS, closest margin 3 mm, anterior.  Two sentinel lymph nodes were negative.  pT1b pN0(sn).    23 cycle 1 of Docetaxel and Cytoxan with numerous SE  2023 cycle 2 of TC with 20% dose reduction of docetaxel.  With again numerous side effects.     Extensive GOC discussion held with patient and family and decided to stop chemotherapy.     2023 s/p bilateral mastectomy, implant removal and hybrid flap and small implants    2024 Reports to [facility] to establish care with me. Reports left lateral axillary tail nodules.  Causing anxiety.    24    mammogram with ultrasound BI-RADS 4- 11 mm left axillary tail mass  5/15/24   left axillary tail  and lymph node positive for  poorly differentiated carcinoma, triple negative,  staging scans negative.    24  Had a prolonged discussion  with patient's , father, mother she was motivated to pursue neoadjuvant chemotherapy.    06/24/24:  C1D15 delayed last week due to platelets = 91k.  Presents to the clinic for clearance for C1D15; Denies any N/V/D; appetite fair; weight loss of 3 1/2 pounds in 1 week.  Remains working FT; needs to move C2D1 to Tuesday of next week 07/02 due to work.  Dr. Cruz reviewed labs & is ok to proceed with today's labs of Platelets = 91k    07/02/24:  Presents for clearance for C2D1; appetite better; hydrating well; no N/V/D  Platelets slightly improved from 91 to 95k this week; grans down; getting Zarxio on Days 2 & 4     07/22/24:  Presents for clearance for C3D1; feels good overall.  Eating well & hydrating; no nausea or diarrhea. No bleeding or bruising.  Denies fevers, chills, abdominal discomfort, N/V/D, melena, etc.    07/29/24:  last week platelets 66k; tx deferred x 1 week.  Feels well. Had an episode of increased pain that ran through her body; resolved with pain med; request refill for pain med.  Denies fevers, chills, abdominal pain, N/V/D, bleeding, new lumps or bumps, etc.      Oncology History   Triple negative breast cancer   4/17/2023 Initial Diagnosis    Triple negative breast cancer     4/18/2023 Cancer Staged    Staging form: Breast, AJCC 8th Edition  - Pathologic stage from 4/18/2023: Stage IB (pT1b, pN0, cM0, G3, ER-, [place]-, HER2-)     5/1/2023 - 5/25/2023 Chemotherapy    Treatment Summary   Plan Name: OP BREAST TC - DOCETAXEL CYCLOPHOSPHAMIDE Q3W  Treatment Goal: Curative  Status: Inactive  Start Date: 5/1/2023  End Date: 5/25/2023  Provider: Ambar Ayala MD  Chemotherapy: cycloPHOSphamide 600 mg/m2 = 880 mg in sodium chloride 0.9% 289.4 mL chemo infusion, 600 mg/m2 = 880 mg, Intravenous, Clinic/HOD 1 time, 2 of 4 cycles  Dose modification: 600 mg/m2 (Cycle 3), 480 mg/m2 (Cycle 3, Reason: Dose not tolerated)  Administration: 880 mg (5/1/2023)  DOCEtaxel 75 mg/m2 = 110 mg in sodium chloride  "0.9% 290.5 mL chemo infusion, 75 mg/m2 = 110 mg, Intravenous, Clinic/HOD 1 time, 2 of 4 cycles  Dose modification: 60 mg/m2 (original dose 75 mg/m2, Cycle 2, Reason: Dose not tolerated)  Administration: 110 mg (5/1/2023), 90 mg (5/24/2023)     5/22/2024 Cancer Staged    Staging form: Breast, AJCC 8th Edition  - Clinical stage from 5/22/2024: Stage IIB (rcT0, cN1(f), cM0, G3, ER-, [place]-, HER2-)     6/4/2024 -  Chemotherapy    Treatment Summary   Plan Name: OP PEMBROLIZUMAB CARBOPLATIN (AUC 5) WITH WEEKLY PACLITAXEL FOLLOWED BY PEMBROLIZUMAB DOXORUBICIN CYCLOPHOSPHAMIDE FOLLOWED BY PEMBROLIZUMAB 200 MG Q3W  Treatment Goal: Curative  Status: Active  Start Date: 6/4/2024  End Date: 5/20/2025 (Planned)  Provider: Eunice Cruz MD  Chemotherapy: DOXOrubicin chemo injection 86 mg, 60 mg/m2 = 86 mg, Intravenous, Clinic/HOD 1 time, 0 of 4 cycles  CARBOplatin (PARAPLATIN) 525 mg in sodium chloride 0.9% 302.5 mL chemo infusion, 525 mg, Intravenous, Clinic/HOD 1 time, 3 of 4 cycles  Administration: 525 mg (6/4/2024), 525 mg (7/2/2024)  cycloPHOSphamide 600 mg/m2 = 860 mg in sodium chloride 0.9% 254.3 mL chemo infusion, 600 mg/m2 = 860 mg, Intravenous, Clinic/HOD 1 time, 0 of 4 cycles  PACLitaxeL (TAXOL) 80 mg/m2 = 114 mg in sodium chloride 0.9% 250 mL chemo infusion, 80 mg/m2 = 114 mg, Intravenous, Clinic/HOD 1 time, 3 of 4 cycles  Administration: 114 mg (6/4/2024), 114 mg (6/11/2024), 114 mg (6/24/2024), 114 mg (7/2/2024), 114 mg (7/8/2024), 120 mg (7/15/2024)           PHYSICAL EXAMINATION:   Weight:  Gain of 6 pounds in 1 week  /71 (BP Location: Left arm, Patient Position: Sitting, BP Method: Medium (Automatic))   Pulse 95   Temp 96.9 °F (36.1 °C) (Temporal)   Resp 18   Ht 5' 2" (1.575 m)   Wt 56.9 kg (125 lb 7.1 oz)   SpO2 99%   BMI 22.94 kg/m²   Wt Readings from Last 3 Encounters:   07/29/24 56.9 kg (125 lb 7.1 oz)   07/22/24 54.1 kg (119 lb 4.3 oz)   07/18/24 53.8 kg (118 lb 9.7 oz)     ECOG PERFORMANCE " STATUS: 0  Physical Exam   General:  Well-appearing, nontoxic  Eyes:  Equal and round pupils, EOMI, no scleral icterus  Mouth:  No lesions, moist  Cardiovascular:  Warm, well-perfused, no peripheral edema  Lungs:  Unlabored on room air, no wheezing  Neurologic:  Awake, alert and oriented, participating in the exam  Psych:  Appropriate mood and affect  Skin:  Pale pallor, No rashes  Heme:  No petechiae, no purpura       LABORATORY:  CBC  Lab Results   Component Value Date    WBC 4.65 07/29/2024    HGB 10.3 (L) 07/29/2024    HCT 31.0 (L) 07/29/2024    MCV 95 07/29/2024     (L) 07/29/2024     ANC = 2.7     CMP  Sodium   Date Value Ref Range Status   07/29/2024 141 136 - 145 mmol/L Final     Potassium   Date Value Ref Range Status   07/29/2024 4.0 3.5 - 5.1 mmol/L Final     Chloride   Date Value Ref Range Status   07/29/2024 105 95 - 110 mmol/L Final     CO2   Date Value Ref Range Status   07/29/2024 27 23 - 29 mmol/L Final     Glucose   Date Value Ref Range Status   07/29/2024 94 70 - 110 mg/dL Final     BUN   Date Value Ref Range Status   07/29/2024 18 6 - 20 mg/dL Final     Creatinine   Date Value Ref Range Status   07/29/2024 0.7 0.5 - 1.4 mg/dL Final     Calcium   Date Value Ref Range Status   07/29/2024 9.3 8.7 - 10.5 mg/dL Final     Total Protein   Date Value Ref Range Status   07/29/2024 6.2 6.0 - 8.4 g/dL Final     Albumin   Date Value Ref Range Status   07/29/2024 3.9 3.5 - 5.2 g/dL Final     Total Bilirubin   Date Value Ref Range Status   07/29/2024 0.7 0.1 - 1.0 mg/dL Final     Comment:     For infants and newborns, interpretation of results should be based  on gestational age, weight and in agreement with clinical  observations.    Premature Infant recommended reference ranges:  Up to 24 hours.............<8.0 mg/dL  Up to 48 hours............<12.0 mg/dL  3-5 days..................<15.0 mg/dL  6-29 days.................<15.0 mg/dL       Alkaline Phosphatase   Date Value Ref Range Status   07/29/2024  71 55 - 135 U/L Final     AST   Date Value Ref Range Status   07/29/2024 25 10 - 40 U/L Final     ALT   Date Value Ref Range Status   07/29/2024 75 (H) 10 - 44 U/L Final     Anion Gap   Date Value Ref Range Status   07/29/2024 9 8 - 16 mmol/L Final     eGFR   Date Value Ref Range Status   07/29/2024 >60.0 >60 mL/min/1.73 m^2 Final     UPT:  negative  Magnesium:  2.1  Lab Results   Component Value Date    TSH 0.401 07/15/2024    FREET4 1.00 03/06/2024        PATHOLOGY:  DIAGNOSIS:  05/17/2024 JWH/kl  LEFT AXILLARY TAIL, ULTRASOUND-GUIDED NEEDLE CORE BIOPSY:  --FRAGMENTS OF LYMPH NODE POSITIVE FOR METASTATIC POORLY DIFFERENTIATED   CARCINOMA (SEE COMMENT).  --RECEPTOR STUDIES PENDING.      RADIOLOGY:  CT chest abdomen pelvis 05/24/2024  Impression:  1. Postoperative changes of bilateral breast reconstruction with breast implants in place.  Two small subcutaneous soft tissue nodules along the left axillary tail in this patient with biopsy-proven left axillary lymph node triple negative breast cancer, as detailed above.  Correlate with recent mammographic imaging/biopsy.  2. No evidence of metastatic disease within the chest, abdomen, or pelvis.  3. Mild bilateral pelviectasis without evidence of gina hydronephrosis or obstructive uropathy, nonspecific and may be physiologic.    NM Bone Scan  05/24/2024  Impression:  No evidence of metastatic disease      ASSESSMENT AND PLAN:  Aleena Duckworth is a 38 y.o. female with...    # Invasive Ductal Carcinoma of the Left Breast  Date of Original Diagnosis:  02/09/2023  Recurrence: 5/15/24, localized  Original Stage: pT1b pN0(sn) triple negative, grade 3, Ki-67 80%  Recurrence Stage: [id number] Stage 2B Triple negative,  grade 3, Ki-67 76%  Current Sites of Disease:   left axillary tail/lymph node  Current Goals of Therapy:  Curative  Current Therapy:   pending neoadjuvant chemotherapy        Staging CT and PET negative for distant disease    Added 1 mg of Ativan for each dose of  chemotherapy given her adverse reactions in the past and anxiety surrounding chemo  Added filgrastim to  days 2,3,4/9,10,11/16,17,18  given history of neutropenic fever with TC regimen,  can give additional antiemetics if necessary  Advised patient she needs to have weekly mental health visits while on chemotherapy, referral to Oncology Psychology  Will see her weekly during Cycles 1-4   6/17/24--one-week delay for cycle 1 day 15 of chemotherapy due to thrombocytopenia and LFT elevation, platelets 91,000, repeat CBC and CMP on 06/24.   06/24/24:  Dr. Cruz reviewed labs; ok to proceed with Platelets + 91k again this week; Proceed with C1D15 today; f/u next week 07/01 with Dr. Cruz for C2D1 with labs prior.  07/02/24:  Proceed with C2D1 today with Zarxio on Days 2, 4 (holiday on day 3); f/u next week as scheduled on 07/08 with labs prior for clearance for C2D8  07/08/24:  Proceed with C2D8 today; Zarxio on Days 9, 10, 11; f/u in 1 week for clearance for C2D15 with labs prior.  07/15/24:  Proceed with C2D15 today; Zarxio on Days 16,17,18; f/u in 1 week for clearance for C3D1 with labs prior.  07/22/24:  Defer x 1 week C3D1 with platelets 66k; f/u in 1 week with CBC, CMP, MG, UPT (TSH done 07/15)  07/29/24:  Proceed with C3D1 with platelets 104k; f/u next week with Dr. Cruz with labs for clearance for C3D8    #LFT elevation- advised to d/c OTC meds, one week delay in chemotherapy  07/02/24:  Continues to trend down  07/22/24:  elevated  07/29/24:  trending down    # medication induced constipation- advised to take BID docusate and PRN senna   07/02:  stable  # anxiety about health- as noted above,  could be exacerbated by Adderall  # history of neutropenic fever- as above  # thrombocytopenia- fluctuating platelet levels, commonly this is associated with over-the-counter medications or other medications, encouraged cessation of all OTCs, changed Percocet to Niki  07/02/24:  stable  07/08/24:  stable  07/22/24:   66k; asymptomatic  07/29/24:  104K; asymptomatic    #ADHD, has been on Adderall for several years.  Was born prematurely, was diagnosed with a stroke and heart murmur at birth, was treated at [facility].     #Heavy menorrhagia for the past few years, worsening recently and has developed CAROLIN.  Has been on iron supplementation.  Sees gynecologist, Dr. Almita Clark with [facility].      #Hyperbilirubinemia - An abdominal ultrasound was done, negative for any abnormalities.Saw Hepatology prior to chemo who suspected she has Gilbert's disease and found no contraindication to proceeding with TC.    #Anxiety  07/15:  Ativan helping.    CACHORRO Ga, FNP-C  [facility]  25 minutes were spent in coordination of patient's care, record review and counseling.        Route Chart for Scheduling    Med Onc Chart Routing      Follow up with physician . F/U 08/05 with labs with Dr. Cruz as scheduled for C3D8   Follow up with NUNO    Infusion scheduling note   Proceed with C3D1 today; Zarxio x 3 days post   Injection scheduling note    Labs    Imaging    Pharmacy appointment    Other referrals                Treatment Plan Information   OP PEMBROLIZUMAB CARBOPLATIN (AUC 5) WITH WEEKLY PACLITAXEL FOLLOWED BY PEMBROLIZUMAB DOXORUBICIN CYCLOPHOSPHAMIDE FOLLOWED BY PEMBROLIZUMAB 200 MG Q3W   Eunice Cruz MD   Upcoming Treatment Dates - OP PEMBROLIZUMAB CARBOPLATIN (AUC 5) WITH WEEKLY PACLITAXEL FOLLOWED BY PEMBROLIZUMAB DOXORUBICIN CYCLOPHOSPHAMIDE FOLLOWED BY PEMBROLIZUMAB 200 MG Q3W    7/30/2024       Pre-Medications       diphenhydrAMINE (BENADRYL) 50 mg in NS 50 mL IVPB       famotidine (PF) injection 20 mg       Chemotherapy       PACLitaxeL (TAXOL) 80 mg/m2 = 114 mg in 0.9% NaCl 250 mL chemo infusion       CARBOplatin (PARAPLATIN) 525 mg in 0.9% NaCl 302.5 mL chemo infusion       Supportive Care       LORazepam injection 1 mg       prochlorperazine injection Soln 10 mg        Antiemetics       aprepitant (CINVANTI) injection 130 mg       palonosetron 0.25mg/dexAMETHasone 12mg in NS IVPB 0.25 mg 50 mL       Immunotherapy       pembrolizumab (KEYTRUDA) 200 mg in 0.9% NaCl SolP 108 mL infusion  7/31/2024       Growth Factor       filgrastim-sndz (ZARXIO) injection 300 mcg/0.5 mL (Preferred Regimen)  8/1/2024       Growth Factor       filgrastim-sndz (ZARXIO) injection 300 mcg/0.5 mL (Preferred Regimen)  8/2/2024       Growth Factor       filgrastim-sndz (ZARXIO) injection 300 mcg/0.5 mL (Preferred Regimen)    Therapy Plan Information  IV Fluids  sodium chloride 0.9% bolus 1,000 mL 1,000 mL  1,000 mL, Intravenous, Every visit  Flushes  sodium chloride 0.9% flush 10 mL  10 mL, Intravenous, PRN  heparin, porcine (PF) 100 unit/mL injection flush 500 Units  500 Units, Intravenous, PRN  Antiemetics  ondansetron injection 8 mg  8 mg, Intravenous, PRN  promethazine (PHENERGAN) 12.5 mg in dextrose 5 % (D5W) 50 mL IVPB  12.5 mg, Intravenous, PRN  dexAMETHasone injection 8 mg  8 mg, Intravenous, PRN

## 2024-07-30 ENCOUNTER — INFUSION (OUTPATIENT)
Dept: INFUSION THERAPY | Facility: HOSPITAL | Age: 39
End: 2024-07-30
Attending: INTERNAL MEDICINE
Payer: COMMERCIAL

## 2024-07-30 VITALS
RESPIRATION RATE: 17 BRPM | DIASTOLIC BLOOD PRESSURE: 66 MMHG | HEART RATE: 82 BPM | SYSTOLIC BLOOD PRESSURE: 105 MMHG | TEMPERATURE: 98 F | WEIGHT: 125.44 LBS | HEIGHT: 62 IN | OXYGEN SATURATION: 98 % | BODY MASS INDEX: 23.08 KG/M2

## 2024-07-30 DIAGNOSIS — C50.919 TRIPLE NEGATIVE BREAST CANCER: Primary | ICD-10-CM

## 2024-07-30 PROCEDURE — 96372 THER/PROPH/DIAG INJ SC/IM: CPT | Mod: 59,PN

## 2024-07-30 PROCEDURE — 63600175 PHARM REV CODE 636 W HCPCS: Mod: PN | Performed by: NURSE PRACTITIONER

## 2024-07-30 PROCEDURE — 96374 THER/PROPH/DIAG INJ IV PUSH: CPT | Mod: PN

## 2024-07-30 RX ORDER — PROCHLORPERAZINE EDISYLATE 5 MG/ML
2.5 INJECTION INTRAMUSCULAR; INTRAVENOUS
Status: COMPLETED | OUTPATIENT
Start: 2024-07-30 | End: 2024-07-30

## 2024-07-30 RX ADMIN — PROCHLORPERAZINE EDISYLATE 2.5 MG: 5 INJECTION INTRAMUSCULAR; INTRAVENOUS at 03:07

## 2024-07-30 RX ADMIN — FILGRASTIM-SNDZ 300 MCG: 300 INJECTION, SOLUTION INTRAVENOUS; SUBCUTANEOUS at 03:07

## 2024-07-30 NOTE — PLAN OF CARE
Problem: Adult Inpatient Plan of Care  Goal: Plan of Care Review  Outcome: Progressing  Flowsheets (Taken 7/30/2024 1510)  Plan of Care Reviewed With: patient     Patient given zarxio to right posterior arm; covered with bandaid after bleeding was controlled.   Patient complaining of nausea with no relief from PO medications from home. Offered IV compazine in plan. Patient agreed. Port accessed with sterile technique; patent with blood return. Medication given, then port was flushed with saline, and de-accessed. Patient will return tomorrow and Thursday for additional doses of Zarxio. Patient aware and has access to [facility] portal. Patient's  is her transportation. Patient ambulates per self.

## 2024-07-30 NOTE — PLAN OF CARE
Problem: Adult Inpatient Plan of Care  Goal: Patient-Specific Goal (Individualized)  Outcome: Progressing  Flowsheets (Taken 7/30/2024 8126)  Individualized Care Needs:   recliner, education with regard to small bland meals   offered ordered PRN compazine  Anxieties, Fears or Concerns: nausea, unable to eat much  Patient/Family-Specific Goals (Include Timeframe): no reaction during treatment     Problem: Fatigue  Goal: Improved Activity Tolerance  Intervention: Promote Improved Energy  Flowsheets (Taken 7/30/2024 5392)  Sleep/Rest Enhancement:   natural light exposure provided   noise level reduced  Activity Management:   Ambulated -L4   Ambulated in canales - L4

## 2024-07-31 ENCOUNTER — INFUSION (OUTPATIENT)
Dept: INFUSION THERAPY | Facility: HOSPITAL | Age: 39
End: 2024-07-31
Attending: INTERNAL MEDICINE
Payer: COMMERCIAL

## 2024-07-31 VITALS
RESPIRATION RATE: 16 BRPM | TEMPERATURE: 98 F | DIASTOLIC BLOOD PRESSURE: 66 MMHG | SYSTOLIC BLOOD PRESSURE: 106 MMHG | HEART RATE: 74 BPM

## 2024-07-31 DIAGNOSIS — C50.919 TRIPLE NEGATIVE BREAST CANCER: Primary | ICD-10-CM

## 2024-07-31 PROCEDURE — 96372 THER/PROPH/DIAG INJ SC/IM: CPT | Mod: PN

## 2024-07-31 PROCEDURE — 63600175 PHARM REV CODE 636 W HCPCS: Mod: JZ,JB,JG,PN | Performed by: NURSE PRACTITIONER

## 2024-07-31 RX ADMIN — FILGRASTIM-SNDZ 300 MCG: 300 INJECTION, SOLUTION INTRAVENOUS; SUBCUTANEOUS at 04:07

## 2024-08-01 ENCOUNTER — PATIENT MESSAGE (OUTPATIENT)
Dept: HEMATOLOGY/ONCOLOGY | Facility: CLINIC | Age: 39
End: 2024-08-01
Payer: COMMERCIAL

## 2024-08-01 ENCOUNTER — INFUSION (OUTPATIENT)
Dept: INFUSION THERAPY | Facility: HOSPITAL | Age: 39
End: 2024-08-01
Attending: INTERNAL MEDICINE
Payer: COMMERCIAL

## 2024-08-01 VITALS
RESPIRATION RATE: 16 BRPM | TEMPERATURE: 98 F | OXYGEN SATURATION: 98 % | DIASTOLIC BLOOD PRESSURE: 62 MMHG | HEART RATE: 72 BPM | SYSTOLIC BLOOD PRESSURE: 102 MMHG

## 2024-08-01 DIAGNOSIS — C50.919 TRIPLE NEGATIVE BREAST CANCER: Primary | ICD-10-CM

## 2024-08-01 PROCEDURE — 96372 THER/PROPH/DIAG INJ SC/IM: CPT | Mod: 59,PN

## 2024-08-01 PROCEDURE — 63600175 PHARM REV CODE 636 W HCPCS: Mod: JZ,JB,JG,PN | Performed by: NURSE PRACTITIONER

## 2024-08-01 PROCEDURE — 96374 THER/PROPH/DIAG INJ IV PUSH: CPT | Mod: PN

## 2024-08-01 RX ORDER — PROCHLORPERAZINE EDISYLATE 5 MG/ML
2.5 INJECTION INTRAMUSCULAR; INTRAVENOUS
Status: COMPLETED | OUTPATIENT
Start: 2024-08-01 | End: 2024-08-01

## 2024-08-01 RX ADMIN — FILGRASTIM-SNDZ 300 MCG: 300 INJECTION, SOLUTION INTRAVENOUS; SUBCUTANEOUS at 03:08

## 2024-08-01 RX ADMIN — PROCHLORPERAZINE EDISYLATE 2.5 MG: 5 INJECTION INTRAMUSCULAR; INTRAVENOUS at 03:08

## 2024-08-01 NOTE — PLAN OF CARE
Problem: Fever with Neutropenia  Goal: Baseline Body Temperature  Outcome: Progressing     Problem: Fever with Neutropenia  Goal: Absence of Infection  Outcome: Progressing   Tolerated injection without diffculty  She stated she was really nauseated request compazine as ordered D/C instructions given and pt ambulated to private vehicle per self without difficulty  NAD

## 2024-08-05 ENCOUNTER — OFFICE VISIT (OUTPATIENT)
Dept: HEMATOLOGY/ONCOLOGY | Facility: CLINIC | Age: 39
End: 2024-08-05
Payer: COMMERCIAL

## 2024-08-05 ENCOUNTER — TELEPHONE (OUTPATIENT)
Dept: HEMATOLOGY/ONCOLOGY | Facility: CLINIC | Age: 39
End: 2024-08-05
Payer: COMMERCIAL

## 2024-08-05 ENCOUNTER — INFUSION (OUTPATIENT)
Dept: INFUSION THERAPY | Facility: HOSPITAL | Age: 39
End: 2024-08-05
Attending: INTERNAL MEDICINE
Payer: COMMERCIAL

## 2024-08-05 ENCOUNTER — DOCUMENTATION ONLY (OUTPATIENT)
Dept: INFUSION THERAPY | Facility: HOSPITAL | Age: 39
End: 2024-08-05

## 2024-08-05 ENCOUNTER — LAB VISIT (OUTPATIENT)
Dept: LAB | Facility: HOSPITAL | Age: 39
End: 2024-08-05
Attending: INTERNAL MEDICINE
Payer: COMMERCIAL

## 2024-08-05 VITALS
RESPIRATION RATE: 18 BRPM | TEMPERATURE: 98 F | SYSTOLIC BLOOD PRESSURE: 100 MMHG | HEIGHT: 62 IN | HEART RATE: 83 BPM | WEIGHT: 125.25 LBS | DIASTOLIC BLOOD PRESSURE: 60 MMHG | OXYGEN SATURATION: 95 % | BODY MASS INDEX: 23.05 KG/M2

## 2024-08-05 VITALS
HEIGHT: 62 IN | WEIGHT: 125.25 LBS | HEART RATE: 100 BPM | DIASTOLIC BLOOD PRESSURE: 63 MMHG | TEMPERATURE: 98 F | RESPIRATION RATE: 16 BRPM | BODY MASS INDEX: 23.05 KG/M2 | OXYGEN SATURATION: 96 % | SYSTOLIC BLOOD PRESSURE: 117 MMHG

## 2024-08-05 DIAGNOSIS — C50.919 TRIPLE NEGATIVE BREAST CANCER: ICD-10-CM

## 2024-08-05 DIAGNOSIS — C50.612 MALIGNANT NEOPLASM OF AXILLARY TAIL OF LEFT BREAST IN FEMALE, ESTROGEN RECEPTOR NEGATIVE: ICD-10-CM

## 2024-08-05 DIAGNOSIS — C50.919 TRIPLE NEGATIVE BREAST CANCER: Primary | ICD-10-CM

## 2024-08-05 DIAGNOSIS — D72.819 LEUKOPENIA, UNSPECIFIED TYPE: ICD-10-CM

## 2024-08-05 DIAGNOSIS — R74.01 TRANSAMINITIS: ICD-10-CM

## 2024-08-05 DIAGNOSIS — Z17.1 MALIGNANT NEOPLASM OF AXILLARY TAIL OF LEFT BREAST IN FEMALE, ESTROGEN RECEPTOR NEGATIVE: ICD-10-CM

## 2024-08-05 DIAGNOSIS — D63.0 ANEMIA IN NEOPLASTIC DISEASE: ICD-10-CM

## 2024-08-05 DIAGNOSIS — M89.8X9 BONE PAIN DUE TO G-CSF: Primary | ICD-10-CM

## 2024-08-05 DIAGNOSIS — R45.89 ANXIETY ABOUT HEALTH: ICD-10-CM

## 2024-08-05 DIAGNOSIS — D69.6 THROMBOCYTOPENIA: ICD-10-CM

## 2024-08-05 LAB
ALBUMIN SERPL BCP-MCNC: 3.8 G/DL (ref 3.5–5.2)
ALP SERPL-CCNC: 74 U/L (ref 55–135)
ALT SERPL W/O P-5'-P-CCNC: 62 U/L (ref 10–44)
ANION GAP SERPL CALC-SCNC: 9 MMOL/L (ref 8–16)
ANISOCYTOSIS BLD QL SMEAR: SLIGHT
AST SERPL-CCNC: 28 U/L (ref 10–40)
B-HCG UR QL: NEGATIVE
BASOPHILS NFR BLD: 0 % (ref 0–1.9)
BILIRUB SERPL-MCNC: 0.4 MG/DL (ref 0.1–1)
BUN SERPL-MCNC: 19 MG/DL (ref 6–20)
CALCIUM SERPL-MCNC: 9.3 MG/DL (ref 8.7–10.5)
CHLORIDE SERPL-SCNC: 102 MMOL/L (ref 95–110)
CO2 SERPL-SCNC: 27 MMOL/L (ref 23–29)
CREAT SERPL-MCNC: 0.8 MG/DL (ref 0.5–1.4)
DIFFERENTIAL METHOD BLD: ABNORMAL
EOSINOPHIL NFR BLD: 1 % (ref 0–8)
ERYTHROCYTE [DISTWIDTH] IN BLOOD BY AUTOMATED COUNT: 16.1 % (ref 11.5–14.5)
EST. GFR  (NO RACE VARIABLE): >60 ML/MIN/1.73 M^2
GLUCOSE SERPL-MCNC: 87 MG/DL (ref 70–110)
HCT VFR BLD AUTO: 29.9 % (ref 37–48.5)
HGB BLD-MCNC: 10.3 G/DL (ref 12–16)
IMM GRANULOCYTES # BLD AUTO: ABNORMAL K/UL (ref 0–0.04)
IMM GRANULOCYTES NFR BLD AUTO: ABNORMAL % (ref 0–0.5)
LYMPHOCYTES NFR BLD: 39 % (ref 18–48)
MAGNESIUM SERPL-MCNC: 2.2 MG/DL (ref 1.6–2.6)
MCH RBC QN AUTO: 32.1 PG (ref 27–31)
MCHC RBC AUTO-ENTMCNC: 34.4 G/DL (ref 32–36)
MCV RBC AUTO: 93 FL (ref 82–98)
METAMYELOCYTES NFR BLD MANUAL: 5 %
MONOCYTES NFR BLD: 4 % (ref 4–15)
MYELOCYTES NFR BLD MANUAL: 4 %
NEUTROPHILS NFR BLD: 44 % (ref 38–73)
NEUTS BAND NFR BLD MANUAL: 2 %
NRBC BLD-RTO: 0 /100 WBC
PLATELET # BLD AUTO: 142 K/UL (ref 150–450)
PLATELET BLD QL SMEAR: ABNORMAL
PMV BLD AUTO: 8.4 FL (ref 9.2–12.9)
POLYCHROMASIA BLD QL SMEAR: ABNORMAL
POTASSIUM SERPL-SCNC: 4.1 MMOL/L (ref 3.5–5.1)
PROMYELOCYTES NFR BLD MANUAL: 1 %
PROT SERPL-MCNC: 6 G/DL (ref 6–8.4)
RBC # BLD AUTO: 3.21 M/UL (ref 4–5.4)
SODIUM SERPL-SCNC: 138 MMOL/L (ref 136–145)
TSH SERPL DL<=0.005 MIU/L-ACNC: 3.87 UIU/ML (ref 0.4–4)
WBC # BLD AUTO: 9.66 K/UL (ref 3.9–12.7)

## 2024-08-05 PROCEDURE — 36415 COLL VENOUS BLD VENIPUNCTURE: CPT | Mod: PN | Performed by: INTERNAL MEDICINE

## 2024-08-05 PROCEDURE — G2211 COMPLEX E/M VISIT ADD ON: HCPCS | Mod: S$GLB,,, | Performed by: INTERNAL MEDICINE

## 2024-08-05 PROCEDURE — 83735 ASSAY OF MAGNESIUM: CPT | Mod: PN | Performed by: INTERNAL MEDICINE

## 2024-08-05 PROCEDURE — 25000003 PHARM REV CODE 250: Mod: PN | Performed by: INTERNAL MEDICINE

## 2024-08-05 PROCEDURE — 84443 ASSAY THYROID STIM HORMONE: CPT | Performed by: INTERNAL MEDICINE

## 2024-08-05 PROCEDURE — 3008F BODY MASS INDEX DOCD: CPT | Mod: CPTII,S$GLB,, | Performed by: INTERNAL MEDICINE

## 2024-08-05 PROCEDURE — 99215 OFFICE O/P EST HI 40 MIN: CPT | Mod: S$GLB,,, | Performed by: INTERNAL MEDICINE

## 2024-08-05 PROCEDURE — 96375 TX/PRO/DX INJ NEW DRUG ADDON: CPT | Mod: PN

## 2024-08-05 PROCEDURE — 63600175 PHARM REV CODE 636 W HCPCS: Mod: PN | Performed by: INTERNAL MEDICINE

## 2024-08-05 PROCEDURE — 85027 COMPLETE CBC AUTOMATED: CPT | Mod: PN | Performed by: INTERNAL MEDICINE

## 2024-08-05 PROCEDURE — 80053 COMPREHEN METABOLIC PANEL: CPT | Mod: PN | Performed by: INTERNAL MEDICINE

## 2024-08-05 PROCEDURE — 81025 URINE PREGNANCY TEST: CPT | Mod: PN | Performed by: INTERNAL MEDICINE

## 2024-08-05 PROCEDURE — 99999 PR PBB SHADOW E&M-EST. PATIENT-LVL IV: CPT | Mod: PBBFAC,,, | Performed by: INTERNAL MEDICINE

## 2024-08-05 PROCEDURE — 96413 CHEMO IV INFUSION 1 HR: CPT | Mod: PN

## 2024-08-05 PROCEDURE — 3078F DIAST BP <80 MM HG: CPT | Mod: CPTII,S$GLB,, | Performed by: INTERNAL MEDICINE

## 2024-08-05 PROCEDURE — 96367 TX/PROPH/DG ADDL SEQ IV INF: CPT | Mod: PN

## 2024-08-05 PROCEDURE — 85007 BL SMEAR W/DIFF WBC COUNT: CPT | Mod: PN | Performed by: INTERNAL MEDICINE

## 2024-08-05 PROCEDURE — 3074F SYST BP LT 130 MM HG: CPT | Mod: CPTII,S$GLB,, | Performed by: INTERNAL MEDICINE

## 2024-08-05 RX ORDER — DIPHENHYDRAMINE HYDROCHLORIDE 50 MG/ML
50 INJECTION INTRAMUSCULAR; INTRAVENOUS ONCE AS NEEDED
Status: CANCELLED | OUTPATIENT
Start: 2024-08-05

## 2024-08-05 RX ORDER — LORAZEPAM 2 MG/ML
1 INJECTION INTRAMUSCULAR ONCE AS NEEDED
Status: DISCONTINUED | OUTPATIENT
Start: 2024-08-05 | End: 2024-08-05 | Stop reason: HOSPADM

## 2024-08-05 RX ORDER — FAMOTIDINE 10 MG/ML
20 INJECTION INTRAVENOUS
Status: CANCELLED | OUTPATIENT
Start: 2024-08-05

## 2024-08-05 RX ORDER — EPINEPHRINE 0.3 MG/.3ML
0.3 INJECTION SUBCUTANEOUS ONCE AS NEEDED
Status: CANCELLED | OUTPATIENT
Start: 2024-08-05

## 2024-08-05 RX ORDER — FAMOTIDINE 10 MG/ML
20 INJECTION INTRAVENOUS
Status: COMPLETED | OUTPATIENT
Start: 2024-08-05 | End: 2024-08-05

## 2024-08-05 RX ORDER — PROCHLORPERAZINE EDISYLATE 5 MG/ML
10 INJECTION INTRAMUSCULAR; INTRAVENOUS ONCE AS NEEDED
Status: COMPLETED | OUTPATIENT
Start: 2024-08-05 | End: 2024-08-05

## 2024-08-05 RX ORDER — PROCHLORPERAZINE EDISYLATE 5 MG/ML
10 INJECTION INTRAMUSCULAR; INTRAVENOUS ONCE AS NEEDED
Status: CANCELLED
Start: 2024-08-05

## 2024-08-05 RX ORDER — HEPARIN 100 UNIT/ML
500 SYRINGE INTRAVENOUS
Status: CANCELLED | OUTPATIENT
Start: 2024-08-05

## 2024-08-05 RX ORDER — LORAZEPAM 2 MG/ML
1 INJECTION INTRAMUSCULAR ONCE AS NEEDED
Status: CANCELLED
Start: 2024-08-05

## 2024-08-05 RX ORDER — SODIUM CHLORIDE 0.9 % (FLUSH) 0.9 %
10 SYRINGE (ML) INJECTION
Status: DISCONTINUED | OUTPATIENT
Start: 2024-08-05 | End: 2024-08-05 | Stop reason: HOSPADM

## 2024-08-05 RX ORDER — SODIUM CHLORIDE 0.9 % (FLUSH) 0.9 %
10 SYRINGE (ML) INJECTION
Status: CANCELLED | OUTPATIENT
Start: 2024-08-05

## 2024-08-05 RX ORDER — DIPHENHYDRAMINE HYDROCHLORIDE 50 MG/ML
50 INJECTION INTRAMUSCULAR; INTRAVENOUS ONCE AS NEEDED
Status: DISCONTINUED | OUTPATIENT
Start: 2024-08-05 | End: 2024-08-05 | Stop reason: HOSPADM

## 2024-08-05 RX ORDER — HEPARIN 100 UNIT/ML
500 SYRINGE INTRAVENOUS
Status: DISCONTINUED | OUTPATIENT
Start: 2024-08-05 | End: 2024-08-05 | Stop reason: HOSPADM

## 2024-08-05 RX ORDER — EPINEPHRINE 0.3 MG/.3ML
0.3 INJECTION SUBCUTANEOUS ONCE AS NEEDED
Status: DISCONTINUED | OUTPATIENT
Start: 2024-08-05 | End: 2024-08-05 | Stop reason: HOSPADM

## 2024-08-05 RX ORDER — LORATADINE 10 MG/1
10 TABLET ORAL DAILY
Qty: 90 TABLET | Refills: 0 | Status: SHIPPED | OUTPATIENT
Start: 2024-08-05 | End: 2024-11-03

## 2024-08-05 RX ADMIN — PROCHLORPERAZINE EDISYLATE 10 MG: 5 INJECTION INTRAMUSCULAR; INTRAVENOUS at 12:08

## 2024-08-05 RX ADMIN — FAMOTIDINE 20 MG: 10 INJECTION INTRAVENOUS at 12:08

## 2024-08-05 RX ADMIN — DEXAMETHASONE SODIUM PHOSPHATE 10 MG: 10 INJECTION, SOLUTION INTRAMUSCULAR; INTRAVENOUS at 11:08

## 2024-08-05 RX ADMIN — DIPHENHYDRAMINE HYDROCHLORIDE 50 MG: 50 INJECTION, SOLUTION INTRAMUSCULAR; INTRAVENOUS at 12:08

## 2024-08-05 RX ADMIN — PACLITAXEL 126 MG: 6 INJECTION, SOLUTION INTRAVENOUS at 01:08

## 2024-08-05 NOTE — PROGRESS NOTES
Name: Aleena Duckworth  MRN:  [id number]  :  1985 Age 38 y.o.  Date of Service: 2024    Reason for visit:  Aleena Duckworth is a 38 y.o. female here regarding...clearance for C3D1 of  Pembro, Carbo, Taxol    # Invasive Ductal Carcinoma of the Left Breast  Date of Original Diagnosis:  2023  Recurrence: 5/15/24, localized  Original Stage: pT1b pN0(sn) triple negative, grade 3, Ki-67 80%  Recurrence Stage: [id number] Stage 2B Triple negative,  grade 3, Ki-67 76%  Current Sites of Disease:   left axillary tail/lymph node  Current Goals of Therapy:  Curative  Current Therapy:   keynote 522 NACT     Oncologic History/History of Present Illness:   Former patient of Dr. Ayala, per her notes  self palpated a left-sided breast mass.    2023  bilateral breast mammogram that showed a lobular hypoechoic solid mass within the left breast at 2 o'clock position 7 cm     2023, ultrasound guided biopsy = invasive ductal carcinoma, grade 3, ER/OR/HER2 Randall negative, Ki-67 80%.     23  left partial mastectomy that revealed invasive ductal carcinoma 9 x 8 x 6 mm, single focus, approximately 1 mm focus of high-grade DCIS, closest margin 3 mm, anterior.  Two sentinel lymph nodes were negative.  pT1b pN0(sn).    23 cycle 1 of Docetaxel and Cytoxan with numerous SE  2023 cycle 2 of TC with 20% dose reduction of docetaxel.  With again numerous side effects.     Extensive GOC discussion held with patient and family and decided to stop chemotherapy.     2023 s/p bilateral mastectomy, implant removal and hybrid flap and small implants    2024 Reports to [facility] to establish care with me. Reports left lateral axillary tail nodules.  Causing anxiety.    24    mammogram with ultrasound BI-RADS 4- 11 mm left axillary tail mass  5/15/24   left axillary tail  and lymph node positive for  poorly differentiated carcinoma, triple negative,  staging scans negative.    24  Had a prolonged discussion  "with patient's , father, mother she was motivated to pursue neoadjuvant chemotherapy.    Today --8/5/24  Doing well tolerating chemotherapy much better than when originally diagnosed, appetite is good, pain is controlled, + weight gain     PHYSICAL EXAMINATION:   /60 (BP Location: Right arm, Patient Position: Sitting, BP Method: Medium (Automatic))   Pulse 83   Temp 97.7 °F (36.5 °C) (Temporal)   Resp 18   Ht 5' 2" (1.575 m)   Wt 56.8 kg (125 lb 3.5 oz)   SpO2 95%   BMI 22.90 kg/m²   Wt Readings from Last 3 Encounters:   08/05/24 56.8 kg (125 lb 3.5 oz)   07/30/24 56.9 kg (125 lb 7.1 oz)   07/29/24 56.9 kg (125 lb 7.1 oz)     ECOG PERFORMANCE STATUS: 0  Physical Exam   General:  Well-appearing, nontoxic  Eyes:  Equal and round pupils, EOMI, no scleral icterus  Mouth:  No lesions, moist  Cardiovascular:  Warm, well-perfused, no peripheral edema  Lungs:  Unlabored on room air, no wheezing  Neurologic:  Awake, alert and oriented, participating in the exam  Psych:  Appropriate mood and affect  Skin:  Pale pallor, No rashes  Heme:  No petechiae, no purpura       LABORATORY:  CBC  Lab Results   Component Value Date    WBC 9.66 08/05/2024    HGB 10.3 (L) 08/05/2024    HCT 29.9 (L) 08/05/2024    MCV 93 08/05/2024     (L) 08/05/2024         PATHOLOGY:  DIAGNOSIS:  05/17/2024 LORENAH/carson  LEFT AXILLARY TAIL, ULTRASOUND-GUIDED NEEDLE CORE BIOPSY:  --FRAGMENTS OF LYMPH NODE POSITIVE FOR METASTATIC POORLY DIFFERENTIATED   CARCINOMA (SEE COMMENT).  --RECEPTOR STUDIES PENDING.      RADIOLOGY:  CT chest abdomen pelvis 05/24/2024  Impression:  1. Postoperative changes of bilateral breast reconstruction with breast implants in place.  Two small subcutaneous soft tissue nodules along the left axillary tail in this patient with biopsy-proven left axillary lymph node triple negative breast cancer, as detailed above.  Correlate with recent mammographic imaging/biopsy.  2. No evidence of metastatic disease within the " chest, abdomen, or pelvis.  3. Mild bilateral pelviectasis without evidence of gina hydronephrosis or obstructive uropathy, nonspecific and may be physiologic.    NM Bone Scan  05/24/2024  Impression:  No evidence of metastatic disease      ASSESSMENT AND PLAN:  Aleena Duckworth is a 38 y.o. female with...    # Invasive Ductal Carcinoma of the Left Breast  Date of Original Diagnosis:  02/09/2023  Recurrence: 5/15/24, localized  Original Stage: pT1b pN0(sn) triple negative, grade 3, Ki-67 80%  Recurrence Stage: [id number] Stage 2B Triple negative,  grade 3, Ki-67 76%  Current Sites of Disease:   left axillary tail/lymph node  Current Goals of Therapy:  Curative  Current Therapy:   pending neoadjuvant chemotherapy        Staging CT and PET negative for distant disease    Added 1 mg of Ativan for each dose of chemotherapy given her adverse reactions in the past and anxiety surrounding chemo  Added filgrastim to  days 2,3,4/9,10,11/16,17,18  given history of neutropenic fever with TC regimen,  can give additional antiemetics if necessary  Advised patient she needs to have weekly mental health visits while on chemotherapy, referral to Oncology Psychology  Will see her weekly during Cycles 1-4   6/17/24--one-week delay for cycle 1 day 15 of chemotherapy due to thrombocytopenia and LFT elevation, platelets 91,000, repeat CBC and CMP on 06/24.   06/24/24:  Dr. Cruz reviewed labs; ok to proceed with Platelets + 91k again this week; Proceed with C1D15 today; f/u next week 07/01 with Dr. Cruz for C2D1 with labs prior.  07/02/24:  Proceed with C2D1 today with Zarxio on Days 2, 4 (holiday on day 3); f/u next week as scheduled on 07/08 with labs prior for clearance for C2D8  07/08/24:  Proceed with C2D8 today; Zarxio on Days 9, 10, 11; f/u in 1 week for clearance for C2D15 with labs prior.  07/15/24:  Proceed with C2D15 today; Zarxio on Days 16,17,18; f/u in 1 week for clearance for C3D1 with labs prior.  07/22/24:  Defer x 1  week C3D1 with platelets 66k; f/u in 1 week with CBC, CMP, MG, UPT (TSH done 07/15)  07/29/24:  Proceed with C3D1 with platelets 104k; f/u next week with Dr. Cruz with labs for clearance for C3D8  08/05/24:  Proceed with C3D15, full dose     #LFT elevation- advised to d/c OTC meds, one week delay in chemotherapy  8/5/24 down trending proceed w/ full dose     # medication induced constipation- advised to take BID docusate and PRN senna   # anxiety about health- as noted above,  could be exacerbated by Adderall, ativan PRN   # history of neutropenic fever- as above  # thrombocytopenia- fluctuating platelet levels, commonly this is associated with over-the-counter medications or other medications, encouraged cessation of all OTCs, improved    #ADHD, has been on Adderall for several years.  Was born prematurely, was diagnosed with a stroke and heart murmur at birth, was treated at [facility].     #Heavy menorrhagia for the past few years, worsening recently and has developed CAROLIN.  Has been on iron supplementation.  Sees gynecologist, Dr. Almita Clark with [facility].      #Hyperbilirubinemia - An abdominal ultrasound was done, negative for any abnormalities.Saw Hepatology prior to chemo who suspected she has Gilbert's disease and found no contraindication to proceeding with TC.    Eunice Cruz M.D.   Oncology and Benign Hematology      Route Chart for Scheduling    Med Onc Chart Routing      Follow up with physician . As marvin'd   Follow up with NUNO    Infusion scheduling note    Injection scheduling note proceed with taxol   Labs   Scheduling:  Preferred lab:  Lab interval:  Labs with treatment   Imaging    Pharmacy appointment    Other referrals            Treatment Plan Information   OP PEMBROLIZUMAB CARBOPLATIN (AUC 5) WITH WEEKLY PACLITAXEL FOLLOWED BY PEMBROLIZUMAB DOXORUBICIN CYCLOPHOSPHAMIDE FOLLOWED BY PEMBROLIZUMAB 200 MG Q3W   Eunice Cruz MD   Upcoming Treatment Dates - OP PEMBROLIZUMAB CARBOPLATIN  (AUC 5) WITH WEEKLY PACLITAXEL FOLLOWED BY PEMBROLIZUMAB DOXORUBICIN CYCLOPHOSPHAMIDE FOLLOWED BY PEMBROLIZUMAB 200 MG Q3W    8/5/2024       Pre-Medications       dexAMETHasone (DECADRON) 10 mg in sodium chloride 0.9% 50 mL IVPB       diphenhydrAMINE (BENADRYL) 50 mg in NS 50 mL IVPB       famotidine (PF) injection 20 mg       Chemotherapy       PACLitaxeL (TAXOL) 80 mg/m2 = 114 mg in 0.9% NaCl 250 mL chemo infusion       Supportive Care       LORazepam injection 1 mg       prochlorperazine injection Soln 10 mg  8/6/2024       Growth Factor       filgrastim-sndz (ZARXIO) injection 300 mcg/0.5 mL (Preferred Regimen)  8/7/2024       Growth Factor       filgrastim-sndz (ZARXIO) injection 300 mcg/0.5 mL (Preferred Regimen)  8/8/2024       Growth Factor       filgrastim-sndz (ZARXIO) injection 300 mcg/0.5 mL (Preferred Regimen)    Therapy Plan Information  IV Fluids  sodium chloride 0.9% bolus 1,000 mL 1,000 mL  1,000 mL, Intravenous, Every visit  Flushes  sodium chloride 0.9% flush 10 mL  10 mL, Intravenous, PRN  heparin, porcine (PF) 100 unit/mL injection flush 500 Units  500 Units, Intravenous, PRN  Antiemetics  ondansetron injection 8 mg  8 mg, Intravenous, PRN  promethazine (PHENERGAN) 12.5 mg in dextrose 5 % (D5W) 50 mL IVPB  12.5 mg, Intravenous, PRN  dexAMETHasone injection 8 mg  8 mg, Intravenous, PRN

## 2024-08-06 ENCOUNTER — INFUSION (OUTPATIENT)
Dept: INFUSION THERAPY | Facility: HOSPITAL | Age: 39
End: 2024-08-06
Attending: INTERNAL MEDICINE
Payer: COMMERCIAL

## 2024-08-06 VITALS
DIASTOLIC BLOOD PRESSURE: 63 MMHG | OXYGEN SATURATION: 98 % | TEMPERATURE: 98 F | RESPIRATION RATE: 16 BRPM | HEART RATE: 82 BPM | SYSTOLIC BLOOD PRESSURE: 116 MMHG

## 2024-08-06 DIAGNOSIS — C50.919 TRIPLE NEGATIVE BREAST CANCER: Primary | ICD-10-CM

## 2024-08-06 PROCEDURE — 63600175 PHARM REV CODE 636 W HCPCS: Mod: JZ,JB,JG,PN | Performed by: INTERNAL MEDICINE

## 2024-08-06 PROCEDURE — 96372 THER/PROPH/DIAG INJ SC/IM: CPT | Mod: PN

## 2024-08-06 RX ORDER — PROCHLORPERAZINE EDISYLATE 5 MG/ML
2.5 INJECTION INTRAMUSCULAR; INTRAVENOUS
Status: CANCELLED
Start: 2024-08-06

## 2024-08-06 RX ORDER — PROCHLORPERAZINE EDISYLATE 5 MG/ML
2.5 INJECTION INTRAMUSCULAR; INTRAVENOUS
Status: CANCELLED
Start: 2024-08-08

## 2024-08-06 RX ORDER — PROCHLORPERAZINE EDISYLATE 5 MG/ML
2.5 INJECTION INTRAMUSCULAR; INTRAVENOUS
Status: DISCONTINUED | OUTPATIENT
Start: 2024-08-06 | End: 2024-08-06 | Stop reason: HOSPADM

## 2024-08-06 RX ORDER — PROCHLORPERAZINE EDISYLATE 5 MG/ML
2.5 INJECTION INTRAMUSCULAR; INTRAVENOUS
Status: CANCELLED
Start: 2024-08-07

## 2024-08-06 RX ADMIN — FILGRASTIM-SNDZ 300 MCG: 300 INJECTION, SOLUTION INTRAVENOUS; SUBCUTANEOUS at 01:08

## 2024-08-07 ENCOUNTER — INFUSION (OUTPATIENT)
Dept: INFUSION THERAPY | Facility: HOSPITAL | Age: 39
End: 2024-08-07
Attending: INTERNAL MEDICINE
Payer: COMMERCIAL

## 2024-08-07 ENCOUNTER — CLINICAL SUPPORT (OUTPATIENT)
Dept: REHABILITATION | Facility: HOSPITAL | Age: 39
End: 2024-08-07
Payer: COMMERCIAL

## 2024-08-07 VITALS
SYSTOLIC BLOOD PRESSURE: 116 MMHG | TEMPERATURE: 98 F | OXYGEN SATURATION: 99 % | RESPIRATION RATE: 16 BRPM | HEART RATE: 82 BPM | DIASTOLIC BLOOD PRESSURE: 63 MMHG

## 2024-08-07 DIAGNOSIS — C50.919 TRIPLE NEGATIVE BREAST CANCER: Primary | ICD-10-CM

## 2024-08-07 DIAGNOSIS — R45.89 ANXIETY ABOUT HEALTH: ICD-10-CM

## 2024-08-07 DIAGNOSIS — G47.00 INSOMNIA, UNSPECIFIED TYPE: ICD-10-CM

## 2024-08-07 DIAGNOSIS — C50.919 TRIPLE NEGATIVE BREAST CANCER: ICD-10-CM

## 2024-08-07 PROCEDURE — 97814 ACUP 1/> W/ESTIM EA ADDL 15: CPT | Mod: PN | Performed by: ACUPUNCTURIST

## 2024-08-07 PROCEDURE — 63600175 PHARM REV CODE 636 W HCPCS: Mod: JZ,JB,JG,PN | Performed by: INTERNAL MEDICINE

## 2024-08-07 PROCEDURE — 96372 THER/PROPH/DIAG INJ SC/IM: CPT | Mod: PN

## 2024-08-07 PROCEDURE — 97813 ACUP 1/> W/ESTIM 1ST 15 MIN: CPT | Mod: PN | Performed by: ACUPUNCTURIST

## 2024-08-07 RX ADMIN — FILGRASTIM-SNDZ 300 MCG: 300 INJECTION, SOLUTION INTRAVENOUS; SUBCUTANEOUS at 03:08

## 2024-08-08 ENCOUNTER — INFUSION (OUTPATIENT)
Dept: INFUSION THERAPY | Facility: HOSPITAL | Age: 39
End: 2024-08-08
Attending: INTERNAL MEDICINE
Payer: COMMERCIAL

## 2024-08-08 VITALS
RESPIRATION RATE: 20 BRPM | HEIGHT: 62 IN | WEIGHT: 125.25 LBS | TEMPERATURE: 98 F | OXYGEN SATURATION: 99 % | HEART RATE: 81 BPM | BODY MASS INDEX: 23.05 KG/M2 | SYSTOLIC BLOOD PRESSURE: 112 MMHG | DIASTOLIC BLOOD PRESSURE: 50 MMHG

## 2024-08-08 DIAGNOSIS — C50.919 TRIPLE NEGATIVE BREAST CANCER: Primary | ICD-10-CM

## 2024-08-08 PROCEDURE — 63600175 PHARM REV CODE 636 W HCPCS: Mod: JZ,JB,JG,PN | Performed by: INTERNAL MEDICINE

## 2024-08-08 PROCEDURE — 96372 THER/PROPH/DIAG INJ SC/IM: CPT | Mod: PN

## 2024-08-08 RX ADMIN — FILGRASTIM-SNDZ 300 MCG: 300 INJECTION, SOLUTION INTRAVENOUS; SUBCUTANEOUS at 02:08

## 2024-08-11 DIAGNOSIS — G89.3 NEOPLASM RELATED PAIN: ICD-10-CM

## 2024-08-12 ENCOUNTER — INFUSION (OUTPATIENT)
Dept: INFUSION THERAPY | Facility: HOSPITAL | Age: 39
End: 2024-08-12
Attending: INTERNAL MEDICINE
Payer: COMMERCIAL

## 2024-08-12 ENCOUNTER — OFFICE VISIT (OUTPATIENT)
Dept: HEMATOLOGY/ONCOLOGY | Facility: CLINIC | Age: 39
End: 2024-08-12
Payer: COMMERCIAL

## 2024-08-12 ENCOUNTER — DOCUMENTATION ONLY (OUTPATIENT)
Dept: INFUSION THERAPY | Facility: HOSPITAL | Age: 39
End: 2024-08-12
Payer: COMMERCIAL

## 2024-08-12 ENCOUNTER — LAB VISIT (OUTPATIENT)
Dept: LAB | Facility: HOSPITAL | Age: 39
End: 2024-08-12
Attending: INTERNAL MEDICINE
Payer: COMMERCIAL

## 2024-08-12 ENCOUNTER — PATIENT MESSAGE (OUTPATIENT)
Dept: HEMATOLOGY/ONCOLOGY | Facility: CLINIC | Age: 39
End: 2024-08-12

## 2024-08-12 VITALS
HEART RATE: 95 BPM | BODY MASS INDEX: 24.42 KG/M2 | SYSTOLIC BLOOD PRESSURE: 98 MMHG | RESPIRATION RATE: 17 BRPM | HEIGHT: 62 IN | WEIGHT: 132.69 LBS | OXYGEN SATURATION: 96 % | DIASTOLIC BLOOD PRESSURE: 60 MMHG | TEMPERATURE: 97 F

## 2024-08-12 VITALS
HEIGHT: 62 IN | DIASTOLIC BLOOD PRESSURE: 67 MMHG | BODY MASS INDEX: 24.42 KG/M2 | HEART RATE: 97 BPM | SYSTOLIC BLOOD PRESSURE: 104 MMHG | RESPIRATION RATE: 17 BRPM | TEMPERATURE: 97 F | WEIGHT: 132.69 LBS | OXYGEN SATURATION: 96 %

## 2024-08-12 DIAGNOSIS — C50.919 TRIPLE NEGATIVE BREAST CANCER: Primary | ICD-10-CM

## 2024-08-12 DIAGNOSIS — M89.8X9 BONE PAIN DUE TO G-CSF: ICD-10-CM

## 2024-08-12 DIAGNOSIS — R74.01 TRANSAMINITIS: ICD-10-CM

## 2024-08-12 DIAGNOSIS — D63.0 ANEMIA IN NEOPLASTIC DISEASE: ICD-10-CM

## 2024-08-12 DIAGNOSIS — D69.6 THROMBOCYTOPENIA: ICD-10-CM

## 2024-08-12 DIAGNOSIS — R14.0 ABDOMINAL DISTENTION: ICD-10-CM

## 2024-08-12 DIAGNOSIS — C50.919 TRIPLE NEGATIVE BREAST CANCER: ICD-10-CM

## 2024-08-12 LAB
ALBUMIN SERPL BCP-MCNC: 3.8 G/DL (ref 3.5–5.2)
ALP SERPL-CCNC: 69 U/L (ref 55–135)
ALT SERPL W/O P-5'-P-CCNC: 93 U/L (ref 10–44)
ANION GAP SERPL CALC-SCNC: 10 MMOL/L (ref 8–16)
AST SERPL-CCNC: 46 U/L (ref 10–40)
B-HCG UR QL: NEGATIVE
BASOPHILS # BLD AUTO: ABNORMAL K/UL (ref 0–0.2)
BASOPHILS NFR BLD: 0 % (ref 0–1.9)
BILIRUB SERPL-MCNC: 0.5 MG/DL (ref 0.1–1)
BUN SERPL-MCNC: 20 MG/DL (ref 6–20)
CALCIUM SERPL-MCNC: 9.3 MG/DL (ref 8.7–10.5)
CHLORIDE SERPL-SCNC: 101 MMOL/L (ref 95–110)
CO2 SERPL-SCNC: 27 MMOL/L (ref 23–29)
CREAT SERPL-MCNC: 0.7 MG/DL (ref 0.5–1.4)
DIFFERENTIAL METHOD BLD: ABNORMAL
EOSINOPHIL # BLD AUTO: ABNORMAL K/UL (ref 0–0.5)
EOSINOPHIL NFR BLD: 1 % (ref 0–8)
ERYTHROCYTE [DISTWIDTH] IN BLOOD BY AUTOMATED COUNT: 17.2 % (ref 11.5–14.5)
EST. GFR  (NO RACE VARIABLE): >60 ML/MIN/1.73 M^2
GLUCOSE SERPL-MCNC: 86 MG/DL (ref 70–110)
HCT VFR BLD AUTO: 30.2 % (ref 37–48.5)
HGB BLD-MCNC: 10 G/DL (ref 12–16)
IMM GRANULOCYTES # BLD AUTO: ABNORMAL K/UL (ref 0–0.04)
IMM GRANULOCYTES NFR BLD AUTO: ABNORMAL % (ref 0–0.5)
LYMPHOCYTES # BLD AUTO: ABNORMAL K/UL (ref 1–4.8)
LYMPHOCYTES NFR BLD: 17 % (ref 18–48)
MAGNESIUM SERPL-MCNC: 2.2 MG/DL (ref 1.6–2.6)
MCH RBC QN AUTO: 31.1 PG (ref 27–31)
MCHC RBC AUTO-ENTMCNC: 33.1 G/DL (ref 32–36)
MCV RBC AUTO: 94 FL (ref 82–98)
METAMYELOCYTES NFR BLD MANUAL: 13 %
MONOCYTES # BLD AUTO: ABNORMAL K/UL (ref 0.3–1)
MONOCYTES NFR BLD: 9 % (ref 4–15)
MYELOCYTES NFR BLD MANUAL: 7 %
NEUTROPHILS NFR BLD: 45 % (ref 38–73)
NEUTS BAND NFR BLD MANUAL: 8 %
NRBC BLD-RTO: 0 /100 WBC
PLATELET # BLD AUTO: 104 K/UL (ref 150–450)
PLATELET BLD QL SMEAR: ABNORMAL
PMV BLD AUTO: 8.4 FL (ref 9.2–12.9)
POTASSIUM SERPL-SCNC: 4.3 MMOL/L (ref 3.5–5.1)
PROT SERPL-MCNC: 6 G/DL (ref 6–8.4)
RBC # BLD AUTO: 3.22 M/UL (ref 4–5.4)
SODIUM SERPL-SCNC: 138 MMOL/L (ref 136–145)
TOXIC GRANULES BLD QL SMEAR: PRESENT
TSH SERPL DL<=0.005 MIU/L-ACNC: 3.4 UIU/ML (ref 0.4–4)
WBC # BLD AUTO: 8.32 K/UL (ref 3.9–12.7)

## 2024-08-12 PROCEDURE — 3074F SYST BP LT 130 MM HG: CPT | Mod: CPTII,S$GLB,,

## 2024-08-12 PROCEDURE — 3078F DIAST BP <80 MM HG: CPT | Mod: CPTII,S$GLB,,

## 2024-08-12 PROCEDURE — 85027 COMPLETE CBC AUTOMATED: CPT | Performed by: INTERNAL MEDICINE

## 2024-08-12 PROCEDURE — 36415 COLL VENOUS BLD VENIPUNCTURE: CPT | Mod: PN | Performed by: INTERNAL MEDICINE

## 2024-08-12 PROCEDURE — 1160F RVW MEDS BY RX/DR IN RCRD: CPT | Mod: CPTII,S$GLB,,

## 2024-08-12 PROCEDURE — A4216 STERILE WATER/SALINE, 10 ML: HCPCS | Mod: PN

## 2024-08-12 PROCEDURE — 85060 BLOOD SMEAR INTERPRETATION: CPT | Mod: ,,, | Performed by: PATHOLOGY

## 2024-08-12 PROCEDURE — 84443 ASSAY THYROID STIM HORMONE: CPT | Performed by: INTERNAL MEDICINE

## 2024-08-12 PROCEDURE — 96375 TX/PRO/DX INJ NEW DRUG ADDON: CPT | Mod: PN

## 2024-08-12 PROCEDURE — 63600175 PHARM REV CODE 636 W HCPCS: Mod: PN

## 2024-08-12 PROCEDURE — 85007 BL SMEAR W/DIFF WBC COUNT: CPT | Performed by: INTERNAL MEDICINE

## 2024-08-12 PROCEDURE — 83735 ASSAY OF MAGNESIUM: CPT | Mod: PN | Performed by: INTERNAL MEDICINE

## 2024-08-12 PROCEDURE — G2211 COMPLEX E/M VISIT ADD ON: HCPCS | Mod: S$GLB,,,

## 2024-08-12 PROCEDURE — 3008F BODY MASS INDEX DOCD: CPT | Mod: CPTII,S$GLB,,

## 2024-08-12 PROCEDURE — 99999 PR PBB SHADOW E&M-EST. PATIENT-LVL V: CPT | Mod: PBBFAC,,,

## 2024-08-12 PROCEDURE — 81025 URINE PREGNANCY TEST: CPT | Mod: PN | Performed by: INTERNAL MEDICINE

## 2024-08-12 PROCEDURE — 25000003 PHARM REV CODE 250: Mod: PN

## 2024-08-12 PROCEDURE — 80053 COMPREHEN METABOLIC PANEL: CPT | Mod: PN | Performed by: INTERNAL MEDICINE

## 2024-08-12 PROCEDURE — 99214 OFFICE O/P EST MOD 30 MIN: CPT | Mod: S$GLB,,,

## 2024-08-12 PROCEDURE — 96367 TX/PROPH/DG ADDL SEQ IV INF: CPT | Mod: PN

## 2024-08-12 PROCEDURE — 96413 CHEMO IV INFUSION 1 HR: CPT | Mod: PN

## 2024-08-12 PROCEDURE — 1159F MED LIST DOCD IN RCRD: CPT | Mod: CPTII,S$GLB,,

## 2024-08-12 RX ORDER — EPINEPHRINE 0.3 MG/.3ML
0.3 INJECTION SUBCUTANEOUS ONCE AS NEEDED
Status: CANCELLED | OUTPATIENT
Start: 2024-08-12

## 2024-08-12 RX ORDER — SODIUM CHLORIDE 0.9 % (FLUSH) 0.9 %
10 SYRINGE (ML) INJECTION
Status: DISCONTINUED | OUTPATIENT
Start: 2024-08-12 | End: 2024-08-12 | Stop reason: HOSPADM

## 2024-08-12 RX ORDER — HEPARIN 100 UNIT/ML
500 SYRINGE INTRAVENOUS
Status: CANCELLED | OUTPATIENT
Start: 2024-08-12

## 2024-08-12 RX ORDER — SODIUM CHLORIDE 0.9 % (FLUSH) 0.9 %
10 SYRINGE (ML) INJECTION
Status: CANCELLED | OUTPATIENT
Start: 2024-08-12

## 2024-08-12 RX ORDER — OXYCODONE HYDROCHLORIDE 10 MG/1
10 TABLET ORAL EVERY 4 HOURS PRN
Qty: 45 TABLET | Refills: 0 | Status: SHIPPED | OUTPATIENT
Start: 2024-08-12

## 2024-08-12 RX ORDER — FAMOTIDINE 10 MG/ML
20 INJECTION INTRAVENOUS
Status: CANCELLED | OUTPATIENT
Start: 2024-08-12

## 2024-08-12 RX ORDER — FAMOTIDINE 10 MG/ML
20 INJECTION INTRAVENOUS
Status: COMPLETED | OUTPATIENT
Start: 2024-08-12 | End: 2024-08-12

## 2024-08-12 RX ORDER — DIPHENHYDRAMINE HYDROCHLORIDE 50 MG/ML
50 INJECTION INTRAMUSCULAR; INTRAVENOUS ONCE AS NEEDED
Status: CANCELLED | OUTPATIENT
Start: 2024-08-12

## 2024-08-12 RX ORDER — LORAZEPAM 2 MG/ML
1 INJECTION INTRAMUSCULAR ONCE AS NEEDED
Status: CANCELLED
Start: 2024-08-12

## 2024-08-12 RX ORDER — PROCHLORPERAZINE EDISYLATE 5 MG/ML
10 INJECTION INTRAMUSCULAR; INTRAVENOUS ONCE AS NEEDED
Status: CANCELLED
Start: 2024-08-12

## 2024-08-12 RX ADMIN — DEXAMETHASONE SODIUM PHOSPHATE 10 MG: 10 INJECTION, SOLUTION INTRAMUSCULAR; INTRAVENOUS at 02:08

## 2024-08-12 RX ADMIN — SODIUM CHLORIDE: 9 INJECTION, SOLUTION INTRAVENOUS at 02:08

## 2024-08-12 RX ADMIN — DIPHENHYDRAMINE HYDROCHLORIDE 50 MG: 50 INJECTION, SOLUTION INTRAMUSCULAR; INTRAVENOUS at 02:08

## 2024-08-12 RX ADMIN — FAMOTIDINE 20 MG: 10 INJECTION INTRAVENOUS at 02:08

## 2024-08-12 RX ADMIN — Medication 10 ML: at 04:08

## 2024-08-12 RX ADMIN — PACLITAXEL 126 MG: 6 INJECTION, SOLUTION INTRAVENOUS at 03:08

## 2024-08-12 NOTE — PROGRESS NOTES
PATIENT: Aleena Duckworth  MRN: [id number]  DATE: 8/12/2024      Diagnosis:   1. Triple negative breast cancer    2. Bone pain due to G-CSF    3. Thrombocytopenia    4. Anemia in neoplastic disease    5. Transaminitis    6. Abdominal distention        Chief Complaint: Follow-up          Subjective:    Interval History: Ms. Duckworth is a 38 y.o. female who returns for follow up for review of labs and treatment. Pt reports breakthrough arthralgias and neuropathic pain to lower extremities not well controlled with current medications. Pt also complains of abdominal distention. She reports daily bowel movements with prescribed regimen. Denies fever, chills, cp, palpitations, n/v, diarrhea, constipation, new bumps, lumps, bleeding, bruising.     Oncologic History:   Former patient of Dr. Ayala, per her notes  self palpated a left-sided breast mass.    02/02/2023  bilateral breast mammogram that showed a lobular hypoechoic solid mass within the left breast at 2 o'clock position 7 cm      02/09/2023, ultrasound guided biopsy = invasive ductal carcinoma, grade 3, ER/VT/HER2 Randall negative, Ki-67 80%.     2/27/23  left partial mastectomy that revealed invasive ductal carcinoma 9 x 8 x 6 mm, single focus, approximately 1 mm focus of high-grade DCIS, closest margin 3 mm, anterior.  Two sentinel lymph nodes were negative.  pT1b pN0(sn).     5/1/23 cycle 1 of Docetaxel and Cytoxan with numerous SE  05/24/2023 cycle 2 of TC with 20% dose reduction of docetaxel.  With again numerous side effects.     Extensive GOC discussion held with patient and family and decided to stop chemotherapy.      07/26/2023 s/p bilateral mastectomy, implant removal and hybrid flap and small implants     4/2024 Reports to [facility] to establish care with me. Reports left lateral axillary tail nodules.  Causing anxiety.     5/6/24    mammogram with ultrasound BI-RADS 4- 11 mm left axillary tail mass  5/15/24   left axillary tail  and lymph node positive for  poorly  differentiated carcinoma, triple negative,  staging scans negative.     5/27/24  Had a prolonged discussion with patient's , father, mother she was motivated to pursue neoadjuvant chemotherapy.     06/24/24:  C1D15 delayed last week due to platelets = 91k.  Presents to the clinic for clearance for C1D15; Denies any N/V/D; appetite fair; weight loss of 3 1/2 pounds in 1 week.  Remains working FT; needs to move C2D1 to Tuesday of next week 07/02 due to work.  Dr. Cruz reviewed labs & is ok to proceed with today's labs of Platelets = 91k     07/02/24:  Presents for clearance for C2D1; appetite better; hydrating well; no N/V/D  Platelets slightly improved from 91 to 95k this week; grans down; getting Zarxio on Days 2 & 4      07/22/24:  Presents for clearance for C3D1; feels good overall.  Eating well & hydrating; no nausea or diarrhea. No bleeding or bruising.  Denies fevers, chills, abdominal discomfort, N/V/D, melena, etc.     07/29/24:  last week platelets 66k; tx deferred x 1 week.  Feels well. Had an episode of increased pain that ran through her body; resolved with pain med; request refill for pain med.  Denies fevers, chills, abdominal pain, N/V/D, bleeding, new lumps or bumps, etc.     Oncology History   Triple negative breast cancer   4/17/2023 Initial Diagnosis    Triple negative breast cancer     4/18/2023 Cancer Staged    Staging form: Breast, AJCC 8th Edition  - Pathologic stage from 4/18/2023: Stage IB (pT1b, pN0, cM0, G3, ER-, NH-, HER2-)     5/1/2023 - 5/25/2023 Chemotherapy    Treatment Summary   Plan Name: OP BREAST TC - DOCETAXEL CYCLOPHOSPHAMIDE Q3W  Treatment Goal: Curative  Status: Inactive  Start Date: 5/1/2023  End Date: 5/25/2023  Provider: Ambar Ayala MD  Chemotherapy: cycloPHOSphamide 600 mg/m2 = 880 mg in sodium chloride 0.9% 289.4 mL chemo infusion, 600 mg/m2 = 880 mg, Intravenous, Clinic/HOD 1 time, 2 of 4 cycles  Dose modification: 600 mg/m2 (Cycle 3), 480 mg/m2 (Cycle 3,  Reason: Dose not tolerated)  Administration: 880 mg (5/1/2023)  DOCEtaxel 75 mg/m2 = 110 mg in sodium chloride 0.9% 290.5 mL chemo infusion, 75 mg/m2 = 110 mg, Intravenous, Clinic/HOD 1 time, 2 of 4 cycles  Dose modification: 60 mg/m2 (original dose 75 mg/m2, Cycle 2, Reason: Dose not tolerated)  Administration: 110 mg (5/1/2023), 90 mg (5/24/2023)     5/22/2024 Cancer Staged    Staging form: Breast, AJCC 8th Edition  - Clinical stage from 5/22/2024: Stage IIB (rcT0, cN1(f), cM0, G3, ER-, MA-, HER2-)     6/4/2024 -  Chemotherapy    Treatment Summary   Plan Name: OP PEMBROLIZUMAB CARBOPLATIN (AUC 5) WITH WEEKLY PACLITAXEL FOLLOWED BY PEMBROLIZUMAB DOXORUBICIN CYCLOPHOSPHAMIDE FOLLOWED BY PEMBROLIZUMAB 200 MG Q3W  Treatment Goal: Curative  Status: Active  Start Date: 6/4/2024  End Date: 5/19/2025 (Planned)  Provider: Eunice Cruz MD  Chemotherapy: DOXOrubicin chemo injection 94 mg, 60 mg/m2 = 94 mg, Intravenous, Clinic/HOD 1 time, 0 of 4 cycles  CARBOplatin (PARAPLATIN) 525 mg in sodium chloride 0.9% 302.5 mL chemo infusion, 525 mg, Intravenous, Clinic/HOD 1 time, 3 of 4 cycles  Administration: 525 mg (6/4/2024), 525 mg (7/2/2024), 525 mg (7/29/2024)  cycloPHOSphamide 600 mg/m2 = 940 mg in 0.9% NaCl 254.7 mL chemo infusion, 600 mg/m2 = 940 mg, Intravenous, Clinic/HOD 1 time, 0 of 4 cycles  PACLitaxeL (TAXOL) 80 mg/m2 = 114 mg in sodium chloride 0.9% 250 mL chemo infusion, 80 mg/m2 = 114 mg, Intravenous, Clinic/HOD 1 time, 3 of 4 cycles  Administration: 114 mg (6/4/2024), 114 mg (6/11/2024), 114 mg (6/24/2024), 114 mg (7/2/2024), 114 mg (7/8/2024), 126 mg (7/29/2024), 120 mg (7/15/2024), 126 mg (8/5/2024)         Past Medical History:   Past Medical History:   Diagnosis Date    ADHD (attention deficit hyperactivity disorder)     Anemia     Breast cancer     triple negative    Hypertension     pre eclampsia and eclampsia    Stroke     at birth       Past Surgical HIstory:   Past Surgical History:   Procedure  Laterality Date    ABLATION      BREAST RECONSTRUCTION  11/2023    BREAST SURGERY      lumpectomy    CYSTOSCOPY      INSERTION OF TUNNELED CENTRAL VENOUS CATHETER (CVC) WITH SUBCUTANEOUS PORT Right 6/3/2024    Procedure: [id number];  Surgeon: Jj Silva MD;  Location: [facility] OR;  Service: General;  Laterality: Right;    LITHOTRIPSY      MASTECTOMY  11/2023    TUBAL LIGATION      VAGINAL DELIVERY         Family History:   Family History   Problem Relation Name Age of Onset    Hypertension Mother      Breast cancer Mother  51        negative screening    Heart disease Father      Kidney cancer Father      Breast cancer Paternal Aunt      Diabetes Maternal Grandmother      Breast cancer Maternal Grandmother      Breast cancer Other Both GGM        Social History:  reports that she has been smoking. She has never used smokeless tobacco. She reports that she does not currently use alcohol. She reports that she does not use drugs.    Allergies:  Review of patient's allergies indicates:  No Known Allergies    Medications:  Current Outpatient Medications   Medication Sig Dispense Refill    chlorhexidine (PERIDEX) 0.12 % solution       dexAMETHasone (DECADRON) 4 MG Tab Take 2 tablets (8 mg total) by mouth once daily. Take 2 tablets (8 mg) by mouth once daily on days 2,3,4 following chemotherapy. 24 tablet 2    dextroamphetamine-amphetamine (ADDERALL XR) 20 MG 24 hr capsule Take 20 mg by mouth every morning.      dextroamphetamine-amphetamine (ADDERALL) 20 mg tablet Take 1 tablet by mouth.      LIDOcaine-prilocaine (EMLA) cream Apply topically as needed (apply 30 to 60 minutes prior to chemo). 30 g 2    loratadine (CLARITIN) 10 mg tablet Take 1 tablet (10 mg total) by mouth once daily. 90 tablet 0    LORazepam (ATIVAN) 1 MG tablet Take 1 tablet (1 mg total) by mouth 2 (two) times daily as needed for Anxiety. 60 tablet 0    mupirocin (BACTROBAN) 2 % ointment       OLANZapine (ZYPREXA) 5 MG tablet TAKE 1 TABLET  BY MOUTH EVERY EVENING ON DAYS 1-4 OF EACH CHEMOTHERAPY CYCLE 30 tablet 1    ondansetron (ZOFRAN-ODT) 8 MG TbDL Take 1 tablet (8 mg total) by mouth every 8 (eight) hours as needed (nausea/vomiting). Take 1 tablet (8 mg) by mouth every 8 hours as needed for nausea/vomiting. 60 tablet 5    oxyCODONE (ROXICODONE) 10 mg Tab immediate release tablet Take 1 tablet (10 mg total) by mouth every 4 (four) hours as needed for Pain. 45 tablet 0    promethazine (PHENERGAN) 12.5 MG Tab (Take 1-2 tabs every 6 hours as needed for nausea persistent despite zofran) 40 tablet 3    SANCUSO 3.1 mg/24 hour PLACE 1 PATCH ONTO THE SKIN ONCE      tretinoin (RETIN-A) 0.05 % cream SMARTSIG:Sparingly Topical Every Night      varenicline (CHANTIX STARTING MONTH BOX) 0.5 mg (11)- 1 mg (42) tablet Take one 0.5mg tab by mouth once daily X3 days,then increase to one 0.5mg tab twice daily X4 days,then increase to one 1mg tab twice daily 1 each 0    varenicline (CHANTIX) 1 mg Tab Take 1 tablet (1 mg total) by mouth 2 (two) times daily. 60 tablet 3    zolpidem (AMBIEN) 10 mg Tab Take 5 mg by mouth nightly as needed.       No current facility-administered medications for this visit.       Review of Systems   Constitutional:  Positive for fatigue. Negative for chills and fever.   HENT: Negative.     Respiratory:  Positive for shortness of breath (On exertion).    Cardiovascular:  Negative for chest pain and palpitations.   Gastrointestinal:  Positive for abdominal distention. Negative for abdominal pain, diarrhea, nausea and vomiting.   Genitourinary: Negative.    Musculoskeletal:  Positive for arthralgias and joint swelling.       ECOG Performance Status:   ECOG SCORE             Objective:      Vitals:   Vitals:    08/12/24 1332   BP: 98/60   BP Location: Right arm   Patient Position: Sitting   BP Method: Medium (Automatic)   Pulse: 95   Resp: 17   Temp: 97.3 °F (36.3 °C)   TempSrc: Temporal   SpO2: 96%   Weight: 60.2 kg (132 lb 11.5 oz)   Height: 5'  "2" (1.575 m)     BMI: Body mass index is 24.27 kg/m².    Physical Exam  HENT:      Head: Normocephalic.      Nose: Nose normal.      Mouth/Throat:      Mouth: Mucous membranes are moist.   Eyes:      Pupils: Pupils are equal, round, and reactive to light.   Cardiovascular:      Rate and Rhythm: Normal rate and regular rhythm.      Heart sounds: Normal heart sounds.   Pulmonary:      Effort: Pulmonary effort is normal.      Breath sounds: Normal breath sounds.   Abdominal:      General: There is distension.      Tenderness: There is no abdominal tenderness.   Musculoskeletal:      Cervical back: Normal range of motion.      Right lower leg: Edema present.      Left lower leg: Edema present.   Skin:     General: Skin is warm and dry.   Neurological:      General: No focal deficit present.      Mental Status: She is alert and oriented to person, place, and time.   Psychiatric:         Mood and Affect: Mood normal.         Behavior: Behavior normal.         Laboratory Data:  Recent Results (from the past 24 hour(s))   CBC Auto Differential    Collection Time: 08/12/24 12:34 PM   Result Value Ref Range    WBC 8.32 3.90 - 12.70 K/uL    RBC 3.22 (L) 4.00 - 5.40 M/uL    Hemoglobin 10.0 (L) 12.0 - 16.0 g/dL    Hematocrit 30.2 (L) 37.0 - 48.5 %    MCV 94 82 - 98 fL    MCH 31.1 (H) 27.0 - 31.0 pg    MCHC 33.1 32.0 - 36.0 g/dL    RDW 17.2 (H) 11.5 - 14.5 %    Platelets 104 (L) 150 - 450 K/uL    MPV 8.4 (L) 9.2 - 12.9 fL    Immature Granulocytes CANCELED 0.0 - 0.5 %    Immature Grans (Abs) CANCELED 0.00 - 0.04 K/uL    Lymph # CANCELED 1.0 - 4.8 K/uL    Mono # CANCELED 0.3 - 1.0 K/uL    Eos # CANCELED 0.0 - 0.5 K/uL    Baso # CANCELED 0.00 - 0.20 K/uL    nRBC 0 0 /100 WBC    Gran % 45.0 38.0 - 73.0 %    Lymph % 17.0 (L) 18.0 - 48.0 %    Mono % 9.0 4.0 - 15.0 %    Eosinophil % 1.0 0.0 - 8.0 %    Basophil % 0.0 0.0 - 1.9 %    Bands 8.0 %    Metamyelocytes 13.0 %    Myelocytes 7.0 %    Platelet Estimate Decreased (A)     Toxic " Granulation Present     Differential Method Manual    Comprehensive Metabolic Panel    Collection Time: 08/12/24 12:34 PM   Result Value Ref Range    Sodium 138 136 - 145 mmol/L    Potassium 4.3 3.5 - 5.1 mmol/L    Chloride 101 95 - 110 mmol/L    CO2 27 23 - 29 mmol/L    Glucose 86 70 - 110 mg/dL    BUN 20 6 - 20 mg/dL    Creatinine 0.7 0.5 - 1.4 mg/dL    Calcium 9.3 8.7 - 10.5 mg/dL    Total Protein 6.0 6.0 - 8.4 g/dL    Albumin 3.8 3.5 - 5.2 g/dL    Total Bilirubin 0.5 0.1 - 1.0 mg/dL    Alkaline Phosphatase 69 55 - 135 U/L    AST 46 (H) 10 - 40 U/L    ALT 93 (H) 10 - 44 U/L    eGFR >60.0 >60 mL/min/1.73 m^2    Anion Gap 10 8 - 16 mmol/L   Magnesium    Collection Time: 08/12/24 12:34 PM   Result Value Ref Range    Magnesium 2.2 1.6 - 2.6 mg/dL   Pregnancy, urine rapid    Collection Time: 08/12/24 12:40 PM   Result Value Ref Range    Preg Test, Ur Negative           PATHOLOGY:  DIAGNOSIS:  05/17/2024 JWH/kl  LEFT AXILLARY TAIL, ULTRASOUND-GUIDED NEEDLE CORE BIOPSY:  --FRAGMENTS OF LYMPH NODE POSITIVE FOR METASTATIC POORLY DIFFERENTIATED   CARCINOMA (SEE COMMENT).  --RECEPTOR STUDIES PENDING.        RADIOLOGY:  CT chest abdomen pelvis 05/24/2024  Impression:  1. Postoperative changes of bilateral breast reconstruction with breast implants in place.  Two small subcutaneous soft tissue nodules along the left axillary tail in this patient with biopsy-proven left axillary lymph node triple negative breast cancer, as detailed above.  Correlate with recent mammographic imaging/biopsy.  2. No evidence of metastatic disease within the chest, abdomen, or pelvis.  3. Mild bilateral pelviectasis without evidence of gina hydronephrosis or obstructive uropathy, nonspecific and may be physiologic.     NM Bone Scan  05/24/2024  Impression:  No evidence of metastatic disease      Assessment:       1. Triple negative breast cancer    2. Bone pain due to G-CSF    3. Thrombocytopenia    4. Anemia in neoplastic disease    5.  Transaminitis    6. Abdominal distention           Plan:   # Invasive Ductal Carcinoma of the Left Breast  Date of Original Diagnosis:  02/09/2023  Recurrence: 5/15/24, localized  Original Stage: pT1b pN0(sn) triple negative, grade 3, Ki-67 80%  Recurrence Stage: [id number] Stage 2B Triple negative,  grade 3, Ki-67 76%  Current Sites of Disease:   left axillary tail/lymph node  Current Goals of Therapy:  Curative  Current Therapy:   pending neoadjuvant chemotherapy          Staging CT and PET negative for distant disease     Added 1 mg of Ativan for each dose of chemotherapy given her adverse reactions in the past and anxiety surrounding chemo  Added filgrastim to  days 2,3,4/9,10,11/16,17,18  given history of neutropenic fever with TC regimen,  can give additional antiemetics if necessary  Advised patient she needs to have weekly mental health visits while on chemotherapy, referral to Oncology Psychology  Will see her weekly during Cycles 1-4   6/17/24--one-week delay for cycle 1 day 15 of chemotherapy due to thrombocytopenia and LFT elevation, platelets 91,000, repeat CBC and CMP on 06/24.   06/24/24:  Dr. Cruz reviewed labs; ok to proceed with Platelets + 91k again this week; Proceed with C1D15 today; f/u next week 07/01 with Dr. Cruz for C2D1 with labs prior.  07/02/24:  Proceed with C2D1 today with Zarxio on Days 2, 4 (holiday on day 3); f/u next week as scheduled on 07/08 with labs prior for clearance for C2D8  07/08/24:  Proceed with C2D8 today; Zarxio on Days 9, 10, 11; f/u in 1 week for clearance for C2D15 with labs prior.  07/15/24:  Proceed with C2D15 today; Zarxio on Days 16,17,18; f/u in 1 week for clearance for C3D1 with labs prior.  07/22/24:  Defer x 1 week C3D1 with platelets 66k; f/u in 1 week with CBC, CMP, MG, UPT (TSH done 07/15)  07/29/24:  Proceed with C3D1 with platelets 104k; f/u next week with Dr. Cruz with labs for clearance for C3D8  08/12/24:  Proceed with C3D15 today with  paltelets 104k; Zarxio on Days 16,17,18; f/u in 1 week for clearance for C4D1 with labs prior.    Visit today included increased complexity associated with the care of the episodic problem  addressed and managing the longitudinal care of the patient due to the serious and/or complex managed problem(s) breast cancer.     #LFT elevation- advised to d/c OTC meds, one week delay in chemotherapy  07/02/24:  Continues to trend down  07/22/24:  elevated  07/29/24:  trending down  8/12/24: elevated     # medication induced constipation- advised to take BID docusate and PRN senna   07/02:  stable  8/12/24: pt reports daily bowel movements with med regimen    # anxiety about health- as noted above,  could be exacerbated by Adderall  # history of neutropenic fever- as above  # thrombocytopenia- fluctuating platelet levels, commonly this is associated with over-the-counter medications or other medications, encouraged cessation of all OTCs, changed Percocet to Niki  07/02/24:  stable  07/08/24:  stable  07/22/24:  66k; asymptomatic  07/29/24:  104K; asymptomatic  08/12/24:  104K; asymptomatic    Abdominal distention/weight gain   -us abdomen ordered      Med Onc Chart Routing      Follow up with physician . As scheduled   Follow up with NUNO    Infusion scheduling note   ok to proceed   Injection scheduling note    Labs    Imaging    Pharmacy appointment    Other referrals                  Plan was discussed with the patient at length, and she verbalized understanding. Aleena was given an opportunity to ask questions that were answered to her satisfaction, and she was advised to call in the interval if any problems or questions arise.    Assessment/Plan reviewed and approved by Dr Cruz    30 minutes were spent in coordination of patient's care, record review and counseling.    CACHORRO Arenas, FNP-C  Hematology & Oncology

## 2024-08-12 NOTE — PLAN OF CARE
Problem: Adult Inpatient Plan of Care  Goal: Plan of Care Review  Outcome: Progressing  Flowsheets (Taken 8/12/2024 1440)  Plan of Care Reviewed With: patient  Goal: Patient-Specific Goal (Individualized)  Outcome: Progressing  Flowsheets (Taken 8/12/2024 1440)  Individualized Care Needs: recliner, blanket, cyrotherapy, dimmed lights  Anxieties, Fears or Concerns: tired today     Problem: Fatigue  Goal: Improved Activity Tolerance  Outcome: Progressing  Intervention: Promote Improved Energy  Flowsheets (Taken 8/12/2024 1440)  Sleep/Rest Enhancement:   natural light exposure provided   noise level reduced   relaxation techniques promoted   family presence promoted  Activity Management:   Ambulated -L4   Ambulated in canales - L4   Up in chair - L3  Environmental Support: calm environment promoted     Pt tolerated taxol, VSS. Pt voiced no new complaints or concerns at this time. Pt aware of inj appts. NAD noted. Pt d/c home.

## 2024-08-13 ENCOUNTER — HOSPITAL ENCOUNTER (OUTPATIENT)
Dept: RADIOLOGY | Facility: HOSPITAL | Age: 39
Discharge: HOME | End: 2024-08-13
Payer: COMMERCIAL

## 2024-08-13 ENCOUNTER — INFUSION (OUTPATIENT)
Dept: INFUSION THERAPY | Facility: HOSPITAL | Age: 39
End: 2024-08-13
Attending: INTERNAL MEDICINE
Payer: COMMERCIAL

## 2024-08-13 VITALS
RESPIRATION RATE: 16 BRPM | DIASTOLIC BLOOD PRESSURE: 68 MMHG | SYSTOLIC BLOOD PRESSURE: 112 MMHG | HEART RATE: 107 BPM | TEMPERATURE: 98 F

## 2024-08-13 DIAGNOSIS — R14.0 ABDOMINAL DISTENTION: ICD-10-CM

## 2024-08-13 DIAGNOSIS — C50.919 TRIPLE NEGATIVE BREAST CANCER: Primary | ICD-10-CM

## 2024-08-13 LAB — PATH REV BLD -IMP: NORMAL

## 2024-08-13 PROCEDURE — 76700 US EXAM ABDOM COMPLETE: CPT | Mod: 26,,, | Performed by: STUDENT IN AN ORGANIZED HEALTH CARE EDUCATION/TRAINING PROGRAM

## 2024-08-13 PROCEDURE — 96372 THER/PROPH/DIAG INJ SC/IM: CPT | Mod: PN

## 2024-08-13 PROCEDURE — 63600175 PHARM REV CODE 636 W HCPCS: Mod: JZ,JB,JG,PN | Performed by: INTERNAL MEDICINE

## 2024-08-13 PROCEDURE — 76700 US EXAM ABDOM COMPLETE: CPT | Mod: TC,PO

## 2024-08-13 RX ORDER — PROCHLORPERAZINE EDISYLATE 5 MG/ML
2.5 INJECTION INTRAMUSCULAR; INTRAVENOUS
Status: CANCELLED
Start: 2024-08-14

## 2024-08-13 RX ORDER — PROCHLORPERAZINE EDISYLATE 5 MG/ML
2.5 INJECTION INTRAMUSCULAR; INTRAVENOUS
Status: CANCELLED
Start: 2024-08-13

## 2024-08-13 RX ORDER — PROCHLORPERAZINE EDISYLATE 5 MG/ML
2.5 INJECTION INTRAMUSCULAR; INTRAVENOUS
Status: CANCELLED
Start: 2024-08-15

## 2024-08-13 RX ORDER — PROCHLORPERAZINE EDISYLATE 5 MG/ML
2.5 INJECTION INTRAMUSCULAR; INTRAVENOUS
Status: DISCONTINUED | OUTPATIENT
Start: 2024-08-13 | End: 2024-08-13 | Stop reason: HOSPADM

## 2024-08-13 RX ADMIN — FILGRASTIM-SNDZ 300 MCG: 300 INJECTION, SOLUTION INTRAVENOUS; SUBCUTANEOUS at 05:08

## 2024-08-14 ENCOUNTER — PATIENT MESSAGE (OUTPATIENT)
Dept: INFUSION THERAPY | Facility: HOSPITAL | Age: 39
End: 2024-08-14

## 2024-08-14 ENCOUNTER — PATIENT MESSAGE (OUTPATIENT)
Dept: HEMATOLOGY/ONCOLOGY | Facility: CLINIC | Age: 39
End: 2024-08-14
Payer: COMMERCIAL

## 2024-08-14 ENCOUNTER — INFUSION (OUTPATIENT)
Dept: INFUSION THERAPY | Facility: HOSPITAL | Age: 39
End: 2024-08-14
Attending: INTERNAL MEDICINE
Payer: COMMERCIAL

## 2024-08-14 ENCOUNTER — TELEPHONE (OUTPATIENT)
Dept: INFUSION THERAPY | Facility: HOSPITAL | Age: 39
End: 2024-08-14
Payer: COMMERCIAL

## 2024-08-14 VITALS
TEMPERATURE: 98 F | OXYGEN SATURATION: 99 % | RESPIRATION RATE: 18 BRPM | SYSTOLIC BLOOD PRESSURE: 112 MMHG | HEART RATE: 108 BPM | DIASTOLIC BLOOD PRESSURE: 68 MMHG

## 2024-08-14 DIAGNOSIS — C50.919 TRIPLE NEGATIVE BREAST CANCER: Primary | ICD-10-CM

## 2024-08-14 PROCEDURE — 96372 THER/PROPH/DIAG INJ SC/IM: CPT | Mod: PN

## 2024-08-14 PROCEDURE — 63600175 PHARM REV CODE 636 W HCPCS: Mod: JZ,JB,JG,PN | Performed by: INTERNAL MEDICINE

## 2024-08-14 RX ADMIN — FILGRASTIM-SNDZ 300 MCG: 300 INJECTION, SOLUTION INTRAVENOUS; SUBCUTANEOUS at 04:08

## 2024-08-14 NOTE — PLAN OF CARE
Tolertated injection well.  No reactions noted.  No questions or concerns at this time.  Ambulated off unit in NAD.

## 2024-08-14 NOTE — TELEPHONE ENCOUNTER
----- Message from Heaven Mcdonald sent at 8/14/2024  1:13 PM CDT -----  Type: Needs Medical Advice  Who Called:  Patient   Symptoms (please be specific):    How long has patient had these symptoms:    Pharmacy name and phone #:    Best Call Back Number: [phone]  Additional Information: Patient is requesting a call back to reschedule her appt..

## 2024-08-15 ENCOUNTER — INFUSION (OUTPATIENT)
Dept: INFUSION THERAPY | Facility: HOSPITAL | Age: 39
End: 2024-08-15
Attending: INTERNAL MEDICINE
Payer: COMMERCIAL

## 2024-08-15 VITALS
HEART RATE: 113 BPM | RESPIRATION RATE: 16 BRPM | BODY MASS INDEX: 24.42 KG/M2 | TEMPERATURE: 98 F | WEIGHT: 132.69 LBS | DIASTOLIC BLOOD PRESSURE: 73 MMHG | HEIGHT: 62 IN | SYSTOLIC BLOOD PRESSURE: 121 MMHG

## 2024-08-15 DIAGNOSIS — C50.919 TRIPLE NEGATIVE BREAST CANCER: Primary | ICD-10-CM

## 2024-08-15 PROCEDURE — 96372 THER/PROPH/DIAG INJ SC/IM: CPT | Mod: PN

## 2024-08-15 PROCEDURE — 63600175 PHARM REV CODE 636 W HCPCS: Mod: JZ,JB,JG,PN | Performed by: INTERNAL MEDICINE

## 2024-08-15 RX ORDER — PROCHLORPERAZINE EDISYLATE 5 MG/ML
2.5 INJECTION INTRAMUSCULAR; INTRAVENOUS
Status: DISCONTINUED | OUTPATIENT
Start: 2024-08-15 | End: 2024-08-15 | Stop reason: HOSPADM

## 2024-08-15 RX ADMIN — FILGRASTIM-SNDZ 300 MCG: 300 INJECTION, SOLUTION INTRAVENOUS; SUBCUTANEOUS at 04:08

## 2024-08-15 NOTE — PLAN OF CARE
Problem: Fatigue  Goal: Improved Activity Tolerance  Outcome: Progressing  Intervention: Promote Improved Energy  Flowsheets (Taken 8/15/2024 3177)  Activity Management: Up in chair - L3     Problem: Adult Inpatient Plan of Care  Goal: Patient-Specific Goal (Individualized)  Outcome: Progressing  Flowsheets (Taken 8/15/2024 9462)  Individualized Care Needs: recliner  Anxieties, Fears or Concerns: fatigued  Patient/Family-Specific Goals (Include Timeframe): no s/s of rx with inj   Patient tolerated Zarxio injection well, d/c in no acute distress.

## 2024-08-20 ENCOUNTER — OFFICE VISIT (OUTPATIENT)
Dept: HEMATOLOGY/ONCOLOGY | Facility: CLINIC | Age: 39
End: 2024-08-20
Payer: COMMERCIAL

## 2024-08-20 ENCOUNTER — INFUSION (OUTPATIENT)
Dept: INFUSION THERAPY | Facility: HOSPITAL | Age: 39
End: 2024-08-20
Attending: INTERNAL MEDICINE
Payer: COMMERCIAL

## 2024-08-20 ENCOUNTER — LAB VISIT (OUTPATIENT)
Dept: LAB | Facility: HOSPITAL | Age: 39
End: 2024-08-20
Attending: INTERNAL MEDICINE
Payer: COMMERCIAL

## 2024-08-20 ENCOUNTER — PATIENT MESSAGE (OUTPATIENT)
Dept: HEMATOLOGY/ONCOLOGY | Facility: CLINIC | Age: 39
End: 2024-08-20
Payer: COMMERCIAL

## 2024-08-20 VITALS
OXYGEN SATURATION: 98 % | TEMPERATURE: 98 F | SYSTOLIC BLOOD PRESSURE: 119 MMHG | RESPIRATION RATE: 17 BRPM | WEIGHT: 131.19 LBS | BODY MASS INDEX: 24.14 KG/M2 | HEIGHT: 62 IN | HEART RATE: 97 BPM | DIASTOLIC BLOOD PRESSURE: 78 MMHG

## 2024-08-20 VITALS
WEIGHT: 131.19 LBS | BODY MASS INDEX: 24.14 KG/M2 | TEMPERATURE: 98 F | SYSTOLIC BLOOD PRESSURE: 108 MMHG | HEIGHT: 62 IN | DIASTOLIC BLOOD PRESSURE: 75 MMHG | HEART RATE: 98 BPM | RESPIRATION RATE: 17 BRPM | OXYGEN SATURATION: 98 %

## 2024-08-20 DIAGNOSIS — G89.3 NEOPLASM RELATED PAIN (ACUTE) (CHRONIC): Primary | ICD-10-CM

## 2024-08-20 DIAGNOSIS — G89.3 NEOPLASM RELATED PAIN: ICD-10-CM

## 2024-08-20 DIAGNOSIS — D69.6 THROMBOCYTOPENIA: ICD-10-CM

## 2024-08-20 DIAGNOSIS — R45.89 ANXIETY ABOUT HEALTH: ICD-10-CM

## 2024-08-20 DIAGNOSIS — Z17.1 MALIGNANT NEOPLASM OF AXILLARY TAIL OF LEFT BREAST IN FEMALE, ESTROGEN RECEPTOR NEGATIVE: ICD-10-CM

## 2024-08-20 DIAGNOSIS — D72.819 LEUKOPENIA, UNSPECIFIED TYPE: ICD-10-CM

## 2024-08-20 DIAGNOSIS — C50.919 TRIPLE NEGATIVE BREAST CANCER: Primary | ICD-10-CM

## 2024-08-20 DIAGNOSIS — C50.612 MALIGNANT NEOPLASM OF AXILLARY TAIL OF LEFT BREAST IN FEMALE, ESTROGEN RECEPTOR NEGATIVE: ICD-10-CM

## 2024-08-20 DIAGNOSIS — C50.919 TRIPLE NEGATIVE BREAST CANCER: ICD-10-CM

## 2024-08-20 DIAGNOSIS — D63.0 ANEMIA IN NEOPLASTIC DISEASE: ICD-10-CM

## 2024-08-20 DIAGNOSIS — N95.1 HOT FLUSHES, PERIMENOPAUSAL: ICD-10-CM

## 2024-08-20 DIAGNOSIS — G89.3 NEOPLASM RELATED PAIN (ACUTE) (CHRONIC): ICD-10-CM

## 2024-08-20 LAB
ALBUMIN SERPL BCP-MCNC: 3.7 G/DL (ref 3.5–5.2)
ALP SERPL-CCNC: 85 U/L (ref 55–135)
ALT SERPL W/O P-5'-P-CCNC: 87 U/L (ref 10–44)
ANION GAP SERPL CALC-SCNC: 10 MMOL/L (ref 8–16)
ANISOCYTOSIS BLD QL SMEAR: SLIGHT
AST SERPL-CCNC: 38 U/L (ref 10–40)
B-HCG UR QL: NEGATIVE
BASOPHILS NFR BLD: 1 % (ref 0–1.9)
BILIRUB SERPL-MCNC: 0.6 MG/DL (ref 0.1–1)
BUN SERPL-MCNC: 16 MG/DL (ref 6–20)
CALCIUM SERPL-MCNC: 9.2 MG/DL (ref 8.7–10.5)
CHLORIDE SERPL-SCNC: 105 MMOL/L (ref 95–110)
CO2 SERPL-SCNC: 25 MMOL/L (ref 23–29)
CREAT SERPL-MCNC: 0.7 MG/DL (ref 0.5–1.4)
DIFFERENTIAL METHOD BLD: ABNORMAL
EOSINOPHIL NFR BLD: 0 % (ref 0–8)
ERYTHROCYTE [DISTWIDTH] IN BLOOD BY AUTOMATED COUNT: 16.5 % (ref 11.5–14.5)
EST. GFR  (NO RACE VARIABLE): >60 ML/MIN/1.73 M^2
GLUCOSE SERPL-MCNC: 71 MG/DL (ref 70–110)
HCT VFR BLD AUTO: 31.6 % (ref 37–48.5)
HGB BLD-MCNC: 10.6 G/DL (ref 12–16)
IMM GRANULOCYTES # BLD AUTO: ABNORMAL K/UL (ref 0–0.04)
IMM GRANULOCYTES NFR BLD AUTO: ABNORMAL % (ref 0–0.5)
LYMPHOCYTES NFR BLD: 23 % (ref 18–48)
MAGNESIUM SERPL-MCNC: 2 MG/DL (ref 1.6–2.6)
MCH RBC QN AUTO: 31.7 PG (ref 27–31)
MCHC RBC AUTO-ENTMCNC: 33.5 G/DL (ref 32–36)
MCV RBC AUTO: 95 FL (ref 82–98)
METAMYELOCYTES NFR BLD MANUAL: 5 %
MONOCYTES NFR BLD: 13 % (ref 4–15)
MYELOCYTES NFR BLD MANUAL: 7 %
NEUTROPHILS NFR BLD: 43 % (ref 38–73)
NEUTS BAND NFR BLD MANUAL: 8 %
NRBC BLD-RTO: 0 /100 WBC
OVALOCYTES BLD QL SMEAR: ABNORMAL
PLATELET # BLD AUTO: 107 K/UL (ref 150–450)
PLATELET BLD QL SMEAR: ABNORMAL
PMV BLD AUTO: 8.8 FL (ref 9.2–12.9)
POTASSIUM SERPL-SCNC: 3.8 MMOL/L (ref 3.5–5.1)
PROT SERPL-MCNC: 6.2 G/DL (ref 6–8.4)
RBC # BLD AUTO: 3.34 M/UL (ref 4–5.4)
SODIUM SERPL-SCNC: 140 MMOL/L (ref 136–145)
WBC # BLD AUTO: 5.8 K/UL (ref 3.9–12.7)

## 2024-08-20 PROCEDURE — 36415 COLL VENOUS BLD VENIPUNCTURE: CPT | Mod: PN | Performed by: INTERNAL MEDICINE

## 2024-08-20 PROCEDURE — 80053 COMPREHEN METABOLIC PANEL: CPT | Mod: PN | Performed by: INTERNAL MEDICINE

## 2024-08-20 PROCEDURE — 63600175 PHARM REV CODE 636 W HCPCS: Mod: PN | Performed by: INTERNAL MEDICINE

## 2024-08-20 PROCEDURE — 96375 TX/PRO/DX INJ NEW DRUG ADDON: CPT | Mod: PN

## 2024-08-20 PROCEDURE — 96413 CHEMO IV INFUSION 1 HR: CPT | Mod: PN

## 2024-08-20 PROCEDURE — 83735 ASSAY OF MAGNESIUM: CPT | Mod: PN | Performed by: INTERNAL MEDICINE

## 2024-08-20 PROCEDURE — 85027 COMPLETE CBC AUTOMATED: CPT | Mod: PN | Performed by: INTERNAL MEDICINE

## 2024-08-20 PROCEDURE — 3078F DIAST BP <80 MM HG: CPT | Mod: CPTII,S$GLB,, | Performed by: INTERNAL MEDICINE

## 2024-08-20 PROCEDURE — 3008F BODY MASS INDEX DOCD: CPT | Mod: CPTII,S$GLB,, | Performed by: INTERNAL MEDICINE

## 2024-08-20 PROCEDURE — 81025 URINE PREGNANCY TEST: CPT | Mod: PN | Performed by: INTERNAL MEDICINE

## 2024-08-20 PROCEDURE — 84443 ASSAY THYROID STIM HORMONE: CPT | Performed by: INTERNAL MEDICINE

## 2024-08-20 PROCEDURE — 96367 TX/PROPH/DG ADDL SEQ IV INF: CPT | Mod: PN

## 2024-08-20 PROCEDURE — 99215 OFFICE O/P EST HI 40 MIN: CPT | Mod: S$GLB,,, | Performed by: INTERNAL MEDICINE

## 2024-08-20 PROCEDURE — 85007 BL SMEAR W/DIFF WBC COUNT: CPT | Mod: PN | Performed by: INTERNAL MEDICINE

## 2024-08-20 PROCEDURE — G2211 COMPLEX E/M VISIT ADD ON: HCPCS | Mod: S$GLB,,, | Performed by: INTERNAL MEDICINE

## 2024-08-20 PROCEDURE — 96417 CHEMO IV INFUS EACH ADDL SEQ: CPT | Mod: PN

## 2024-08-20 PROCEDURE — 99999 PR PBB SHADOW E&M-EST. PATIENT-LVL IV: CPT | Mod: PBBFAC,,, | Performed by: INTERNAL MEDICINE

## 2024-08-20 PROCEDURE — 3074F SYST BP LT 130 MM HG: CPT | Mod: CPTII,S$GLB,, | Performed by: INTERNAL MEDICINE

## 2024-08-20 PROCEDURE — 25000003 PHARM REV CODE 250: Mod: PN | Performed by: INTERNAL MEDICINE

## 2024-08-20 RX ORDER — PROCHLORPERAZINE EDISYLATE 5 MG/ML
10 INJECTION INTRAMUSCULAR; INTRAVENOUS ONCE AS NEEDED
Status: CANCELLED
Start: 2024-08-20

## 2024-08-20 RX ORDER — DIPHENHYDRAMINE HYDROCHLORIDE 50 MG/ML
50 INJECTION INTRAMUSCULAR; INTRAVENOUS ONCE AS NEEDED
Status: DISCONTINUED | OUTPATIENT
Start: 2024-08-20 | End: 2024-08-20 | Stop reason: HOSPADM

## 2024-08-20 RX ORDER — EPINEPHRINE 0.3 MG/.3ML
0.3 INJECTION SUBCUTANEOUS ONCE AS NEEDED
Status: DISCONTINUED | OUTPATIENT
Start: 2024-08-20 | End: 2024-08-20 | Stop reason: HOSPADM

## 2024-08-20 RX ORDER — PROCHLORPERAZINE EDISYLATE 5 MG/ML
2.5 INJECTION INTRAMUSCULAR; INTRAVENOUS
Start: 2024-09-13

## 2024-08-20 RX ORDER — PROCHLORPERAZINE EDISYLATE 5 MG/ML
10 INJECTION INTRAMUSCULAR; INTRAVENOUS ONCE AS NEEDED
Status: DISCONTINUED | OUTPATIENT
Start: 2024-08-20 | End: 2024-08-20 | Stop reason: HOSPADM

## 2024-08-20 RX ORDER — SODIUM CHLORIDE 0.9 % (FLUSH) 0.9 %
10 SYRINGE (ML) INJECTION
OUTPATIENT
Start: 2024-09-10

## 2024-08-20 RX ORDER — PROCHLORPERAZINE EDISYLATE 5 MG/ML
2.5 INJECTION INTRAMUSCULAR; INTRAVENOUS
Start: 2024-09-06

## 2024-08-20 RX ORDER — SODIUM CHLORIDE 0.9 % (FLUSH) 0.9 %
10 SYRINGE (ML) INJECTION
Status: DISCONTINUED | OUTPATIENT
Start: 2024-08-20 | End: 2024-08-20 | Stop reason: HOSPADM

## 2024-08-20 RX ORDER — PROCHLORPERAZINE EDISYLATE 5 MG/ML
2.5 INJECTION INTRAMUSCULAR; INTRAVENOUS
Start: 2024-09-11

## 2024-08-20 RX ORDER — PROCHLORPERAZINE EDISYLATE 5 MG/ML
2.5 INJECTION INTRAMUSCULAR; INTRAVENOUS
Start: 2024-09-12

## 2024-08-20 RX ORDER — PROCHLORPERAZINE EDISYLATE 5 MG/ML
10 INJECTION INTRAMUSCULAR; INTRAVENOUS ONCE AS NEEDED
Status: CANCELLED
Start: 2024-09-03

## 2024-08-20 RX ORDER — EPINEPHRINE 0.3 MG/.3ML
0.3 INJECTION SUBCUTANEOUS ONCE AS NEEDED
Status: CANCELLED | OUTPATIENT
Start: 2024-08-20

## 2024-08-20 RX ORDER — FAMOTIDINE 10 MG/ML
20 INJECTION INTRAVENOUS
Status: CANCELLED | OUTPATIENT
Start: 2024-09-03

## 2024-08-20 RX ORDER — DIPHENHYDRAMINE HYDROCHLORIDE 50 MG/ML
50 INJECTION INTRAMUSCULAR; INTRAVENOUS ONCE AS NEEDED
OUTPATIENT
Start: 2024-09-10

## 2024-08-20 RX ORDER — LORAZEPAM 2 MG/ML
1 INJECTION INTRAMUSCULAR ONCE AS NEEDED
Status: DISCONTINUED | OUTPATIENT
Start: 2024-08-20 | End: 2024-08-20 | Stop reason: HOSPADM

## 2024-08-20 RX ORDER — PROCHLORPERAZINE EDISYLATE 5 MG/ML
10 INJECTION INTRAMUSCULAR; INTRAVENOUS ONCE AS NEEDED
Start: 2024-09-10

## 2024-08-20 RX ORDER — EPINEPHRINE 0.3 MG/.3ML
0.3 INJECTION SUBCUTANEOUS ONCE AS NEEDED
Status: CANCELLED | OUTPATIENT
Start: 2024-09-03

## 2024-08-20 RX ORDER — FAMOTIDINE 10 MG/ML
20 INJECTION INTRAVENOUS
Status: CANCELLED | OUTPATIENT
Start: 2024-08-20

## 2024-08-20 RX ORDER — SODIUM CHLORIDE 0.9 % (FLUSH) 0.9 %
10 SYRINGE (ML) INJECTION
Status: CANCELLED | OUTPATIENT
Start: 2024-08-20

## 2024-08-20 RX ORDER — HEPARIN 100 UNIT/ML
500 SYRINGE INTRAVENOUS
OUTPATIENT
Start: 2024-09-10

## 2024-08-20 RX ORDER — PROCHLORPERAZINE EDISYLATE 5 MG/ML
2.5 INJECTION INTRAMUSCULAR; INTRAVENOUS
Start: 2024-09-05

## 2024-08-20 RX ORDER — MORPHINE SULFATE 15 MG/1
15 TABLET, FILM COATED, EXTENDED RELEASE ORAL 2 TIMES DAILY
Qty: 60 TABLET | Refills: 0 | Status: SHIPPED | OUTPATIENT
Start: 2024-08-20 | End: 2024-09-19

## 2024-08-20 RX ORDER — PROCHLORPERAZINE EDISYLATE 5 MG/ML
2.5 INJECTION INTRAMUSCULAR; INTRAVENOUS
Status: CANCELLED
Start: 2024-08-22

## 2024-08-20 RX ORDER — DIPHENHYDRAMINE HYDROCHLORIDE 50 MG/ML
50 INJECTION INTRAMUSCULAR; INTRAVENOUS ONCE AS NEEDED
Status: CANCELLED | OUTPATIENT
Start: 2024-09-03

## 2024-08-20 RX ORDER — MORPHINE SULFATE 15 MG/1
15 TABLET, FILM COATED, EXTENDED RELEASE ORAL 2 TIMES DAILY
Qty: 60 TABLET | Refills: 0 | Status: SHIPPED | OUTPATIENT
Start: 2024-08-20 | End: 2024-08-20 | Stop reason: SDUPTHER

## 2024-08-20 RX ORDER — HEPARIN 100 UNIT/ML
500 SYRINGE INTRAVENOUS
Status: CANCELLED | OUTPATIENT
Start: 2024-08-20

## 2024-08-20 RX ORDER — HEPARIN 100 UNIT/ML
500 SYRINGE INTRAVENOUS
Status: DISCONTINUED | OUTPATIENT
Start: 2024-08-20 | End: 2024-08-20 | Stop reason: HOSPADM

## 2024-08-20 RX ORDER — DIPHENHYDRAMINE HYDROCHLORIDE 50 MG/ML
50 INJECTION INTRAMUSCULAR; INTRAVENOUS ONCE AS NEEDED
Status: CANCELLED | OUTPATIENT
Start: 2024-08-20

## 2024-08-20 RX ORDER — FAMOTIDINE 10 MG/ML
20 INJECTION INTRAVENOUS
OUTPATIENT
Start: 2024-09-10

## 2024-08-20 RX ORDER — PROCHLORPERAZINE EDISYLATE 5 MG/ML
2.5 INJECTION INTRAMUSCULAR; INTRAVENOUS
Start: 2024-09-04

## 2024-08-20 RX ORDER — HEPARIN 100 UNIT/ML
500 SYRINGE INTRAVENOUS
Status: CANCELLED | OUTPATIENT
Start: 2024-09-03

## 2024-08-20 RX ORDER — PROCHLORPERAZINE EDISYLATE 5 MG/ML
2.5 INJECTION INTRAMUSCULAR; INTRAVENOUS
Status: CANCELLED
Start: 2024-08-23

## 2024-08-20 RX ORDER — SODIUM CHLORIDE 0.9 % (FLUSH) 0.9 %
10 SYRINGE (ML) INJECTION
Status: CANCELLED | OUTPATIENT
Start: 2024-09-03

## 2024-08-20 RX ORDER — EPINEPHRINE 0.3 MG/.3ML
0.3 INJECTION SUBCUTANEOUS ONCE AS NEEDED
OUTPATIENT
Start: 2024-09-10

## 2024-08-20 RX ORDER — PROCHLORPERAZINE EDISYLATE 5 MG/ML
2.5 INJECTION INTRAMUSCULAR; INTRAVENOUS
Status: CANCELLED
Start: 2024-08-21

## 2024-08-20 RX ADMIN — CARBOPLATIN 485 MG: 10 INJECTION, SOLUTION INTRAVENOUS at 03:08

## 2024-08-20 RX ADMIN — DEXAMETHASONE SODIUM PHOSPHATE 0.25 MG: 10 INJECTION, SOLUTION INTRAMUSCULAR; INTRAVENOUS at 03:08

## 2024-08-20 RX ADMIN — SODIUM CHLORIDE 200 MG: 9 INJECTION, SOLUTION INTRAVENOUS at 02:08

## 2024-08-20 RX ADMIN — APREPITANT 130 MG: 130 INJECTION, EMULSION INTRAVENOUS at 03:08

## 2024-08-20 NOTE — PLAN OF CARE
Problem: Fatigue  Goal: Improved Activity Tolerance  Intervention: Promote Improved Energy  Flowsheets (Taken 8/20/2024 1400)  Fatigue Management:   frequent rest breaks encouraged   paced activity encouraged  Sleep/Rest Enhancement: regular sleep/rest pattern promoted  Activity Management: Ambulated -L4  Environmental Support: rest periods encouraged     Problem: Adult Inpatient Plan of Care  Goal: Patient-Specific Goal (Individualized)  Flowsheets (Taken 8/20/2024 1400)  Individualized Care Needs: Recliner,  at chairside, conversation, snacks  Anxieties, Fears or Concerns: Holding Taxol today  Patient/Family-Specific Goals (Include Timeframe): Free of S/S of reaction with infusions.    Patient to Infusion for Keytruda and Carbo following appointment with the provider. Treatment plan reviewed with patient. VSS. Tolerated treatment. Prefers using patient portal for upcoming appointment schedule. Escorted to the front lobby in no distress for discharge to home.

## 2024-08-20 NOTE — PROGRESS NOTES
Name: Aleena Duckworth  MRN:  [id number]  :  1985 Age 38 y.o.  Date of Service: 2024    Reason for visit:  Aleena Duckworth is a 38 y.o. female here regarding...clearance for C3D1 of  Pembro, Carbo, Taxol    # Invasive Ductal Carcinoma of the Left Breast  Date of Original Diagnosis:  2023  Recurrence: 5/15/24, localized  Original Stage: pT1b pN0(sn) triple negative, grade 3, Ki-67 80%  Recurrence Stage: [id number] Stage 2B Triple negative, grade 3, Ki-67 76%  Current Sites of Disease:   left axillary tail/lymph node  Current Goals of Therapy:  Curative  Current Therapy:   keynote 522 NACT     Oncologic History/History of Present Illness:   Former patient of Dr. Ayala, per her notes  self palpated a left-sided breast mass.    2023  bilateral breast mammogram that showed a lobular hypoechoic solid mass within the left breast at 2 o'clock position 7 cm     2023, ultrasound guided biopsy = invasive ductal carcinoma, grade 3, ER/CT/HER2 Randall negative, Ki-67 80%.     23  left partial mastectomy that revealed invasive ductal carcinoma 9 x 8 x 6 mm, single focus, approximately 1 mm focus of high-grade DCIS, closest margin 3 mm, anterior.  Two sentinel lymph nodes were negative.  pT1b pN0(sn).    23 cycle 1 of Docetaxel and Cytoxan with numerous SE  2023 cycle 2 of TC with 20% dose reduction of docetaxel.  With again numerous side effects.     Extensive GOC discussion held with patient and family and decided to stop chemotherapy.     2023 s/p bilateral mastectomy, implant removal and hybrid flap and small implants    2024 Reports to [facility] to establish care with me. Reports left lateral axillary tail nodules.  Causing anxiety.    24    mammogram with ultrasound BI-RADS 4- 11 mm left axillary tail mass  5/15/24   left axillary tail  and lymph node positive for  poorly differentiated carcinoma, triple negative,  staging scans negative.    24  Had a prolonged discussion  with patient's , father, mother she was motivated to pursue neoadjuvant chemotherapy.    Today (08/20/2024)  -reports ongoing weight gain with steriods, diffuse pain all over,   -presents for clearance of Cycle 4 Day 1     PHYSICAL EXAMINATION:   There were no vitals taken for this visit.  Wt Readings from Last 3 Encounters:   08/15/24 60.2 kg (132 lb 11.5 oz)   08/12/24 60.2 kg (132 lb 11.5 oz)   08/12/24 60.2 kg (132 lb 11.5 oz)     ECOG PERFORMANCE STATUS: 0  Physical Exam   General:  cushinoid-appearing, nontoxic  Eyes:  Equal and round pupils, EOMI, no scleral icterus  Mouth:  No lesions, moist  Cardiovascular:  Warm, well-perfused, + peripheral edema  Lungs:  Unlabored on room air, no wheezing  Neurologic:  Awake, alert and oriented, participating in the exam  Psych:  Appropriate mood and affect  Skin:  Pale pallor, No rashes  Heme:  No petechiae, no purpura       LABORATORY:  CBC  Lab Results   Component Value Date    WBC 5.80 08/20/2024    HGB 10.6 (L) 08/20/2024    HCT 31.6 (L) 08/20/2024    MCV 95 08/20/2024     (L) 08/20/2024         PATHOLOGY:  DIAGNOSIS:  05/17/2024 [facility]  LEFT AXILLARY TAIL, ULTRASOUND-GUIDED NEEDLE CORE BIOPSY:  --FRAGMENTS OF LYMPH NODE POSITIVE FOR METASTATIC POORLY DIFFERENTIATED   CARCINOMA (SEE COMMENT).  --RECEPTOR STUDIES PENDING.      RADIOLOGY:  CT chest abdomen pelvis 05/24/2024  Impression:  1. Postoperative changes of bilateral breast reconstruction with breast implants in place.  Two small subcutaneous soft tissue nodules along the left axillary tail in this patient with biopsy-proven left axillary lymph node triple negative breast cancer, as detailed above.  Correlate with recent mammographic imaging/biopsy.  2. No evidence of metastatic disease within the chest, abdomen, or pelvis.  3. Mild bilateral pelviectasis without evidence of gina hydronephrosis or obstructive uropathy, nonspecific and may be physiologic.    NM Bone Scan   05/24/2024  Impression:  No evidence of metastatic disease      ASSESSMENT AND PLAN:  Aleena Duckworth is a 38 y.o. female with...    # Invasive Ductal Carcinoma of the Left Breast  Date of Original Diagnosis:  02/09/2023  Recurrence: 5/15/24, localized  Original Stage: pT1b pN0(sn) triple negative, grade 3, Ki-67 80%  Recurrence Stage: [id number] Stage 2B Triple negative,  grade 3, Ki-67 76%  Current Sites of Disease:   left axillary tail/lymph node  Current Goals of Therapy:  Curative  Current Therapy:   pending neoadjuvant chemotherapy        Staging CT and PET negative for distant disease    Added 1 mg of Ativan for each dose of chemotherapy given her adverse reactions in the past and anxiety surrounding chemo  Added filgrastim to  days 2,3,4/9,10,11/16,17,18  given history of neutropenic fever with TC regimen,  can give additional antiemetics if necessary  Advised patient she needs to have weekly mental health visits while on chemotherapy, referral to Oncology Psychology  Will see her weekly during Cycles 1-4   6/17/24--one-week delay for cycle 1 day 15 of chemotherapy due to thrombocytopenia and LFT elevation, platelets 91,000, repeat CBC and CMP on 06/24.   06/24/24:  Dr. Cruz reviewed labs; ok to proceed with Platelets + 91k again this week; Proceed with C1D15 today; f/u next week 07/01 with Dr. Cruz for C2D1 with labs prior.  07/02/24:  Proceed with C2D1 today with Zarxio on Days 2, 4 (holiday on day 3); f/u next week as scheduled on 07/08 with labs prior for clearance for C2D8  07/08/24:  Proceed with C2D8 today; Zarxio on Days 9, 10, 11; f/u in 1 week for clearance for C2D15 with labs prior.  07/15/24:  Proceed with C2D15 today; Zarxio on Days 16,17,18; f/u in 1 week for clearance for C3D1 with labs prior.  07/22/24:  Defer x 1 week C3D1 with platelets 66k; f/u in 1 week with CBC, CMP, MG, UPT (TSH done 07/15)  07/29/24:  Proceed with C3D1 with platelets 104k; f/u next week with Dr. Cruz with labs for  clearance for C3D8  08/05/24:  Proceed with C3D8, full dose   08/12/24:  Proceed with C3D15 with paltelets 104k; Zarxio on Days 16,17,18  08/20/24:  Proceed with C4D1 with filagstrim on days 2,3,4; advised her to take 1/2 dose of steriods and reminded her not to take steriods with day 8 and day 15 paclitaxel; changed carboplatin to AUC 4 due to pain and marked fatigue; dropped paclitaxel due to symptoms mentioned; dose reduce day 8 and ay 15 paclitaxel to 40mg/m2     #LFT elevation- advised to d/c OTC meds, one week delay in chemotherapy  8/5/24 down trending proceed w/ full dose     # medication induced constipation- advised to take BID docusate and PRN senna   # anxiety about health- as noted above,  could be exacerbated by Adderall, ativan PRN   # history of neutropenic fever- as above  # thrombocytopenia- fluctuating platelet levels, commonly this is associated with over-the-counter medications or other medications, encouraged cessation of all OTCs, improved    #ADHD, has been on Adderall for several years.  Was born prematurely, was diagnosed with a stroke and heart murmur at birth, was treated at [facility].     #Heavy menorrhagia for the past few years, worsening recently and has developed CAROLIN.  Has been on iron supplementation.  Sees gynecologist, Dr. Almita Clark with [facility].      #Hyperbilirubinemia - An abdominal ultrasound was done, negative for any abnormalities.Saw Hepatology prior to chemo who suspected she has Gilbert's disease and found no contraindication to proceeding with TC.    Eunice Cruz M.D.   Oncology and Benign Hematology      Route Chart for Scheduling    Med Onc Chart Routing      Follow up with physician 3 weeks. 12 weeks--end of chemotherapy   Follow up with NUNO 6 weeks. 9 weeks   Infusion scheduling note   cycle 4 d1--carboplatin AUC 4; drop out paclitaxel day 8- paclitaxel 40mg/m2; day 15: paclitaxel 40mg/m2   Injection scheduling note    Labs   Scheduling:  Preferred  lab:  Lab interval:  Cbc,cmp, with each chemotherapy, TSH q3w   Imaging    Pharmacy appointment    Other referrals          Treatment Plan Information   OP PEMBROLIZUMAB CARBOPLATIN (AUC 5) WITH WEEKLY PACLITAXEL FOLLOWED BY PEMBROLIZUMAB DOXORUBICIN CYCLOPHOSPHAMIDE FOLLOWED BY PEMBROLIZUMAB 200 MG Q3W   Eunice Cruz MD   Upcoming Treatment Dates - OP PEMBROLIZUMAB CARBOPLATIN (AUC 5) WITH WEEKLY PACLITAXEL FOLLOWED BY PEMBROLIZUMAB DOXORUBICIN CYCLOPHOSPHAMIDE FOLLOWED BY PEMBROLIZUMAB 200 MG Q3W    8/19/2024       Pre-Medications       diphenhydrAMINE (BENADRYL) 50 mg in NS 50 mL IVPB       famotidine (PF) injection 20 mg       Chemotherapy       PACLitaxeL (TAXOL) 80 mg/m2 = 126 mg in 0.9% NaCl 250 mL chemo infusion       CARBOplatin (PARAPLATIN) 545 mg in 0.9% NaCl 304.5 mL chemo infusion       Supportive Care       LORazepam injection 1 mg       prochlorperazine injection Soln 10 mg       Antiemetics       aprepitant (CINVANTI) injection 130 mg       palonosetron 0.25mg/dexAMETHasone 12mg in NS IVPB 0.25 mg 50 mL       Immunotherapy       pembrolizumab (KEYTRUDA) 200 mg in 0.9% NaCl SolP 108 mL infusion  8/20/2024       Growth Factor       filgrastim-sndz (ZARXIO) injection 300 mcg/0.5 mL (Preferred Regimen)  8/21/2024       Growth Factor       filgrastim-sndz (ZARXIO) injection 300 mcg/0.5 mL (Preferred Regimen)  8/22/2024       Growth Factor       filgrastim-sndz (ZARXIO) injection 300 mcg/0.5 mL (Preferred Regimen)    Therapy Plan Information  IV Fluids  sodium chloride 0.9% bolus 1,000 mL 1,000 mL  1,000 mL, Intravenous, Every visit  Flushes  sodium chloride 0.9% flush 10 mL  10 mL, Intravenous, PRN  heparin, porcine (PF) 100 unit/mL injection flush 500 Units  500 Units, Intravenous, PRN  Antiemetics  ondansetron injection 8 mg  8 mg, Intravenous, PRN  promethazine (PHENERGAN) 12.5 mg in dextrose 5 % (D5W) 50 mL IVPB  12.5 mg, Intravenous, PRN  dexAMETHasone injection 8 mg  8 mg, Intravenous,  PRN

## 2024-08-20 NOTE — TELEPHONE ENCOUNTER
Request sent to Dr Escoto- doc of the day.    ----- Message from Lilliam Talley sent at 8/20/2024  4:36 PM CDT -----  Contact: Susana  Type:  RX Refill Request    Who Called:  Susana/ [facility]  Refill or New Rx: New RX  RX Name and Strength:  morphine (MS CONTIN) 15 MG 12 hr tablet, must say  medically necessary for greater than 7 days  How is the patient currently taking it?  Take 1 tablet (15 mg total) by mouth 2 (two) times daily. Scheduled. - Oral  Is this a 30 day or 90 day RX: 60   Preferred Pharmacy with phone number:      [facility] [place] - [address]  [address] [address]  Phone: [phone] Fax: [phone]    Best Call Back Number:  [phone]  Additional Information: New prescription has to be sent

## 2024-08-21 ENCOUNTER — INFUSION (OUTPATIENT)
Dept: INFUSION THERAPY | Facility: HOSPITAL | Age: 39
End: 2024-08-21
Attending: INTERNAL MEDICINE
Payer: COMMERCIAL

## 2024-08-21 VITALS — SYSTOLIC BLOOD PRESSURE: 125 MMHG | DIASTOLIC BLOOD PRESSURE: 75 MMHG

## 2024-08-21 DIAGNOSIS — C50.919 TRIPLE NEGATIVE BREAST CANCER: Primary | ICD-10-CM

## 2024-08-21 DIAGNOSIS — C50.919 TRIPLE NEGATIVE BREAST CANCER: ICD-10-CM

## 2024-08-21 LAB — TSH SERPL DL<=0.005 MIU/L-ACNC: 1.1 UIU/ML (ref 0.4–4)

## 2024-08-21 PROCEDURE — 96372 THER/PROPH/DIAG INJ SC/IM: CPT | Mod: PN

## 2024-08-21 PROCEDURE — 63600175 PHARM REV CODE 636 W HCPCS: Mod: JZ,JB,JG,PN | Performed by: INTERNAL MEDICINE

## 2024-08-21 RX ORDER — OXYCODONE HYDROCHLORIDE 10 MG/1
10 TABLET ORAL EVERY 4 HOURS PRN
Qty: 45 TABLET | Refills: 0 | Status: SHIPPED | OUTPATIENT
Start: 2024-08-21

## 2024-08-21 RX ORDER — DEXAMETHASONE 4 MG/1
8 TABLET ORAL DAILY
Qty: 24 TABLET | Refills: 2 | Status: SHIPPED | OUTPATIENT
Start: 2024-08-21

## 2024-08-21 RX ORDER — PROCHLORPERAZINE EDISYLATE 5 MG/ML
2.5 INJECTION INTRAMUSCULAR; INTRAVENOUS
Status: DISCONTINUED | OUTPATIENT
Start: 2024-08-21 | End: 2024-08-21 | Stop reason: HOSPADM

## 2024-08-21 RX ADMIN — FILGRASTIM-SNDZ 300 MCG: 300 INJECTION, SOLUTION INTRAVENOUS; SUBCUTANEOUS at 04:08

## 2024-08-21 NOTE — PLAN OF CARE
Problem: Adult Inpatient Plan of Care  Goal: Plan of Care Review  Outcome: Met     Problem: Fever with Neutropenia  Goal: Absence of Infection  Outcome: Progressing   Tolerated injection without diffculty D/C instructions given and pt ambulated to private vehicle per self without difficulty  NAD

## 2024-08-22 ENCOUNTER — TELEPHONE (OUTPATIENT)
Dept: HEMATOLOGY/ONCOLOGY | Facility: CLINIC | Age: 39
End: 2024-08-22
Payer: COMMERCIAL

## 2024-08-22 ENCOUNTER — INFUSION (OUTPATIENT)
Dept: INFUSION THERAPY | Facility: HOSPITAL | Age: 39
End: 2024-08-22
Attending: INTERNAL MEDICINE
Payer: COMMERCIAL

## 2024-08-22 VITALS
SYSTOLIC BLOOD PRESSURE: 125 MMHG | OXYGEN SATURATION: 97 % | WEIGHT: 131.19 LBS | HEART RATE: 105 BPM | HEIGHT: 62 IN | RESPIRATION RATE: 20 BRPM | TEMPERATURE: 98 F | BODY MASS INDEX: 24.14 KG/M2 | DIASTOLIC BLOOD PRESSURE: 69 MMHG

## 2024-08-22 DIAGNOSIS — C50.919 TRIPLE NEGATIVE BREAST CANCER: Primary | ICD-10-CM

## 2024-08-22 PROCEDURE — 63600175 PHARM REV CODE 636 W HCPCS: Mod: JZ,JB,JG,PN | Performed by: INTERNAL MEDICINE

## 2024-08-22 PROCEDURE — 96372 THER/PROPH/DIAG INJ SC/IM: CPT | Mod: PN

## 2024-08-22 RX ADMIN — FILGRASTIM-SNDZ 300 MCG: 300 INJECTION, SOLUTION INTRAVENOUS; SUBCUTANEOUS at 03:08

## 2024-08-22 NOTE — TELEPHONE ENCOUNTER
----- Message from Heaven Mcdonald sent at 8/22/2024 10:51 AM CDT -----  Type: Needs Medical Advice  Who Called:  Patient   Symptoms (please be specific):    How long has patient had these symptoms:    Pharmacy name and phone #:    Best Call Back Number: [phone]  Additional Information: Patient is requesting a call back regarding being very hot and face red.   no

## 2024-08-23 ENCOUNTER — INFUSION (OUTPATIENT)
Dept: INFUSION THERAPY | Facility: HOSPITAL | Age: 39
End: 2024-08-23
Attending: INTERNAL MEDICINE
Payer: COMMERCIAL

## 2024-08-23 ENCOUNTER — OFFICE VISIT (OUTPATIENT)
Dept: PALLIATIVE MEDICINE | Facility: CLINIC | Age: 39
End: 2024-08-23
Payer: COMMERCIAL

## 2024-08-23 ENCOUNTER — TELEPHONE (OUTPATIENT)
Dept: HEMATOLOGY/ONCOLOGY | Facility: CLINIC | Age: 39
End: 2024-08-23
Payer: COMMERCIAL

## 2024-08-23 VITALS
WEIGHT: 133.81 LBS | OXYGEN SATURATION: 98 % | BODY MASS INDEX: 24.63 KG/M2 | HEIGHT: 62 IN | DIASTOLIC BLOOD PRESSURE: 68 MMHG | SYSTOLIC BLOOD PRESSURE: 114 MMHG | HEART RATE: 92 BPM | TEMPERATURE: 98 F | RESPIRATION RATE: 16 BRPM

## 2024-08-23 VITALS
HEART RATE: 92 BPM | TEMPERATURE: 98 F | RESPIRATION RATE: 16 BRPM | WEIGHT: 133.81 LBS | HEIGHT: 62 IN | BODY MASS INDEX: 24.63 KG/M2 | SYSTOLIC BLOOD PRESSURE: 114 MMHG | DIASTOLIC BLOOD PRESSURE: 68 MMHG | OXYGEN SATURATION: 98 %

## 2024-08-23 DIAGNOSIS — Z71.89 ACP (ADVANCE CARE PLANNING): ICD-10-CM

## 2024-08-23 DIAGNOSIS — K59.00 CONSTIPATION, UNSPECIFIED CONSTIPATION TYPE: ICD-10-CM

## 2024-08-23 DIAGNOSIS — C50.919 TRIPLE NEGATIVE BREAST CANCER: Primary | ICD-10-CM

## 2024-08-23 DIAGNOSIS — C50.919 TRIPLE NEGATIVE BREAST CANCER: ICD-10-CM

## 2024-08-23 DIAGNOSIS — G62.9 NEUROPATHY: Primary | ICD-10-CM

## 2024-08-23 DIAGNOSIS — Z51.5 PALLIATIVE CARE BY SPECIALIST: ICD-10-CM

## 2024-08-23 PROCEDURE — 99205 OFFICE O/P NEW HI 60 MIN: CPT | Mod: S$GLB,,, | Performed by: NURSE PRACTITIONER

## 2024-08-23 PROCEDURE — 63600175 PHARM REV CODE 636 W HCPCS: Mod: JZ,JB,JG,PN | Performed by: INTERNAL MEDICINE

## 2024-08-23 PROCEDURE — 3008F BODY MASS INDEX DOCD: CPT | Mod: CPTII,S$GLB,, | Performed by: NURSE PRACTITIONER

## 2024-08-23 PROCEDURE — 96372 THER/PROPH/DIAG INJ SC/IM: CPT | Mod: PN

## 2024-08-23 PROCEDURE — 3074F SYST BP LT 130 MM HG: CPT | Mod: CPTII,S$GLB,, | Performed by: NURSE PRACTITIONER

## 2024-08-23 PROCEDURE — 3078F DIAST BP <80 MM HG: CPT | Mod: CPTII,S$GLB,, | Performed by: NURSE PRACTITIONER

## 2024-08-23 PROCEDURE — 99999 PR PBB SHADOW E&M-EST. PATIENT-LVL V: CPT | Mod: PBBFAC,,, | Performed by: NURSE PRACTITIONER

## 2024-08-23 RX ORDER — VORTIOXETINE 5 MG/1
TABLET, FILM COATED ORAL
COMMUNITY
Start: 2024-08-06

## 2024-08-23 RX ORDER — FAMOTIDINE 20 MG/1
20 TABLET, FILM COATED ORAL 2 TIMES DAILY
COMMUNITY
Start: 2024-08-05

## 2024-08-23 RX ORDER — SENNOSIDES 8.6 MG/1
2 TABLET ORAL DAILY
Qty: 60 TABLET | Refills: 0 | Status: SHIPPED | OUTPATIENT
Start: 2024-08-23 | End: 2024-09-22

## 2024-08-23 RX ORDER — DOXYCYCLINE HYCLATE 60 MG/1
1 TABLET, DELAYED RELEASE ORAL 2 TIMES DAILY
COMMUNITY
Start: 2024-08-08

## 2024-08-23 RX ORDER — GABAPENTIN 300 MG/1
300 CAPSULE ORAL 3 TIMES DAILY
Qty: 90 CAPSULE | Refills: 0 | Status: SHIPPED | OUTPATIENT
Start: 2024-08-23 | End: 2024-09-22

## 2024-08-23 RX ADMIN — FILGRASTIM-SNDZ 300 MCG: 300 INJECTION, SOLUTION INTRAVENOUS; SUBCUTANEOUS at 02:08

## 2024-08-23 NOTE — TELEPHONE ENCOUNTER
"Spoke with lonnie--  Wife is in "agony"  She is having severe pain which is making her tearful around the clock.  She is not able to sleep at all from her pain.  She is taking morphine 15mg since 3 days ago. Oxy 10mg a half tablet or one tablet every 4 hours.  Edward states she needs better pain control---bc she can't tolerate the pain at where it is at.  Nurse explained morphine does take time to build up in the blood stream---so short acting may have to be increased for the next several days to compensate.        Nurse will call him back after speaking with ernesto, as dr lozano is out of the office   "

## 2024-08-23 NOTE — TELEPHONE ENCOUNTER
----- Message from Heaven Mcdonald sent at 8/23/2024 10:21 AM CDT -----  Type: Needs Medical Advice  Who Called:  Gen ()  Symptoms (please be specific):    How long has patient had these symptoms:    Pharmacy name and phone #:    Best Call Back Number:   Additional Information: Gen is requesting a call back to speak with the nurse regarding his wife.

## 2024-08-23 NOTE — PROGRESS NOTES
Office Visit  [facility] Palliative Care      Consult Requested By: Dr. Eunice Cruz  Reason for Consult: cancer related pain       ASSESSMENT/PLAN:     Plan/Recommendations:  Aleena was seen today for palliative care.    Diagnoses and all orders for this visit:    Neuropathy    Patient currently on MS Contin and Oxycodone which has not been helpful, she may continue if she chooses , discussed starting gabapentin , she is agreeable to try  -     Start gabapentin (NEURONTIN) 300 MG capsule; Take 1 capsule (300 mg total) by mouth 3 (three) times daily.  Currently doing Acupuncture, advised to inform her therapist about her neuropathy .     Triple negative breast cancer  -     Continue to follow with Oncology    Constipation, unspecified constipation type  -     senna (SENOKOT) 8.6 mg tablet; Take 2 tablets by mouth once daily.    ACP (advance care planning)  Living Will and HCPOA documents provided for review    Palliative care by specialist  Patient with on going fatigue, discussed trial of Wellbutrin if OK with her PCP  Patient should engage with support groups or Therapist for mental health support         Follow up: 2 weeks - RTC as needed       SUBJECTIVE:     History of Present Illness:  Patient is a 38 y.o. year old female with h/o  left triple negative breast cancer.  She is currently under management of Dr Cruz and comes in today for cancer related pain      Oncology Notes:  In summary, she presented with palpable painful breast mass for a week February 2023.  She elected to do surgery 1st.  She had left lumpectomy sentinel node biopsy at [facility].  She was T1 with an 8 mm lesion.  3 mm anterior margin.  2 negative sentinel nodes.     She underwent 2 treatments of adjuvant chemo Taxol and cyclophosphamide.  She developed severe abdominal pain, dehydration, diarrhea, sepsis of unknown source.  Multiple admissions.  She could not tolerate further chemo and it was aborted May 2023.       She elected to have  "bilateral mastectomy with reconstruction July 2023 and did not get radiation.  Her breast pathology at that time was benign.     She has been vaping throughout this process and is still vaping.  She would like to quit.  She does not due to the stress.  She has done marijuana gummies during her treatments and that did help.     She initially presented February 2023 with palpable painful area and started noticing this again March 2024 this year.  Initial workup at [facility] ultrasound showed fat necrosis.  She persisted and had further workup at [facility] and was found to have abnormal left axillary masses.  Found to have recurrent triple negative breast cancer on core biopsy.     Initially was treated with Dr. Ayala and now follows with Dr. Cruz.  Medical oncology          Palliative Care Discussion:  Patient presents accompanied by SO, she reports being a EVELYN - she continues to work full time and care for her children. She is currently trying to balance managing her life and cancer treatment.   C/o pain to her legs - started a few months ago with swelling, and lower back -  she believes it to be side effect  of her steroids and chemo  Things worsened  in the last 2 weeks, she saw Dr. Houser on Monday- started Ms contin 15 mg bid  and oxycodone 10 mg  every 4 hours/prn BT pain. Patient reports this regiment helps but is not long lasting.     Wakes up crying in pain,  feels  like nerve pain - described as  tingling, legs feel heavy  like "dead weight " Pain score 10/10 NRS. Patient finds things to be unbearable. Reviewed most recent imaging and patient has no report of metastatic disease, unsure if this leg pain is related to her cancer. Discussed trial of gabapentin- she is familiar with this drug she has used it in the past for back pain, she is agreeable to try, I do not recommend increasing opiods at this time, consider more imaging by Oncology if her symptoms persists.     Bowels  some constipation- enemas " every morning- will start jojo    Appetite- good    Sleep - interrupted by pain       ROS:  Review of Systems   Constitutional:  Positive for fatigue.   Musculoskeletal:  Positive for joint swelling and myalgias.       Past Medical History:   Diagnosis Date    ADHD (attention deficit hyperactivity disorder)     Anemia     Breast cancer     triple negative    Hypertension     pre eclampsia and eclampsia    Stroke     at birth     Past Surgical History:   Procedure Laterality Date    ABLATION      BREAST RECONSTRUCTION  11/2023    BREAST SURGERY      lumpectomy    CYSTOSCOPY      INSERTION OF TUNNELED CENTRAL VENOUS CATHETER (CVC) WITH SUBCUTANEOUS PORT Right 6/3/2024    Procedure: [id number];  Surgeon: Jj Silva MD;  Location: [facility];  Service: General;  Laterality: Right;    LITHOTRIPSY      MASTECTOMY  11/2023    TUBAL LIGATION      VAGINAL DELIVERY       Family History   Problem Relation Name Age of Onset    Hypertension Mother      Breast cancer Mother  51        negative screening    Heart disease Father      Kidney cancer Father      Breast cancer Paternal Aunt      Diabetes Maternal Grandmother      Breast cancer Maternal Grandmother      Breast cancer Other Both GGM      Review of patient's allergies indicates:  No Known Allergies  Social Determinants of Health     Tobacco Use: Medium Risk (8/23/2024)    Patient History     Smoking Tobacco Use: Former     Smokeless Tobacco Use: Never     Passive Exposure: Never   Alcohol Use: Not At Risk (1/18/2024)    AUDIT-C     Frequency of Alcohol Consumption: Never     Average Number of Drinks: Patient does not drink     Frequency of Binge Drinking: Never   Financial Resource Strain: Medium Risk (1/18/2024)    Overall Financial Resource Strain (CARDIA)     Difficulty of Paying Living Expenses: Somewhat hard   Food Insecurity: Patient Declined (1/18/2024)    Hunger Vital Sign     Worried About Running Out of Food in the Last Year: Patient declined      Ran Out of Food in the Last Year: Patient declined   Transportation Needs: No Transportation Needs (1/18/2024)    PRAPARE - Transportation     Lack of Transportation (Medical): No     Lack of Transportation (Non-Medical): No   Physical Activity: Insufficiently Active (1/18/2024)    Exercise Vital Sign     Days of Exercise per Week: 2 days     Minutes of Exercise per Session: 30 min   Stress: Stress Concern Present (1/18/2024)    [employer] - Occupational Stress Questionnaire     Feeling of Stress : Rather much   Housing Stability: High Risk (1/18/2024)    Housing Stability Vital Sign     Unable to Pay for Housing in the Last Year: Yes     Number of Places Lived in the Last Year: 1     Unstable Housing in the Last Year: No   Depression: High Risk (8/23/2024)    Depression     Last PHQ-4: Flowsheet Data: 10   Utilities: Not on file   Health Literacy: Not on file   Social Isolation: Not on file            OBJECTIVE:     Physical Exam:  Vitals: Temp: 97.9 °F (36.6 °C) (08/23/24 1344)  Pulse: 92 (08/23/24 1344)  Resp: 16 (08/23/24 1344)  BP: 114/68 (08/23/24 1344)  SpO2: 98 % (08/23/24 1344)  Physical Exam  HENT:      Head: Normocephalic.      Mouth/Throat:      Mouth: Mucous membranes are moist.   Pulmonary:      Effort: Pulmonary effort is normal.   Abdominal:      General: There is no distension.   Musculoskeletal:         General: Normal range of motion.      Cervical back: Normal range of motion.   Skin:     General: Skin is warm and dry.      Coloration: Skin is pale.   Neurological:      Mental Status: She is alert and oriented to person, place, and time.   Psychiatric:         Mood and Affect: Mood normal.           Review of Symptoms      Symptom Assessment (ESAS 0-10 Scale)  Pain:  0  Dyspnea:  3  Anxiety:  0  Nausea:  0  Depression:  2  Anorexia:  0  Fatigue:  6  Insomnia:  6  Restlessness:  4  Agitation:  2         ECOG Performance Status ndGndrndanddndend:nd nd2nd Living Arrangements:  Lives  with spouse    Psychosocial/Cultural:   See Palliative Psychosocial Note: Yes  **Primary  to Follow**  Palliative Care  Consult: No      Advance Care Planning   Advance Directives:   Living Will: No    [place]: No    Do Not Resuscitate Status: No      Decision Making:  Patient answered questions  Goals of Care: The patient endorses that what is most important right now is to focus on symptom/pain control    Accordingly, we have decided that the best plan to meet the patient's goals includes continuing with treatment          Medications:    Current Outpatient Medications:     chlorhexidine (PERIDEX) 0.12 % solution, , Disp: , Rfl:     dexAMETHasone (DECADRON) 4 MG Tab, Take 2 tablets (8 mg total) by mouth once daily. Take 2 tablets (8 mg) by mouth once daily on days 2,3,4 following chemotherapy., Disp: 24 tablet, Rfl: 2    dextroamphetamine-amphetamine (ADDERALL XR) 20 MG 24 hr capsule, Take 20 mg by mouth every morning., Disp: , Rfl:     dextroamphetamine-amphetamine (ADDERALL) 20 mg tablet, Take 1 tablet by mouth., Disp: , Rfl:     DORYX MPC 60 mg TbEC, Take 1 tablet by mouth 2 (two) times daily., Disp: , Rfl:     famotidine (PEPCID) 20 MG tablet, Take 20 mg by mouth 2 (two) times daily., Disp: , Rfl:     LIDOcaine-prilocaine (EMLA) cream, Apply topically as needed (apply 30 to 60 minutes prior to chemo)., Disp: 30 g, Rfl: 2    loratadine (CLARITIN) 10 mg tablet, Take 1 tablet (10 mg total) by mouth once daily., Disp: 90 tablet, Rfl: 0    LORazepam (ATIVAN) 1 MG tablet, Take 1 tablet (1 mg total) by mouth 2 (two) times daily as needed for Anxiety., Disp: 60 tablet, Rfl: 0    morphine (MS CONTIN) 15 MG 12 hr tablet, Take 1 tablet (15 mg total) by mouth 2 (two) times daily. Scheduled., Disp: 60 tablet, Rfl: 0    mupirocin (BACTROBAN) 2 % ointment, , Disp: , Rfl:     OLANZapine (ZYPREXA) 5 MG tablet, TAKE 1 TABLET BY MOUTH EVERY EVENING ON DAYS 1-4 OF EACH CHEMOTHERAPY CYCLE, Disp: 30 tablet,  Rfl: 1    ondansetron (ZOFRAN-ODT) 8 MG TbDL, Take 1 tablet (8 mg total) by mouth every 8 (eight) hours as needed (nausea/vomiting). Take 1 tablet (8 mg) by mouth every 8 hours as needed for nausea/vomiting., Disp: 60 tablet, Rfl: 5    oxyCODONE (ROXICODONE) 10 mg Tab immediate release tablet, Take 1 tablet (10 mg total) by mouth every 4 (four) hours as needed for Pain., Disp: 45 tablet, Rfl: 0    promethazine (PHENERGAN) 12.5 MG Tab, (Take 1-2 tabs every 6 hours as needed for nausea persistent despite zofran), Disp: 40 tablet, Rfl: 3    SANCUSO 3.1 mg/24 hour, PLACE 1 PATCH ONTO THE SKIN ONCE, Disp: , Rfl:     tretinoin (RETIN-A) 0.05 % cream, SMARTSIG:Sparingly Topical Every Night, Disp: , Rfl:     TRINTELLIX 5 mg Tab, Take 1 tablet every day by oral route for 30 days., Disp: , Rfl:     varenicline (CHANTIX STARTING MONTH BOX) 0.5 mg (11)- 1 mg (42) tablet, Take one 0.5mg tab by mouth once daily X3 days,then increase to one 0.5mg tab twice daily X4 days,then increase to one 1mg tab twice daily, Disp: 1 each, Rfl: 0    varenicline (CHANTIX) 1 mg Tab, Take 1 tablet (1 mg total) by mouth 2 (two) times daily., Disp: 60 tablet, Rfl: 3    zolpidem (AMBIEN) 10 mg Tab, Take 5 mg by mouth nightly as needed., Disp: , Rfl:     gabapentin (NEURONTIN) 300 MG capsule, Take 1 capsule (300 mg total) by mouth 3 (three) times daily., Disp: 90 capsule, Rfl: 0    senna (SENOKOT) 8.6 mg tablet, Take 2 tablets by mouth once daily., Disp: 60 tablet, Rfl: 0  No current facility-administered medications for this visit.    Labs:  CBC:   WBC   Date Value Ref Range Status   08/20/2024 5.80 3.90 - 12.70 K/uL Final     Hemoglobin   Date Value Ref Range Status   08/20/2024 10.6 (L) 12.0 - 16.0 g/dL Final     Hematocrit   Date Value Ref Range Status   08/20/2024 31.6 (L) 37.0 - 48.5 % Final     MCV   Date Value Ref Range Status   08/20/2024 95 82 - 98 fL Final     Platelets   Date Value Ref Range Status   08/20/2024 107 (L) 150 - 450 K/uL Final        LFT:   Lab Results   Component Value Date    AST 38 08/20/2024    ALKPHOS 85 08/20/2024    BILITOT 0.6 08/20/2024       Albumin:   Albumin   Date Value Ref Range Status   08/20/2024 3.7 3.5 - 5.2 g/dL Final     Protein:   Total Protein   Date Value Ref Range Status   08/20/2024 6.2 6.0 - 8.4 g/dL Final       Radiology:None      60 minutes of total time spent on the encounter, which includes face to face time and non-face to face time preparing to see the patient (eg, review of tests), Obtaining and/or reviewing separately obtained history, Documenting clinical information in the electronic or other health record, Independently interpreting results if documented above (not separately reported) and communicating results to the patient/family/caregiver, or Care coordination (not separately reported).    15 minutes spent in discussing ACP    Signature: Ruth Adam NP

## 2024-08-23 NOTE — PLAN OF CARE
Problem: Fatigue  Goal: Improved Activity Tolerance  Outcome: Progressing  Intervention: Promote Improved Energy  Flowsheets (Taken 8/23/2024 1440)  Activity Management: Up in chair - L3     Problem: Adult Inpatient Plan of Care  Goal: Patient-Specific Goal (Individualized)  Outcome: Progressing  Flowsheets (Taken 8/23/2024 1440)  Individualized Care Needs: recliner  Anxieties, Fears or Concerns: none voiced  Patient/Family-Specific Goals (Include Timeframe): no s/s of reaction with treatment   Patient tolerated Zarxio injection well, d/c in no acute distress.

## 2024-08-23 NOTE — PROGRESS NOTES
[facility] - Breast Surgery Services   History and Physical    Subjective:      Aleena Duckworth is a 38 y.o. female     [place] through chemo follow up   Last chemo 11/12/24    States she had quit vaping after starter chantix but 3 days later started again. She wants another starter pack and will do maintenance after if needed.     OP PEMBROLIZUMAB CARBOPLATIN (AUC 5) WITH WEEKLY PACLITAXEL FOLLOWED BY PEMBROLIZUMAB DOXORUBICIN CYCLOPHOSPHAMIDE FOLLOWED BY PEMBROLIZUMAB 200 MG Q3W      History with presentation to [facility] 5/22/24:    Patient with history of left triple negative breast cancer.  Details below.  In summary, she presented with palpable painful breast mass for a week February 2023.  She elected to do surgery 1st.  She had left lumpectomy sentinel node biopsy at [facility].  She was T1 with an 8 mm lesion.  3 mm anterior margin.  2 negative sentinel nodes.    She underwent 2 treatments of adjuvant chemo Taxol and cyclophosphamide.  She developed severe abdominal pain, dehydration, diarrhea, sepsis of unknown source.  Multiple admissions.  She could not tolerate further chemo and it was aborted May 2023.      She elected to have bilateral mastectomy with reconstruction July 2023 and did not get radiation.  Her breast pathology at that time was benign.    She has been vaping throughout this process and is still vaping.  She would like to quit.  She does not due to the stress.  She has done marijuana gummies during her treatments and that did help.    She initially presented February 2023 with palpable painful area and started noticing this again March 2024 this year.  Initial workup at [facility] ultrasound showed fat necrosis.  She persisted and had further workup at [facility] and was found to have abnormal left axillary masses.  Found to have recurrent triple negative breast cancer on core biopsy.    Initially was treated with Dr. Ayala and now follows with Dr. Cruz.   "Medical oncology      23  47 gene [employer] panel negative       Breast history with initial cancer presentation:      Breast Augmentation -  Sub pec 450 ml Implant      23  Painful palpable mass x 1 week    23 Left 2 OC CNB [facility]  Grade 3 IDC   ER 0 IA 0  FISH neg Ki67 80%    23 Left lumpectomy, Left SLNB [facility] Dr. Douglas Last  9 x 8 x 6 mm Grade 3 IDC   closest margin 3 mm anterior   Focal DCIS  0/2 SLNB    Elected Mastectomy instead if XRT     2023 Taxotere Cytoxan  2023 cycle 2 day 1 Taxotere cyclophosphamide    2023 Stopped Adjuvant chemo after 2 cycles due to side effects  Dr. Ayala.  Had 3 admissions to the ER for abdominal pain.  Admitted for sepsis and started on antibiotics.  Cultures negative, no clear source, discharge on Bactrim.    "S/p cycle 1 of  docetaxel and Cytoxan  with poor tolerability and hospitalization.   Dose reduced for cycle 2, docetaxel to 60 mg/m2. Has had multiple set backs including transaminitis, bronchitis, significant fluid overload.   We discussed the early stage of her tumor and that given that and the very poor tolerability, it is best to stop chemotherapy at this time."    2023 Stopped Adjuvant chemo after 2 cycles due to side effects  Dr. Ayala.    2023 Bilateral mastectomy with hybrid reconstruction, SOO/ implants    [place]  Bilateral stromal fibrosis    23 bilateral reconstruction revision, implant exchange, fat grafting, abdominal site revision, dermal grafts to bilateral nipples  Dr. Lake       Relevant info:  Smoker   present at office visit     Family history of cancer:  Mother- Breast Cancer Dx 51  Father - Kidney Cancer   Maternal Grandmother - Breast Cancer   Paternal Aunt - Breast Cancer          Menarche at age 13. . Age at first live birth 19. did breast feed 3 months. LMP 2024. OCP-N/A. Menopause at N/A. Denies HRT.  Personal history of left breast cancer " 2023. Had previous left breast biopsy 2023. Genetic testing negative 2023.      Vaping for 2-3 years  Covid-19 vaccine right arm        Patient Active Problem List   Diagnosis    Triple negative breast cancer    Insomnia    Anxiety about health    Transaminitis    Nausea    Functional diarrhea    Thrombocytopenia    Anemia in neoplastic disease    Leukopenia    Hot flushes, perimenopausal    Malignant neoplasm of axillary tail of left female breast     Current Outpatient Medications on File Prior to Visit   Medication Sig Dispense Refill    chlorhexidine (PERIDEX) 0.12 % solution       dexAMETHasone (DECADRON) 4 MG Tab Take 2 tablets (8 mg total) by mouth once daily. Take 2 tablets (8 mg) by mouth once daily on days 2,3,4 following chemotherapy. 24 tablet 2    dextroamphetamine-amphetamine (ADDERALL XR) 20 MG 24 hr capsule Take 20 mg by mouth every morning.      dextroamphetamine-amphetamine (ADDERALL) 20 mg tablet Take 1 tablet by mouth.      DORYX MPC 60 mg TbEC Take 1 tablet by mouth 2 (two) times daily.      famotidine (PEPCID) 20 MG tablet Take 20 mg by mouth 2 (two) times daily.      LIDOcaine-prilocaine (EMLA) cream Apply topically as needed (apply 30 to 60 minutes prior to chemo). 30 g 2    loratadine (CLARITIN) 10 mg tablet Take 1 tablet (10 mg total) by mouth once daily. 90 tablet 0    LORazepam (ATIVAN) 1 MG tablet Take 1 tablet (1 mg total) by mouth 2 (two) times daily as needed for Anxiety. 60 tablet 0    morphine (MS CONTIN) 15 MG 12 hr tablet Take 1 tablet (15 mg total) by mouth 2 (two) times daily. Scheduled. 60 tablet 0    mupirocin (BACTROBAN) 2 % ointment       OLANZapine (ZYPREXA) 5 MG tablet TAKE 1 TABLET BY MOUTH EVERY EVENING ON DAYS 1-4 OF EACH CHEMOTHERAPY CYCLE 30 tablet 1    ondansetron (ZOFRAN-ODT) 8 MG TbDL Take 1 tablet (8 mg total) by mouth every 8 (eight) hours as needed (nausea/vomiting). Take 1 tablet (8 mg) by mouth every 8 hours as needed for nausea/vomiting. 60 tablet 5     oxyCODONE (ROXICODONE) 10 mg Tab immediate release tablet Take 1 tablet (10 mg total) by mouth every 4 (four) hours as needed for Pain. 45 tablet 0    promethazine (PHENERGAN) 12.5 MG Tab (Take 1-2 tabs every 6 hours as needed for nausea persistent despite zofran) 40 tablet 3    SANCUSO 3.1 mg/24 hour PLACE 1 PATCH ONTO THE SKIN ONCE      senna (SENOKOT) 8.6 mg tablet Take 2 tablets by mouth once daily. 60 tablet 0    tretinoin (RETIN-A) 0.05 % cream SMARTSIG:Sparingly Topical Every Night      TRINTELLIX 5 mg Tab Take 1 tablet every day by oral route for 30 days.      zolpidem (AMBIEN) 10 mg Tab Take 5 mg by mouth nightly as needed.      gabapentin (NEURONTIN) 300 MG capsule Take 1 capsule (300 mg total) by mouth 3 (three) times daily. (Patient not taking: Reported on 8/26/2024) 90 capsule 0    varenicline (CHANTIX STARTING MONTH BOX) 0.5 mg (11)- 1 mg (42) tablet Take one 0.5mg tab by mouth once daily X3 days,then increase to one 0.5mg tab twice daily X4 days,then increase to one 1mg tab twice daily (Patient not taking: Reported on 8/26/2024) 1 each 0    varenicline (CHANTIX) 1 mg Tab Take 1 tablet (1 mg total) by mouth 2 (two) times daily. (Patient not taking: Reported on 8/26/2024) 60 tablet 3     No current facility-administered medications on file prior to visit.     Review of patient's allergies indicates:  No Known Allergies  Past Medical History:   Diagnosis Date    ADHD (attention deficit hyperactivity disorder)     Anemia     Breast cancer     triple negative    Hypertension     pre eclampsia and eclampsia    Stroke     at birth     Past Surgical History:   Procedure Laterality Date    ABLATION      BREAST RECONSTRUCTION  11/2023    BREAST SURGERY      lumpectomy    CYSTOSCOPY      INSERTION OF TUNNELED CENTRAL VENOUS CATHETER (CVC) WITH SUBCUTANEOUS PORT Right 6/3/2024    Procedure: [id number];  Surgeon: Jj Silva MD;  Location: [facility];  Service: General;  Laterality: Right;     LITHOTRIPSY      MASTECTOMY  11/2023    TUBAL LIGATION      VAGINAL DELIVERY       Family History   Problem Relation Name Age of Onset    Hypertension Mother      Breast cancer Mother  51        negative screening    Heart disease Father      Kidney cancer Father      Breast cancer Paternal Aunt      Diabetes Maternal Grandmother      Breast cancer Maternal Grandmother      Breast cancer Other Both GGM      Social History     Socioeconomic History    Marital status:    Tobacco Use    Smoking status: Former     Types: Cigarettes     Passive exposure: Never    Smokeless tobacco: Never    Tobacco comments:     quitting   Substance and Sexual Activity    Alcohol use: Not Currently    Drug use: Never    Sexual activity: Yes     Partners: Male     Birth control/protection: See Surgical Hx     Comment: tubal     Social Determinants of Health     Financial Resource Strain: Medium Risk (1/18/2024)    Overall Financial Resource Strain (CARDIA)     Difficulty of Paying Living Expenses: Somewhat hard   Food Insecurity: Patient Declined (1/18/2024)    Hunger Vital Sign     Worried About Running Out of Food in the Last Year: Patient declined     Ran Out of Food in the Last Year: Patient declined   Transportation Needs: No Transportation Needs (1/18/2024)    PRAPARE - Transportation     Lack of Transportation (Medical): No     Lack of Transportation (Non-Medical): No   Physical Activity: Insufficiently Active (1/18/2024)    Exercise Vital Sign     Days of Exercise per Week: 2 days     Minutes of Exercise per Session: 30 min   Stress: Stress Concern Present (1/18/2024)    [employer] [place] [employer] - Occupational Stress Questionnaire     Feeling of Stress : Rather much   Housing Stability: High Risk (1/18/2024)    Housing Stability Vital Sign     Unable to Pay for Housing in the Last Year: Yes     Number of Places Lived in the Last Year: 1     Unstable Housing in the Last Year: No         Review of  "Systems    No CP, No SOB  No GI sxs      Objective:      BP (!) 102/58 (BP Location: Right arm, Patient Position: Sitting, BP Method: Medium (Manual))   Pulse 99   Temp 97.4 °F (36.3 °C) (Temporal)   Resp 16   Ht 5' 2" (1.575 m)   Wt 59.1 kg (130 lb 4.7 oz)   SpO2 99%   BMI 23.83 kg/m²     Constitutional: NAD AAOX4  HEENT: EOMI, nonicteric sclera  NECK: no mass, no thyromegaly, no lymphadenopathy  CV: regular rate  PULM: good respiratory effort  ABDOMEN: soft, Nontender, nondistended. No guarding. No rebound.  MUSCULOSKELETAL: Good ROM  SKIN: No rashes or ulcerations seen.   NEUROLOGIC: CN grossly intact. Motor, sensory grossly intact    Breast exam 5/22/24  Bilateral implant SOO recon  [place] pattern scars  Right: No mass, discharge, dimpling, lymphadenopathy    Left:  No discharge, dimpling  Left axillary masses  1 lower ax tail 1.5cm and higher in mid axilla 2cm movable masses  No peau  No DC  No other breast mass      Breast exam 8/26/24  Bilateral implant SOO recon  [place] pattern scars  Right: No mass, discharge, dimpling, lymphadenopathy    Left:  No discharge, dimpling  Left deep in axilla with pt guidance found 1.5cm soft movable area  No peau  No DC  No other breast mass                  Patient Active Problem List   Diagnosis    Triple negative breast cancer    Insomnia    Anxiety about health    Transaminitis    Nausea    Functional diarrhea    Thrombocytopenia    Anemia in neoplastic disease    Leukopenia    Hot flushes, perimenopausal    Malignant neoplasm of axillary tail of left female breast        High complexity of problems addressed by Dr. Valle - breast cancer, treatment options, expectations, effects of treatment, risks, benefits. Extensive and complex data reviewed, independent interpretation of tests, discussion of management with another health care provider.    08/26/2024 reviewed all complex data again.  MA/NP functioned as a scribe.  Services and exam were personally performed, " decisions made, complex data reviewed by Dr. Valle.  Time spent on chart and with pt 40min.     Alternative efficacious methods of treatment of breast cancer were given.  Options including lumpectomy, mastectomy, reconstruction options with advantages/disadvantages of each, provisions assuring coverage of reconstructive surgery costs, how the patient may access reconstructive care including the potential to be transferred to a facility that provides reconstructive care or choosing reconstruction after completion of breast cancer surgery and treatment.  LDH, LCLTF breast cancer brochure given.    Evidence based NCCN and MD Villanueva guidelines used for treatment planning.       I have given her all options.  I have explained procedure, risks, benefits, postop expectations. All questions answered. Risks include but are not limited to infection, bleeding, injury to nerves, vessels, numbness, weakness, difficulty lifting arm, swelling of arm (lymphedema), inability to remove all lesion, residual breast tissue, recurrence of malignancy, need for further procedure, loss of skin flap, seroma (fluid collection), allergic reaction, close or positive margins requiring additional surgery, vascular clotting, pulmonary embolus.      Explained smoking increases risks of complications - infection, perforation, increased pain, chronic pain, stricture, ischemia, recurrent problems, poor healing, flap failure, seromas, pulmonary complications, ventilator, clots, pulmonary embolism, thrombosis, cancers, heart attack, stroke, death.        Imaging and Chronology:     2002  Breast Augmentation -  Sub pec 450 ml Implant      02/02/23  Painful palpable mass x 1 week    02/09/23 Left 2 OC CNB [facility]  Grade 3 IDC   ER 0 DC 0  FISH neg Ki67 80%    02/27/23 Left lumpectomy, Left SLNB [facility] Dr. Douglas Last  9 x 8 x 6 mm Grade 3 IDC    closest margin 3 mm anterior   Focal DCIS  0/2 SLNB    Elected Mastectomy instead if  "XRT     05/01/2023 Taxotere Cytoxan  05/24/2023 cycle 2 day 1 Taxotere cyclophosphamide    5/2023 Stopped Adjuvant chemo after 2 cycles due to side effects Dr. Ayala.  Had 3 admissions to the ER for abdominal pain.  Admitted for sepsis and started on antibiotics.  Cultures negative, no clear source, discharge on Bactrim.    "S/p cycle 1 of  docetaxel and Cytoxan  with poor tolerability and hospitalization.   Dose reduced for cycle 2, docetaxel to 60 mg/m2. Has had multiple set backs including transaminitis, bronchitis, significant fluid overload.   We discussed the early stage of her tumor and that given that and the very poor tolerability, it is best to stop chemotherapy at this time."    7/26/2023 Bilateral mastectomy with hybrid reconstruction, SOO/ implants  Dr.Wise Burdick  Path showed benign Bilateral stromal fibrosis    11/13/23 bilateral reconstruction revision, implant exchange, fat grafting, abdominal site revision, dermal grafts to bilateral nipples  Dr. Lake     3/27/2024 Lt Dx Mammo, [facility]   Left there is a hypoechoic, parallel, oval, circumscribed mass measuring approximately 5  x 5 x 3 mm. 3 month follow up.  Left there is a mildly heterogeneous, parallel, oval circumscribed mass measuring 6 x 7 x 6 mm suspected fatty hilum. 3 month follow up.     Both masses are palpable and mobile on physical exam within the clinic. These masses probably reflect benign lymph nodes versus less likely fat necrosis. 3 month follow up.   BI-RADS: 3 Probably Benign     5/9/2024 Bilateral Complete US - [facility]   Left axillary tail corresponds to a circumscribed, round, heterogeneous solid mass with internal vascularity and posterior acoustic enhancement, measuring 10 x 9 x 11 mm. Biopsy recommended.     Left axillary tail, there are scattered oval, circumscribed, fat injections measuring 8 x 3 x 8 mm,     Left 10:00, 8 cm from the nipple, measuring 5 x 4 x 6 mm, 9 x 3 x 7 mm, left 11:00, 9 cm from the " nipple, measuring 10 x 4 x 5 mm.     BI-RADS Category: Overall: 4 - Suspicious 4A    5/15/2024 Left Axillary Tail, CNB, Infinity Clip - [facility]   Fragments of lymph node positive for metastatic poorly differentiated carcinoma (see comment)    TNBC    Comment: Mammary origin of this carcinoma is suggested, although not unequivocally proven, based solely upon this needle core biopsy    sampling. The findings suggest against bronchopulmonary origin. Correlation with clinicoradiologic parameters would be needed for    further potential characterization.     5/2024 MRI Breast - [facility]   Right Nml   High left axilla, there is a suspicious, asymmetric prominent axillary lymph node with a fatty hilum and diffuse cortical thickening measuring 9 x 8 x 7 mm.      Left axillary tail, there is a suspicious, mildly irregular, oval, washout enhancing mass abutting the skin and the lateral and superior aspects of the muscle/implant, measuring 6 x 6 x 6 mm, which corresponds to the oval, heterogeneous mass seen by ultrasound in the left axillary tail measuring 8 x 3 x 8 mm (L1).     10 mm posterior inferior medial to L1 and abutting the posterior aspect of the implant and chest wall muscle, there is a washout enhancing, left axillary tail lymph node with a biopsy clip at the lateral posterior aspect measuring 10 x 11 x 14 mm (L2).      Impression:  Biopsy proven metastatic left axillary tail lymph node measuring 14 mm (L2) with a nearby suspicious mass 10 mm (L1) away measuring 6 mm and a suspicious high left axillary lymph node measuring 9 mm.       5/24/24 CT CAP, bone scan  No distant mets    Med onc marta    OP PEMBROLIZUMAB CARBOPLATIN (AUC 5) WITH WEEKLY PACLITAXEL FOLLOWED BY PEMBROLIZUMAB DOXORUBICIN CYCLOPHOSPHAMIDE FOLLOWED BY PEMBROLIZUMAB 200 MG Q3W  Keynote 522    6/3/24  3 rekha's placed to 3 LN     6/14/24 brain MRI  No mets    7/15/24 left ax US [facility]  Left ax tail mass not visible  Left ax tail LN [id number], was [id number] on  5/9/24 08/26/24  left US  Met ax tail LN 6mm better with clip and rekha  Left ax tail mass gone, rekha in   Left ax LN with rekha nml    No abn LN    Assessment/Plan:      Cancer Staging   Triple negative breast cancer  Staging form: Breast, AJCC 8th Edition  - Pathologic stage from 4/18/2023: Stage IB (pT1b, pN0, cM0, G3, ER-, NJ-, HER2-) - Signed by Ambar Ayala MD on 4/18/2023  - Clinical stage from 5/22/2024: Stage IIB (rcT0, cN1(f), cM0, G3, ER-, NJ-, HER2-) - Signed by Tia Valle MD on 5/22/2024    History of left upper outer quadrant triple negative breast cancer treated with lumpectomy, sentinel node biopsy, 2 rounds of adjuvant chemo.  Then bilateral mastectomy with recon. 2023      Now with recurrent left axillary lymph node triple negative breast cancer.  Appears to have 3 nodes by imaging.      Undergoing NACT. Dr Cruz. Keynote 522  Last chemo 11/12/24    OP PEMBROLIZUMAB CARBOPLATIN (AUC 5) WITH WEEKLY PACLITAXEL FOLLOWED BY PEMBROLIZUMAB DOXORUBICIN CYCLOPHOSPHAMIDE FOLLOWED BY PEMBROLIZUMAB 200 MG Q3W    8/26/24 Masses better to normal with NACT    Plan Postsurgical radiation. Dr Martinez   Large xrt with likely boost per [facility] protocol postop      Left axillary lymph node dissection.  Has REKHA  to the left axillary lymph nodes x3.    Needs to quit vaping.  Explained poor healing, increased malignancies, recurrences  Discussed Chantix.  Risks and benefits.  She wants to try it again. It worked after the starter but she started smoking 2 days later.  Declines smoking cessation clinic  Starter chantix given, explained. She has maintenance.     Prehab  Integrative Oncology      Q 6 week us left  See me around end chemo        08/20/2024 WBC 5.8 hemoglobin 10.6 platelet 107  albumin 3.7 T bili  0.6  ALT 87

## 2024-08-23 NOTE — TELEPHONE ENCOUNTER
Dr Adam, are you available to see her today? May I schedule her maybe virtually?    I am leaving for the day, please follow up with black, if so.

## 2024-08-26 ENCOUNTER — OFFICE VISIT (OUTPATIENT)
Dept: SURGERY | Facility: CLINIC | Age: 39
End: 2024-08-26
Payer: COMMERCIAL

## 2024-08-26 ENCOUNTER — DOCUMENTATION ONLY (OUTPATIENT)
Dept: SURGERY | Facility: CLINIC | Age: 39
End: 2024-08-26

## 2024-08-26 ENCOUNTER — LAB VISIT (OUTPATIENT)
Dept: LAB | Facility: HOSPITAL | Age: 39
End: 2024-08-26
Attending: NURSE PRACTITIONER
Payer: COMMERCIAL

## 2024-08-26 ENCOUNTER — OFFICE VISIT (OUTPATIENT)
Dept: HEMATOLOGY/ONCOLOGY | Facility: CLINIC | Age: 39
End: 2024-08-26
Payer: COMMERCIAL

## 2024-08-26 VITALS
DIASTOLIC BLOOD PRESSURE: 58 MMHG | OXYGEN SATURATION: 99 % | WEIGHT: 130.31 LBS | HEIGHT: 62 IN | TEMPERATURE: 97 F | SYSTOLIC BLOOD PRESSURE: 102 MMHG | HEART RATE: 99 BPM | RESPIRATION RATE: 16 BRPM | BODY MASS INDEX: 23.98 KG/M2

## 2024-08-26 VITALS
WEIGHT: 130.5 LBS | OXYGEN SATURATION: 98 % | TEMPERATURE: 98 F | HEIGHT: 62 IN | DIASTOLIC BLOOD PRESSURE: 73 MMHG | RESPIRATION RATE: 18 BRPM | HEART RATE: 97 BPM | BODY MASS INDEX: 24.01 KG/M2 | SYSTOLIC BLOOD PRESSURE: 107 MMHG

## 2024-08-26 DIAGNOSIS — C50.919 TRIPLE NEGATIVE BREAST CANCER: Primary | ICD-10-CM

## 2024-08-26 DIAGNOSIS — C50.919 TRIPLE NEGATIVE BREAST CANCER: ICD-10-CM

## 2024-08-26 DIAGNOSIS — M54.16 LUMBAR RADICULOPATHY, CHRONIC: Primary | ICD-10-CM

## 2024-08-26 DIAGNOSIS — F17.200 SMOKER: ICD-10-CM

## 2024-08-26 LAB
ALBUMIN SERPL BCP-MCNC: 3.9 G/DL (ref 3.5–5.2)
ALP SERPL-CCNC: 111 U/L (ref 55–135)
ALT SERPL W/O P-5'-P-CCNC: 50 U/L (ref 10–44)
ANION GAP SERPL CALC-SCNC: 12 MMOL/L (ref 8–16)
AST SERPL-CCNC: 23 U/L (ref 10–40)
B-HCG UR QL: NEGATIVE
BASOPHILS # BLD AUTO: ABNORMAL K/UL (ref 0–0.2)
BASOPHILS NFR BLD: 0 % (ref 0–1.9)
BILIRUB SERPL-MCNC: 0.5 MG/DL (ref 0.1–1)
BUN SERPL-MCNC: 19 MG/DL (ref 6–20)
CALCIUM SERPL-MCNC: 9.7 MG/DL (ref 8.7–10.5)
CHLORIDE SERPL-SCNC: 101 MMOL/L (ref 95–110)
CO2 SERPL-SCNC: 27 MMOL/L (ref 23–29)
CREAT SERPL-MCNC: 0.7 MG/DL (ref 0.5–1.4)
DIFFERENTIAL METHOD BLD: ABNORMAL
EOSINOPHIL # BLD AUTO: ABNORMAL K/UL (ref 0–0.5)
EOSINOPHIL NFR BLD: 2 % (ref 0–8)
ERYTHROCYTE [DISTWIDTH] IN BLOOD BY AUTOMATED COUNT: 15.9 % (ref 11.5–14.5)
EST. GFR  (NO RACE VARIABLE): >60 ML/MIN/1.73 M^2
GLUCOSE SERPL-MCNC: 76 MG/DL (ref 70–110)
HCT VFR BLD AUTO: 33.4 % (ref 37–48.5)
HGB BLD-MCNC: 11.6 G/DL (ref 12–16)
IMM GRANULOCYTES # BLD AUTO: ABNORMAL K/UL (ref 0–0.04)
IMM GRANULOCYTES NFR BLD AUTO: ABNORMAL % (ref 0–0.5)
LYMPHOCYTES # BLD AUTO: ABNORMAL K/UL (ref 1–4.8)
LYMPHOCYTES NFR BLD: 25 % (ref 18–48)
MAGNESIUM SERPL-MCNC: 2 MG/DL (ref 1.6–2.6)
MCH RBC QN AUTO: 32.3 PG (ref 27–31)
MCHC RBC AUTO-ENTMCNC: 34.7 G/DL (ref 32–36)
MCV RBC AUTO: 93 FL (ref 82–98)
METAMYELOCYTES NFR BLD MANUAL: 11 %
MONOCYTES # BLD AUTO: ABNORMAL K/UL (ref 0.3–1)
MONOCYTES NFR BLD: 8 % (ref 4–15)
MYELOCYTES NFR BLD MANUAL: 6 %
NEUTROPHILS NFR BLD: 44 % (ref 38–73)
NEUTS BAND NFR BLD MANUAL: 4 %
NRBC BLD-RTO: 0 /100 WBC
PLATELET # BLD AUTO: 137 K/UL (ref 150–450)
PLATELET BLD QL SMEAR: ABNORMAL
PMV BLD AUTO: 8.8 FL (ref 9.2–12.9)
POTASSIUM SERPL-SCNC: 4 MMOL/L (ref 3.5–5.1)
PROT SERPL-MCNC: 6.6 G/DL (ref 6–8.4)
RBC # BLD AUTO: 3.59 M/UL (ref 4–5.4)
SODIUM SERPL-SCNC: 140 MMOL/L (ref 136–145)
TSH SERPL DL<=0.005 MIU/L-ACNC: 2.62 UIU/ML (ref 0.4–4)
WBC # BLD AUTO: 6.85 K/UL (ref 3.9–12.7)

## 2024-08-26 PROCEDURE — 84443 ASSAY THYROID STIM HORMONE: CPT | Performed by: INTERNAL MEDICINE

## 2024-08-26 PROCEDURE — 1160F RVW MEDS BY RX/DR IN RCRD: CPT | Mod: CPTII,S$GLB,, | Performed by: NURSE PRACTITIONER

## 2024-08-26 PROCEDURE — 3008F BODY MASS INDEX DOCD: CPT | Mod: CPTII,S$GLB,, | Performed by: NURSE PRACTITIONER

## 2024-08-26 PROCEDURE — 99999 PR PBB SHADOW E&M-EST. PATIENT-LVL V: CPT | Mod: PBBFAC,,, | Performed by: SURGERY

## 2024-08-26 PROCEDURE — 3074F SYST BP LT 130 MM HG: CPT | Mod: CPTII,S$GLB,, | Performed by: NURSE PRACTITIONER

## 2024-08-26 PROCEDURE — 1159F MED LIST DOCD IN RCRD: CPT | Mod: CPTII,S$GLB,, | Performed by: NURSE PRACTITIONER

## 2024-08-26 PROCEDURE — 85007 BL SMEAR W/DIFF WBC COUNT: CPT | Mod: PN | Performed by: INTERNAL MEDICINE

## 2024-08-26 PROCEDURE — 99999 PR PBB SHADOW E&M-EST. PATIENT-LVL V: CPT | Mod: PBBFAC,,, | Performed by: NURSE PRACTITIONER

## 2024-08-26 PROCEDURE — 99214 OFFICE O/P EST MOD 30 MIN: CPT | Mod: S$GLB,,, | Performed by: NURSE PRACTITIONER

## 2024-08-26 PROCEDURE — 3074F SYST BP LT 130 MM HG: CPT | Mod: CPTII,S$GLB,, | Performed by: SURGERY

## 2024-08-26 PROCEDURE — 99215 OFFICE O/P EST HI 40 MIN: CPT | Mod: S$GLB,,, | Performed by: SURGERY

## 2024-08-26 PROCEDURE — 36415 COLL VENOUS BLD VENIPUNCTURE: CPT | Mod: PN | Performed by: INTERNAL MEDICINE

## 2024-08-26 PROCEDURE — 3078F DIAST BP <80 MM HG: CPT | Mod: CPTII,S$GLB,, | Performed by: NURSE PRACTITIONER

## 2024-08-26 PROCEDURE — 81025 URINE PREGNANCY TEST: CPT | Mod: PN | Performed by: INTERNAL MEDICINE

## 2024-08-26 PROCEDURE — 3078F DIAST BP <80 MM HG: CPT | Mod: CPTII,S$GLB,, | Performed by: SURGERY

## 2024-08-26 PROCEDURE — 3008F BODY MASS INDEX DOCD: CPT | Mod: CPTII,S$GLB,, | Performed by: SURGERY

## 2024-08-26 PROCEDURE — 83735 ASSAY OF MAGNESIUM: CPT | Mod: PN | Performed by: INTERNAL MEDICINE

## 2024-08-26 PROCEDURE — 80053 COMPREHEN METABOLIC PANEL: CPT | Mod: PN | Performed by: INTERNAL MEDICINE

## 2024-08-26 PROCEDURE — 1159F MED LIST DOCD IN RCRD: CPT | Mod: CPTII,S$GLB,, | Performed by: SURGERY

## 2024-08-26 PROCEDURE — 85027 COMPLETE CBC AUTOMATED: CPT | Mod: PN | Performed by: INTERNAL MEDICINE

## 2024-08-26 RX ORDER — VARENICLINE TARTRATE 0.5 (11)-1
KIT ORAL
Qty: 1 EACH | Refills: 0 | Status: SHIPPED | OUTPATIENT
Start: 2024-08-26

## 2024-08-26 NOTE — PROGRESS NOTES
Name: Aleena Duckworth  MRN:  [id number]  :  1985 Age 38 y.o.  Date of Service: 2024    Reason for visit:  Aleena Duckworth is a 38 y.o. female here regarding...clearance for C4D8 of Taxol    # Invasive Ductal Carcinoma of the Left Breast  Date of Original Diagnosis:  2023  Recurrence: 5/15/24, localized  Original Stage: pT1b pN0(sn) triple negative, grade 3, Ki-67 80%  Recurrence Stage: [id number] Stage 2B Triple negative, grade 3, Ki-67 76%  Current Sites of Disease:   left axillary tail/lymph node  Current Goals of Therapy:  Curative  Current Therapy:   keynote 522 NACT     Oncologic History/History of Present Illness:   Former patient of Dr. Ayala, per her notes  self palpated a left-sided breast mass.    2023  bilateral breast mammogram that showed a lobular hypoechoic solid mass within the left breast at 2 o'clock position 7 cm     2023, ultrasound guided biopsy = invasive ductal carcinoma, grade 3, ER/OK/HER2 Randall negative, Ki-67 80%.     23  left partial mastectomy that revealed invasive ductal carcinoma 9 x 8 x 6 mm, single focus, approximately 1 mm focus of high-grade DCIS, closest margin 3 mm, anterior.  Two sentinel lymph nodes were negative.  pT1b pN0(sn).    23 cycle 1 of Docetaxel and Cytoxan with numerous SE  2023 cycle 2 of TC with 20% dose reduction of docetaxel.  With again numerous side effects.     Extensive GOC discussion held with patient and family and decided to stop chemotherapy.     2023 s/p bilateral mastectomy, implant removal and hybrid flap and small implants    2024 Reports to [facility] to establish care with me. Reports left lateral axillary tail nodules.  Causing anxiety.    24    mammogram with ultrasound BI-RADS 4- 11 mm left axillary tail mass  5/15/24   left axillary tail  and lymph node positive for  poorly differentiated carcinoma, triple negative,  staging scans negative.    24  Had a prolonged discussion with patient's  ", father, mother she was motivated to pursue neoadjuvant chemotherapy.    Today (08/26/2024)  -reports ongoing pain in lower back that presents down right thigh; seen in Palliative Care last week for pain affecting sleep; started on Gabapentin - unable to tolerate  Prior: was taking MS Contin q 12 & Oxycodone without relief.    To note:  Taxol was held last week chemo to ascertain if it was the problem with back & leg pain  Discussed imaging to evaluate lumbar spine; patient would also like to skip this week due to pain difficulties  No fevers, chills, abdominal pain, N/V/D, bleeding, CP, Palpitations, etc.       PHYSICAL EXAMINATION:   /73 (BP Location: Left arm, Patient Position: Sitting, BP Method: Medium (Automatic))   Pulse 97   Temp 97.5 °F (36.4 °C) (Temporal)   Resp 18   Ht 5' 2" (1.575 m)   Wt 59.2 kg (130 lb 8.2 oz)   SpO2 98%   BMI 23.87 kg/m²   Wt Readings from Last 3 Encounters:   08/26/24 59.2 kg (130 lb 8.2 oz)   08/23/24 60.7 kg (133 lb 13.1 oz)   08/23/24 60.7 kg (133 lb 13.1 oz)     ECOG PERFORMANCE STATUS: 0  Physical Exam   General:  cushinoid-appearing, nontoxic  Eyes:  Equal and round pupils, EOMI, no scleral icterus  Mouth:  No lesions, moist  Cardiovascular:  Warm, well-perfused, + peripheral edema  Lungs:  Unlabored on room air, no wheezing  Neurologic:  Awake, alert and oriented, participating in the exam  Psych:  Appropriate mood and affect  Skin:  Pale pallor, No rashes  Heme:  No petechiae, no purpura       LABORATORY:  CBC  Lab Results   Component Value Date    WBC 6.85 08/26/2024    HGB 11.6 (L) 08/26/2024    HCT 33.4 (L) 08/26/2024    MCV 93 08/26/2024     (L) 08/26/2024     CMP  Sodium   Date Value Ref Range Status   08/26/2024 140 136 - 145 mmol/L Final     Potassium   Date Value Ref Range Status   08/26/2024 4.0 3.5 - 5.1 mmol/L Final     Chloride   Date Value Ref Range Status   08/26/2024 101 95 - 110 mmol/L Final     CO2   Date Value Ref Range Status "   08/26/2024 27 23 - 29 mmol/L Final     Glucose   Date Value Ref Range Status   08/26/2024 76 70 - 110 mg/dL Final     BUN   Date Value Ref Range Status   08/26/2024 19 6 - 20 mg/dL Final     Creatinine   Date Value Ref Range Status   08/26/2024 0.7 0.5 - 1.4 mg/dL Final     Calcium   Date Value Ref Range Status   08/26/2024 9.7 8.7 - 10.5 mg/dL Final     Total Protein   Date Value Ref Range Status   08/26/2024 6.6 6.0 - 8.4 g/dL Final     Albumin   Date Value Ref Range Status   08/26/2024 3.9 3.5 - 5.2 g/dL Final     Total Bilirubin   Date Value Ref Range Status   08/26/2024 0.5 0.1 - 1.0 mg/dL Final     Comment:     For infants and newborns, interpretation of results should be based  on gestational age, weight and in agreement with clinical  observations.    Premature Infant recommended reference ranges:  Up to 24 hours.............<8.0 mg/dL  Up to 48 hours............<12.0 mg/dL  3-5 days..................<15.0 mg/dL  6-29 days.................<15.0 mg/dL       Alkaline Phosphatase   Date Value Ref Range Status   08/26/2024 111 55 - 135 U/L Final     AST   Date Value Ref Range Status   08/26/2024 23 10 - 40 U/L Final     ALT   Date Value Ref Range Status   08/26/2024 50 (H) 10 - 44 U/L Final     Anion Gap   Date Value Ref Range Status   08/26/2024 12 8 - 16 mmol/L Final     eGFR   Date Value Ref Range Status   08/26/2024 >60.0 >60 mL/min/1.73 m^2 Final     Magnesium:  2.0  UPT:  negative     PATHOLOGY:  DIAGNOSIS:  05/17/2024 RODO/carson  LEFT AXILLARY TAIL, ULTRASOUND-GUIDED NEEDLE CORE BIOPSY:  --FRAGMENTS OF LYMPH NODE POSITIVE FOR METASTATIC POORLY DIFFERENTIATED   CARCINOMA (SEE COMMENT).  --RECEPTOR STUDIES PENDING.      RADIOLOGY:  CT chest abdomen pelvis 05/24/2024  Impression:  1. Postoperative changes of bilateral breast reconstruction with breast implants in place.  Two small subcutaneous soft tissue nodules along the left axillary tail in this patient with biopsy-proven left axillary lymph node triple  negative breast cancer, as detailed above.  Correlate with recent mammographic imaging/biopsy.  2. No evidence of metastatic disease within the chest, abdomen, or pelvis.  3. Mild bilateral pelviectasis without evidence of gina hydronephrosis or obstructive uropathy, nonspecific and may be physiologic.    NM Bone Scan  05/24/2024  Impression:  No evidence of metastatic disease      ASSESSMENT AND PLAN:  Aleena Duckworth is a 38 y.o. female with...    # Invasive Ductal Carcinoma of the Left Breast  Date of Original Diagnosis:  02/09/2023  Recurrence: 5/15/24, localized  Original Stage: pT1b pN0(sn) triple negative, grade 3, Ki-67 80%  Recurrence Stage: [id number] Stage 2B Triple negative,  grade 3, Ki-67 76%  Current Sites of Disease:   left axillary tail/lymph node  Current Goals of Therapy:  Curative  Current Therapy:   pending neoadjuvant chemotherapy        Staging CT and PET negative for distant disease    Added 1 mg of Ativan for each dose of chemotherapy given her adverse reactions in the past and anxiety surrounding chemo  Added filgrastim to  days 2,3,4/9,10,11/16,17,18  given history of neutropenic fever with TC regimen,  can give additional antiemetics if necessary  Advised patient she needs to have weekly mental health visits while on chemotherapy, referral to Oncology Psychology  Will see her weekly during Cycles 1-4   6/17/24--one-week delay for cycle 1 day 15 of chemotherapy due to thrombocytopenia and LFT elevation, platelets 91,000, repeat CBC and CMP on 06/24.   06/24/24:  Dr. Cruz reviewed labs; ok to proceed with Platelets + 91k again this week; Proceed with C1D15 today; f/u next week 07/01 with Dr. Cruz for C2D1 with labs prior.  07/02/24:  Proceed with C2D1 today with Zarxio on Days 2, 4 (holiday on day 3); f/u next week as scheduled on 07/08 with labs prior for clearance for C2D8  07/08/24:  Proceed with C2D8 today; Zarxio on Days 9, 10, 11; f/u in 1 week for clearance for C2D15 with labs  prior.  07/15/24:  Proceed with C2D15 today; Zarxio on Days 16,17,18; f/u in 1 week for clearance for C3D1 with labs prior.  07/22/24:  Defer x 1 week C3D1 with platelets 66k; f/u in 1 week with CBC, CMP, MG, UPT (TSH done 07/15)  07/29/24:  Proceed with C3D1 with platelets 104k; f/u next week with Dr. Cruz with labs for clearance for C3D8  08/05/24:  Proceed with C3D8, full dose   08/12/24:  Proceed with C3D15 with paltelets 104k; Zarxio on Days 16,17,18  08/20/24:  Proceed with C4D1 with filagstrim on days 2,3,4; advised her to take 1/2 dose of steriods and reminded her not to take steriods with day 8 and day 15 paclitaxel; changed carboplatin to AUC 4 due to pain and marked fatigue; dropped paclitaxel due to symptoms mentioned; dose reduce day 8 and ay 15 paclitaxel to 40mg/m2   08/26/24:  HOLD C4D8; obtain MRI lumbar spine STAT - phone review; f/u in 1 week with labs prior    #LFT elevation- advised to d/c OTC meds, one week delay in chemotherapy  8/5/24 down trending proceed w/ full dose   08/26/24:  continues to trend down    # medication induced constipation- advised to take BID docusate and PRN senna   # anxiety about health- as noted above,  could be exacerbated by Adderall, ativan PRN   # history of neutropenic fever- as above  # thrombocytopenia- fluctuating platelet levels, commonly this is associated with over-the-counter medications or other medications, encouraged cessation of all OTCs, improved  08/26/24:  Platelets 137K    #ADHD, has been on Adderall for several years.  Was born prematurely, was diagnosed with a stroke and heart murmur at birth, was treated at [facility].     #Heavy menorrhagia for the past few years, worsening recently and has developed CAROLIN.  Has been on iron supplementation.  Sees gynecologist, Dr. Almita Clark with [facility].      #Hyperbilirubinemia - An abdominal ultrasound was done, negative for any abnormalities.Saw Hepatology prior to chemo who suspected she has  Gilbert's disease and found no contraindication to proceeding with TC.       CACHORRO Ga, FNP-C  [facility]  30 minutes were spent in coordination of patient's care, record review and counseling.        Route Chart for Scheduling    Med Onc Chart Routing      Follow up with physician . Needs F/U with Dr. Cruz in 1 week 09/03 with labs prior:  CBC, CMP, MG, UPT   Follow up with NUNO    Infusion scheduling note   HOLD X 1 week   Injection scheduling note    Labs    Imaging   MRI lumbar spine STAT - phone review   Pharmacy appointment    Other referrals          Treatment Plan Information   OP PEMBROLIZUMAB CARBOPLATIN (AUC 5) WITH WEEKLY PACLITAXEL FOLLOWED BY PEMBROLIZUMAB DOXORUBICIN CYCLOPHOSPHAMIDE FOLLOWED BY PEMBROLIZUMAB 200 MG Q3W Eunice Cruz MD   Associated diagnosis: Triple negative breast cancer Stage IB, Stage IIB pT1b, pN0, cM0, G3, ER-, AZ-, HER2-, rcT0, cN1(f), cM0, G3, ER-, AZ-, HER2- noted on 4/17/2023   Line of treatment: Neoadjuvant  Treatment Goal: Curative     Upcoming Treatment Dates - OP PEMBROLIZUMAB CARBOPLATIN (AUC 5) WITH WEEKLY PACLITAXEL FOLLOWED BY PEMBROLIZUMAB DOXORUBICIN CYCLOPHOSPHAMIDE FOLLOWED BY PEMBROLIZUMAB 200 MG Q3W    8/27/2024       Pre-Medications       dexAMETHasone (DECADRON) 10 mg in sodium chloride 0.9% 50 mL IVPB       diphenhydrAMINE (BENADRYL) 50 mg in NS 50 mL IVPB       famotidine (PF) injection 20 mg       Chemotherapy       PACLitaxeL (TAXOL) 40 mg/m2 = 66 mg in 0.9% NaCl 100 mL chemo infusion       Supportive Care       LORazepam (ATIVAN) injection 1 mg       prochlorperazine injection Soln 10 mg  8/28/2024       Growth Factor       filgrastim-sndz (ZARXIO) injection 300 mcg/0.5 mL (Preferred Regimen)  8/29/2024       Growth Factor       filgrastim-sndz (ZARXIO) injection 300 mcg/0.5 mL (Preferred Regimen)  8/30/2024       Growth Factor       filgrastim-sndz (ZARXIO) injection 300 mcg/0.5 mL (Preferred  Regimen)    Therapy Plan Information  INF FLUIDS for Triple negative breast cancer, noted on 4/17/2023  IV Fluids  sodium chloride 0.9% bolus 1,000 mL 1,000 mL  1,000 mL, Intravenous, Every visit  Flushes  sodium chloride 0.9% flush 10 mL  10 mL, Intravenous, PRN  heparin, porcine (PF) 100 unit/mL injection flush 500 Units  500 Units, Intravenous, PRN  Antiemetics  ondansetron injection 8 mg  8 mg, Intravenous, PRN  promethazine (PHENERGAN) 12.5 mg in dextrose 5 % (D5W) 50 mL IVPB  12.5 mg, Intravenous, PRN  dexAMETHasone injection 8 mg  8 mg, Intravenous, PRN      No therapy plan of the specified type found.    No therapy plan of the specified type found.

## 2024-08-27 ENCOUNTER — PATIENT MESSAGE (OUTPATIENT)
Dept: HEMATOLOGY/ONCOLOGY | Facility: CLINIC | Age: 39
End: 2024-08-27
Payer: COMMERCIAL

## 2024-08-27 NOTE — PATIENT INSTRUCTIONS
gabapentin (NEURONTIN) 300 MG capsule; Take 1 capsule (300 mg total) by mouth 3 (three) times daily.

## 2024-08-28 ENCOUNTER — PATIENT MESSAGE (OUTPATIENT)
Dept: HEMATOLOGY/ONCOLOGY | Facility: CLINIC | Age: 39
End: 2024-08-28
Payer: COMMERCIAL

## 2024-08-28 NOTE — PROGRESS NOTES
Met with patient during her half way through chemo appointment with .  Patient and I reviewed the information she discussed with Dr. Valle including future plans for workup and monitoring. Detailed information was discussed with the patient on lymphedema management and written copies were provided as well. I have explained follow up appointments, surgical procedure, risks, benefits, postop instructions and expectations, she was also given a detailed copy of her post instructions and appointments, 's card and my card. She verbalized understanding of everything above and encouraged to call with any other questions or concerns. Will go over the same information at her end of chemo appointment also

## 2024-08-29 DIAGNOSIS — C50.919 TRIPLE NEGATIVE BREAST CANCER: Primary | ICD-10-CM

## 2024-08-29 DIAGNOSIS — G89.3 NEOPLASM RELATED PAIN: ICD-10-CM

## 2024-08-29 DIAGNOSIS — Z91.89 AT RISK FOR LYMPHEDEMA: ICD-10-CM

## 2024-08-30 ENCOUNTER — PATIENT MESSAGE (OUTPATIENT)
Dept: HEMATOLOGY/ONCOLOGY | Facility: CLINIC | Age: 39
End: 2024-08-30
Payer: COMMERCIAL

## 2024-08-30 ENCOUNTER — NURSE TRIAGE (OUTPATIENT)
Dept: ADMINISTRATIVE | Facility: CLINIC | Age: 39
End: 2024-08-30
Payer: COMMERCIAL

## 2024-08-30 ENCOUNTER — TELEPHONE (OUTPATIENT)
Dept: SURGERY | Facility: CLINIC | Age: 39
End: 2024-08-30
Payer: COMMERCIAL

## 2024-08-30 ENCOUNTER — TELEPHONE (OUTPATIENT)
Dept: HEMATOLOGY/ONCOLOGY | Facility: CLINIC | Age: 39
End: 2024-08-30
Payer: COMMERCIAL

## 2024-08-30 DIAGNOSIS — G89.3 NEOPLASM RELATED PAIN: ICD-10-CM

## 2024-08-30 RX ORDER — OXYCODONE HYDROCHLORIDE 10 MG/1
10 TABLET ORAL EVERY 4 HOURS PRN
Qty: 45 TABLET | Refills: 0 | OUTPATIENT
Start: 2024-08-30

## 2024-08-30 RX ORDER — OXYCODONE HYDROCHLORIDE 10 MG/1
10 TABLET ORAL EVERY 4 HOURS PRN
Qty: 45 TABLET | Refills: 0 | Status: SHIPPED | OUTPATIENT
Start: 2024-08-30

## 2024-08-30 RX ORDER — OXYCODONE HYDROCHLORIDE 10 MG/1
10 TABLET ORAL EVERY 4 HOURS PRN
Qty: 45 TABLET | Refills: 0 | Status: SHIPPED | OUTPATIENT
Start: 2024-08-30 | End: 2024-08-30 | Stop reason: SDUPTHER

## 2024-08-30 NOTE — TELEPHONE ENCOUNTER
Returned patient's call regarding oxycodone refill -- Advised patient Dr Sullivan signed for the refill and prescription was sent to [facility] in [place]. Pt voiced understanding.       ----- Message from Lilliam Talley sent at 8/30/2024  4:36 PM CDT -----  Contact: PT  Type: Needs Medical Advice    Who Called: PT  Best Call Back Number: [phone]  Additional  Information: PT asking for nurse to give her a call back regarding oxyCODONE pain medication being denied  Please Advise- Thank you

## 2024-08-30 NOTE — TELEPHONE ENCOUNTER
OOC NT return call -  Pt reports pain med was called in earlier today but local pharmacy is out of med and pt will be out of med tomorrow morning.  Pt is oncology pt with treatment at [facility]. - medication protocol followed and OCP contacted. See OOC tool for instructions. Pt instructed on new order and will follow up with [facility] for  time.   Encounter routed to Dr. Gar and PCP   Reason for Disposition   [1] Prescription refill request for ESSENTIAL medicine (i.e., likelihood of harm to patient if not taken) AND [2] triager unable to refill per department policy     Pt is a cancer pt and receives chemo treatment    Protocols used: Medication Refill and Renewal Call-A-

## 2024-09-03 ENCOUNTER — TELEPHONE (OUTPATIENT)
Dept: HEMATOLOGY/ONCOLOGY | Facility: CLINIC | Age: 39
End: 2024-09-03
Payer: COMMERCIAL

## 2024-09-03 NOTE — TELEPHONE ENCOUNTER
Patient states she has had a severe migraine since yesterday with nausea. She is headed to ER now to get evaluated. Will keep us updated on progress. Dr Cruz and infusion notified as due today for D8 tx and visit.

## 2024-09-03 NOTE — TELEPHONE ENCOUNTER
----- Message from Gisela Fuller, Patient Care Assistant sent at 9/3/2024  8:02 AM CDT -----  Type: Needs Medical Advice  Who Called:  Aleena  Best Call Back Number: [phone]    Additional Information: Aleena states she is having chemo today and seeing elen , and needs to know if she can still come today,  she is having sweat and chills and migraine, states  its hurts to talk or do anything, please call to further discuss, thank you .

## 2024-09-04 ENCOUNTER — PATIENT MESSAGE (OUTPATIENT)
Dept: HEMATOLOGY/ONCOLOGY | Facility: CLINIC | Age: 39
End: 2024-09-04
Payer: COMMERCIAL

## 2024-09-04 ENCOUNTER — OFFICE VISIT (OUTPATIENT)
Dept: HEMATOLOGY/ONCOLOGY | Facility: CLINIC | Age: 39
End: 2024-09-04
Payer: COMMERCIAL

## 2024-09-04 ENCOUNTER — LAB VISIT (OUTPATIENT)
Dept: LAB | Facility: HOSPITAL | Age: 39
End: 2024-09-04
Attending: INTERNAL MEDICINE
Payer: COMMERCIAL

## 2024-09-04 VITALS
SYSTOLIC BLOOD PRESSURE: 105 MMHG | OXYGEN SATURATION: 95 % | HEIGHT: 62 IN | TEMPERATURE: 98 F | WEIGHT: 129.63 LBS | DIASTOLIC BLOOD PRESSURE: 75 MMHG | RESPIRATION RATE: 16 BRPM | HEART RATE: 91 BPM | BODY MASS INDEX: 23.85 KG/M2

## 2024-09-04 DIAGNOSIS — T45.1X5A CINV (CHEMOTHERAPY-INDUCED NAUSEA AND VOMITING): ICD-10-CM

## 2024-09-04 DIAGNOSIS — C50.919 TRIPLE NEGATIVE BREAST CANCER: Primary | ICD-10-CM

## 2024-09-04 DIAGNOSIS — D63.0 ANEMIA IN NEOPLASTIC DISEASE: ICD-10-CM

## 2024-09-04 DIAGNOSIS — R11.0 CHEMOTHERAPY-INDUCED NAUSEA: ICD-10-CM

## 2024-09-04 DIAGNOSIS — R45.89 ANXIETY ABOUT HEALTH: ICD-10-CM

## 2024-09-04 DIAGNOSIS — T45.1X5A CHEMOTHERAPY-INDUCED NAUSEA: ICD-10-CM

## 2024-09-04 DIAGNOSIS — D69.6 THROMBOCYTOPENIA: ICD-10-CM

## 2024-09-04 DIAGNOSIS — D72.819 LEUKOPENIA, UNSPECIFIED TYPE: ICD-10-CM

## 2024-09-04 DIAGNOSIS — R74.01 TRANSAMINITIS: ICD-10-CM

## 2024-09-04 DIAGNOSIS — C50.919 TRIPLE NEGATIVE BREAST CANCER: ICD-10-CM

## 2024-09-04 DIAGNOSIS — R11.2 CINV (CHEMOTHERAPY-INDUCED NAUSEA AND VOMITING): ICD-10-CM

## 2024-09-04 LAB
ALBUMIN SERPL BCP-MCNC: 3.9 G/DL (ref 3.5–5.2)
ALP SERPL-CCNC: 82 U/L (ref 55–135)
ALT SERPL W/O P-5'-P-CCNC: 59 U/L (ref 10–44)
ANION GAP SERPL CALC-SCNC: 10 MMOL/L (ref 8–16)
AST SERPL-CCNC: 38 U/L (ref 10–40)
B-HCG UR QL: NEGATIVE
BASOPHILS # BLD AUTO: 0.02 K/UL (ref 0–0.2)
BASOPHILS NFR BLD: 0.5 % (ref 0–1.9)
BILIRUB SERPL-MCNC: 1 MG/DL (ref 0.1–1)
BUN SERPL-MCNC: 11 MG/DL (ref 6–20)
CALCIUM SERPL-MCNC: 9.7 MG/DL (ref 8.7–10.5)
CHLORIDE SERPL-SCNC: 104 MMOL/L (ref 95–110)
CO2 SERPL-SCNC: 26 MMOL/L (ref 23–29)
CREAT SERPL-MCNC: 0.7 MG/DL (ref 0.5–1.4)
DIFFERENTIAL METHOD BLD: ABNORMAL
EOSINOPHIL # BLD AUTO: 0 K/UL (ref 0–0.5)
EOSINOPHIL NFR BLD: 1 % (ref 0–8)
ERYTHROCYTE [DISTWIDTH] IN BLOOD BY AUTOMATED COUNT: 14.8 % (ref 11.5–14.5)
EST. GFR  (NO RACE VARIABLE): >60 ML/MIN/1.73 M^2
GLUCOSE SERPL-MCNC: 97 MG/DL (ref 70–110)
HCT VFR BLD AUTO: 32 % (ref 37–48.5)
HGB BLD-MCNC: 11.1 G/DL (ref 12–16)
IMM GRANULOCYTES # BLD AUTO: 0.01 K/UL (ref 0–0.04)
IMM GRANULOCYTES NFR BLD AUTO: 0.2 % (ref 0–0.5)
LYMPHOCYTES # BLD AUTO: 1 K/UL (ref 1–4.8)
LYMPHOCYTES NFR BLD: 23.8 % (ref 18–48)
MAGNESIUM SERPL-MCNC: 2 MG/DL (ref 1.6–2.6)
MCH RBC QN AUTO: 31.2 PG (ref 27–31)
MCHC RBC AUTO-ENTMCNC: 34.7 G/DL (ref 32–36)
MCV RBC AUTO: 90 FL (ref 82–98)
MONOCYTES # BLD AUTO: 0.4 K/UL (ref 0.3–1)
MONOCYTES NFR BLD: 8.4 % (ref 4–15)
NEUTROPHILS # BLD AUTO: 2.8 K/UL (ref 1.8–7.7)
NEUTROPHILS NFR BLD: 66.1 % (ref 38–73)
NRBC BLD-RTO: 0 /100 WBC
PLATELET # BLD AUTO: 103 K/UL (ref 150–450)
PMV BLD AUTO: 8.7 FL (ref 9.2–12.9)
POTASSIUM SERPL-SCNC: 4.3 MMOL/L (ref 3.5–5.1)
PROT SERPL-MCNC: 6.4 G/DL (ref 6–8.4)
RBC # BLD AUTO: 3.56 M/UL (ref 4–5.4)
SODIUM SERPL-SCNC: 140 MMOL/L (ref 136–145)
TSH SERPL DL<=0.005 MIU/L-ACNC: 0.57 UIU/ML (ref 0.4–4)
WBC # BLD AUTO: 4.16 K/UL (ref 3.9–12.7)

## 2024-09-04 PROCEDURE — 99215 OFFICE O/P EST HI 40 MIN: CPT | Mod: S$GLB,,, | Performed by: INTERNAL MEDICINE

## 2024-09-04 PROCEDURE — 85025 COMPLETE CBC W/AUTO DIFF WBC: CPT | Mod: PN | Performed by: INTERNAL MEDICINE

## 2024-09-04 PROCEDURE — 81025 URINE PREGNANCY TEST: CPT | Mod: PN | Performed by: INTERNAL MEDICINE

## 2024-09-04 PROCEDURE — 83735 ASSAY OF MAGNESIUM: CPT | Mod: PN | Performed by: INTERNAL MEDICINE

## 2024-09-04 PROCEDURE — 3008F BODY MASS INDEX DOCD: CPT | Mod: CPTII,S$GLB,, | Performed by: INTERNAL MEDICINE

## 2024-09-04 PROCEDURE — 99999 PR PBB SHADOW E&M-EST. PATIENT-LVL IV: CPT | Mod: PBBFAC,,, | Performed by: INTERNAL MEDICINE

## 2024-09-04 PROCEDURE — G2211 COMPLEX E/M VISIT ADD ON: HCPCS | Mod: S$GLB,,, | Performed by: INTERNAL MEDICINE

## 2024-09-04 PROCEDURE — 3078F DIAST BP <80 MM HG: CPT | Mod: CPTII,S$GLB,, | Performed by: INTERNAL MEDICINE

## 2024-09-04 PROCEDURE — 3074F SYST BP LT 130 MM HG: CPT | Mod: CPTII,S$GLB,, | Performed by: INTERNAL MEDICINE

## 2024-09-04 PROCEDURE — 84443 ASSAY THYROID STIM HORMONE: CPT | Performed by: INTERNAL MEDICINE

## 2024-09-04 PROCEDURE — 80053 COMPREHEN METABOLIC PANEL: CPT | Mod: PN | Performed by: INTERNAL MEDICINE

## 2024-09-04 PROCEDURE — 36415 COLL VENOUS BLD VENIPUNCTURE: CPT | Mod: PN | Performed by: INTERNAL MEDICINE

## 2024-09-04 RX ORDER — DIPHENHYDRAMINE HYDROCHLORIDE 50 MG/ML
50 INJECTION INTRAMUSCULAR; INTRAVENOUS ONCE AS NEEDED
Status: CANCELLED | OUTPATIENT
Start: 2024-09-05

## 2024-09-04 RX ORDER — LORAZEPAM 2 MG/ML
1 INJECTION INTRAMUSCULAR ONCE AS NEEDED
Status: CANCELLED
Start: 2024-09-05

## 2024-09-04 RX ORDER — EPINEPHRINE 0.3 MG/.3ML
0.3 INJECTION SUBCUTANEOUS ONCE AS NEEDED
Status: CANCELLED | OUTPATIENT
Start: 2024-09-05

## 2024-09-04 RX ORDER — SODIUM CHLORIDE 0.9 % (FLUSH) 0.9 %
10 SYRINGE (ML) INJECTION
Status: CANCELLED | OUTPATIENT
Start: 2024-09-05

## 2024-09-04 RX ORDER — FAMOTIDINE 10 MG/ML
20 INJECTION INTRAVENOUS
Status: CANCELLED | OUTPATIENT
Start: 2024-09-05

## 2024-09-04 RX ORDER — GRANISETRON 3.1 MG/24H
1 PATCH TRANSDERMAL ONCE
Qty: 4 PATCH | Refills: 5 | Status: SHIPPED | OUTPATIENT
Start: 2024-09-04 | End: 2024-09-04

## 2024-09-04 RX ORDER — PROCHLORPERAZINE EDISYLATE 5 MG/ML
10 INJECTION INTRAMUSCULAR; INTRAVENOUS ONCE AS NEEDED
Status: CANCELLED
Start: 2024-09-05

## 2024-09-04 RX ORDER — HEPARIN 100 UNIT/ML
500 SYRINGE INTRAVENOUS
Status: CANCELLED | OUTPATIENT
Start: 2024-09-05

## 2024-09-04 NOTE — PROGRESS NOTES
Name: Aleena Duckworth  MRN:  [id number]  :  1985 Age 38 y.o.  Date of Service: 2024    Reason for visit:  Aleena Duckworth is a 38 y.o. female here regarding...clearance for C4D8 of Taxol    # Invasive Ductal Carcinoma of the Left Breast  Date of Original Diagnosis:  2023  Recurrence: 5/15/24, localized  Original Stage: pT1b pN0(sn) triple negative, grade 3, Ki-67 80%  Recurrence Stage: [id number] Stage 2B Triple negative, grade 3, Ki-67 76%  Current Sites of Disease:   left axillary tail/lymph node  Current Goals of Therapy:  Curative  Current Therapy:   keynote 522 NACT     Oncologic History/History of Present Illness:   Former patient of Dr. Ayala, per her notes  self palpated a left-sided breast mass.    2023  bilateral breast mammogram that showed a lobular hypoechoic solid mass within the left breast at 2 o'clock position 7 cm     2023, ultrasound guided biopsy = invasive ductal carcinoma, grade 3, ER/WV/HER2 Randall negative, Ki-67 80%.     23  left partial mastectomy that revealed invasive ductal carcinoma 9 x 8 x 6 mm, single focus, approximately 1 mm focus of high-grade DCIS, closest margin 3 mm, anterior.  Two sentinel lymph nodes were negative.  pT1b pN0(sn).    23 cycle 1 of Docetaxel and Cytoxan with numerous SE  2023 cycle 2 of TC with 20% dose reduction of docetaxel.  With again numerous side effects.     Extensive GOC discussion held with patient and family and decided to stop chemotherapy.     2023 s/p bilateral mastectomy, implant removal and hybrid flap and small implants    2024 Reports to [facility] to establish care with me. Reports left lateral axillary tail nodules.  Causing anxiety.    24    mammogram with ultrasound BI-RADS 4- 11 mm left axillary tail mass  5/15/24   left axillary tail  and lymph node positive for  poorly differentiated carcinoma, triple negative,  staging scans negative.    24  Had a prolonged discussion with patient's  ", father, mother she was motivated to pursue neoadjuvant chemotherapy.    Today (09/06/2024)  -reports migraine yesterday and went to ED, better today but not completely gone away   -states she is not taking the oral steroids right now but reports she can noticed a difference in chemotherapy side effect symptoms when she doesn't take them   -reports she thinks she wants to continue taxol tomorrow at 50% dose reduction skip c4d15 and then resume chemotherapy with c5d1 (AC) in hopes to compete full dose.      PHYSICAL EXAMINATION:   /75 (BP Location: Right arm, Patient Position: Sitting, BP Method: Medium (Automatic))   Pulse 91   Temp 97.5 °F (36.4 °C) (Temporal)   Resp 16   Ht 5' 2" (1.575 m)   Wt 58.8 kg (129 lb 10.1 oz)   SpO2 95%   BMI 23.71 kg/m²   Wt Readings from Last 3 Encounters:   09/05/24 58.4 kg (128 lb 12 oz)   09/04/24 58.8 kg (129 lb 10.1 oz)   09/03/24 58.1 kg (128 lb)     ECOG PERFORMANCE STATUS: 0  Physical Exam   General:  cushinoid-appearing, nontoxic  Eyes:  Equal and round pupils, EOMI, no scleral icterus  Mouth:  No lesions, moist  Cardiovascular:  Warm, well-perfused, + peripheral edema  Lungs:  Unlabored on room air, no wheezing  Neurologic:  Awake, alert and oriented, participating in the exam  Psych:  Appropriate mood and affect  Skin:  Pale pallor, No rashes  Heme:  No petechiae, no purpura       LABORATORY:  reviewed     PATHOLOGY:  DIAGNOSIS:  05/17/2024 RODO/carson  LEFT AXILLARY TAIL, ULTRASOUND-GUIDED NEEDLE CORE BIOPSY:  --FRAGMENTS OF LYMPH NODE POSITIVE FOR METASTATIC POORLY DIFFERENTIATED   CARCINOMA (SEE COMMENT).  --RECEPTOR STUDIES PENDING.      RADIOLOGY:  CT chest abdomen pelvis 05/24/2024  Impression:  1. Postoperative changes of bilateral breast reconstruction with breast implants in place.  Two small subcutaneous soft tissue nodules along the left axillary tail in this patient with biopsy-proven left axillary lymph node triple negative breast cancer, as " detailed above.  Correlate with recent mammographic imaging/biopsy.  2. No evidence of metastatic disease within the chest, abdomen, or pelvis.  3. Mild bilateral pelviectasis without evidence of gina hydronephrosis or obstructive uropathy, nonspecific and may be physiologic.    NM Bone Scan  05/24/2024  Impression:  No evidence of metastatic disease      ASSESSMENT AND PLAN:  Aleena Duckworth is a 38 y.o. female with...    # Invasive Ductal Carcinoma of the Left Breast  Date of Original Diagnosis:  02/09/2023  Recurrence: 5/15/24, localized  Original Stage: pT1b pN0(sn) triple negative, grade 3, Ki-67 80%  Recurrence Stage: [id number] Stage 2B Triple negative, grade 3, Ki-67 76%  Current Sites of Disease:   left axillary tail/lymph node  Current Goals of Therapy:  Curative  Current Therapy:   keynote 522 NACT         Staging CT and PET negative for distant disease    Added 1 mg of Ativan for each dose of chemotherapy given her adverse reactions in the past and anxiety surrounding chemo  Added filgrastim to  days 2,3,4/9,10,11/16,17,18  given history of neutropenic fever with TC regimen,  can give additional antiemetics if necessary  Advised patient she needs to have weekly mental health visits while on chemotherapy, referral to Oncology Psychology  Will see her weekly during Cycles 1-4   6/17/24--one-week delay for cycle 1 day 15 of chemotherapy due to thrombocytopenia and LFT elevation, platelets 91,000, repeat CBC and CMP on 06/24.   06/24/24:  Dr. Cruz reviewed labs; ok to proceed with Platelets + 91k again this week; Proceed with C1D15 today; f/u next week 07/01 with Dr. Cruz for C2D1 with labs prior.  07/02/24:  Proceed with C2D1 today with Zarxio on Days 2, 4 (holiday on day 3); f/u next week as scheduled on 07/08 with labs prior for clearance for C2D8  07/08/24:  Proceed with C2D8 today; Zarxio on Days 9, 10, 11; f/u in 1 week for clearance for C2D15 with labs prior.  07/15/24:  Proceed with C2D15 today;  Zarxio on Days 16,17,18; f/u in 1 week for clearance for C3D1 with labs prior.  07/22/24:  Defer x 1 week C3D1 with platelets 66k; f/u in 1 week with CBC, CMP, MG, UPT (TSH done 07/15)  07/29/24:  Proceed with C3D1 with platelets 104k; f/u next week with Dr. Cruz with labs for clearance for C3D8  08/05/24:  Proceed with C3D8, full dose   08/12/24:  Proceed with C3D15 with paltelets 104k; Zarxio on Days 16,17,18  08/20/24:  Proceed with C4D1 with filagstrim on days 2,3,4; advised her to take 1/2 dose of steriods and reminded her not to take steriods with day 8 and day 15 paclitaxel; changed carboplatin to AUC 4 due to pain and marked fatigue; dropped paclitaxel due to symptoms mentioned; dose reduce day 8 and ay 15 paclitaxel to 40mg/m2   08/26/24:  HOLD C4D8; obtain MRI lumbar spine STAT - phone review; f/u in 1 week with labs prior  9/4/24- proceed C4d8 50% dose reduction in taxol, skip c4d15, no oral steroids for now    #LFT elevation- advised to d/c OTC meds, one week delay in chemotherapy  8/5/24 down trending proceed w/ full dose   08/26/24:  continues to trend down  9/4/24: better     # medication induced constipation- advised to take BID docusate and PRN senna   # anxiety about health- as noted above,  could be exacerbated by Adderall, ativan PRN   # history of neutropenic fever- as above  # thrombocytopenia- fluctuating platelet levels, commonly this is associated with over-the-counter medications or other medications, encouraged cessation of all OTCs, improved  08/26/24:  Platelets 137K    #ADHD, has been on Adderall for several years.  Was born prematurely, was diagnosed with a stroke and heart murmur at birth, was treated at [facility].     #Heavy menorrhagia for the past few years, worsening recently and has developed CAROLIN.  Has been on iron supplementation.  Sees gynecologist, Dr. Almita Clark with [facility].      #Hyperbilirubinemia - An abdominal ultrasound was done, negative for any  abnormalities.Saw Hepatology prior to chemo who suspected she has Gilbert's disease and found no contraindication to proceeding with chemotherapy     Route Chart for Scheduling    Med Onc Chart Routing      Follow up with physician . 5 weeks-- with Cycle 6 (AC), 11 weeks --with cycle 8(AC) (end of treatment)   Follow up with NUNO . 2 weeks with C5(AC); 8 weeks with cycle 7 (AC)   Infusion scheduling note   cleared for [id number] (50% dose reduction); skip c4d15 paclitaxel; will pick back up with c5d1 AC   Injection scheduling note    Labs   Scheduling:  Preferred lab:  Lab interval:  Labs w/ treatment; TSH Monthly   Imaging ECHO   ECHO in next couple of weeks   Pharmacy appointment    Other referrals              Treatment Plan Information   OP PEMBROLIZUMAB CARBOPLATIN (AUC 5) WITH WEEKLY PACLITAXEL FOLLOWED BY PEMBROLIZUMAB DOXORUBICIN CYCLOPHOSPHAMIDE FOLLOWED BY PEMBROLIZUMAB 200 MG Q3W Eunice Cruz MD   Associated diagnosis: Triple negative breast cancer Stage IB, Stage IIB pT1b, pN0, cM0, G3, ER-, SD-, HER2-, rcT0, cN1(f), cM0, G3, ER-, SD-, HER2- noted on 4/17/2023   Line of treatment: Neoadjuvant  Treatment Goal: Curative     Upcoming Treatment Dates - OP PEMBROLIZUMAB CARBOPLATIN (AUC 5) WITH WEEKLY PACLITAXEL FOLLOWED BY PEMBROLIZUMAB DOXORUBICIN CYCLOPHOSPHAMIDE FOLLOWED BY PEMBROLIZUMAB 200 MG Q3W    9/7/2024       Growth Factor       filgrastim-sndz (ZARXIO) injection 300 mcg/0.5 mL (Preferred Regimen)  9/8/2024       Growth Factor       filgrastim-sndz (ZARXIO) injection 300 mcg/0.5 mL (Preferred Regimen)  9/12/2024       Pre-Medications       dexAMETHasone (DECADRON) 10 mg in sodium chloride 0.9% 50 mL IVPB       diphenhydrAMINE (BENADRYL) 50 mg in NS 50 mL IVPB       famotidine (PF) injection 20 mg       Chemotherapy       PACLitaxeL (TAXOL) 40 mg/m2 = 66 mg in 0.9% NaCl 100 mL chemo infusion       Supportive Care       LORazepam (ATIVAN) injection 1 mg       prochlorperazine injection Soln 10  mg  9/13/2024       Growth Factor       filgrastim-sndz (ZARXIO) injection 300 mcg/0.5 mL (Preferred Regimen)    Therapy Plan Information  INF FLUIDS for Triple negative breast cancer, noted on 4/17/2023  IV Fluids  sodium chloride 0.9% bolus 1,000 mL 1,000 mL  1,000 mL, Intravenous, Every visit  Flushes  sodium chloride 0.9% flush 10 mL  10 mL, Intravenous, PRN  heparin, porcine (PF) 100 unit/mL injection flush 500 Units  500 Units, Intravenous, PRN  Antiemetics  ondansetron injection 8 mg  8 mg, Intravenous, PRN  promethazine (PHENERGAN) 12.5 mg in dextrose 5 % (D5W) 50 mL IVPB  12.5 mg, Intravenous, PRN  dexAMETHasone injection 8 mg  8 mg, Intravenous, PRN      No therapy plan of the specified type found.    No therapy plan of the specified type found.

## 2024-09-05 ENCOUNTER — PATIENT MESSAGE (OUTPATIENT)
Dept: HEMATOLOGY/ONCOLOGY | Facility: CLINIC | Age: 39
End: 2024-09-05
Payer: COMMERCIAL

## 2024-09-05 ENCOUNTER — INFUSION (OUTPATIENT)
Dept: INFUSION THERAPY | Facility: HOSPITAL | Age: 39
End: 2024-09-05
Attending: INTERNAL MEDICINE
Payer: COMMERCIAL

## 2024-09-05 VITALS
TEMPERATURE: 98 F | RESPIRATION RATE: 19 BRPM | BODY MASS INDEX: 23.69 KG/M2 | OXYGEN SATURATION: 95 % | SYSTOLIC BLOOD PRESSURE: 96 MMHG | HEART RATE: 95 BPM | DIASTOLIC BLOOD PRESSURE: 63 MMHG | WEIGHT: 128.75 LBS | HEIGHT: 62 IN

## 2024-09-05 DIAGNOSIS — C50.919 TRIPLE NEGATIVE BREAST CANCER: Primary | ICD-10-CM

## 2024-09-05 PROCEDURE — 25000003 PHARM REV CODE 250: Mod: PN | Performed by: INTERNAL MEDICINE

## 2024-09-05 PROCEDURE — 63600175 PHARM REV CODE 636 W HCPCS: Mod: PN | Performed by: INTERNAL MEDICINE

## 2024-09-05 PROCEDURE — 96375 TX/PRO/DX INJ NEW DRUG ADDON: CPT | Mod: PN

## 2024-09-05 PROCEDURE — A4216 STERILE WATER/SALINE, 10 ML: HCPCS | Mod: PN | Performed by: INTERNAL MEDICINE

## 2024-09-05 PROCEDURE — 96413 CHEMO IV INFUSION 1 HR: CPT | Mod: PN

## 2024-09-05 PROCEDURE — 96367 TX/PROPH/DG ADDL SEQ IV INF: CPT | Mod: PN

## 2024-09-05 RX ORDER — PROCHLORPERAZINE EDISYLATE 5 MG/ML
10 INJECTION INTRAMUSCULAR; INTRAVENOUS ONCE AS NEEDED
Start: 2024-09-08

## 2024-09-05 RX ORDER — DIPHENHYDRAMINE HYDROCHLORIDE 50 MG/ML
50 INJECTION INTRAMUSCULAR; INTRAVENOUS ONCE AS NEEDED
Status: DISCONTINUED | OUTPATIENT
Start: 2024-09-05 | End: 2024-09-05 | Stop reason: HOSPADM

## 2024-09-05 RX ORDER — PROCHLORPERAZINE EDISYLATE 5 MG/ML
10 INJECTION INTRAMUSCULAR; INTRAVENOUS ONCE AS NEEDED
Start: 2024-09-07

## 2024-09-05 RX ORDER — PROCHLORPERAZINE EDISYLATE 5 MG/ML
10 INJECTION INTRAMUSCULAR; INTRAVENOUS ONCE AS NEEDED
Status: CANCELLED
Start: 2024-09-06

## 2024-09-05 RX ORDER — SODIUM CHLORIDE 0.9 % (FLUSH) 0.9 %
10 SYRINGE (ML) INJECTION
Status: DISCONTINUED | OUTPATIENT
Start: 2024-09-05 | End: 2024-09-05 | Stop reason: HOSPADM

## 2024-09-05 RX ORDER — HEPARIN 100 UNIT/ML
500 SYRINGE INTRAVENOUS
Status: DISCONTINUED | OUTPATIENT
Start: 2024-09-05 | End: 2024-09-05 | Stop reason: HOSPADM

## 2024-09-05 RX ORDER — PROCHLORPERAZINE EDISYLATE 5 MG/ML
10 INJECTION INTRAMUSCULAR; INTRAVENOUS ONCE AS NEEDED
Status: COMPLETED | OUTPATIENT
Start: 2024-09-05 | End: 2024-09-05

## 2024-09-05 RX ORDER — EPINEPHRINE 0.3 MG/.3ML
0.3 INJECTION SUBCUTANEOUS ONCE AS NEEDED
Status: DISCONTINUED | OUTPATIENT
Start: 2024-09-05 | End: 2024-09-05 | Stop reason: HOSPADM

## 2024-09-05 RX ORDER — FAMOTIDINE 10 MG/ML
20 INJECTION INTRAVENOUS
Status: COMPLETED | OUTPATIENT
Start: 2024-09-05 | End: 2024-09-05

## 2024-09-05 RX ADMIN — SODIUM CHLORIDE: 9 INJECTION, SOLUTION INTRAVENOUS at 11:09

## 2024-09-05 RX ADMIN — Medication 10 ML: at 02:09

## 2024-09-05 RX ADMIN — DEXAMETHASONE SODIUM PHOSPHATE 10 MG: 10 INJECTION, SOLUTION INTRAMUSCULAR; INTRAVENOUS at 11:09

## 2024-09-05 RX ADMIN — PROCHLORPERAZINE EDISYLATE 10 MG: 5 INJECTION INTRAMUSCULAR; INTRAVENOUS at 12:09

## 2024-09-05 RX ADMIN — PACLITAXEL 66 MG: 6 INJECTION, SOLUTION INTRAVENOUS at 01:09

## 2024-09-05 RX ADMIN — DIPHENHYDRAMINE HYDROCHLORIDE 50 MG: 50 INJECTION, SOLUTION INTRAMUSCULAR; INTRAVENOUS at 12:09

## 2024-09-05 RX ADMIN — FAMOTIDINE 20 MG: 10 INJECTION INTRAVENOUS at 12:09

## 2024-09-05 NOTE — PLAN OF CARE
Problem: Fatigue  Goal: Improved Activity Tolerance  Outcome: Progressing  Intervention: Promote Improved Energy  Flowsheets (Taken 9/5/2024 1430)  Fatigue Management: activity schedule adjusted  Environmental Support: calm environment promoted     Problem: Adult Inpatient Plan of Care  Goal: Patient-Specific Goal (Individualized)  Outcome: Progressing  Flowsheets (Taken 9/5/2024 1430)  Individualized Care Needs: recliner, blanket  Anxieties, Fears or Concerns: none voiced  Patient/Family-Specific Goals (Include Timeframe): no signs of reaction  Goal: Absence of Hospital-Acquired Illness or Injury  Outcome: Progressing  Goal: Optimal Comfort and Wellbeing  Outcome: Progressing  Intervention: Provide Person-Centered Care  Flowsheets (Taken 9/5/2024 1430)  Trust Relationship/Rapport: care explained   Pt here for C4D8 Taxol, questions answered. Pt tolerated infusion well. Cryotherapy used to wrists and ankles for duration of Taxol infusion.  Pt discharged home in satisfactory condition with daughter

## 2024-09-06 ENCOUNTER — PATIENT MESSAGE (OUTPATIENT)
Dept: HEMATOLOGY/ONCOLOGY | Facility: CLINIC | Age: 39
End: 2024-09-06
Payer: COMMERCIAL

## 2024-09-06 ENCOUNTER — TELEPHONE (OUTPATIENT)
Dept: HEMATOLOGY/ONCOLOGY | Facility: CLINIC | Age: 39
End: 2024-09-06
Payer: COMMERCIAL

## 2024-09-06 ENCOUNTER — INFUSION (OUTPATIENT)
Dept: INFUSION THERAPY | Facility: HOSPITAL | Age: 39
End: 2024-09-06
Attending: INTERNAL MEDICINE
Payer: COMMERCIAL

## 2024-09-06 VITALS
TEMPERATURE: 98 F | SYSTOLIC BLOOD PRESSURE: 101 MMHG | BODY MASS INDEX: 23.69 KG/M2 | HEART RATE: 100 BPM | DIASTOLIC BLOOD PRESSURE: 66 MMHG | HEIGHT: 62 IN | WEIGHT: 128.75 LBS | RESPIRATION RATE: 16 BRPM

## 2024-09-06 DIAGNOSIS — C50.919 TRIPLE NEGATIVE BREAST CANCER: Primary | ICD-10-CM

## 2024-09-06 PROBLEM — R11.2 CINV (CHEMOTHERAPY-INDUCED NAUSEA AND VOMITING): Status: ACTIVE | Noted: 2024-09-06

## 2024-09-06 PROBLEM — T45.1X5A CINV (CHEMOTHERAPY-INDUCED NAUSEA AND VOMITING): Status: ACTIVE | Noted: 2024-09-06

## 2024-09-06 PROCEDURE — 63600175 PHARM REV CODE 636 W HCPCS: Mod: JZ,JB,JG,PN | Performed by: INTERNAL MEDICINE

## 2024-09-06 PROCEDURE — 96372 THER/PROPH/DIAG INJ SC/IM: CPT | Mod: PN

## 2024-09-06 RX ADMIN — FILGRASTIM-SNDZ 300 MCG: 300 INJECTION, SOLUTION INTRAVENOUS; SUBCUTANEOUS at 01:09

## 2024-09-06 NOTE — TELEPHONE ENCOUNTER
"Received email from Phylicia green at [facility] advising that "spoke with Beth at [facility] and they have Sancuso patches in stock and they have recieved the prescription, so feel free to let Ms. Duckworth she is good to go to call the pharmacy and get her prescription filled as needed. If you need assistance with anything else please feel free to reach out via email or phone.     Thanks     Phylicia Green  [facility]  P:  [phone]  F:  [phone]  E: [email or web address]   "

## 2024-09-06 NOTE — PLAN OF CARE
Problem: Fatigue  Goal: Improved Activity Tolerance  Outcome: Progressing  Intervention: Promote Improved Energy  Flowsheets (Taken 9/6/2024 1303)  Activity Management:   Up in chair - L3   Ambulated in canales - L4     Problem: Adult Inpatient Plan of Care  Goal: Patient-Specific Goal (Individualized)  Outcome: Progressing  Flowsheets (Taken 9/6/2024 1303)  Individualized Care Needs: recliner  Anxieties, Fears or Concerns: none today  Patient/Family-Specific Goals (Include Timeframe): no s/s of reaction with inj   Patient tolerated Zarxio injection well, d/c in no acute distress.

## 2024-09-09 ENCOUNTER — PATIENT MESSAGE (OUTPATIENT)
Dept: HEMATOLOGY/ONCOLOGY | Facility: CLINIC | Age: 39
End: 2024-09-09
Payer: COMMERCIAL

## 2024-09-09 ENCOUNTER — INFUSION (OUTPATIENT)
Dept: INFUSION THERAPY | Facility: HOSPITAL | Age: 39
End: 2024-09-09
Attending: INTERNAL MEDICINE
Payer: COMMERCIAL

## 2024-09-09 VITALS
SYSTOLIC BLOOD PRESSURE: 97 MMHG | TEMPERATURE: 98 F | DIASTOLIC BLOOD PRESSURE: 60 MMHG | HEART RATE: 97 BPM | RESPIRATION RATE: 16 BRPM

## 2024-09-09 DIAGNOSIS — C50.919 TRIPLE NEGATIVE BREAST CANCER: Primary | ICD-10-CM

## 2024-09-09 DIAGNOSIS — G89.3 NEOPLASM RELATED PAIN: ICD-10-CM

## 2024-09-09 DIAGNOSIS — M89.8X9 BONE PAIN DUE TO G-CSF: ICD-10-CM

## 2024-09-09 DIAGNOSIS — F41.9 ANXIETY: ICD-10-CM

## 2024-09-09 PROCEDURE — 96372 THER/PROPH/DIAG INJ SC/IM: CPT | Mod: PN

## 2024-09-09 PROCEDURE — 63600175 PHARM REV CODE 636 W HCPCS: Mod: JZ,JB,JG,PN | Performed by: INTERNAL MEDICINE

## 2024-09-09 RX ORDER — PROCHLORPERAZINE EDISYLATE 5 MG/ML
10 INJECTION INTRAMUSCULAR; INTRAVENOUS ONCE AS NEEDED
Status: DISCONTINUED | OUTPATIENT
Start: 2024-09-09 | End: 2024-09-09 | Stop reason: HOSPADM

## 2024-09-09 RX ORDER — OXYCODONE HYDROCHLORIDE 10 MG/1
10 TABLET ORAL EVERY 4 HOURS PRN
Qty: 45 TABLET | Refills: 0 | Status: SHIPPED | OUTPATIENT
Start: 2024-09-09 | End: 2024-09-10 | Stop reason: SDUPTHER

## 2024-09-09 RX ORDER — LORATADINE 10 MG/1
10 TABLET ORAL DAILY
Qty: 90 TABLET | Refills: 0 | Status: SHIPPED | OUTPATIENT
Start: 2024-09-09 | End: 2024-12-08

## 2024-09-09 RX ORDER — LORAZEPAM 1 MG/1
1 TABLET ORAL 2 TIMES DAILY PRN
Qty: 60 TABLET | Refills: 0 | Status: SHIPPED | OUTPATIENT
Start: 2024-09-09 | End: 2025-09-09

## 2024-09-09 RX ADMIN — FILGRASTIM-SNDZ 300 MCG: 300 INJECTION, SOLUTION INTRAVENOUS; SUBCUTANEOUS at 03:09

## 2024-09-10 ENCOUNTER — INFUSION (OUTPATIENT)
Dept: INFUSION THERAPY | Facility: HOSPITAL | Age: 39
End: 2024-09-10
Attending: INTERNAL MEDICINE
Payer: COMMERCIAL

## 2024-09-10 VITALS
HEART RATE: 98 BPM | TEMPERATURE: 98 F | BODY MASS INDEX: 23.69 KG/M2 | HEIGHT: 62 IN | RESPIRATION RATE: 20 BRPM | OXYGEN SATURATION: 97 % | SYSTOLIC BLOOD PRESSURE: 92 MMHG | WEIGHT: 128.75 LBS | DIASTOLIC BLOOD PRESSURE: 61 MMHG

## 2024-09-10 DIAGNOSIS — G89.3 NEOPLASM RELATED PAIN: ICD-10-CM

## 2024-09-10 DIAGNOSIS — C50.919 TRIPLE NEGATIVE BREAST CANCER: Primary | ICD-10-CM

## 2024-09-10 PROCEDURE — 96372 THER/PROPH/DIAG INJ SC/IM: CPT | Mod: PN

## 2024-09-10 PROCEDURE — 63600175 PHARM REV CODE 636 W HCPCS: Mod: JZ,JB,JG,PN | Performed by: INTERNAL MEDICINE

## 2024-09-10 RX ORDER — OXYCODONE HYDROCHLORIDE 10 MG/1
10 TABLET ORAL EVERY 4 HOURS PRN
Qty: 45 TABLET | Refills: 0 | Status: SHIPPED | OUTPATIENT
Start: 2024-09-10

## 2024-09-10 RX ADMIN — FILGRASTIM-SNDZ 300 MCG: 300 INJECTION, SOLUTION INTRAVENOUS; SUBCUTANEOUS at 02:09

## 2024-09-16 DIAGNOSIS — G89.3 NEOPLASM RELATED PAIN (ACUTE) (CHRONIC): ICD-10-CM

## 2024-09-16 RX ORDER — MORPHINE SULFATE 15 MG/1
15 TABLET, FILM COATED, EXTENDED RELEASE ORAL 2 TIMES DAILY
Qty: 60 TABLET | Refills: 0 | Status: SHIPPED | OUTPATIENT
Start: 2024-09-16 | End: 2024-10-16

## 2024-09-17 ENCOUNTER — OFFICE VISIT (OUTPATIENT)
Dept: HEMATOLOGY/ONCOLOGY | Facility: CLINIC | Age: 39
End: 2024-09-17
Payer: COMMERCIAL

## 2024-09-17 ENCOUNTER — LAB VISIT (OUTPATIENT)
Dept: LAB | Facility: HOSPITAL | Age: 39
End: 2024-09-17
Attending: INTERNAL MEDICINE
Payer: COMMERCIAL

## 2024-09-17 VITALS
BODY MASS INDEX: 23.77 KG/M2 | WEIGHT: 129.19 LBS | OXYGEN SATURATION: 99 % | RESPIRATION RATE: 18 BRPM | TEMPERATURE: 97 F | SYSTOLIC BLOOD PRESSURE: 104 MMHG | DIASTOLIC BLOOD PRESSURE: 67 MMHG | HEART RATE: 101 BPM | HEIGHT: 62 IN

## 2024-09-17 DIAGNOSIS — C50.919 TRIPLE NEGATIVE BREAST CANCER: Primary | ICD-10-CM

## 2024-09-17 DIAGNOSIS — C50.919 TRIPLE NEGATIVE BREAST CANCER: ICD-10-CM

## 2024-09-17 LAB
ALBUMIN SERPL BCP-MCNC: 3.8 G/DL (ref 3.5–5.2)
ALP SERPL-CCNC: 74 U/L (ref 55–135)
ALT SERPL W/O P-5'-P-CCNC: 38 U/L (ref 10–44)
ANION GAP SERPL CALC-SCNC: 9 MMOL/L (ref 8–16)
AST SERPL-CCNC: 34 U/L (ref 10–40)
B-HCG UR QL: NEGATIVE
BASOPHILS # BLD AUTO: 0.01 K/UL (ref 0–0.2)
BASOPHILS NFR BLD: 0.3 % (ref 0–1.9)
BILIRUB SERPL-MCNC: 1.2 MG/DL (ref 0.1–1)
BUN SERPL-MCNC: 17 MG/DL (ref 6–20)
CALCIUM SERPL-MCNC: 9.5 MG/DL (ref 8.7–10.5)
CHLORIDE SERPL-SCNC: 105 MMOL/L (ref 95–110)
CO2 SERPL-SCNC: 26 MMOL/L (ref 23–29)
CREAT SERPL-MCNC: 0.7 MG/DL (ref 0.5–1.4)
DIFFERENTIAL METHOD BLD: ABNORMAL
EOSINOPHIL # BLD AUTO: 0.1 K/UL (ref 0–0.5)
EOSINOPHIL NFR BLD: 1.6 % (ref 0–8)
ERYTHROCYTE [DISTWIDTH] IN BLOOD BY AUTOMATED COUNT: 14.4 % (ref 11.5–14.5)
EST. GFR  (NO RACE VARIABLE): >60 ML/MIN/1.73 M^2
GLUCOSE SERPL-MCNC: 92 MG/DL (ref 70–110)
HCT VFR BLD AUTO: 30.9 % (ref 37–48.5)
HGB BLD-MCNC: 10.8 G/DL (ref 12–16)
IMM GRANULOCYTES # BLD AUTO: 0.02 K/UL (ref 0–0.04)
IMM GRANULOCYTES NFR BLD AUTO: 0.6 % (ref 0–0.5)
LYMPHOCYTES # BLD AUTO: 1.1 K/UL (ref 1–4.8)
LYMPHOCYTES NFR BLD: 33.5 % (ref 18–48)
MAGNESIUM SERPL-MCNC: 2 MG/DL (ref 1.6–2.6)
MCH RBC QN AUTO: 31.3 PG (ref 27–31)
MCHC RBC AUTO-ENTMCNC: 35 G/DL (ref 32–36)
MCV RBC AUTO: 90 FL (ref 82–98)
MONOCYTES # BLD AUTO: 0.3 K/UL (ref 0.3–1)
MONOCYTES NFR BLD: 9.7 % (ref 4–15)
NEUTROPHILS # BLD AUTO: 1.7 K/UL (ref 1.8–7.7)
NEUTROPHILS NFR BLD: 54.3 % (ref 38–73)
NRBC BLD-RTO: 0 /100 WBC
PLATELET # BLD AUTO: 65 K/UL (ref 150–450)
PMV BLD AUTO: 9.3 FL (ref 9.2–12.9)
POTASSIUM SERPL-SCNC: 4.3 MMOL/L (ref 3.5–5.1)
PROT SERPL-MCNC: 6 G/DL (ref 6–8.4)
RBC # BLD AUTO: 3.45 M/UL (ref 4–5.4)
SODIUM SERPL-SCNC: 140 MMOL/L (ref 136–145)
TSH SERPL DL<=0.005 MIU/L-ACNC: 2.26 UIU/ML (ref 0.4–4)
WBC # BLD AUTO: 3.19 K/UL (ref 3.9–12.7)

## 2024-09-17 PROCEDURE — 85025 COMPLETE CBC W/AUTO DIFF WBC: CPT | Mod: PN | Performed by: INTERNAL MEDICINE

## 2024-09-17 PROCEDURE — 1159F MED LIST DOCD IN RCRD: CPT | Mod: CPTII,S$GLB,, | Performed by: NURSE PRACTITIONER

## 2024-09-17 PROCEDURE — 84443 ASSAY THYROID STIM HORMONE: CPT | Performed by: INTERNAL MEDICINE

## 2024-09-17 PROCEDURE — 81025 URINE PREGNANCY TEST: CPT | Mod: PN | Performed by: INTERNAL MEDICINE

## 2024-09-17 PROCEDURE — 99213 OFFICE O/P EST LOW 20 MIN: CPT | Mod: S$GLB,,, | Performed by: NURSE PRACTITIONER

## 2024-09-17 PROCEDURE — 36415 COLL VENOUS BLD VENIPUNCTURE: CPT | Mod: PN | Performed by: INTERNAL MEDICINE

## 2024-09-17 PROCEDURE — 99999 PR PBB SHADOW E&M-EST. PATIENT-LVL V: CPT | Mod: PBBFAC,,, | Performed by: NURSE PRACTITIONER

## 2024-09-17 PROCEDURE — 3078F DIAST BP <80 MM HG: CPT | Mod: CPTII,S$GLB,, | Performed by: NURSE PRACTITIONER

## 2024-09-17 PROCEDURE — 3008F BODY MASS INDEX DOCD: CPT | Mod: CPTII,S$GLB,, | Performed by: NURSE PRACTITIONER

## 2024-09-17 PROCEDURE — 3074F SYST BP LT 130 MM HG: CPT | Mod: CPTII,S$GLB,, | Performed by: NURSE PRACTITIONER

## 2024-09-17 PROCEDURE — 1160F RVW MEDS BY RX/DR IN RCRD: CPT | Mod: CPTII,S$GLB,, | Performed by: NURSE PRACTITIONER

## 2024-09-17 PROCEDURE — 83735 ASSAY OF MAGNESIUM: CPT | Mod: PN | Performed by: INTERNAL MEDICINE

## 2024-09-17 PROCEDURE — 80053 COMPREHEN METABOLIC PANEL: CPT | Mod: PN | Performed by: INTERNAL MEDICINE

## 2024-09-17 RX ORDER — GRANISETRON 3.1 MG/24H
PATCH TRANSDERMAL
COMMUNITY
Start: 2024-09-09

## 2024-09-17 RX ORDER — DIAZEPAM 2 MG/1
2 TABLET ORAL NIGHTLY
COMMUNITY
Start: 2024-09-10

## 2024-09-17 NOTE — PROGRESS NOTES
Name: Aleena Duckworth  MRN:  [id number]  :  1985 Age 38 y.o.  Date of Service: 2024    Reason for visit:  Aleena Duckworth is a 38 y.o. female here regarding...clearance for C5D1 of Taxol    # Invasive Ductal Carcinoma of the Left Breast  Date of Original Diagnosis:  2023  Recurrence: 5/15/24, localized  Original Stage: pT1b pN0(sn) triple negative, grade 3, Ki-67 80%  Recurrence Stage: [id number] Stage 2B Triple negative, grade 3, Ki-67 76%  Current Sites of Disease:   left axillary tail/lymph node  Current Goals of Therapy:  Curative  Current Therapy:   keynote 522 NACT     Oncologic History/History of Present Illness:   Former patient of Dr. Ayala, per her notes  self palpated a left-sided breast mass.    2023  bilateral breast mammogram that showed a lobular hypoechoic solid mass within the left breast at 2 o'clock position 7 cm     2023, ultrasound guided biopsy = invasive ductal carcinoma, grade 3, ER/AL/HER2 Randall negative, Ki-67 80%.     23  left partial mastectomy that revealed invasive ductal carcinoma 9 x 8 x 6 mm, single focus, approximately 1 mm focus of high-grade DCIS, closest margin 3 mm, anterior.  Two sentinel lymph nodes were negative.  pT1b pN0(sn).    23 cycle 1 of Docetaxel and Cytoxan with numerous SE  2023 cycle 2 of TC with 20% dose reduction of docetaxel.  With again numerous side effects.     Extensive GOC discussion held with patient and family and decided to stop chemotherapy.     2023 s/p bilateral mastectomy, implant removal and hybrid flap and small implants    2024 Reports to [facility] to establish care with me. Reports left lateral axillary tail nodules.  Causing anxiety.    24    mammogram with ultrasound BI-RADS 4- 11 mm left axillary tail mass  5/15/24   left axillary tail  and lymph node positive for  poorly differentiated carcinoma, triple negative,  staging scans negative.    24  Had a prolonged discussion with patient's  , father, mother she was motivated to pursue neoadjuvant chemotherapy.    Today (09/17/2024)  Presents with her daughter for consideration of C5 (AC) today; feels good overall. Anxious about new treatment.     Oncology History   Triple negative breast cancer   4/17/2023 Initial Diagnosis    Triple negative breast cancer     4/18/2023 Cancer Staged    Staging form: Breast, AJCC 8th Edition  - Pathologic stage from 4/18/2023: Stage IB (pT1b, pN0, cM0, G3, ER-, AK-, HER2-)     5/1/2023 - 5/25/2023 Chemotherapy    Treatment Summary   Plan Name: OP BREAST TC - DOCETAXEL CYCLOPHOSPHAMIDE Q3W  Treatment Goal: Curative  Status: Inactive  Start Date: 5/1/2023  End Date: 5/25/2023  Provider: Ambar Ayala MD  Chemotherapy: cycloPHOSphamide 600 mg/m2 = 880 mg in sodium chloride 0.9% 289.4 mL chemo infusion, 600 mg/m2 = 880 mg, Intravenous, Clinic/[facility] 1 time, 2 of 4 cycles  Dose modification: 600 mg/m2 (Cycle 3), 480 mg/m2 (Cycle 3, Reason: Dose not tolerated)  Administration: 880 mg (5/1/2023)  DOCEtaxel 75 mg/m2 = 110 mg in sodium chloride 0.9% 290.5 mL chemo infusion, 75 mg/m2 = 110 mg, Intravenous, Clinic/[facility] 1 time, 2 of 4 cycles  Dose modification: 60 mg/m2 (original dose 75 mg/m2, Cycle 2, Reason: Dose not tolerated)  Administration: 110 mg (5/1/2023), 90 mg (5/24/2023)     5/22/2024 Cancer Staged    Staging form: Breast, AJCC 8th Edition  - Clinical stage from 5/22/2024: Stage IIB (rcT0, cN1(f), cM0, G3, ER-, AK-, HER2-)     6/4/2024 -  Chemotherapy    Treatment Summary   Plan Name: OP PEMBROLIZUMAB CARBOPLATIN (AUC 5) WITH WEEKLY PACLITAXEL FOLLOWED BY PEMBROLIZUMAB DOXORUBICIN CYCLOPHOSPHAMIDE FOLLOWED BY PEMBROLIZUMAB 200 MG Q3W  Treatment Goal: Curative  Status: Active  Start Date: 6/4/2024  End Date: 6/3/2025 (Planned)  Provider: Eunice Cruz MD  Chemotherapy: DOXOrubicin chemo injection 94 mg, 60 mg/m2 = 94 mg, Intravenous, Clinic[facility] 1 time, 1 of 4 cycles  CARBOplatin (PARAPLATIN) 525 mg in sodium  "chloride 0.9% 302.5 mL chemo infusion, 525 mg, Intravenous, Clinic/[facility] 1 time, 4 of 4 cycles  Dose modification: 421.2 mg (80 % of original dose 526.5 mg, Cycle 4, Reason: Other (see comments), Comment: thrombocytopenia),   (original dose 526.5 mg, Cycle 4, Reason: Dose not tolerated, Comment: thrombocytopenia)  Administration: 525 mg (6/4/2024), 525 mg (7/2/2024), 485 mg (8/20/2024), 525 mg (7/29/2024)  cycloPHOSphamide 600 mg/m2 = 940 mg in 0.9% NaCl 254.7 mL chemo infusion, 600 mg/m2 = 940 mg, Intravenous, Clinic/[facility] 1 time, 1 of 4 cycles  PACLitaxeL (TAXOL) 80 mg/m2 = 114 mg in sodium chloride 0.9% 250 mL chemo infusion, 80 mg/m2 = 114 mg, Intravenous, Clinic/[facility] 1 time, 4 of 4 cycles  Dose modification: 40 mg/m2 (original dose 40 mg/m2, Cycle 4)  Administration: 114 mg (6/4/2024), 114 mg (6/11/2024), 114 mg (6/24/2024), 114 mg (7/2/2024), 114 mg (7/8/2024), 126 mg (7/29/2024), 120 mg (7/15/2024), 126 mg (8/12/2024), 126 mg (8/5/2024), 66 mg (9/5/2024)         PHYSICAL EXAMINATION: Weight:  Loss of 1/2 pound in 1 week  /67 (BP Location: Left arm, Patient Position: Sitting, BP Method: Medium (Automatic))   Pulse 101   Temp 97.3 °F (36.3 °C) (Temporal)   Resp 18   Ht 5' 2" (1.575 m)   Wt 58.6 kg (129 lb 3 oz)   SpO2 99%   BMI 23.63 kg/m²   Wt Readings from Last 3 Encounters:   09/17/24 58.6 kg (129 lb 3 oz)   09/10/24 58.4 kg (128 lb 12 oz)   09/06/24 58.4 kg (128 lb 12 oz)     ECOG PERFORMANCE STATUS: 0  Physical Exam   General:  cushinoid-appearing, nontoxic  Eyes:  Equal and round pupils, EOMI, no scleral icterus  Mouth:  No lesions, moist  Cardiovascular:  Warm, well-perfused, + peripheral edema  Lungs:  Unlabored on room air, no wheezing  Neurologic:  Awake, alert and oriented, participating in the exam  Psych:  Appropriate mood and affect  Skin:  Pale pallor, No rashes  Heme:  No petechiae, no purpura       LABORATORY:  reviewed     Lab Results   Component Value Date    WBC 3.19 (L) 09/17/2024 "    HGB 10.8 (L) 09/17/2024    HCT 30.9 (L) 09/17/2024    MCV 90 09/17/2024    PLT 65 (L) 09/17/2024       ANC = 1.7  CMP  Sodium   Date Value Ref Range Status   09/17/2024 140 136 - 145 mmol/L Final     Potassium   Date Value Ref Range Status   09/17/2024 4.3 3.5 - 5.1 mmol/L Final     Chloride   Date Value Ref Range Status   09/17/2024 105 95 - 110 mmol/L Final     CO2   Date Value Ref Range Status   09/17/2024 26 23 - 29 mmol/L Final     Glucose   Date Value Ref Range Status   09/17/2024 92 70 - 110 mg/dL Final     BUN   Date Value Ref Range Status   09/17/2024 17 6 - 20 mg/dL Final     Creatinine   Date Value Ref Range Status   09/17/2024 0.7 0.5 - 1.4 mg/dL Final     Calcium   Date Value Ref Range Status   09/17/2024 9.5 8.7 - 10.5 mg/dL Final     Total Protein   Date Value Ref Range Status   09/17/2024 6.0 6.0 - 8.4 g/dL Final     Albumin   Date Value Ref Range Status   09/17/2024 3.8 3.5 - 5.2 g/dL Final     Total Bilirubin   Date Value Ref Range Status   09/17/2024 1.2 (H) 0.1 - 1.0 mg/dL Final     Comment:     For infants and newborns, interpretation of results should be based  on gestational age, weight and in agreement with clinical  observations.    Premature Infant recommended reference ranges:  Up to 24 hours.............<8.0 mg/dL  Up to 48 hours............<12.0 mg/dL  3-5 days..................<15.0 mg/dL  6-29 days.................<15.0 mg/dL       Alkaline Phosphatase   Date Value Ref Range Status   09/17/2024 74 55 - 135 U/L Final     AST   Date Value Ref Range Status   09/17/2024 34 10 - 40 U/L Final     ALT   Date Value Ref Range Status   09/17/2024 38 10 - 44 U/L Final     Anion Gap   Date Value Ref Range Status   09/17/2024 9 8 - 16 mmol/L Final     eGFR   Date Value Ref Range Status   09/17/2024 >60.0 >60 mL/min/1.73 m^2 Final     Magnesium:  2.0  UPT:  NEGATIVE  PATHOLOGY:  DIAGNOSIS:  05/17/2024 RODO/carson  LEFT AXILLARY TAIL, ULTRASOUND-GUIDED NEEDLE CORE BIOPSY:  --FRAGMENTS OF LYMPH NODE  POSITIVE FOR METASTATIC POORLY DIFFERENTIATED   CARCINOMA (SEE COMMENT).  --RECEPTOR STUDIES PENDING.      RADIOLOGY:  CT chest abdomen pelvis 05/24/2024  Impression:  1. Postoperative changes of bilateral breast reconstruction with breast implants in place.  Two small subcutaneous soft tissue nodules along the left axillary tail in this patient with biopsy-proven left axillary lymph node triple negative breast cancer, as detailed above.  Correlate with recent mammographic imaging/biopsy.  2. No evidence of metastatic disease within the chest, abdomen, or pelvis.  3. Mild bilateral pelviectasis without evidence of gina hydronephrosis or obstructive uropathy, nonspecific and may be physiologic.    NM Bone Scan  05/24/2024  Impression:  No evidence of metastatic disease      ASSESSMENT AND PLAN:  Aleena Duckworth is a 38 y.o. female with...    # Invasive Ductal Carcinoma of the Left Breast  Date of Original Diagnosis:  02/09/2023  Recurrence: 5/15/24, localized  Original Stage: pT1b pN0(sn) triple negative, grade 3, Ki-67 80%  Recurrence Stage: [id number] Stage 2B Triple negative, grade 3, Ki-67 76%  Current Sites of Disease:   left axillary tail/lymph node  Current Goals of Therapy:  Curative  Current Therapy:   keynote 522 NACT         Staging CT and PET negative for distant disease    Added 1 mg of Ativan for each dose of chemotherapy given her adverse reactions in the past and anxiety surrounding chemo  Added filgrastim to  days 2,3,4/9,10,11/16,17,18  given history of neutropenic fever with TC regimen,  can give additional antiemetics if necessary  Advised patient she needs to have weekly mental health visits while on chemotherapy, referral to Oncology Psychology  Will see her weekly during Cycles 1-4   6/17/24--one-week delay for cycle 1 day 15 of chemotherapy due to thrombocytopenia and LFT elevation, platelets 91,000, repeat CBC and CMP on 06/24.   06/24/24:  Dr. Cruz reviewed labs; ok to proceed with  Platelets + 91k again this week; Proceed with C1D15 today; f/u next week 07/01 with Dr. Cruz for C2D1 with labs prior.  07/02/24:  Proceed with C2D1 today with Zarxio on Days 2, 4 (holiday on day 3); f/u next week as scheduled on 07/08 with labs prior for clearance for C2D8  07/08/24:  Proceed with C2D8 today; Zarxio on Days 9, 10, 11; f/u in 1 week for clearance for C2D15 with labs prior.  07/15/24:  Proceed with C2D15 today; Zarxio on Days 16,17,18; f/u in 1 week for clearance for C3D1 with labs prior.  07/22/24:  Defer x 1 week C3D1 with platelets 66k; f/u in 1 week with CBC, CMP, MG, UPT (TSH done 07/15)  07/29/24:  Proceed with C3D1 with platelets 104k; f/u next week with Dr. Cruz with labs for clearance for C3D8  08/05/24:  Proceed with C3D8, full dose   08/12/24:  Proceed with C3D15 with paltelets 104k; Zarxio on Days 16,17,18  08/20/24:  Proceed with C4D1 with filagstrim on days 2,3,4; advised her to take 1/2 dose of steriods and reminded her not to take steriods with day 8 and day 15 paclitaxel; changed carboplatin to AUC 4 due to pain and marked fatigue; dropped paclitaxel due to symptoms mentioned; dose reduce day 8 and ay 15 paclitaxel to 40mg/m2   08/26/24:  HOLD C4D8; obtain MRI lumbar spine STAT - phone review; f/u in 1 week with labs prior  9/4/24- proceed C4d8 50% dose reduction in taxol, skip c4d15, no oral steroids for now  09/17/24:  Defer x 1 week in light of platelets = 65k; f/u in 1 week with Dr. MONTANO or me with CBC, CMP, MG, UPT prior (labs day before)    #LFT elevation- advised to d/c OTC meds, one week delay in chemotherapy  8/5/24 down trending proceed w/ full dose   08/26/24:  continues to trend down  9/4/24: better   09/17/24:  Slight uptick in bili = 1.2    # medication induced constipation- advised to take BID docusate and PRN senna   # anxiety about health- as noted above,  could be exacerbated by Adderall, ativan PRN   # history of neutropenic fever- as above  # thrombocytopenia-  fluctuating platelet levels, commonly this is associated with over-the-counter medications or other medications, encouraged cessation of all OTCs, improved  08/26/24:  Platelets 137K  09/17/24:  Platelets 65k    #ADHD, has been on Adderall for several years.  Was born prematurely, was diagnosed with a stroke and heart murmur at birth, was treated at [facility].     #Heavy menorrhagia for the past few years, worsening recently and has developed CAROLIN.  Has been on iron supplementation.  Sees gynecologist, Dr. Almita Clark with [facility].      #Hyperbilirubinemia - An abdominal ultrasound was done, negative for any abnormalities.Saw Hepatology prior to chemo who suspected she has Gilbert's disease and found no contraindication to proceeding with chemotherapy       CACHORRO Ga, FNP-C  [facility]  20 minutes were spent in coordination of patient's care, record review and counseling.    Route Chart for Scheduling    Med Onc Chart Routing      Follow up with physician . F/u in 1 week 09/24 with Dr. Cruz or Wyatt with labs prior:  CBC, CMP, MG UPT - NEED TO PUSH BACK 10/08 APPT TO 10/15   Follow up with NUNO    Infusion scheduling note   Defer x 1 week due to platelets = 65k   Injection scheduling note    Labs    Imaging    Pharmacy appointment    Other referrals          Treatment Plan Information   OP PEMBROLIZUMAB CARBOPLATIN (AUC 5) WITH WEEKLY PACLITAXEL FOLLOWED BY PEMBROLIZUMAB DOXORUBICIN CYCLOPHOSPHAMIDE FOLLOWED BY PEMBROLIZUMAB 200 MG Q3W Eunice Cruz MD   Associated diagnosis: Triple negative breast cancer Stage IB, Stage IIB pT1b, pN0, cM0, G3, ER-, MO-, HER2-, rcT0, cN1(f), cM0, G3, ER-, MO-, HER2- noted on 4/17/2023   Line of treatment: Neoadjuvant  Treatment Goal: Curative     Upcoming Treatment Dates - OP PEMBROLIZUMAB CARBOPLATIN (AUC 5) WITH WEEKLY PACLITAXEL FOLLOWED BY PEMBROLIZUMAB DOXORUBICIN CYCLOPHOSPHAMIDE FOLLOWED BY PEMBROLIZUMAB 200 MG  Q3W    9/24/2024       Chemotherapy       DOXOrubicin chemo injection 94 mg       cycloPHOSphamide 600 mg/m2 = 940 mg in 0.9% NaCl 254.7 mL chemo infusion       Supportive Care       LORazepam injection 1 mg       prochlorperazine injection Soln 10 mg       Antiemetics       aprepitant (CINVANTI) injection 130 mg       palonosetron 0.25mg/dexAMETHasone 12mg in NS IVPB 0.25 mg 50 mL       Immunotherapy       pembrolizumab (KEYTRUDA) 200 mg in 0.9% NaCl SolP 108 mL infusion  10/15/2024       Chemotherapy       DOXOrubicin chemo injection 94 mg       cycloPHOSphamide 600 mg/m2 = 940 mg in 0.9% NaCl 254.7 mL chemo infusion       Supportive Care       LORazepam injection 1 mg       prochlorperazine injection Soln 10 mg       Antiemetics       aprepitant (CINVANTI) injection 130 mg       palonosetron 0.25mg/dexAMETHasone 12mg in NS IVPB 0.25 mg 50 mL       Immunotherapy       pembrolizumab (KEYTRUDA) 200 mg in sodium chloride 0.9% SolP 108 mL infusion  11/5/2024       Chemotherapy       DOXOrubicin chemo injection 94 mg       cycloPHOSphamide 600 mg/m2 = 940 mg in 0.9% NaCl 254.7 mL chemo infusion       Supportive Care       LORazepam injection 1 mg       prochlorperazine injection Soln 10 mg       Antiemetics       aprepitant (CINVANTI) injection 130 mg       palonosetron 0.25mg/dexAMETHasone 12mg in NS IVPB 0.25 mg 50 mL       Immunotherapy       pembrolizumab (KEYTRUDA) 200 mg in sodium chloride 0.9% SolP 108 mL infusion  11/26/2024       Chemotherapy       DOXOrubicin chemo injection 94 mg       cycloPHOSphamide 600 mg/m2 = 940 mg in 0.9% NaCl 254.7 mL chemo infusion       Supportive Care       LORazepam injection 1 mg       prochlorperazine injection Soln 10 mg       Antiemetics       aprepitant (CINVANTI) injection 130 mg       palonosetron 0.25mg/dexAMETHasone 12mg in NS IVPB 0.25 mg 50 mL       Immunotherapy       pembrolizumab (KEYTRUDA) 200 mg in sodium chloride 0.9% SolP 108 mL infusion    Therapy Plan  Information  INF FLUIDS for Triple negative breast cancer, noted on 4/17/2023  IV Fluids  sodium chloride 0.9% bolus 1,000 mL 1,000 mL  1,000 mL, Intravenous, Every visit  Flushes  sodium chloride 0.9% flush 10 mL  10 mL, Intravenous, PRN  heparin, porcine (PF) 100 unit/mL injection flush 500 Units  500 Units, Intravenous, PRN  Antiemetics  ondansetron injection 8 mg  8 mg, Intravenous, PRN  promethazine (PHENERGAN) 12.5 mg in dextrose 5 % (D5W) 50 mL IVPB  12.5 mg, Intravenous, PRN  dexAMETHasone injection 8 mg  8 mg, Intravenous, PRN      No therapy plan of the specified type found.    No therapy plan of the specified type found.

## 2024-09-18 DIAGNOSIS — G89.3 NEOPLASM RELATED PAIN: ICD-10-CM

## 2024-09-19 ENCOUNTER — PATIENT MESSAGE (OUTPATIENT)
Dept: HEMATOLOGY/ONCOLOGY | Facility: CLINIC | Age: 39
End: 2024-09-19
Payer: COMMERCIAL

## 2024-09-23 ENCOUNTER — OFFICE VISIT (OUTPATIENT)
Dept: HEMATOLOGY/ONCOLOGY | Facility: CLINIC | Age: 39
End: 2024-09-23
Payer: COMMERCIAL

## 2024-09-23 ENCOUNTER — LAB VISIT (OUTPATIENT)
Dept: LAB | Facility: HOSPITAL | Age: 39
End: 2024-09-23
Attending: NURSE PRACTITIONER
Payer: COMMERCIAL

## 2024-09-23 VITALS
OXYGEN SATURATION: 97 % | TEMPERATURE: 98 F | DIASTOLIC BLOOD PRESSURE: 69 MMHG | RESPIRATION RATE: 18 BRPM | SYSTOLIC BLOOD PRESSURE: 103 MMHG | HEIGHT: 62 IN | WEIGHT: 125 LBS | BODY MASS INDEX: 23 KG/M2 | HEART RATE: 92 BPM

## 2024-09-23 DIAGNOSIS — C50.919 TRIPLE NEGATIVE BREAST CANCER: Primary | ICD-10-CM

## 2024-09-23 DIAGNOSIS — C50.919 TRIPLE NEGATIVE BREAST CANCER: ICD-10-CM

## 2024-09-23 DIAGNOSIS — D63.0 ANEMIA IN NEOPLASTIC DISEASE: ICD-10-CM

## 2024-09-23 DIAGNOSIS — D69.6 THROMBOCYTOPENIA: ICD-10-CM

## 2024-09-23 LAB
ALBUMIN SERPL BCP-MCNC: 4 G/DL (ref 3.5–5.2)
ALP SERPL-CCNC: 65 U/L (ref 55–135)
ALT SERPL W/O P-5'-P-CCNC: 29 U/L (ref 10–44)
ANION GAP SERPL CALC-SCNC: 8 MMOL/L (ref 8–16)
AST SERPL-CCNC: 22 U/L (ref 10–40)
B-HCG UR QL: NEGATIVE
BASOPHILS # BLD AUTO: 0.01 K/UL (ref 0–0.2)
BASOPHILS NFR BLD: 0.3 % (ref 0–1.9)
BILIRUB SERPL-MCNC: 1.4 MG/DL (ref 0.1–1)
BUN SERPL-MCNC: 14 MG/DL (ref 6–20)
CALCIUM SERPL-MCNC: 9.4 MG/DL (ref 8.7–10.5)
CHLORIDE SERPL-SCNC: 105 MMOL/L (ref 95–110)
CO2 SERPL-SCNC: 27 MMOL/L (ref 23–29)
CREAT SERPL-MCNC: 0.8 MG/DL (ref 0.5–1.4)
DIFFERENTIAL METHOD BLD: ABNORMAL
EOSINOPHIL # BLD AUTO: 0 K/UL (ref 0–0.5)
EOSINOPHIL NFR BLD: 1 % (ref 0–8)
ERYTHROCYTE [DISTWIDTH] IN BLOOD BY AUTOMATED COUNT: 14.1 % (ref 11.5–14.5)
EST. GFR  (NO RACE VARIABLE): >60 ML/MIN/1.73 M^2
GLUCOSE SERPL-MCNC: 85 MG/DL (ref 70–110)
HCT VFR BLD AUTO: 29.9 % (ref 37–48.5)
HGB BLD-MCNC: 10.5 G/DL (ref 12–16)
IMM GRANULOCYTES # BLD AUTO: 0.01 K/UL (ref 0–0.04)
IMM GRANULOCYTES NFR BLD AUTO: 0.3 % (ref 0–0.5)
LYMPHOCYTES # BLD AUTO: 0.7 K/UL (ref 1–4.8)
LYMPHOCYTES NFR BLD: 23.1 % (ref 18–48)
MAGNESIUM SERPL-MCNC: 2 MG/DL (ref 1.6–2.6)
MCH RBC QN AUTO: 31.1 PG (ref 27–31)
MCHC RBC AUTO-ENTMCNC: 35.1 G/DL (ref 32–36)
MCV RBC AUTO: 89 FL (ref 82–98)
MONOCYTES # BLD AUTO: 0.2 K/UL (ref 0.3–1)
MONOCYTES NFR BLD: 6.5 % (ref 4–15)
NEUTROPHILS # BLD AUTO: 2.1 K/UL (ref 1.8–7.7)
NEUTROPHILS NFR BLD: 68.8 % (ref 38–73)
NRBC BLD-RTO: 0 /100 WBC
PLATELET # BLD AUTO: 111 K/UL (ref 150–450)
PLATELET BLD QL SMEAR: ABNORMAL
PMV BLD AUTO: 8.9 FL (ref 9.2–12.9)
POTASSIUM SERPL-SCNC: 4.7 MMOL/L (ref 3.5–5.1)
PROT SERPL-MCNC: 6.2 G/DL (ref 6–8.4)
RBC # BLD AUTO: 3.38 M/UL (ref 4–5.4)
SODIUM SERPL-SCNC: 140 MMOL/L (ref 136–145)
TSH SERPL DL<=0.005 MIU/L-ACNC: 1.41 UIU/ML (ref 0.4–4)
WBC # BLD AUTO: 3.07 K/UL (ref 3.9–12.7)

## 2024-09-23 PROCEDURE — 84443 ASSAY THYROID STIM HORMONE: CPT | Performed by: INTERNAL MEDICINE

## 2024-09-23 PROCEDURE — 81025 URINE PREGNANCY TEST: CPT | Mod: PN | Performed by: INTERNAL MEDICINE

## 2024-09-23 PROCEDURE — 3008F BODY MASS INDEX DOCD: CPT | Mod: CPTII,S$GLB,, | Performed by: NURSE PRACTITIONER

## 2024-09-23 PROCEDURE — 36415 COLL VENOUS BLD VENIPUNCTURE: CPT | Mod: PN | Performed by: INTERNAL MEDICINE

## 2024-09-23 PROCEDURE — 83735 ASSAY OF MAGNESIUM: CPT | Mod: PN | Performed by: INTERNAL MEDICINE

## 2024-09-23 PROCEDURE — 3074F SYST BP LT 130 MM HG: CPT | Mod: CPTII,S$GLB,, | Performed by: NURSE PRACTITIONER

## 2024-09-23 PROCEDURE — 85025 COMPLETE CBC W/AUTO DIFF WBC: CPT | Mod: PN | Performed by: INTERNAL MEDICINE

## 2024-09-23 PROCEDURE — 1159F MED LIST DOCD IN RCRD: CPT | Mod: CPTII,S$GLB,, | Performed by: NURSE PRACTITIONER

## 2024-09-23 PROCEDURE — 99999 PR PBB SHADOW E&M-EST. PATIENT-LVL IV: CPT | Mod: PBBFAC,,, | Performed by: NURSE PRACTITIONER

## 2024-09-23 PROCEDURE — 80053 COMPREHEN METABOLIC PANEL: CPT | Mod: PN | Performed by: INTERNAL MEDICINE

## 2024-09-23 PROCEDURE — 3078F DIAST BP <80 MM HG: CPT | Mod: CPTII,S$GLB,, | Performed by: NURSE PRACTITIONER

## 2024-09-23 PROCEDURE — 99213 OFFICE O/P EST LOW 20 MIN: CPT | Mod: S$GLB,,, | Performed by: NURSE PRACTITIONER

## 2024-09-23 RX ORDER — OXYCODONE HYDROCHLORIDE 10 MG/1
10 TABLET ORAL EVERY 4 HOURS PRN
Qty: 45 TABLET | Refills: 0 | Status: SHIPPED | OUTPATIENT
Start: 2024-09-23

## 2024-09-23 RX ORDER — LORAZEPAM 2 MG/ML
1 INJECTION INTRAMUSCULAR ONCE AS NEEDED
Status: CANCELLED
Start: 2024-09-24

## 2024-09-23 RX ORDER — HEPARIN 100 UNIT/ML
500 SYRINGE INTRAVENOUS
Status: CANCELLED | OUTPATIENT
Start: 2024-09-24

## 2024-09-23 RX ORDER — EPINEPHRINE 0.3 MG/.3ML
0.3 INJECTION SUBCUTANEOUS ONCE AS NEEDED
Status: CANCELLED | OUTPATIENT
Start: 2024-09-24

## 2024-09-23 RX ORDER — SODIUM CHLORIDE 0.9 % (FLUSH) 0.9 %
10 SYRINGE (ML) INJECTION
Status: CANCELLED | OUTPATIENT
Start: 2024-09-24

## 2024-09-23 RX ORDER — PROCHLORPERAZINE EDISYLATE 5 MG/ML
10 INJECTION INTRAMUSCULAR; INTRAVENOUS ONCE AS NEEDED
Status: CANCELLED
Start: 2024-09-24

## 2024-09-23 RX ORDER — DIPHENHYDRAMINE HYDROCHLORIDE 50 MG/ML
50 INJECTION INTRAMUSCULAR; INTRAVENOUS ONCE AS NEEDED
Status: CANCELLED | OUTPATIENT
Start: 2024-09-24

## 2024-09-23 RX ORDER — DOXORUBICIN HYDROCHLORIDE 2 MG/ML
60 INJECTION, SOLUTION INTRAVENOUS
Status: CANCELLED | OUTPATIENT
Start: 2024-09-24

## 2024-09-23 NOTE — PROGRESS NOTES
Name: Aleena Duckworth  MRN:  [id number]  :  1985 Age 38 y.o.  Date of Service: 2024    Reason for visit:  Aleena Duckworth is a 38 y.o. female here regarding...clearance for C5D1 of Taxol (deferred x 1 week; platelets 65k)    # Invasive Ductal Carcinoma of the Left Breast  Date of Original Diagnosis:  2023  Recurrence: 5/15/24, localized  Original Stage: pT1b pN0(sn) triple negative, grade 3, Ki-67 80%  Recurrence Stage: [id number] Stage 2B Triple negative, grade 3, Ki-67 76%  Current Sites of Disease:   left axillary tail/lymph node  Current Goals of Therapy:  Curative  Current Therapy:   keynote 522 NACT     Oncologic History/History of Present Illness:   Former patient of Dr. Ayala, per her notes  self palpated a left-sided breast mass.    2023  bilateral breast mammogram that showed a lobular hypoechoic solid mass within the left breast at 2 o'clock position 7 cm     2023, ultrasound guided biopsy = invasive ductal carcinoma, grade 3, ER/DC/HER2 Randall negative, Ki-67 80%.     23  left partial mastectomy that revealed invasive ductal carcinoma 9 x 8 x 6 mm, single focus, approximately 1 mm focus of high-grade DCIS, closest margin 3 mm, anterior.  Two sentinel lymph nodes were negative.  pT1b pN0(sn).    23 cycle 1 of Docetaxel and Cytoxan with numerous SE  2023 cycle 2 of TC with 20% dose reduction of docetaxel.  With again numerous side effects.     Extensive GOC discussion held with patient and family and decided to stop chemotherapy.     2023 s/p bilateral mastectomy, implant removal and hybrid flap and small implants    2024 Reports to [facility] to establish care with me. Reports left lateral axillary tail nodules.  Causing anxiety.    24    mammogram with ultrasound BI-RADS 4- 11 mm left axillary tail mass  5/15/24   left axillary tail  and lymph node positive for  poorly differentiated carcinoma, triple negative,  staging scans negative.    24  Had a  prolonged discussion with patient's , father, mother she was motivated to pursue neoadjuvant chemotherapy.    Today (09/23/2024)  Presents with her daughter for consideration of C5 (AC) today.  Reports  came home sick this weekend; negative COVID; feels well overall.  No fevers, chills, sore throat, myalgias, abdominal discomfort, N/V/D, etc.     Oncology History   Triple negative breast cancer   4/17/2023 Initial Diagnosis    Triple negative breast cancer     4/18/2023 Cancer Staged    Staging form: Breast, AJCC 8th Edition  - Pathologic stage from 4/18/2023: Stage IB (pT1b, pN0, cM0, G3, ER-, AR-, HER2-)     5/1/2023 - 5/25/2023 Chemotherapy    Treatment Summary   Plan Name: OP BREAST TC - DOCETAXEL CYCLOPHOSPHAMIDE Q3W  Treatment Goal: Curative  Status: Inactive  Start Date: 5/1/2023  End Date: 5/25/2023  Provider: Ambar Ayala MD  Chemotherapy: cycloPHOSphamide 600 mg/m2 = 880 mg in sodium chloride 0.9% 289.4 mL chemo infusion, 600 mg/m2 = 880 mg, Intravenous, Clinic/HOD 1 time, 2 of 4 cycles  Dose modification: 600 mg/m2 (Cycle 3), 480 mg/m2 (Cycle 3, Reason: Dose not tolerated)  Administration: 880 mg (5/1/2023)  DOCEtaxel 75 mg/m2 = 110 mg in sodium chloride 0.9% 290.5 mL chemo infusion, 75 mg/m2 = 110 mg, Intravenous, Clinic/HOD 1 time, 2 of 4 cycles  Dose modification: 60 mg/m2 (original dose 75 mg/m2, Cycle 2, Reason: Dose not tolerated)  Administration: 110 mg (5/1/2023), 90 mg (5/24/2023)     5/22/2024 Cancer Staged    Staging form: Breast, AJCC 8th Edition  - Clinical stage from 5/22/2024: Stage IIB (rcT0, cN1(f), cM0, G3, ER-, AR-, HER2-)     6/4/2024 -  Chemotherapy    Treatment Summary   Plan Name: OP PEMBROLIZUMAB CARBOPLATIN (AUC 5) WITH WEEKLY PACLITAXEL FOLLOWED BY PEMBROLIZUMAB DOXORUBICIN CYCLOPHOSPHAMIDE FOLLOWED BY PEMBROLIZUMAB 200 MG Q3W  Treatment Goal: Curative  Status: Active  Start Date: 6/4/2024  End Date: 6/3/2025 (Planned)  Provider: Euncie Cruz,  "MD  Chemotherapy: DOXOrubicin chemo injection 94 mg, 60 mg/m2 = 94 mg, Intravenous, Clinic/HOD 1 time, 1 of 4 cycles  CARBOplatin (PARAPLATIN) 525 mg in sodium chloride 0.9% 302.5 mL chemo infusion, 525 mg, Intravenous, Clinic/HOD 1 time, 4 of 4 cycles  Dose modification: 421.2 mg (80 % of original dose 526.5 mg, Cycle 4, Reason: Other (see comments), Comment: thrombocytopenia),   (original dose 526.5 mg, Cycle 4, Reason: Dose not tolerated, Comment: thrombocytopenia)  Administration: 525 mg (6/4/2024), 525 mg (7/2/2024), 485 mg (8/20/2024), 525 mg (7/29/2024)  cycloPHOSphamide 600 mg/m2 = 940 mg in 0.9% NaCl 254.7 mL chemo infusion, 600 mg/m2 = 940 mg, Intravenous, Clinic/HOD 1 time, 1 of 4 cycles  PACLitaxeL (TAXOL) 80 mg/m2 = 114 mg in sodium chloride 0.9% 250 mL chemo infusion, 80 mg/m2 = 114 mg, Intravenous, Clinic/HOD 1 time, 4 of 4 cycles  Dose modification: 40 mg/m2 (original dose 40 mg/m2, Cycle 4)  Administration: 114 mg (6/4/2024), 114 mg (6/11/2024), 114 mg (6/24/2024), 114 mg (7/2/2024), 114 mg (7/8/2024), 126 mg (7/29/2024), 120 mg (7/15/2024), 126 mg (8/12/2024), 126 mg (8/5/2024), 66 mg (9/5/2024)         PHYSICAL EXAMINATION:    /69 (BP Location: Right arm, Patient Position: Sitting, BP Method: Medium (Automatic))   Pulse 92   Temp 97.7 °F (36.5 °C) (Temporal)   Resp 18   Ht 5' 2" (1.575 m)   Wt 56.7 kg (125 lb)   SpO2 97%   BMI 22.86 kg/m²   Wt Readings from Last 3 Encounters:   09/23/24 56.7 kg (125 lb)   09/17/24 58.6 kg (129 lb 3 oz)   09/10/24 58.4 kg (128 lb 12 oz)     ECOG PERFORMANCE STATUS: 0  Physical Exam   General:  cushinoid-appearing, nontoxic  Eyes:  Equal and round pupils, EOMI, no scleral icterus  Mouth:  No lesions, moist  Cardiovascular:  Warm, well-perfused  Lungs:  Unlabored on room air, no wheezing  Neurologic:  Awake, alert and oriented, participating in the exam  Psych:  Appropriate mood and affect  Skin:  Pale pallor, No rashes  Heme:  No petechiae, no purpura   "     LABORATORY:  reviewed     Lab Results   Component Value Date    WBC 3.07 (L) 09/23/2024    HGB 10.5 (L) 09/23/2024    HCT 29.9 (L) 09/23/2024    MCV 89 09/23/2024     (L) 09/23/2024     ANC = 2.1    CMP  Sodium   Date Value Ref Range Status   09/23/2024 140 136 - 145 mmol/L Final     Potassium   Date Value Ref Range Status   09/23/2024 4.7 3.5 - 5.1 mmol/L Final     Chloride   Date Value Ref Range Status   09/23/2024 105 95 - 110 mmol/L Final     CO2   Date Value Ref Range Status   09/23/2024 27 23 - 29 mmol/L Final     Glucose   Date Value Ref Range Status   09/23/2024 85 70 - 110 mg/dL Final     BUN   Date Value Ref Range Status   09/23/2024 14 6 - 20 mg/dL Final     Creatinine   Date Value Ref Range Status   09/23/2024 0.8 0.5 - 1.4 mg/dL Final     Calcium   Date Value Ref Range Status   09/23/2024 9.4 8.7 - 10.5 mg/dL Final     Total Protein   Date Value Ref Range Status   09/23/2024 6.2 6.0 - 8.4 g/dL Final     Albumin   Date Value Ref Range Status   09/23/2024 4.0 3.5 - 5.2 g/dL Final     Total Bilirubin   Date Value Ref Range Status   09/23/2024 1.4 (H) 0.1 - 1.0 mg/dL Final     Comment:     For infants and newborns, interpretation of results should be based  on gestational age, weight and in agreement with clinical  observations.    Premature Infant recommended reference ranges:  Up to 24 hours.............<8.0 mg/dL  Up to 48 hours............<12.0 mg/dL  3-5 days..................<15.0 mg/dL  6-29 days.................<15.0 mg/dL       Alkaline Phosphatase   Date Value Ref Range Status   09/23/2024 65 55 - 135 U/L Final     AST   Date Value Ref Range Status   09/23/2024 22 10 - 40 U/L Final     ALT   Date Value Ref Range Status   09/23/2024 29 10 - 44 U/L Final     Anion Gap   Date Value Ref Range Status   09/23/2024 8 8 - 16 mmol/L Final     eGFR   Date Value Ref Range Status   09/23/2024 >60.0 >60 mL/min/1.73 m^2 Final     Magnesium:  2.0  UPT:  processing  PATHOLOGY:  DIAGNOSIS:  05/17/2024  RODO/carson  LEFT AXILLARY TAIL, ULTRASOUND-GUIDED NEEDLE CORE BIOPSY:  --FRAGMENTS OF LYMPH NODE POSITIVE FOR METASTATIC POORLY DIFFERENTIATED   CARCINOMA (SEE COMMENT).  --RECEPTOR STUDIES PENDING.      RADIOLOGY:  CT chest abdomen pelvis 05/24/2024  Impression:  1. Postoperative changes of bilateral breast reconstruction with breast implants in place.  Two small subcutaneous soft tissue nodules along the left axillary tail in this patient with biopsy-proven left axillary lymph node triple negative breast cancer, as detailed above.  Correlate with recent mammographic imaging/biopsy.  2. No evidence of metastatic disease within the chest, abdomen, or pelvis.  3. Mild bilateral pelviectasis without evidence of gina hydronephrosis or obstructive uropathy, nonspecific and may be physiologic.    NM Bone Scan  05/24/2024  Impression:  No evidence of metastatic disease      ASSESSMENT AND PLAN:  Aleena Duckworth is a 38 y.o. female with...    # Invasive Ductal Carcinoma of the Left Breast  Date of Original Diagnosis:  02/09/2023  Recurrence: 5/15/24, localized  Original Stage: pT1b pN0(sn) triple negative, grade 3, Ki-67 80%  Recurrence Stage: [id number] Stage 2B Triple negative, grade 3, Ki-67 76%  Current Sites of Disease:   left axillary tail/lymph node  Current Goals of Therapy:  Curative  Current Therapy:   keynote 522 NACT         Staging CT and PET negative for distant disease    Added 1 mg of Ativan for each dose of chemotherapy given her adverse reactions in the past and anxiety surrounding chemo  Added filgrastim to  days 2,3,4/9,10,11/16,17,18  given history of neutropenic fever with TC regimen,  can give additional antiemetics if necessary  Advised patient she needs to have weekly mental health visits while on chemotherapy, referral to Oncology Psychology  Will see her weekly during Cycles 1-4   6/17/24--one-week delay for cycle 1 day 15 of chemotherapy due to thrombocytopenia and LFT elevation, platelets 91,000,  repeat CBC and CMP on 06/24.   06/24/24:  Dr. Cruz reviewed labs; ok to proceed with Platelets + 91k again this week; Proceed with C1D15 today; f/u next week 07/01 with Dr. Cruz for C2D1 with labs prior.  07/02/24:  Proceed with C2D1 today with Zarxio on Days 2, 4 (holiday on day 3); f/u next week as scheduled on 07/08 with labs prior for clearance for C2D8  07/08/24:  Proceed with C2D8 today; Zarxio on Days 9, 10, 11; f/u in 1 week for clearance for C2D15 with labs prior.  07/15/24:  Proceed with C2D15 today; Zarxio on Days 16,17,18; f/u in 1 week for clearance for C3D1 with labs prior.  07/22/24:  Defer x 1 week C3D1 with platelets 66k; f/u in 1 week with CBC, CMP, MG, UPT (TSH done 07/15)  07/29/24:  Proceed with C3D1 with platelets 104k; f/u next week with Dr. Cruz with labs for clearance for C3D8  08/05/24:  Proceed with C3D8, full dose   08/12/24:  Proceed with C3D15 with paltelets 104k; Zarxio on Days 16,17,18  08/20/24:  Proceed with C4D1 with filagstrim on days 2,3,4; advised her to take 1/2 dose of steriods and reminded her not to take steriods with day 8 and day 15 paclitaxel; changed carboplatin to AUC 4 due to pain and marked fatigue; dropped paclitaxel due to symptoms mentioned; dose reduce day 8 and ay 15 paclitaxel to 40mg/m2   08/26/24:  HOLD C4D8; obtain MRI lumbar spine STAT - phone review; f/u in 1 week with labs prior  9/4/24- proceed C4d8 50% dose reduction in taxol, skip c4d15, no oral steroids for now  09/17/24:  Defer x 1 week in light of platelets = 65k; f/u in 1 week with Dr. MONTANO or me with CBC, CMP, MG, UPT prior (labs day before)  09/23/24:  Proceed with C5D1 tomorrow 09/24 as long as patient does not start running fever, chills, flu-like symptoms (recent exposure); f/u in 3 weeks as scheduled with Dr. Cruz with labs prior for consideration of C6    #LFT elevation- advised to d/c OTC meds, one week delay in chemotherapy  8/5/24 down trending proceed w/ full dose   08/26/24:   continues to trend down  9/4/24: better   09/17/24:  Slight uptick in bili = 1.2  09/23/24:  bili 1.4; liver enzymes wnl    # medication induced constipation- advised to take BID docusate and PRN senna   # anxiety about health- as noted above,  could be exacerbated by Adderall, ativan PRN   # history of neutropenic fever- as above  # thrombocytopenia- fluctuating platelet levels, commonly this is associated with over-the-counter medications or other medications, encouraged cessation of all OTCs, improved  08/26/24:  Platelets 137K  09/17/24:  Platelets 65k  09/22/24:  Platelets 111k    #ADHD, has been on Adderall for several years.  Was born prematurely, was diagnosed with a stroke and heart murmur at birth, was treated at [facility].     #Heavy menorrhagia for the past few years, worsening recently and has developed CAROLIN.  Has been on iron supplementation.  Sees gynecologist, Dr. Almita Clark with [facility].      #Hyperbilirubinemia - An abdominal ultrasound was done, negative for any abnormalities.Saw Hepatology prior to chemo who suspected she has Gilbert's disease and found no contraindication to proceeding with chemotherapy       CACHORRO Ga, FNP-C  [facility]  20 minutes were spent in coordination of patient's care, record review and counseling.    Route Chart for Scheduling    Med Onc Chart Routing      Follow up with physician . F/u as scheduled with Dr. Cruz on 10/15 with labs prior   Follow up with NUNO    Infusion scheduling note   Proceed wtih C5D1 on 09/24   Injection scheduling note    Labs    Imaging    Pharmacy appointment    Other referrals              Treatment Plan Information   OP PEMBROLIZUMAB CARBOPLATIN (AUC 5) WITH WEEKLY PACLITAXEL FOLLOWED BY PEMBROLIZUMAB DOXORUBICIN CYCLOPHOSPHAMIDE FOLLOWED BY PEMBROLIZUMAB 200 MG Q3W Eunice Cruz MD   Associated diagnosis: Triple negative breast cancer Stage IB, Stage IIB pT1b, pN0, cM0, G3, ER-,  ID-, HER2-, rcT0, cN1(f), cM0, G3, ER-, ID-, HER2- noted on 4/17/2023   Line of treatment: Neoadjuvant  Treatment Goal: Curative     Upcoming Treatment Dates - OP PEMBROLIZUMAB CARBOPLATIN (AUC 5) WITH WEEKLY PACLITAXEL FOLLOWED BY PEMBROLIZUMAB DOXORUBICIN CYCLOPHOSPHAMIDE FOLLOWED BY PEMBROLIZUMAB 200 MG Q3W    9/24/2024       Chemotherapy       DOXOrubicin chemo injection 94 mg       cycloPHOSphamide 600 mg/m2 = 940 mg in 0.9% NaCl 254.7 mL chemo infusion       Supportive Care       LORazepam injection 1 mg       prochlorperazine injection Soln 10 mg       Antiemetics       aprepitant (CINVANTI) injection 130 mg       palonosetron 0.25mg/dexAMETHasone 12mg in NS IVPB 0.25 mg 50 mL       Immunotherapy       pembrolizumab (KEYTRUDA) 200 mg in 0.9% NaCl SolP 108 mL infusion  10/15/2024       Chemotherapy       DOXOrubicin chemo injection 94 mg       cycloPHOSphamide 600 mg/m2 = 940 mg in 0.9% NaCl 254.7 mL chemo infusion       Supportive Care       LORazepam injection 1 mg       prochlorperazine injection Soln 10 mg       Antiemetics       aprepitant (CINVANTI) injection 130 mg       palonosetron 0.25mg/dexAMETHasone 12mg in NS IVPB 0.25 mg 50 mL       Immunotherapy       pembrolizumab (KEYTRUDA) 200 mg in sodium chloride 0.9% SolP 108 mL infusion  11/5/2024       Chemotherapy       DOXOrubicin chemo injection 94 mg       cycloPHOSphamide 600 mg/m2 = 940 mg in 0.9% NaCl 254.7 mL chemo infusion       Supportive Care       LORazepam injection 1 mg       prochlorperazine injection Soln 10 mg       Antiemetics       aprepitant (CINVANTI) injection 130 mg       palonosetron 0.25mg/dexAMETHasone 12mg in NS IVPB 0.25 mg 50 mL       Immunotherapy       pembrolizumab (KEYTRUDA) 200 mg in sodium chloride 0.9% SolP 108 mL infusion  11/26/2024       Chemotherapy       DOXOrubicin chemo injection 94 mg       cycloPHOSphamide 600 mg/m2 = 940 mg in 0.9% NaCl 254.7 mL chemo infusion       Supportive Care       LORazepam injection 1  mg       prochlorperazine injection Soln 10 mg       Antiemetics       aprepitant (CINVANTI) injection 130 mg       palonosetron 0.25mg/dexAMETHasone 12mg in NS IVPB 0.25 mg 50 mL       Immunotherapy       pembrolizumab (KEYTRUDA) 200 mg in sodium chloride 0.9% SolP 108 mL infusion    Therapy Plan Information  INF FLUIDS for Triple negative breast cancer, noted on 4/17/2023  IV Fluids  sodium chloride 0.9% bolus 1,000 mL 1,000 mL  1,000 mL, Intravenous, Every visit  Flushes  sodium chloride 0.9% flush 10 mL  10 mL, Intravenous, PRN  heparin, porcine (PF) 100 unit/mL injection flush 500 Units  500 Units, Intravenous, PRN  Antiemetics  ondansetron injection 8 mg  8 mg, Intravenous, PRN  promethazine (PHENERGAN) 12.5 mg in dextrose 5 % (D5W) 50 mL IVPB  12.5 mg, Intravenous, PRN  dexAMETHasone injection 8 mg  8 mg, Intravenous, PRN      No therapy plan of the specified type found.    No therapy plan of the specified type found.

## 2024-09-24 ENCOUNTER — INFUSION (OUTPATIENT)
Dept: INFUSION THERAPY | Facility: HOSPITAL | Age: 39
End: 2024-09-24
Attending: INTERNAL MEDICINE
Payer: COMMERCIAL

## 2024-09-24 ENCOUNTER — DOCUMENTATION ONLY (OUTPATIENT)
Dept: INFUSION THERAPY | Facility: HOSPITAL | Age: 39
End: 2024-09-24
Payer: COMMERCIAL

## 2024-09-24 VITALS
HEART RATE: 90 BPM | RESPIRATION RATE: 18 BRPM | SYSTOLIC BLOOD PRESSURE: 93 MMHG | OXYGEN SATURATION: 100 % | DIASTOLIC BLOOD PRESSURE: 60 MMHG | HEIGHT: 62 IN | TEMPERATURE: 98 F | WEIGHT: 125.69 LBS | BODY MASS INDEX: 23.13 KG/M2

## 2024-09-24 DIAGNOSIS — C50.919 TRIPLE NEGATIVE BREAST CANCER: Primary | ICD-10-CM

## 2024-09-24 PROCEDURE — 96367 TX/PROPH/DG ADDL SEQ IV INF: CPT | Mod: PN

## 2024-09-24 PROCEDURE — 96411 CHEMO IV PUSH ADDL DRUG: CPT | Mod: PN

## 2024-09-24 PROCEDURE — 96417 CHEMO IV INFUS EACH ADDL SEQ: CPT | Mod: PN

## 2024-09-24 PROCEDURE — A4216 STERILE WATER/SALINE, 10 ML: HCPCS | Mod: PN | Performed by: NURSE PRACTITIONER

## 2024-09-24 PROCEDURE — 96375 TX/PRO/DX INJ NEW DRUG ADDON: CPT | Mod: PN

## 2024-09-24 PROCEDURE — 25000003 PHARM REV CODE 250: Mod: PN | Performed by: NURSE PRACTITIONER

## 2024-09-24 PROCEDURE — 96377 APPLICATON ON-BODY INJECTOR: CPT | Mod: PN

## 2024-09-24 PROCEDURE — 96413 CHEMO IV INFUSION 1 HR: CPT | Mod: PN

## 2024-09-24 PROCEDURE — 63600175 PHARM REV CODE 636 W HCPCS: Mod: PN | Performed by: NURSE PRACTITIONER

## 2024-09-24 RX ORDER — DOXORUBICIN HYDROCHLORIDE 2 MG/ML
30 INJECTION, SOLUTION INTRAVENOUS
Status: COMPLETED | OUTPATIENT
Start: 2024-09-24 | End: 2024-09-24

## 2024-09-24 RX ORDER — EPINEPHRINE 0.3 MG/.3ML
0.3 INJECTION SUBCUTANEOUS ONCE AS NEEDED
Status: DISCONTINUED | OUTPATIENT
Start: 2024-09-24 | End: 2024-09-24 | Stop reason: HOSPADM

## 2024-09-24 RX ORDER — DOXORUBICIN HYDROCHLORIDE 2 MG/ML
60 INJECTION, SOLUTION INTRAVENOUS
Status: DISCONTINUED | OUTPATIENT
Start: 2024-09-24 | End: 2024-09-24

## 2024-09-24 RX ORDER — SODIUM CHLORIDE 0.9 % (FLUSH) 0.9 %
10 SYRINGE (ML) INJECTION
Status: DISCONTINUED | OUTPATIENT
Start: 2024-09-24 | End: 2024-09-24 | Stop reason: HOSPADM

## 2024-09-24 RX ORDER — LORAZEPAM 2 MG/ML
1 INJECTION INTRAMUSCULAR ONCE AS NEEDED
Status: COMPLETED | OUTPATIENT
Start: 2024-09-24 | End: 2024-09-24

## 2024-09-24 RX ORDER — DIPHENHYDRAMINE HYDROCHLORIDE 50 MG/ML
50 INJECTION INTRAMUSCULAR; INTRAVENOUS ONCE AS NEEDED
Status: DISCONTINUED | OUTPATIENT
Start: 2024-09-24 | End: 2024-09-24 | Stop reason: HOSPADM

## 2024-09-24 RX ORDER — PROCHLORPERAZINE EDISYLATE 5 MG/ML
10 INJECTION INTRAMUSCULAR; INTRAVENOUS ONCE AS NEEDED
Status: DISCONTINUED | OUTPATIENT
Start: 2024-09-24 | End: 2024-09-24 | Stop reason: HOSPADM

## 2024-09-24 RX ORDER — DOXORUBICIN HYDROCHLORIDE 2 MG/ML
30 INJECTION, SOLUTION INTRAVENOUS
Status: CANCELLED | OUTPATIENT
Start: 2024-09-24

## 2024-09-24 RX ADMIN — DOXORUBICIN HYDROCHLORIDE 48 MG: 2 INJECTION, SOLUTION INTRAVENOUS at 10:09

## 2024-09-24 RX ADMIN — Medication 10 ML: at 12:09

## 2024-09-24 RX ADMIN — CYCLOPHOSPHAMIDE 940 MG: 200 INJECTION, SOLUTION INTRAVENOUS at 11:09

## 2024-09-24 RX ADMIN — PEGFILGRASTIM 6 MG: KIT SUBCUTANEOUS at 12:09

## 2024-09-24 RX ADMIN — DEXAMETHASONE SODIUM PHOSPHATE 0.25 MG: 10 INJECTION, SOLUTION INTRAMUSCULAR; INTRAVENOUS at 10:09

## 2024-09-24 RX ADMIN — SODIUM CHLORIDE: 9 INJECTION, SOLUTION INTRAVENOUS at 09:09

## 2024-09-24 RX ADMIN — APREPITANT 130 MG: 130 INJECTION, EMULSION INTRAVENOUS at 10:09

## 2024-09-24 RX ADMIN — LORAZEPAM 1 MG: 2 INJECTION INTRAMUSCULAR; INTRAVENOUS at 09:09

## 2024-09-24 RX ADMIN — SODIUM CHLORIDE 200 MG: 9 INJECTION, SOLUTION INTRAVENOUS at 09:09

## 2024-09-24 NOTE — PROGRESS NOTES
10:54 AM    [facility] and her daughter, Danny at chairside. The pt said she is doing ok and spoke about her talking to other individuals that have gone through the same journey, but also is careful bc she has learned sometimes she is told things that have happen to others and this worries her and then it ends up not even happening to her.  The pt said she tries to keep positive. She also shared how she works hard to stay hydrated and eat healthy hoping this makes a difference in her treatment.  The pt shared her going to the grocery with her daughter yesterday to purchase some easy to grab snacks and drinks. She said she has a mini fridge in her bedroom so when she does not feel well she can just grab some things out of the fridge in her room.  She spoke about not knowing what to expect in regards to radiation bc she has never had it before.  She spoke about after her chemotherapy this time [facility] will have her surgery and then radiation.   The pt said she has support with her family and she feels she has support here too and she is grateful.  The pt was pleasant and reported no practical needs at this time.

## 2024-09-24 NOTE — PROGRESS NOTES
Oncology Nutrition   Chemotherapy Infusion Visit    Nutrition Follow Up   RD met with patient at chairside. Pt familiar to RD from previous treatments. Pt starting AC today. As of now, patient denies nutrition related side effects and is eating well. Pt aware of the potential side effects with AC. Discussed some preventative measures regarding diet and N/V, poor appetite. Pt VU and appreciative of RD.    Wt Readings from Last 10 Encounters:   09/24/24 57 kg (125 lb 10.6 oz)   09/23/24 56.7 kg (125 lb)   09/17/24 58.6 kg (129 lb 3 oz)   09/10/24 58.4 kg (128 lb 12 oz)   09/06/24 58.4 kg (128 lb 12 oz)   09/05/24 58.4 kg (128 lb 12 oz)   09/04/24 58.8 kg (129 lb 10.1 oz)   09/03/24 58.1 kg (128 lb)   08/26/24 59.1 kg (130 lb 4.7 oz)   08/26/24 59.2 kg (130 lb 8.2 oz)       All other nutrition questions/concerns addressed as appropriate. Will continue to follow and monitor throughout treatment PRN.     Kaci Mast, MS, RD, LDN  09/24/2024  1:24 PM

## 2024-09-24 NOTE — PLAN OF CARE
Problem: Fatigue  Goal: Improved Activity Tolerance  Intervention: Promote Improved Energy  Flowsheets (Taken 9/24/2024 7842)  Fatigue Management:   paced activity encouraged   activity schedule adjusted  Sleep/Rest Enhancement: relaxation techniques promoted  Activity Management:   Ambulated -L4   Ambulated in canales - L4  Environmental Support:   calm environment promoted   environmental consistency promoted     Problem: Adult Inpatient Plan of Care  Goal: Optimal Comfort and Wellbeing  Intervention: Provide Person-Centered Care  Flowsheets (Taken 9/24/2024 1230)  Trust Relationship/Rapport:   care explained   questions encouraged     Problem: Adult Inpatient Plan of Care  Goal: Patient-Specific Goal (Individualized)  Outcome: Progressing  Flowsheets (Taken 9/24/2024 1230)  Individualized Care Needs: Recliner, blanket  Anxieties, Fears or Concerns: None  Tolerated treatment with no known distress.  Ambulated from infusion center with steady gait.

## 2024-10-01 ENCOUNTER — PATIENT MESSAGE (OUTPATIENT)
Dept: HEMATOLOGY/ONCOLOGY | Facility: CLINIC | Age: 39
End: 2024-10-01
Payer: COMMERCIAL

## 2024-10-01 DIAGNOSIS — M62.838 MUSCLE SPASM: Primary | ICD-10-CM

## 2024-10-02 ENCOUNTER — INFUSION (OUTPATIENT)
Dept: INFUSION THERAPY | Facility: HOSPITAL | Age: 39
End: 2024-10-02
Attending: INTERNAL MEDICINE
Payer: COMMERCIAL

## 2024-10-02 VITALS
BODY MASS INDEX: 23.61 KG/M2 | HEART RATE: 96 BPM | RESPIRATION RATE: 16 BRPM | TEMPERATURE: 98 F | WEIGHT: 128.31 LBS | DIASTOLIC BLOOD PRESSURE: 58 MMHG | HEIGHT: 62 IN | OXYGEN SATURATION: 98 % | SYSTOLIC BLOOD PRESSURE: 91 MMHG

## 2024-10-02 DIAGNOSIS — C50.919 TRIPLE NEGATIVE BREAST CANCER: Primary | ICD-10-CM

## 2024-10-02 DIAGNOSIS — D63.0 ANEMIA IN NEOPLASTIC DISEASE: ICD-10-CM

## 2024-10-02 DIAGNOSIS — Z17.421 TRIPLE NEGATIVE BREAST CANCER: Primary | ICD-10-CM

## 2024-10-02 DIAGNOSIS — D69.6 THROMBOCYTOPENIA: ICD-10-CM

## 2024-10-02 PROCEDURE — 25000003 PHARM REV CODE 250: Mod: PN | Performed by: INTERNAL MEDICINE

## 2024-10-02 PROCEDURE — 96360 HYDRATION IV INFUSION INIT: CPT | Mod: PN

## 2024-10-02 RX ORDER — HEPARIN 100 UNIT/ML
500 SYRINGE INTRAVENOUS
Status: DISCONTINUED | OUTPATIENT
Start: 2024-10-02 | End: 2024-10-02 | Stop reason: HOSPADM

## 2024-10-02 RX ORDER — TIZANIDINE 4 MG/1
4 TABLET ORAL EVERY 8 HOURS PRN
Qty: 45 TABLET | Refills: 0 | Status: SHIPPED | OUTPATIENT
Start: 2024-10-02 | End: 2024-10-12

## 2024-10-02 RX ORDER — SODIUM CHLORIDE 9 MG/ML
1000 INJECTION, SOLUTION INTRAVENOUS
Status: COMPLETED | OUTPATIENT
Start: 2024-10-02 | End: 2024-10-02

## 2024-10-02 RX ORDER — ONDANSETRON HYDROCHLORIDE 2 MG/ML
8 INJECTION, SOLUTION INTRAVENOUS ONCE AS NEEDED
Status: CANCELLED
Start: 2024-10-02

## 2024-10-02 RX ORDER — HEPARIN 100 UNIT/ML
500 SYRINGE INTRAVENOUS
OUTPATIENT
Start: 2024-10-02

## 2024-10-02 RX ORDER — SODIUM CHLORIDE 0.9 % (FLUSH) 0.9 %
10 SYRINGE (ML) INJECTION
OUTPATIENT
Start: 2024-10-02

## 2024-10-02 RX ORDER — SODIUM CHLORIDE 0.9 % (FLUSH) 0.9 %
10 SYRINGE (ML) INJECTION
Status: DISCONTINUED | OUTPATIENT
Start: 2024-10-02 | End: 2024-10-02 | Stop reason: HOSPADM

## 2024-10-02 RX ORDER — ONDANSETRON HYDROCHLORIDE 2 MG/ML
8 INJECTION, SOLUTION INTRAVENOUS ONCE AS NEEDED
Start: 2024-10-02

## 2024-10-02 RX ADMIN — SODIUM CHLORIDE 1000 ML: 9 INJECTION, SOLUTION INTRAVENOUS at 02:10

## 2024-10-02 NOTE — PLAN OF CARE
Problem: Fatigue  Goal: Improved Activity Tolerance  Outcome: Progressing  Intervention: Promote Improved Energy  Flowsheets (Taken 10/2/2024 1430)  Fatigue Management:   frequent rest breaks encouraged   paced activity encouraged  Sleep/Rest Enhancement: regular sleep/rest pattern promoted  Activity Management: Ambulated -L4  Environmental Support: rest periods encouraged     Problem: Adult Inpatient Plan of Care  Goal: Patient-Specific Goal (Individualized)  Outcome: Progressing  Flowsheets (Taken 10/2/2024 1430)  Individualized Care Needs: Recliner, warm blanket, dtr at chairside, conversation  Anxieties, Fears or Concerns: Felt terrible last few days, better today  Patient/Family-Specific Goals (Include Timeframe): Feel better    Patient to Infusion for IV fluids, accompanied by her dtr.  Difficult past few days, but feeling better today. Treatment plan reviewed with patient. VSS. Tolerated infusion. Prefers using patient portal for upcoming appointment schedule. Escorted to the front lobby in no distress for discharge to home.

## 2024-10-03 ENCOUNTER — PATIENT MESSAGE (OUTPATIENT)
Dept: HEMATOLOGY/ONCOLOGY | Facility: CLINIC | Age: 39
End: 2024-10-03
Payer: COMMERCIAL

## 2024-10-03 DIAGNOSIS — G89.3 NEOPLASM RELATED PAIN: ICD-10-CM

## 2024-10-03 RX ORDER — OXYCODONE HYDROCHLORIDE 10 MG/1
10 TABLET ORAL EVERY 4 HOURS PRN
Qty: 45 TABLET | Refills: 0 | OUTPATIENT
Start: 2024-10-03

## 2024-10-04 ENCOUNTER — TELEPHONE (OUTPATIENT)
Dept: HEMATOLOGY/ONCOLOGY | Facility: CLINIC | Age: 39
End: 2024-10-04
Payer: COMMERCIAL

## 2024-10-04 NOTE — TELEPHONE ENCOUNTER
Returned Pt call, addressed concerns regarding being prescribed pain meds. Advised her to reach out to PCP for this chronic pain. Pt is very confused She stated she has always been prescribed pain meds to help with pain for infusions. Next infision is in 10 days, told her we have some time to figure this out. Pt verbalized understanding and will call PCP.

## 2024-10-04 NOTE — TELEPHONE ENCOUNTER
----- Message from [place] sent at 10/4/2024 12:04 PM CDT -----  Type: Needs Medical Advice  Who Called:  Patient   Symptoms (please be specific):    How long has patient had these symptoms:    Pharmacy name and phone #:    Best Call Back Number: [phone]  Additional Information: Patient is requesting a call back from the nurse ASAP.

## 2024-10-07 DIAGNOSIS — C50.919 TRIPLE NEGATIVE BREAST CANCER: ICD-10-CM

## 2024-10-07 DIAGNOSIS — G89.3 NEOPLASM RELATED PAIN: ICD-10-CM

## 2024-10-07 DIAGNOSIS — Z17.421 TRIPLE NEGATIVE BREAST CANCER: ICD-10-CM

## 2024-10-07 DIAGNOSIS — G89.3 NEOPLASM RELATED PAIN: Primary | ICD-10-CM

## 2024-10-07 RX ORDER — OXYCODONE HYDROCHLORIDE 10 MG/1
10 TABLET ORAL EVERY 4 HOURS PRN
Qty: 20 TABLET | Refills: 0 | Status: SHIPPED | OUTPATIENT
Start: 2024-10-07

## 2024-10-07 RX ORDER — OLANZAPINE 5 MG/1
TABLET ORAL
Qty: 30 TABLET | Refills: 1 | Status: SHIPPED | OUTPATIENT
Start: 2024-10-07

## 2024-10-08 ENCOUNTER — PATIENT MESSAGE (OUTPATIENT)
Dept: HEMATOLOGY/ONCOLOGY | Facility: CLINIC | Age: 39
End: 2024-10-08
Payer: COMMERCIAL

## 2024-10-15 ENCOUNTER — OFFICE VISIT (OUTPATIENT)
Dept: HEMATOLOGY/ONCOLOGY | Facility: CLINIC | Age: 39
End: 2024-10-15
Payer: COMMERCIAL

## 2024-10-15 ENCOUNTER — LAB VISIT (OUTPATIENT)
Dept: LAB | Facility: HOSPITAL | Age: 39
End: 2024-10-15
Attending: INTERNAL MEDICINE
Payer: COMMERCIAL

## 2024-10-15 ENCOUNTER — PATIENT MESSAGE (OUTPATIENT)
Dept: HEMATOLOGY/ONCOLOGY | Facility: CLINIC | Age: 39
End: 2024-10-15

## 2024-10-15 ENCOUNTER — INFUSION (OUTPATIENT)
Dept: INFUSION THERAPY | Facility: HOSPITAL | Age: 39
End: 2024-10-15
Attending: INTERNAL MEDICINE
Payer: COMMERCIAL

## 2024-10-15 VITALS
RESPIRATION RATE: 17 BRPM | HEIGHT: 62 IN | DIASTOLIC BLOOD PRESSURE: 65 MMHG | TEMPERATURE: 98 F | HEART RATE: 99 BPM | WEIGHT: 126.75 LBS | OXYGEN SATURATION: 98 % | BODY MASS INDEX: 23.32 KG/M2 | SYSTOLIC BLOOD PRESSURE: 98 MMHG

## 2024-10-15 VITALS
SYSTOLIC BLOOD PRESSURE: 61 MMHG | OXYGEN SATURATION: 98 % | WEIGHT: 126.75 LBS | HEIGHT: 62 IN | DIASTOLIC BLOOD PRESSURE: 60 MMHG | BODY MASS INDEX: 23.32 KG/M2 | TEMPERATURE: 98 F | HEART RATE: 83 BPM | RESPIRATION RATE: 17 BRPM

## 2024-10-15 DIAGNOSIS — C50.612 MALIGNANT NEOPLASM OF AXILLARY TAIL OF LEFT BREAST IN FEMALE, ESTROGEN RECEPTOR NEGATIVE: ICD-10-CM

## 2024-10-15 DIAGNOSIS — C50.919 TRIPLE NEGATIVE BREAST CANCER: ICD-10-CM

## 2024-10-15 DIAGNOSIS — Z17.1 MALIGNANT NEOPLASM OF AXILLARY TAIL OF LEFT BREAST IN FEMALE, ESTROGEN RECEPTOR NEGATIVE: ICD-10-CM

## 2024-10-15 DIAGNOSIS — R11.2 CINV (CHEMOTHERAPY-INDUCED NAUSEA AND VOMITING): ICD-10-CM

## 2024-10-15 DIAGNOSIS — R53.83 CHEMOTHERAPY-INDUCED FATIGUE: ICD-10-CM

## 2024-10-15 DIAGNOSIS — C50.919 TRIPLE NEGATIVE BREAST CANCER: Primary | ICD-10-CM

## 2024-10-15 DIAGNOSIS — D69.6 THROMBOCYTOPENIA: Primary | ICD-10-CM

## 2024-10-15 DIAGNOSIS — G89.3 NEOPLASM RELATED PAIN (ACUTE) (CHRONIC): Primary | ICD-10-CM

## 2024-10-15 DIAGNOSIS — R45.89 ANXIETY ABOUT HEALTH: ICD-10-CM

## 2024-10-15 DIAGNOSIS — Z17.421 TRIPLE NEGATIVE BREAST CANCER: ICD-10-CM

## 2024-10-15 DIAGNOSIS — G47.00 INSOMNIA, UNSPECIFIED TYPE: Primary | ICD-10-CM

## 2024-10-15 DIAGNOSIS — T45.1X5A CINV (CHEMOTHERAPY-INDUCED NAUSEA AND VOMITING): ICD-10-CM

## 2024-10-15 DIAGNOSIS — Z17.421 TRIPLE NEGATIVE BREAST CANCER: Primary | ICD-10-CM

## 2024-10-15 DIAGNOSIS — D63.0 ANEMIA IN NEOPLASTIC DISEASE: ICD-10-CM

## 2024-10-15 DIAGNOSIS — G89.3 NEOPLASM RELATED PAIN (ACUTE) (CHRONIC): ICD-10-CM

## 2024-10-15 DIAGNOSIS — R74.01 TRANSAMINITIS: ICD-10-CM

## 2024-10-15 DIAGNOSIS — T45.1X5A CHEMOTHERAPY-INDUCED FATIGUE: ICD-10-CM

## 2024-10-15 LAB
ALBUMIN SERPL BCP-MCNC: 4.1 G/DL (ref 3.5–5.2)
ALP SERPL-CCNC: 79 U/L (ref 55–135)
ALT SERPL W/O P-5'-P-CCNC: 51 U/L (ref 10–44)
ANION GAP SERPL CALC-SCNC: 11 MMOL/L (ref 8–16)
AST SERPL-CCNC: 30 U/L (ref 10–40)
B-HCG UR QL: NEGATIVE
BASOPHILS # BLD AUTO: 0.02 K/UL (ref 0–0.2)
BASOPHILS NFR BLD: 0.7 % (ref 0–1.9)
BILIRUB SERPL-MCNC: 0.9 MG/DL (ref 0.1–1)
BUN SERPL-MCNC: 11 MG/DL (ref 6–20)
CALCIUM SERPL-MCNC: 9.2 MG/DL (ref 8.7–10.5)
CHLORIDE SERPL-SCNC: 103 MMOL/L (ref 95–110)
CO2 SERPL-SCNC: 26 MMOL/L (ref 23–29)
CREAT SERPL-MCNC: 0.7 MG/DL (ref 0.5–1.4)
DIFFERENTIAL METHOD BLD: ABNORMAL
EOSINOPHIL # BLD AUTO: 0 K/UL (ref 0–0.5)
EOSINOPHIL NFR BLD: 0.3 % (ref 0–8)
ERYTHROCYTE [DISTWIDTH] IN BLOOD BY AUTOMATED COUNT: 14 % (ref 11.5–14.5)
EST. GFR  (NO RACE VARIABLE): >60 ML/MIN/1.73 M^2
GLUCOSE SERPL-MCNC: 95 MG/DL (ref 70–110)
HCT VFR BLD AUTO: 31 % (ref 37–48.5)
HGB BLD-MCNC: 10.6 G/DL (ref 12–16)
IMM GRANULOCYTES # BLD AUTO: 0.01 K/UL (ref 0–0.04)
IMM GRANULOCYTES NFR BLD AUTO: 0.3 % (ref 0–0.5)
LYMPHOCYTES # BLD AUTO: 0.8 K/UL (ref 1–4.8)
LYMPHOCYTES NFR BLD: 27 % (ref 18–48)
MAGNESIUM SERPL-MCNC: 2.2 MG/DL (ref 1.6–2.6)
MCH RBC QN AUTO: 31.3 PG (ref 27–31)
MCHC RBC AUTO-ENTMCNC: 34.2 G/DL (ref 32–36)
MCV RBC AUTO: 91 FL (ref 82–98)
MONOCYTES # BLD AUTO: 0.5 K/UL (ref 0.3–1)
MONOCYTES NFR BLD: 17.8 % (ref 4–15)
NEUTROPHILS # BLD AUTO: 1.6 K/UL (ref 1.8–7.7)
NEUTROPHILS NFR BLD: 53.9 % (ref 38–73)
NRBC BLD-RTO: 0 /100 WBC
PLATELET # BLD AUTO: 161 K/UL (ref 150–450)
PMV BLD AUTO: 8.3 FL (ref 9.2–12.9)
POTASSIUM SERPL-SCNC: 3.9 MMOL/L (ref 3.5–5.1)
PROT SERPL-MCNC: 6.4 G/DL (ref 6–8.4)
RBC # BLD AUTO: 3.39 M/UL (ref 4–5.4)
SODIUM SERPL-SCNC: 140 MMOL/L (ref 136–145)
TSH SERPL DL<=0.005 MIU/L-ACNC: 3.99 UIU/ML (ref 0.4–4)
WBC # BLD AUTO: 3.04 K/UL (ref 3.9–12.7)

## 2024-10-15 PROCEDURE — 96377 APPLICATON ON-BODY INJECTOR: CPT | Mod: PN

## 2024-10-15 PROCEDURE — 3074F SYST BP LT 130 MM HG: CPT | Mod: CPTII,S$GLB,, | Performed by: INTERNAL MEDICINE

## 2024-10-15 PROCEDURE — 80053 COMPREHEN METABOLIC PANEL: CPT | Mod: PN | Performed by: INTERNAL MEDICINE

## 2024-10-15 PROCEDURE — 63600175 PHARM REV CODE 636 W HCPCS: Mod: PN | Performed by: INTERNAL MEDICINE

## 2024-10-15 PROCEDURE — A4216 STERILE WATER/SALINE, 10 ML: HCPCS | Mod: PN | Performed by: INTERNAL MEDICINE

## 2024-10-15 PROCEDURE — 3008F BODY MASS INDEX DOCD: CPT | Mod: CPTII,S$GLB,, | Performed by: INTERNAL MEDICINE

## 2024-10-15 PROCEDURE — 96375 TX/PRO/DX INJ NEW DRUG ADDON: CPT | Mod: PN

## 2024-10-15 PROCEDURE — 96367 TX/PROPH/DG ADDL SEQ IV INF: CPT | Mod: PN

## 2024-10-15 PROCEDURE — 1159F MED LIST DOCD IN RCRD: CPT | Mod: CPTII,S$GLB,, | Performed by: INTERNAL MEDICINE

## 2024-10-15 PROCEDURE — 81025 URINE PREGNANCY TEST: CPT | Mod: PN | Performed by: INTERNAL MEDICINE

## 2024-10-15 PROCEDURE — 99999 PR PBB SHADOW E&M-EST. PATIENT-LVL III: CPT | Mod: PBBFAC,,, | Performed by: NURSE PRACTITIONER

## 2024-10-15 PROCEDURE — 1160F RVW MEDS BY RX/DR IN RCRD: CPT | Mod: CPTII,S$GLB,, | Performed by: NURSE PRACTITIONER

## 2024-10-15 PROCEDURE — G2211 COMPLEX E/M VISIT ADD ON: HCPCS | Mod: S$GLB,,, | Performed by: INTERNAL MEDICINE

## 2024-10-15 PROCEDURE — 96413 CHEMO IV INFUSION 1 HR: CPT | Mod: PN

## 2024-10-15 PROCEDURE — 85025 COMPLETE CBC W/AUTO DIFF WBC: CPT | Mod: PN | Performed by: INTERNAL MEDICINE

## 2024-10-15 PROCEDURE — 3078F DIAST BP <80 MM HG: CPT | Mod: CPTII,S$GLB,, | Performed by: INTERNAL MEDICINE

## 2024-10-15 PROCEDURE — 99999 PR PBB SHADOW E&M-EST. PATIENT-LVL IV: CPT | Mod: PBBFAC,,, | Performed by: INTERNAL MEDICINE

## 2024-10-15 PROCEDURE — 84443 ASSAY THYROID STIM HORMONE: CPT | Performed by: INTERNAL MEDICINE

## 2024-10-15 PROCEDURE — 96417 CHEMO IV INFUS EACH ADDL SEQ: CPT | Mod: PN

## 2024-10-15 PROCEDURE — 83735 ASSAY OF MAGNESIUM: CPT | Mod: PN | Performed by: INTERNAL MEDICINE

## 2024-10-15 PROCEDURE — 36415 COLL VENOUS BLD VENIPUNCTURE: CPT | Mod: PN | Performed by: INTERNAL MEDICINE

## 2024-10-15 PROCEDURE — 1159F MED LIST DOCD IN RCRD: CPT | Mod: CPTII,S$GLB,, | Performed by: NURSE PRACTITIONER

## 2024-10-15 PROCEDURE — 99215 OFFICE O/P EST HI 40 MIN: CPT | Mod: S$GLB,,, | Performed by: NURSE PRACTITIONER

## 2024-10-15 PROCEDURE — 99215 OFFICE O/P EST HI 40 MIN: CPT | Mod: S$GLB,,, | Performed by: INTERNAL MEDICINE

## 2024-10-15 PROCEDURE — 96411 CHEMO IV PUSH ADDL DRUG: CPT | Mod: PN

## 2024-10-15 PROCEDURE — 25000003 PHARM REV CODE 250: Mod: PN | Performed by: INTERNAL MEDICINE

## 2024-10-15 RX ORDER — MORPHINE SULFATE 15 MG/1
15 TABLET, FILM COATED, EXTENDED RELEASE ORAL 2 TIMES DAILY
Qty: 60 TABLET | Refills: 0 | Status: SHIPPED | OUTPATIENT
Start: 2024-10-15 | End: 2024-11-14

## 2024-10-15 RX ORDER — DOXORUBICIN HYDROCHLORIDE 2 MG/ML
48 INJECTION, SOLUTION INTRAVENOUS
Status: CANCELLED | OUTPATIENT
Start: 2024-10-15

## 2024-10-15 RX ORDER — SODIUM CHLORIDE 0.9 % (FLUSH) 0.9 %
10 SYRINGE (ML) INJECTION
Status: CANCELLED | OUTPATIENT
Start: 2024-10-15

## 2024-10-15 RX ORDER — DIPHENHYDRAMINE HYDROCHLORIDE 50 MG/ML
50 INJECTION INTRAMUSCULAR; INTRAVENOUS ONCE AS NEEDED
Status: DISCONTINUED | OUTPATIENT
Start: 2024-10-15 | End: 2024-10-15 | Stop reason: HOSPADM

## 2024-10-15 RX ORDER — PROCHLORPERAZINE EDISYLATE 5 MG/ML
10 INJECTION INTRAMUSCULAR; INTRAVENOUS ONCE AS NEEDED
Status: CANCELLED
Start: 2024-10-15

## 2024-10-15 RX ORDER — DOXORUBICIN HYDROCHLORIDE 2 MG/ML
48 INJECTION, SOLUTION INTRAVENOUS
Status: COMPLETED | OUTPATIENT
Start: 2024-10-15 | End: 2024-10-15

## 2024-10-15 RX ORDER — EPINEPHRINE 0.3 MG/.3ML
0.3 INJECTION SUBCUTANEOUS ONCE AS NEEDED
Status: DISCONTINUED | OUTPATIENT
Start: 2024-10-15 | End: 2024-10-15 | Stop reason: HOSPADM

## 2024-10-15 RX ORDER — LORAZEPAM 2 MG/ML
1 INJECTION INTRAMUSCULAR ONCE AS NEEDED
Status: COMPLETED | OUTPATIENT
Start: 2024-10-15 | End: 2024-10-15

## 2024-10-15 RX ORDER — PROCHLORPERAZINE EDISYLATE 5 MG/ML
10 INJECTION INTRAMUSCULAR; INTRAVENOUS ONCE AS NEEDED
Status: DISCONTINUED | OUTPATIENT
Start: 2024-10-15 | End: 2024-10-15 | Stop reason: HOSPADM

## 2024-10-15 RX ORDER — EPINEPHRINE 0.3 MG/.3ML
0.3 INJECTION SUBCUTANEOUS ONCE AS NEEDED
Status: CANCELLED | OUTPATIENT
Start: 2024-10-15

## 2024-10-15 RX ORDER — DIPHENHYDRAMINE HYDROCHLORIDE 50 MG/ML
50 INJECTION INTRAMUSCULAR; INTRAVENOUS ONCE AS NEEDED
Status: CANCELLED | OUTPATIENT
Start: 2024-10-15

## 2024-10-15 RX ORDER — HEPARIN 100 UNIT/ML
500 SYRINGE INTRAVENOUS
Status: CANCELLED | OUTPATIENT
Start: 2024-10-15

## 2024-10-15 RX ORDER — LORAZEPAM 2 MG/ML
1 INJECTION INTRAMUSCULAR ONCE AS NEEDED
Status: CANCELLED
Start: 2024-10-15

## 2024-10-15 RX ORDER — SODIUM CHLORIDE 0.9 % (FLUSH) 0.9 %
10 SYRINGE (ML) INJECTION
Status: DISCONTINUED | OUTPATIENT
Start: 2024-10-15 | End: 2024-10-15 | Stop reason: HOSPADM

## 2024-10-15 RX ADMIN — PEGFILGRASTIM 6 MG: KIT SUBCUTANEOUS at 01:10

## 2024-10-15 RX ADMIN — SODIUM CHLORIDE 200 MG: 9 INJECTION, SOLUTION INTRAVENOUS at 11:10

## 2024-10-15 RX ADMIN — DOXORUBICIN HYDROCHLORIDE 76 MG: 2 INJECTION, SOLUTION INTRAVENOUS at 12:10

## 2024-10-15 RX ADMIN — DEXAMETHASONE SODIUM PHOSPHATE 0.25 MG: 10 INJECTION, SOLUTION INTRAMUSCULAR; INTRAVENOUS at 12:10

## 2024-10-15 RX ADMIN — Medication 10 ML: at 01:10

## 2024-10-15 RX ADMIN — CYCLOPHOSPHAMIDE 940 MG: 200 INJECTION, SOLUTION INTRAVENOUS at 12:10

## 2024-10-15 RX ADMIN — APREPITANT 130 MG: 130 INJECTION, EMULSION INTRAVENOUS at 11:10

## 2024-10-15 RX ADMIN — LORAZEPAM 1 MG: 2 INJECTION INTRAMUSCULAR; INTRAVENOUS at 11:10

## 2024-10-15 NOTE — PROGRESS NOTES
"Aleena Duckworth  38 y.o. is here to seek an integrative approach to discuss side effects related to breast cancer treatment.     HPI  Mrs. Duckworth is here today with her  for cycle 2 of treatment. . She reports she noticed a lump in February and then was diagnosed with triple negative breast cancer. She had a partial mastectomy and will have a bilateral mastectomy with reconstruction at the end of the hear. She reports she sleeps well with Ambien, but has difficulty sleeping without it. She reports her stress level varies from moderate to high with daily life in addition to the diagnosis.   She has a good appetite and eats a healthy diet.   They recently  and Aleena has 2 children, a 7 year old and an 18 year old. 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     7/2/2024  Aleena is here today getting treatment with her  . She had neoadjuvant chemo and then a bilateral mastectomy with reconstruction.  Approximately 6 months later she noted more swollen nodes and had to restart treatment.  She reports she is doing well and she is back to work 1-3 days a week. She states, "it is good for my mental health being back at work." She reports her appetite is good and she is gaining weight. She is active throughout the day, no formal exercise. She is not sleeping well.  The Ambien helps her sleep for about 4 hours and then she wakes up.  She has been waking up due to generalized itching and will be started benadryl.       Today's Visit  Mrs. Duckworth is here today getting treatment with her daughter at her chairside.  She reports fatigue is her biggest side effect. She does have nausea with this chemo regimen and she reports brain fog. She is using Sancuso patch and Zofran which are both helpful. She has a good appetite and is eating healthy. She is sleeping well with Ambien. She reports she is doing better at realizing when she is getting overwhelmed and she will stop and relax. " Her activity level is low due to fatigue.     Pillars Assessment    Sleep  How many hours of sleep per night? 8 hours  Do you have trouble falling asleep, staying asleep or waking up earlier than you need to? yes  Do you have daytime fatigue? yes  Do you need medication for sleep? yes Ambien as needed  Do you use any supplements or other interventions for sleep? no    Resilience  Rate your current level of stress- low/moderate--improved   How do you manage stress?  Watches TV and reads    Nutrition   Food allergies or sensitivities: yes rice and lactose  Do you adhere to a particular type of diet? no  Do you have any concerns with your eating habits? no    Exercise  How would you describe your physical activity level? low    Past Medical History  Past Medical History:   Diagnosis Date    ADHD (attention deficit hyperactivity disorder)     Anemia     Breast cancer     triple negative    Hypertension     pre eclampsia and eclampsia    Stroke     at birth        Past Surgical History   Past Surgical History:   Procedure Laterality Date    ABLATION      BREAST BIOPSY Left 05/15/2024    BREAST RECONSTRUCTION  11/2023    BREAST SURGERY      lumpectomy    CYSTOSCOPY      INSERTION OF TUNNELED CENTRAL VENOUS CATHETER (CVC) WITH SUBCUTANEOUS PORT Right 06/03/2024    Procedure: [id number];  Surgeon: Jj Silva MD;  Location: [facility];  Service: General;  Laterality: Right;    LITHOTRIPSY      MASTECTOMY  11/2023    TUBAL LIGATION      VAGINAL DELIVERY        Family History   Family History   Problem Relation Name Age of Onset    Hypertension Mother      Breast cancer Mother  51        negative screening    Heart disease Father      Kidney cancer Father      Breast cancer Paternal Aunt      Diabetes Maternal Grandmother      Breast cancer Maternal Grandmother      Breast cancer Other Both GGM       Social History  Social History     Socioeconomic History    Marital status:    Tobacco Use    Smoking  status: Former     Types: Cigarettes     Passive exposure: Never    Smokeless tobacco: Never    Tobacco comments:     quitting   Substance and Sexual Activity    Alcohol use: Not Currently    Drug use: Never    Sexual activity: Yes     Partners: Male     Birth control/protection: See Surgical Hx     Comment: tubal     Social Drivers of Health     Financial Resource Strain: Medium Risk (1/18/2024)    Overall Financial Resource Strain (CARDIA)     Difficulty of Paying Living Expenses: Somewhat hard   Food Insecurity: Patient Declined (1/18/2024)    Hunger Vital Sign     Worried About Running Out of Food in the Last Year: Patient declined     Ran Out of Food in the Last Year: Patient declined   Transportation Needs: No Transportation Needs (1/18/2024)    PRAPARE - Transportation     Lack of Transportation (Medical): No     Lack of Transportation (Non-Medical): No   Physical Activity: Insufficiently Active (1/18/2024)    Exercise Vital Sign     Days of Exercise per Week: 2 days     Minutes of Exercise per Session: 30 min   Stress: Stress Concern Present (1/18/2024)    [employer] - Occupational Stress Questionnaire     Feeling of Stress : Rather much   Housing Stability: High Risk (1/18/2024)    Housing Stability Vital Sign     Unable to Pay for Housing in the Last Year: Yes     Number of Places Lived in the Last Year: 1     Unstable Housing in the Last Year: No      Allergies  Review of patient's allergies indicates:  No Known Allergies   Current Medications:    Current Outpatient Medications:     chlorhexidine (PERIDEX) 0.12 % solution, , Disp: , Rfl:     dexAMETHasone (DECADRON) 4 MG Tab, Take 2 tablets (8 mg total) by mouth once daily. Take 2 tablets (8 mg) by mouth once daily on days 2,3,4 following chemotherapy., Disp: 24 tablet, Rfl: 2    dextroamphetamine-amphetamine (ADDERALL XR) 20 MG 24 hr capsule, Take 20 mg by mouth every morning., Disp: , Rfl:     dextroamphetamine-amphetamine  (ADDERALL) 20 mg tablet, Take 1 tablet by mouth., Disp: , Rfl:     diazePAM (VALIUM) 2 MG tablet, Take 2 mg by mouth every evening., Disp: , Rfl:     DORYX MPC 60 mg TbEC, Take 1 tablet by mouth 2 (two) times daily., Disp: , Rfl:     famotidine (PEPCID) 20 MG tablet, Take 20 mg by mouth 2 (two) times daily., Disp: , Rfl:     LIDOcaine-prilocaine (EMLA) cream, Apply topically as needed (apply 30 to 60 minutes prior to chemo)., Disp: 30 g, Rfl: 2    loratadine (CLARITIN) 10 mg tablet, Take 1 tablet (10 mg total) by mouth once daily., Disp: 90 tablet, Rfl: 0    LORazepam (ATIVAN) 1 MG tablet, Take 1 tablet (1 mg total) by mouth 2 (two) times daily as needed for Anxiety., Disp: 60 tablet, Rfl: 0    morphine (MS CONTIN) 15 MG 12 hr tablet, Take 1 tablet (15 mg total) by mouth 2 (two) times daily. Scheduled., Disp: 60 tablet, Rfl: 0    mupirocin (BACTROBAN) 2 % ointment, , Disp: , Rfl:     OLANZapine (ZYPREXA) 5 MG tablet, TAKE 1 TABLET BY MOUTH EVERY EVENING ON DAYS 1-4 OF EACH CHEMOTHERAPY, Disp: 30 tablet, Rfl: 1    ondansetron (ZOFRAN-ODT) 8 MG TbDL, Take 1 tablet (8 mg total) by mouth every 8 (eight) hours as needed (nausea/vomiting). Take 1 tablet (8 mg) by mouth every 8 hours as needed for nausea/vomiting., Disp: 60 tablet, Rfl: 5    oxyCODONE (ROXICODONE) 10 mg Tab immediate release tablet, Take 1 tablet (10 mg total) by mouth every 4 (four) hours as needed for Pain., Disp: 20 tablet, Rfl: 0    promethazine (PHENERGAN) 12.5 MG Tab, (Take 1-2 tabs every 6 hours as needed for nausea persistent despite zofran), Disp: 40 tablet, Rfl: 3    SANCUSO 3.1 mg/24 hour, Apply ONE PATCH AT least 24 hours prior TO chemotherapy; MAY be applied UP TO 48 hours BEFORE chemotherapy. REMOVE PATCH a minimum of 24 hours AFTER chemotherapy completion. MAY wear FOR a max of SEVEN DAYS, Disp: , Rfl:     tretinoin (RETIN-A) 0.05 % cream, SMARTSIG:Sparingly Topical Every Night, Disp: , Rfl:     TRINTELLIX 5 mg Tab, , Disp: , Rfl:      varenicline (CHANTIX STARTING MONTH BOX) 0.5 mg (11)- 1 mg (42) tablet, Take one 0.5mg tab by mouth once daily X3 days,then increase to one 0.5mg tab twice daily X4 days,then increase to one 1mg tab twice daily, Disp: 1 each, Rfl: 0    varenicline (CHANTIX) 1 mg Tab, Take 1 tablet (1 mg total) by mouth 2 (two) times daily., Disp: 60 tablet, Rfl: 3    zolpidem (AMBIEN) 10 mg Tab, Take 5 mg by mouth nightly as needed., Disp: , Rfl:   No current facility-administered medications for this visit.    Facility-Administered Medications Ordered in Other Visits:     0.9% NaCl 250 mL flush bag, , Intravenous, PRN, Eunice Cruz MD    aprepitant (Cinvanti) injection 130 mg, 130 mg, Intravenous, 1 time in Clinic/\[facility]\"", Eunice Cruz MD    cycloPHOSphamide 600 mg/m2 = 940 mg in 0.9% NaCl 254.7 mL chemo infusion, 600 mg/m2 (Treatment Plan Recorded), Intravenous, 1 time in Clinic/\[facility]\"", Eunice Cruz MD    diphenhydrAMINE injection 50 mg, 50 mg, Intravenous, Once PRN, Eunice Cruz MD    DOXOrubicin chemo injection 76 mg, 48 mg/m2 (Treatment Plan Recorded), Intravenous, 1 time in Clinic/[facility], Eunice Cruz MD    EPINEPHrine (EPIPEN) 0.3 mg/0.3 mL pen injection 0.3 mg, 0.3 mg, Intramuscular, Once PRN, Eunice Cruz MD    hydrocortisone sodium succinate injection 100 mg, 100 mg, Intravenous, Once PRN, Eunice Cruz MD    LORazepam injection 1 mg, 1 mg, Intravenous, Once PRN, Eunice Cruz MD    palonosetron 0.25mg/dexAMETHasone 12mg in NS IVPB 0.25 mg 50 mL, 0.25 mg, Intravenous, 1 time in Clinic/[facility], Eunice Cruz MD    pegfilgrastim (NEULASTA (ON BODY INJECTOR)) injection 6 mg, 6 mg, Subcutaneous, 1 time in Clinic/[facility], Eunice Cruz MD    pembrolizumab (KEYTRUDA) 200 mg in 0.9% NaCl SolP 108 mL infusion, 200 mg, Intravenous, 1 time in Clinic/[facility], Eunice Cruz MD    prochlorperazine injection Soln 10 mg, 10 mg, Intravenous, Once PRN, Eunice Cruz MD    sodium chloride 0.9% flush 10 mL, 10  mL, Intravenous, PRN, NancyEunice mills MD     Review of Systems  Review of Systems   Constitutional: Negative.    HENT: Negative.     Eyes: Negative.    Respiratory: Negative.     Cardiovascular: Negative.    Gastrointestinal: Negative.    Genitourinary: Negative.    Musculoskeletal: Negative.    Skin: Negative.    Neurological: Negative.    Endo/Heme/Allergies: Negative.    Psychiatric/Behavioral:  The patient is nervous/anxious and has insomnia.       Physical Exam      BP 98/65  HR 99  Temp 97.5  RR 16  Physical Exam  Vitals reviewed.   Constitutional:       Appearance: Normal appearance.   Neurological:      Mental Status: She is alert.   Psychiatric:         Mood and Affect: Mood normal.         Behavior: Behavior normal.      ASSESSMENT :  1. Insomnia, unspecified type    2. CINV (chemotherapy-induced nausea and vomiting)    3. Chemotherapy-induced fatigue    4. Triple negative breast cancer      PLAN:  Reviewed all information discussed at today's visit and all questions were answered.    Counseled on healthy lifestyle and behavior modifications:   See Nutrition as needed during treatment  Continue Acupuncture--message sent to KIMBERLEE Garcia to call and schedule.   Recommended Tummy Drops for nausea   I discussed and recommended the following support services:  Tim Chi and Yoga I suggested Tim Chi and/or Yoga as these practices reduce stress, increases flexibility and muscle strength, improves balance and promotes serenity in the power of movement to help fight disease and boost your immune system.   Music and relaxation therapy and Meditation which can decrease stress by lowering blood pressure, slowing breathing, and helping you be more present in the moment. It improves sleep by relaxing the body and mind at the end of the day.Meditation also trains you how to focus on one thing at a time, improving concentration. It also promotes emotional well-being by decreasing depression and anxiety, and helping  create a more positive outlook on life.  Art Therapy     Follow up with [facility] in 3 months    I spent a total of 42 minutes on the day of the visit.This includes face to face time and non-face to face time preparing to see the patient (eg, review of tests), obtaining and/or reviewing separately obtained history, documenting clinical information in the electronic or other health record, independently interpreting results and communicating results to the patient/family/caregiver, or care coordinator.

## 2024-10-15 NOTE — PROGRESS NOTES
Name: Aleena Duckworth  MRN:  [id number]  :  1985 Age 38 y.o.  Date of Service: 10/15/2024    Reason for visit:  Aleena Duckworth is a 38 y.o. female here regarding...clearance for C5D1 of Taxol (deferred x 1 week; platelets 65k)    # Invasive Ductal Carcinoma of the Left Breast  Date of Original Diagnosis:  2023  Recurrence: 5/15/24, localized  Original Stage: pT1b pN0(sn) triple negative, grade 3, Ki-67 80%  Recurrence Stage: [id number] Stage 2B Triple negative, grade 3, Ki-67 76%  Current Sites of Disease:   left axillary tail/lymph node  Current Goals of Therapy:  Curative  Current Therapy:   keynote 522 NACT     Oncologic History/History of Present Illness:   Former patient of Dr. Ayala, per her notes  self palpated a left-sided breast mass.    2023  bilateral breast mammogram that showed a lobular hypoechoic solid mass within the left breast at 2 o'clock position 7 cm     2023, ultrasound guided biopsy = invasive ductal carcinoma, grade 3, ER/SD/HER2 Randall negative, Ki-67 80%.     23  left partial mastectomy that revealed invasive ductal carcinoma 9 x 8 x 6 mm, single focus, approximately 1 mm focus of high-grade DCIS, closest margin 3 mm, anterior.  Two sentinel lymph nodes were negative.  pT1b pN0(sn).    23 cycle 1 of Docetaxel and Cytoxan with numerous SE  2023 cycle 2 of TC with 20% dose reduction of docetaxel.  With again numerous side effects.     Extensive GOC discussion held with patient and family and decided to stop chemotherapy.     2023 s/p bilateral mastectomy, implant removal and hybrid flap and small implants    2024 Reports to [facility] to establish care with me/Nancy. Reports left lateral axillary tail nodules.  Causing anxiety.    24     mammogram with ultrasound BI-RADS 4- 11 mm left axillary tail mass  5/15/24   left axillary tail  and lymph node positive for poorly differentiated carcinoma, triple negative, staging scans negative.    24   "Had a prolonged discussion with patient's , father, mother she was motivated to pursue neoadjuvant chemotherapy.    Today (10/15/2024)  Presents with her daughter for consideration of C6 (AC) today.  Reports ongoing pain with chemotherapy, working with her primary care to escalate immediate release opioids , asked if I could continue to prescribe her MS contin     PHYSICAL EXAMINATION:    BP 98/65   Pulse 99   Temp 97.5 °F (36.4 °C) (Temporal)   Resp 17   Ht 5' 2" (1.575 m)   Wt 57.5 kg (126 lb 12.2 oz)   SpO2 98%   BMI 23.19 kg/m²   Wt Readings from Last 3 Encounters:   10/15/24 57.5 kg (126 lb 12.2 oz)   10/02/24 58.2 kg (128 lb 4.9 oz)   09/24/24 57 kg (125 lb 10.6 oz)     ECOG PERFORMANCE STATUS: 0  Physical Exam   General:  cushinoid-appearing but improved, nontoxic  Eyes:  Equal and round pupils, EOMI, no scleral icterus  Mouth:  No lesions, moist  Cardiovascular:  Warm, well-perfused  Lungs:  Unlabored on room air, no wheezing  Neurologic:  Awake, alert and oriented, participating in the exam  Psych:  Appropriate mood and affect  Skin:  Pale pallor, No rashes  Heme:  No petechiae, no purpura       LABORATORY:  reviewed     Lab Results   Component Value Date    WBC 3.04 (L) 10/15/2024    HGB 10.6 (L) 10/15/2024    HCT 31.0 (L) 10/15/2024    MCV 91 10/15/2024     10/15/2024   BMP  Lab Results   Component Value Date     10/15/2024    K 3.9 10/15/2024     10/15/2024    CO2 26 10/15/2024    BUN 11 10/15/2024    CREATININE 0.7 10/15/2024    CALCIUM 9.2 10/15/2024    ANIONGAP 11 10/15/2024    EGFRNORACEVR >60.0 10/15/2024     Lab Results   Component Value Date    ALT 51 (H) 10/15/2024    AST 30 10/15/2024    ALKPHOS 79 10/15/2024    BILITOT 0.9 10/15/2024       PATHOLOGY:  DIAGNOSIS:  05/17/2024 RODO/carson  LEFT AXILLARY TAIL, ULTRASOUND-GUIDED NEEDLE CORE BIOPSY:  --FRAGMENTS OF LYMPH NODE POSITIVE FOR METASTATIC POORLY DIFFERENTIATED   CARCINOMA (SEE COMMENT).  --RECEPTOR STUDIES " PENDING.      RADIOLOGY:  CT chest abdomen pelvis 05/24/2024  Impression:  1. Postoperative changes of bilateral breast reconstruction with breast implants in place.  Two small subcutaneous soft tissue nodules along the left axillary tail in this patient with biopsy-proven left axillary lymph node triple negative breast cancer, as detailed above.  Correlate with recent mammographic imaging/biopsy.  2. No evidence of metastatic disease within the chest, abdomen, or pelvis.  3. Mild bilateral pelviectasis without evidence of gina hydronephrosis or obstructive uropathy, nonspecific and may be physiologic.    NM Bone Scan  05/24/2024  Impression:  No evidence of metastatic disease      ASSESSMENT AND PLAN:  Aleena Duckworth is a 38 y.o. female with...    # Invasive Ductal Carcinoma of the Left Breast  Date of Original Diagnosis:  02/09/2023  Recurrence: 5/15/24, localized  Original Stage: pT1b pN0(sn) triple negative, grade 3, Ki-67 80%  Recurrence Stage: [id number] Stage 2B Triple negative, grade 3, Ki-67 76%  Current Sites of Disease:   left axillary tail/lymph node  Current Goals of Therapy:  Curative  Current Therapy:   keynote 522 NACT         Staging CT and PET negative for distant disease    Added 1 mg of Ativan for each dose of chemotherapy given her adverse reactions in the past and anxiety surrounding chemo  Added filgrastim to  days 2,3,4/9,10,11/16,17,18  given history of neutropenic fever with TC regimen,  can give additional antiemetics if necessary  Advised patient she needs to have weekly mental health visits while on chemotherapy, referral to Oncology Psychology  Will see her weekly during Cycles 1-4   6/17/24--one-week delay for cycle 1 day 15 of chemotherapy due to thrombocytopenia and LFT elevation, platelets 91,000, repeat CBC and CMP on 06/24.   06/24/24:  Dr. Cruz reviewed labs; ok to proceed with Platelets + 91k again this week; Proceed with C1D15 today; f/u next week 07/01 with Dr. Cruz for  C2D1 with labs prior.  07/02/24:  Proceed with C2D1 today with Zarxio on Days 2, 4 (holiday on day 3); f/u next week as scheduled on 07/08 with labs prior for clearance for C2D8  07/08/24:  Proceed with C2D8 today; Zarxio on Days 9, 10, 11; f/u in 1 week for clearance for C2D15 with labs prior.  07/15/24:  Proceed with C2D15 today; Zarxio on Days 16,17,18; f/u in 1 week for clearance for C3D1 with labs prior.  07/22/24:  Defer x 1 week C3D1 with platelets 66k; f/u in 1 week with CBC, CMP, MG, UPT (TSH done 07/15)  07/29/24:  Proceed with C3D1 with platelets 104k; f/u next week with Dr. Cruz with labs for clearance for C3D8  08/05/24:  Proceed with C3D8, full dose   08/12/24:  Proceed with C3D15 with paltelets 104k; Zarxio on Days 16,17,18  08/20/24:  Proceed with C4D1 with filagstrim on days 2,3,4; advised her to take 1/2 dose of steriods and reminded her not to take steriods with day 8 and day 15 paclitaxel; changed carboplatin to AUC 4 due to pain and marked fatigue; dropped paclitaxel due to symptoms mentioned; dose reduce day 8 and ay 15 paclitaxel to 40mg/m2   08/26/24:  HOLD C4D8; obtain MRI lumbar spine STAT - phone review; f/u in 1 week with labs prior  9/4/24- proceed C4d8 50% dose reduction in taxol, skip c4d15, no oral steroids for now  09/17/24:  Defer x 1 week in light of platelets = 65k; f/u in 1 week with Dr. MONTANO or me with CBC, CMP, MG, UPT prior (labs day before)  09/23/24:  Proceed with C5D1 tomorrow 09/24 as long as patient does not start running fever, chills, flu-like symptoms (recent exposure); f/u in 3 weeks as scheduled with Dr. Cruz with labs prior for consideration of C6, 50% dose reduction in doxorubicin   10/15/24: proceed with cycle 6, 20% dose reduction in doxorubicin in light of difficulty with platelets and overall tolerability; getting ECHO this week     #LFT elevation- advised to d/c OTC meds, one week delay in chemotherapy  8/5/24 down trending proceed w/ full dose   08/26/24:   continues to trend down  9/4/24: better   09/17/24:  Slight uptick in bili = 1.2  09/23/24:  bili 1.4; liver enzymes wnl  10/15/24:  bili nml ; liver enzymes near nml     # medication induced constipation- advised to take BID docusate and PRN senna   # anxiety about health- as noted above,  could be exacerbated by Adderall, ativan PRN   # history of neutropenic fever- as above  # thrombocytopenia- fluctuating platelet levels, commonly this is associated with over-the-counter medications or other medications, encouraged cessation of all OTCs, improved  08/26/24:  Platelets 137K  09/17/24:  Platelets 65k  09/22/24:  Platelets 111k  10/15/24 :  Platelets 161k    #ADHD, has been on Adderall for several years.  Was born prematurely, was diagnosed with a stroke and heart murmur at birth, was treated at [facility].     #Heavy menorrhagia for the past few years, worsening recently and has developed CAROLIN.  Has been on iron supplementation.  Sees gynecologist, Dr. Almita Calrk with [facility].      #Hyperbilirubinemia - An abdominal ultrasound was done, negative for any abnormalities.Saw Hepatology prior to chemo who suspected she has Gilbert's disease and found no contraindication to proceeding with chemotherapy         Route Chart for Scheduling    Med Onc Chart Routing      Follow up with physician . 11/26 for last infusion   Follow up with NUNO . 11/5 for cycle 7   Infusion scheduling note   okay to proceed wth ECHO from May, getting ECHO this week   Injection scheduling note    Labs   Scheduling:  Preferred lab:  Lab interval:  Standing labs with treatment   Imaging   ECHO this week please   Pharmacy appointment    Other referrals          Treatment Plan Information   OP PEMBROLIZUMAB CARBOPLATIN (AUC 5) WITH WEEKLY PACLITAXEL FOLLOWED BY PEMBROLIZUMAB DOXORUBICIN CYCLOPHOSPHAMIDE FOLLOWED BY PEMBROLIZUMAB 200 MG Q3W Eunice Cruz MD   Associated diagnosis: Triple negative breast cancer Stage IB, Stage IIB pT1b, pN0,  cM0, G3, ER-, DC-, HER2-, rcT0, cN1(f), cM0, G3, ER-, DC-, HER2- noted on 4/17/2023   Line of treatment: Neoadjuvant  Treatment Goal: Curative     Upcoming Treatment Dates - OP PEMBROLIZUMAB CARBOPLATIN (AUC 5) WITH WEEKLY PACLITAXEL FOLLOWED BY PEMBROLIZUMAB DOXORUBICIN CYCLOPHOSPHAMIDE FOLLOWED BY PEMBROLIZUMAB 200 MG Q3W    10/15/2024       Chemotherapy       DOXOrubicin chemo injection 94 mg       cycloPHOSphamide 600 mg/m2 = 940 mg in 0.9% NaCl 254.7 mL chemo infusion       pegfilgrastim (NEULASTA (ON BODY INJECTOR)) injection 6 mg       Supportive Care       LORazepam injection 1 mg       prochlorperazine injection Soln 10 mg       Antiemetics       aprepitant (CINVANTI) injection 130 mg       palonosetron 0.25mg/dexAMETHasone 12mg in NS IVPB 0.25 mg 50 mL       Immunotherapy       pembrolizumab (KEYTRUDA) 200 mg in 0.9% NaCl SolP 108 mL infusion  11/5/2024       Chemotherapy       DOXOrubicin chemo injection 94 mg       cycloPHOSphamide 600 mg/m2 = 940 mg in 0.9% NaCl 254.7 mL chemo infusion       pegfilgrastim (NEULASTA (ON BODY INJECTOR)) injection 6 mg       Supportive Care       LORazepam injection 1 mg       prochlorperazine injection Soln 10 mg       Antiemetics       aprepitant (CINVANTI) injection 130 mg       palonosetron 0.25mg/dexAMETHasone 12mg in NS IVPB 0.25 mg 50 mL       Immunotherapy       pembrolizumab (KEYTRUDA) 200 mg in 0.9% NaCl SolP 108 mL infusion  11/26/2024       Chemotherapy       DOXOrubicin chemo injection 94 mg       cycloPHOSphamide 600 mg/m2 = 940 mg in 0.9% NaCl 254.7 mL chemo infusion       pegfilgrastim (NEULASTA (ON BODY INJECTOR)) injection 6 mg       Supportive Care       LORazepam injection 1 mg       prochlorperazine injection Soln 10 mg       Antiemetics       aprepitant (CINVANTI) injection 130 mg       palonosetron 0.25mg/dexAMETHasone 12mg in NS IVPB 0.25 mg 50 mL       Immunotherapy       pembrolizumab (KEYTRUDA) 200 mg in 0.9% NaCl SolP 108 mL  infusion  12/17/2024       Supportive Care       LORazepam injection 1 mg       prochlorperazine injection Soln 10 mg       Immunotherapy       pembrolizumab (KEYTRUDA) 200 mg in sodium chloride 0.9% SolP 108 mL infusion    Therapy Plan Information  INF FLUIDS for Triple negative breast cancer, noted on 4/17/2023  IV Fluids  sodium chloride 0.9% bolus 1,000 mL 1,000 mL  1,000 mL, Intravenous, Every visit  Flushes  sodium chloride 0.9% flush 10 mL  10 mL, Intravenous, PRN  heparin, porcine (PF) 100 unit/mL injection flush 500 Units  500 Units, Intravenous, PRN  Antiemetics  ondansetron injection 8 mg  8 mg, Intravenous, PRN      No therapy plan of the specified type found.    No therapy plan of the specified type found.

## 2024-10-15 NOTE — PLAN OF CARE
Pt arrived to clinic today for Keytruda and AC infusions and tolerated well. No changes throughout therapy. Pt aware of follow up appointments and side effects of drugs. Discharged to home. NAD.

## 2024-10-16 ENCOUNTER — PATIENT MESSAGE (OUTPATIENT)
Dept: HEMATOLOGY/ONCOLOGY | Facility: CLINIC | Age: 39
End: 2024-10-16
Payer: COMMERCIAL

## 2024-10-16 ENCOUNTER — TELEPHONE (OUTPATIENT)
Dept: INFUSION THERAPY | Facility: HOSPITAL | Age: 39
End: 2024-10-16
Payer: COMMERCIAL
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
## 2024-10-17 ENCOUNTER — TELEPHONE (OUTPATIENT)
Dept: HEMATOLOGY/ONCOLOGY | Facility: CLINIC | Age: 39
End: 2024-10-17
Payer: COMMERCIAL

## 2024-10-18 ENCOUNTER — CLINICAL SUPPORT (OUTPATIENT)
Dept: REHABILITATION | Facility: HOSPITAL | Age: 39
End: 2024-10-18
Payer: COMMERCIAL

## 2024-10-18 DIAGNOSIS — C50.919 TRIPLE NEGATIVE BREAST CANCER: ICD-10-CM

## 2024-10-18 DIAGNOSIS — Z17.421 TRIPLE NEGATIVE BREAST CANCER: ICD-10-CM

## 2024-10-18 DIAGNOSIS — G47.00 INSOMNIA, UNSPECIFIED TYPE: ICD-10-CM

## 2024-10-18 DIAGNOSIS — R45.89 ANXIETY ABOUT HEALTH: Primary | ICD-10-CM

## 2024-10-18 PROCEDURE — 97811 ACUP 1/> W/O ESTIM EA ADD 15: CPT | Mod: PN | Performed by: ACUPUNCTURIST

## 2024-10-18 PROCEDURE — 97810 ACUP 1/> WO ESTIM 1ST 15 MIN: CPT | Mod: PN | Performed by: ACUPUNCTURIST

## 2024-10-18 NOTE — PROGRESS NOTES
Acupuncture Follow-Up Note     Name: Aleena Duckworth  Clinic Number: [id number]    Traditional Chinese Medicine (TCM) Diagnosis: Qi Stagnation, Blood Stasis, Qi Deficiency, Blood Deficiency, Wind , Liver Yang Rising, Damp, and Yin Deficiency  Medical Diagnosis:   1. Anxiety about health    2. Triple negative breast cancer    3. Insomnia, unspecified type         Evaluation Date: 10/18/2024    Visit #/Visits authorized:     Precautions: Standard    Subjective     Chief Concern: Peripheral Neuropathy (Patient experiencing increase in neuropathic pain in feet from chemotherapy, which she rates as 3/10) and Hot Flashes (She now has increase in hot flashes since increase in chemotherapy, which she rates as 6/10.)       Medical necessity is demonstrated by the following IMPAIRMENTS: Medical Necessity: Decreased mobility limits day to day activities, social, and emergent situations              Aggravating Factors:  movement     Relieving Factors:  nothing until acupuncture    Symptom Description:     Quality:  Aching, Dull, Throbbing, and Variable  Severity:  3-6/10  Frequency:  every day      Objective     Observation: Patient is having second round of chemotherapy and is having new and exacerbated symptoms from her previous chemo.     Pulse:        thready       New Findings:  patient now experiencing hot flashes and neuropathy with her new chemo regimen.      Treatment     Treatment Principles:  move qi and blood, clear channels, relieve bi, nourish yin and calm sabillon.     Acupuncture points used:  4 ALMENDAREZ, Du20, ER FUENTES, Gb34, Ht7, Pc6, Lu9 , Ki3, Ki6, Li11, Pc6, Sj5, Sp6, Sp9, St36, and YIN HUBER    Bilateral points:  Unilateral points:  Auricular Treatment:  sabillon men    Needles In: 34  Needles Out: 34  Needles W/O STIM placed: 335  Needles W/O STIM removed: 355      Other Traditional Chinese Medicine Modalities -  infrared heat    Assessment     After treatment, patient felt relief and plans to continue weekly.       Patient prognosis is Good.     Patient will continue to benefit from acupuncture treatment to address the deficits listed in the problem list box on initial evaluation, provide patient family education and to maximize pt's level of independence in the home and community environment.     Patient's spiritual, cultural and educational needs considered and pt agreeable to plan of care and goals.     Anticipated barriers to treatment: none    Plan     Recommend 1 /week for 12 sessions then re-assess.      Education:  Patient is aware of cumulative benefit of acupuncture

## 2024-10-21 ENCOUNTER — PATIENT MESSAGE (OUTPATIENT)
Dept: HEMATOLOGY/ONCOLOGY | Facility: CLINIC | Age: 39
End: 2024-10-21
Payer: COMMERCIAL

## 2024-10-23 ENCOUNTER — TELEPHONE (OUTPATIENT)
Dept: HEMATOLOGY/ONCOLOGY | Facility: CLINIC | Age: 39
End: 2024-10-23
Payer: COMMERCIAL

## 2024-10-24 ENCOUNTER — TELEPHONE (OUTPATIENT)
Dept: HEMATOLOGY/ONCOLOGY | Facility: CLINIC | Age: 39
End: 2024-10-24
Payer: COMMERCIAL

## 2024-10-24 NOTE — TELEPHONE ENCOUNTER
Returned call to pt to cancel and reschedule her Acup apt from 10/24/24 to 10/30/24 at 2:30 pm  per pt request. She is not feeling well today.  She verbalized her acceptance of the apt change. Location and check-in process known to pt.

## 2024-10-30 ENCOUNTER — CLINICAL SUPPORT (OUTPATIENT)
Dept: REHABILITATION | Facility: HOSPITAL | Age: 39
End: 2024-10-30
Payer: COMMERCIAL

## 2024-10-30 DIAGNOSIS — G47.00 INSOMNIA, UNSPECIFIED TYPE: ICD-10-CM

## 2024-10-30 DIAGNOSIS — Z17.421 TRIPLE NEGATIVE BREAST CANCER: ICD-10-CM

## 2024-10-30 DIAGNOSIS — C50.919 TRIPLE NEGATIVE BREAST CANCER: ICD-10-CM

## 2024-10-30 DIAGNOSIS — R45.89 ANXIETY ABOUT HEALTH: Primary | ICD-10-CM

## 2024-10-30 PROCEDURE — 97811 ACUP 1/> W/O ESTIM EA ADD 15: CPT | Mod: PN | Performed by: ACUPUNCTURIST

## 2024-10-30 PROCEDURE — 97810 ACUP 1/> WO ESTIM 1ST 15 MIN: CPT | Mod: PN | Performed by: ACUPUNCTURIST

## 2024-11-04 NOTE — PROGRESS NOTES
Name: Aleena Duckworth  MRN:  [id number]  :  1985 Age 38 y.o.  Date of Service: 2024    Reason for visit:  Aleena Duckworth is a 38 y.o. female here regarding...clearance for C7 AC/Pembro    # Invasive Ductal Carcinoma of the Left Breast  Date of Original Diagnosis:  2023  Recurrence: 5/15/24, localized  Original Stage: pT1b pN0(sn) triple negative, grade 3, Ki-67 80%  Recurrence Stage: [id number] Stage 2B Triple negative, grade 3, Ki-67 76%  Current Sites of Disease:   left axillary tail/lymph node  Current Goals of Therapy:  Curative  Current Therapy:   keynote 522 NACT     Oncologic History/History of Present Illness:   Former patient of Dr. Ayala, per her notes  self palpated a left-sided breast mass.    2023  bilateral breast mammogram that showed a lobular hypoechoic solid mass within the left breast at 2 o'clock position 7 cm     2023, ultrasound guided biopsy = invasive ductal carcinoma, grade 3, ER/MN/HER2 Randall negative, Ki-67 80%.     23  left partial mastectomy that revealed invasive ductal carcinoma 9 x 8 x 6 mm, single focus, approximately 1 mm focus of high-grade DCIS, closest margin 3 mm, anterior.  Two sentinel lymph nodes were negative.  pT1b pN0(sn).    23 cycle 1 of Docetaxel and Cytoxan with numerous SE  2023 cycle 2 of TC with 20% dose reduction of docetaxel.  With again numerous side effects.     Extensive GOC discussion held with patient and family and decided to stop chemotherapy.     2023 s/p bilateral mastectomy, implant removal and hybrid flap and small implants    2024 Reports to [facility] to establish care with me/Nancy. Reports left lateral axillary tail nodules.  Causing anxiety.    24     mammogram with ultrasound BI-RADS 4- 11 mm left axillary tail mass  5/15/24   left axillary tail  and lymph node positive for poorly differentiated carcinoma, triple negative, staging scans negative.    24  Had a prolonged discussion with  patient's , father, mother she was motivated to pursue neoadjuvant chemotherapy.    Today (11/05/24)  Presents with her daughter for consideration of C7 (AC/pembro) today.  Endorses PN, but no worse.  To note:  Doxorubicin reduced 20% on C6.  Hydrating & eating well.  Reports fatigue, but able to work through it.  Anxious.  No fevers, chills, abdominal pain, N/V/D, constipation, bleeding or bruising. No new lumps or bumps.     Oncology History   Triple negative breast cancer   4/17/2023 Initial Diagnosis    Triple negative breast cancer     4/18/2023 Cancer Staged    Staging form: Breast, AJCC 8th Edition  - Pathologic stage from 4/18/2023: Stage IB (pT1b, pN0, cM0, G3, ER-, PA-, HER2-)     5/1/2023 - 5/25/2023 Chemotherapy    Treatment Summary   Plan Name: OP BREAST TC - DOCETAXEL CYCLOPHOSPHAMIDE Q3W  Treatment Goal: Curative  Status: Inactive  Start Date: 5/1/2023  End Date: 5/25/2023  Provider: Ambar Ayala MD  Chemotherapy: cycloPHOSphamide 600 mg/m2 = 880 mg in sodium chloride 0.9% 289.4 mL chemo infusion, 600 mg/m2 = 880 mg, Intravenous, Clinic/HOD 1 time, 2 of 4 cycles  Dose modification: 600 mg/m2 (Cycle 3), 480 mg/m2 (Cycle 3, Reason: Dose not tolerated)  Administration: 880 mg (5/1/2023)  DOCEtaxel 75 mg/m2 = 110 mg in sodium chloride 0.9% 290.5 mL chemo infusion, 75 mg/m2 = 110 mg, Intravenous, Clinic/HOD 1 time, 2 of 4 cycles  Dose modification: 60 mg/m2 (original dose 75 mg/m2, Cycle 2, Reason: Dose not tolerated)  Administration: 110 mg (5/1/2023), 90 mg (5/24/2023)     5/22/2024 Cancer Staged    Staging form: Breast, AJCC 8th Edition  - Clinical stage from 5/22/2024: Stage IIB (rcT0, cN1(f), cM0, G3, ER-, PA-, HER2-)     6/4/2024 -  Chemotherapy    Treatment Summary   Plan Name: OP PEMBROLIZUMAB CARBOPLATIN (AUC 5) WITH WEEKLY PACLITAXEL FOLLOWED BY PEMBROLIZUMAB DOXORUBICIN CYCLOPHOSPHAMIDE FOLLOWED BY PEMBROLIZUMAB 200 MG Q3W  Treatment Goal: Curative  Status: Active  Start Date:  "6/4/2024  End Date: 6/3/2025 (Planned)  Provider: Eunice Cruz MD  Chemotherapy: DOXOrubicin chemo injection 94 mg, 60 mg/m2 = 94 mg, Intravenous, Clinic/HOD 1 time, 2 of 4 cycles  Dose modification: 48 mg/m2 (80 % of original dose 60 mg/m2, Cycle 6, Reason: Original Dose Changed), 30 mg/m2 (original dose 60 mg/m2, Cycle 5, Reason: MD Discretion, Comment: elevated tbili - give 50% dose)  Administration: 76 mg (10/15/2024), 48 mg (9/24/2024)  CARBOplatin (PARAPLATIN) 525 mg in sodium chloride 0.9% 302.5 mL chemo infusion, 525 mg, Intravenous, Clinic/HOD 1 time, 4 of 4 cycles  Dose modification: 421.2 mg (80 % of original dose 526.5 mg, Cycle 4, Reason: Other (see comments), Comment: thrombocytopenia),   (original dose 526.5 mg, Cycle 4, Reason: Dose not tolerated, Comment: thrombocytopenia)  Administration: 525 mg (6/4/2024), 525 mg (7/2/2024), 485 mg (8/20/2024), 525 mg (7/29/2024)  cycloPHOSphamide 600 mg/m2 = 940 mg in 0.9% NaCl 254.7 mL chemo infusion, 600 mg/m2 = 940 mg, Intravenous, Clinic/HOD 1 time, 2 of 4 cycles  Administration: 940 mg (9/24/2024), 940 mg (10/15/2024)  PACLitaxeL (TAXOL) 80 mg/m2 = 114 mg in sodium chloride 0.9% 250 mL chemo infusion, 80 mg/m2 = 114 mg, Intravenous, Clinic/HOD 1 time, 4 of 4 cycles  Dose modification: 40 mg/m2 (original dose 40 mg/m2, Cycle 4)  Administration: 114 mg (6/4/2024), 114 mg (6/11/2024), 114 mg (6/24/2024), 114 mg (7/2/2024), 114 mg (7/8/2024), 126 mg (7/29/2024), 120 mg (7/15/2024), 126 mg (8/12/2024), 126 mg (8/5/2024), 66 mg (9/5/2024)         PHYSICAL EXAMINATION:  Weight:  Loss of 1 1/2 pounds in 3 weeks  BP 95/66 (BP Location: Right arm, Patient Position: Sitting)   Pulse 98   Temp 98 °F (36.7 °C) (Temporal)   Resp 18   Ht 5' 2" (1.575 m)   Wt 56.8 kg (125 lb 3.5 oz)   SpO2 96%   BMI 22.90 kg/m²   Wt Readings from Last 3 Encounters:   10/25/24 57.2 kg (126 lb)   10/15/24 57.5 kg (126 lb 12.2 oz)   10/15/24 57.5 kg (126 lb 12.2 oz)     ECOG " PERFORMANCE STATUS: 0  Physical Exam   General:  cushinoid-appearing but improving, nontoxic  Eyes:  Equal and round pupils, EOMI, no scleral icterus  Mouth:  No lesions, moist  Cardiovascular:  RRR, warm, well-perfused  Lungs:  CTA, unlabored on room air, no wheezing  Neurologic:  Awake, alert and oriented, participating in the exam  Psych:  Appropriate mood and affect  Skin:  Pale pallor, No rashes  Heme:  No petechiae, no purpura       LABORATORY:  reviewed     Lab Results   Component Value Date    WBC 2.94 (L) 11/05/2024    HGB 11.6 (L) 11/05/2024    HCT 33.2 (L) 11/05/2024    MCV 90 11/05/2024     (L) 11/05/2024   ANC (1582)  BMP  Lab Results   Component Value Date     11/05/2024    K 3.9 11/05/2024     11/05/2024    CO2 25 11/05/2024    BUN 11 11/05/2024    CREATININE 0.7 11/05/2024    CALCIUM 9.4 11/05/2024    ANIONGAP 11 11/05/2024    EGFRNORACEVR >60.0 11/05/2024     Lab Results   Component Value Date    ALT 51 (H) 10/15/2024    AST 30 10/15/2024    ALKPHOS 79 10/15/2024    BILITOT 0.9 10/15/2024     Magnesium:  2.1  UPT negative  Lab Results   Component Value Date    TSH 3.992 10/15/2024    FREET4 1.00 03/06/2024         PATHOLOGY:  DIAGNOSIS:  05/17/2024 RODO/carson  LEFT AXILLARY TAIL, ULTRASOUND-GUIDED NEEDLE CORE BIOPSY:  --FRAGMENTS OF LYMPH NODE POSITIVE FOR METASTATIC POORLY DIFFERENTIATED   CARCINOMA (SEE COMMENT).  --RECEPTOR STUDIES PENDING.      RADIOLOGY:  CT chest abdomen pelvis 05/24/2024  Impression:  1. Postoperative changes of bilateral breast reconstruction with breast implants in place.  Two small subcutaneous soft tissue nodules along the left axillary tail in this patient with biopsy-proven left axillary lymph node triple negative breast cancer, as detailed above.  Correlate with recent mammographic imaging/biopsy.  2. No evidence of metastatic disease within the chest, abdomen, or pelvis.  3. Mild bilateral pelviectasis without evidence of gina hydronephrosis or  obstructive uropathy, nonspecific and may be physiologic.    NM Bone Scan  05/24/2024  Impression:  No evidence of metastatic disease    Echo 10/25/24:  EF 60%  ASSESSMENT AND PLAN:  Aleena Duckworth is a 38 y.o. female with...    # Invasive Ductal Carcinoma of the Left Breast  Date of Original Diagnosis:  02/09/2023  Recurrence: 5/15/24, localized  Original Stage: pT1b pN0(sn) triple negative, grade 3, Ki-67 80%  Recurrence Stage: [id number] Stage 2B Triple negative, grade 3, Ki-67 76%  Current Sites of Disease:   left axillary tail/lymph node  Current Goals of Therapy:  Curative  Current Therapy:   keynote 522 NACT         Staging CT and PET negative for distant disease    Added 1 mg of Ativan for each dose of chemotherapy given her adverse reactions in the past and anxiety surrounding chemo  Added filgrastim to  days 2,3,4/9,10,11/16,17,18  given history of neutropenic fever with TC regimen,  can give additional antiemetics if necessary  Advised patient she needs to have weekly mental health visits while on chemotherapy, referral to Oncology Psychology  Will see her weekly during Cycles 1-4   6/17/24--one-week delay for cycle 1 day 15 of chemotherapy due to thrombocytopenia and LFT elevation, platelets 91,000, repeat CBC and CMP on 06/24.   06/24/24:  Dr. Cruz reviewed labs; ok to proceed with Platelets + 91k again this week; Proceed with C1D15 today; f/u next week 07/01 with Dr. Cruz for C2D1 with labs prior.  07/02/24:  Proceed with C2D1 today with Zarxio on Days 2, 4 (holiday on day 3); f/u next week as scheduled on 07/08 with labs prior for clearance for C2D8  07/08/24:  Proceed with C2D8 today; Zarxio on Days 9, 10, 11; f/u in 1 week for clearance for C2D15 with labs prior.  07/15/24:  Proceed with C2D15 today; Zarxio on Days 16,17,18; f/u in 1 week for clearance for C3D1 with labs prior.  07/22/24:  Defer x 1 week C3D1 with platelets 66k; f/u in 1 week with CBC, CMP, MG, UPT (TSH done 07/15)  07/29/24:   Proceed with C3D1 with platelets 104k; f/u next week with Dr. Cruz with labs for clearance for C3D8  08/05/24:  Proceed with C3D8, full dose   08/12/24:  Proceed with C3D15 with paltelets 104k; Zarxio on Days 16,17,18  08/20/24:  Proceed with C4D1 with filagstrim on days 2,3,4; advised her to take 1/2 dose of steriods and reminded her not to take steriods with day 8 and day 15 paclitaxel; changed carboplatin to AUC 4 due to pain and marked fatigue; dropped paclitaxel due to symptoms mentioned; dose reduce day 8 and ay 15 paclitaxel to 40mg/m2   08/26/24:  HOLD C4D8; obtain MRI lumbar spine STAT - phone review; f/u in 1 week with labs prior  9/4/24- proceed C4d8 50% dose reduction in taxol, skip c4d15, no oral steroids for now  09/17/24:  Defer x 1 week in light of platelets = 65k; f/u in 1 week with Dr. MONTANO or me with CBC, CMP, MG, UPT prior (labs day before)  09/23/24:  Proceed with C5D1 tomorrow 09/24 as long as patient does not start running fever, chills, flu-like symptoms (recent exposure); f/u in 3 weeks as scheduled with Dr. Cruz with labs prior for consideration of C6, 50% dose reduction in doxorubicin   10/15/24: proceed with cycle 6, 20% dose reduction in doxorubicin in light of difficulty with platelets and overall tolerability; getting ECHO this week   11/05/24:  Proceed with c7 with 20% dose reduction on doxorubicin; f/u in 3 weeks as scheduled with Dr. Cruz with labs on 11/26/24    #LFT elevation- advised to d/c OTC meds, one week delay in chemotherapy  8/5/24 down trending proceed w/ full dose   08/26/24:  continues to trend down  9/4/24: better   09/17/24:  Slight uptick in bili = 1.2  09/23/24:  bili 1.4; liver enzymes wnl  10/15/24:  bili nml ; liver enzymes near nml   11/05/24:  Bili nml; liver enzymes wnl    # medication induced constipation- advised to take BID docusate and PRN senna   # anxiety about health- as noted above,  could be exacerbated by Adderall, ativan PRN   11/05/24:  Refill  on Lorazepam  # history of neutropenic fever- as above  # thrombocytopenia- fluctuating platelet levels, commonly this is associated with over-the-counter medications or other medications, encouraged cessation of all OTCs, improved  08/26/24:  Platelets 137K  09/17/24:  Platelets 65k  09/22/24:  Platelets 111k  10/15/24 :  Platelets 161k  11/05/24:  Platelets 146k    #ADHD, has been on Adderall for several years.  Was born prematurely, was diagnosed with a stroke and heart murmur at birth, was treated at [facility].     #Heavy menorrhagia for the past few years, worsening recently and has developed CAROLIN.  Has been on iron supplementation.  Sees gynecologist, Dr. Almita Clark with [facility].      #Hyperbilirubinemia - An abdominal ultrasound was done, negative for any abnormalities.Saw Hepatology prior to chemo who suspected she has Gilbert's disease and found no contraindication to proceeding with chemotherapy     CACHORRO Ga, FNP-C  [facility]  30 minutes were spent in coordination of patient's care, record review and counseling.      Route Chart for Scheduling  Med Onc Route Chart for SchedulingTreatment Plan Information   OP PEMBROLIZUMAB CARBOPLATIN (AUC 5) WITH WEEKLY PACLITAXEL FOLLOWED BY PEMBROLIZUMAB DOXORUBICIN CYCLOPHOSPHAMIDE FOLLOWED BY PEMBROLIZUMAB 200 MG Q3W Eunice Cruz MD   Associated diagnosis: Triple negative breast cancer Stage IB, Stage IIB pT1b, pN0, cM0, G3, ER-, ID-, HER2-, rcT0, cN1(f), cM0, G3, ER-, ID-, HER2- noted on 4/17/2023   Line of treatment: Neoadjuvant  Treatment Goal: Curative     Upcoming Treatment Dates - OP PEMBROLIZUMAB CARBOPLATIN (AUC 5) WITH WEEKLY PACLITAXEL FOLLOWED BY PEMBROLIZUMAB DOXORUBICIN CYCLOPHOSPHAMIDE FOLLOWED BY PEMBROLIZUMAB 200 MG Q3W    11/5/2024       Chemotherapy       DOXOrubicin chemo injection 76 mg       cycloPHOSphamide 600 mg/m2 = 940 mg in 0.9% NaCl 254.7 mL chemo infusion       Supportive  Care       LORazepam injection 1 mg       prochlorperazine injection Soln 10 mg       Antiemetics       aprepitant (Cinvanti) injection 130 mg       palonosetron 0.25mg/dexAMETHasone 12mg in NS IVPB 0.25 mg 50 mL       Growth Factor       pegfilgrastim (NEULASTA (ON BODY INJECTOR)) injection 6 mg       Immunotherapy       pembrolizumab (KEYTRUDA) 200 mg in 0.9% NaCl SolP 108 mL infusion  11/26/2024       Chemotherapy       DOXOrubicin chemo injection 76 mg       cycloPHOSphamide 600 mg/m2 = 940 mg in 0.9% NaCl 254.7 mL chemo infusion       Supportive Care       LORazepam injection 1 mg       prochlorperazine injection Soln 10 mg       Antiemetics       aprepitant (Cinvanti) injection 130 mg       palonosetron 0.25mg/dexAMETHasone 12mg in NS IVPB 0.25 mg 50 mL       Growth Factor       pegfilgrastim (NEULASTA (ON BODY INJECTOR)) injection 6 mg       Immunotherapy       pembrolizumab (KEYTRUDA) 200 mg in 0.9% NaCl SolP 108 mL infusion  12/17/2024       Supportive Care       LORazepam injection 1 mg       prochlorperazine injection Soln 10 mg       Immunotherapy       pembrolizumab (KEYTRUDA) 200 mg in sodium chloride 0.9% SolP 108 mL infusion  1/7/2025       Supportive Care       LORazepam injection 1 mg       prochlorperazine injection Soln 10 mg       Immunotherapy       pembrolizumab (KEYTRUDA) 200 mg in sodium chloride 0.9% SolP 108 mL infusion    Therapy Plan Information  INF FLUIDS for Triple negative breast cancer, noted on 4/17/2023  IV Fluids  sodium chloride 0.9% bolus 1,000 mL 1,000 mL  1,000 mL, Intravenous, Every visit  Flushes  sodium chloride 0.9% flush 10 mL  10 mL, Intravenous, PRN  heparin, porcine (PF) 100 unit/mL injection flush 500 Units  500 Units, Intravenous, PRN  Antiemetics  ondansetron injection 8 mg  8 mg, Intravenous, PRN      No therapy plan of the specified type found.    No therapy plan of the specified type found.

## 2024-11-05 ENCOUNTER — INFUSION (OUTPATIENT)
Dept: INFUSION THERAPY | Facility: HOSPITAL | Age: 39
End: 2024-11-05
Attending: INTERNAL MEDICINE
Payer: COMMERCIAL

## 2024-11-05 ENCOUNTER — OFFICE VISIT (OUTPATIENT)
Dept: HEMATOLOGY/ONCOLOGY | Facility: CLINIC | Age: 39
End: 2024-11-05
Payer: COMMERCIAL

## 2024-11-05 ENCOUNTER — DOCUMENTATION ONLY (OUTPATIENT)
Dept: INFUSION THERAPY | Facility: HOSPITAL | Age: 39
End: 2024-11-05

## 2024-11-05 ENCOUNTER — LAB VISIT (OUTPATIENT)
Dept: LAB | Facility: HOSPITAL | Age: 39
End: 2024-11-05
Attending: NURSE PRACTITIONER
Payer: COMMERCIAL

## 2024-11-05 ENCOUNTER — PATIENT MESSAGE (OUTPATIENT)
Dept: HEMATOLOGY/ONCOLOGY | Facility: CLINIC | Age: 39
End: 2024-11-05

## 2024-11-05 VITALS
BODY MASS INDEX: 23.05 KG/M2 | HEART RATE: 98 BPM | RESPIRATION RATE: 18 BRPM | SYSTOLIC BLOOD PRESSURE: 95 MMHG | DIASTOLIC BLOOD PRESSURE: 66 MMHG | WEIGHT: 125.25 LBS | OXYGEN SATURATION: 96 % | HEIGHT: 62 IN | TEMPERATURE: 98 F

## 2024-11-05 VITALS
DIASTOLIC BLOOD PRESSURE: 56 MMHG | RESPIRATION RATE: 18 BRPM | TEMPERATURE: 98 F | HEART RATE: 80 BPM | BODY MASS INDEX: 22.9 KG/M2 | WEIGHT: 125.25 LBS | SYSTOLIC BLOOD PRESSURE: 84 MMHG

## 2024-11-05 DIAGNOSIS — C50.919 TRIPLE NEGATIVE BREAST CANCER: Primary | ICD-10-CM

## 2024-11-05 DIAGNOSIS — Z17.421 TRIPLE NEGATIVE BREAST CANCER: Primary | ICD-10-CM

## 2024-11-05 DIAGNOSIS — M89.8X9 BONE PAIN DUE TO G-CSF: ICD-10-CM

## 2024-11-05 DIAGNOSIS — Z17.1 MALIGNANT NEOPLASM OF AXILLARY TAIL OF LEFT BREAST IN FEMALE, ESTROGEN RECEPTOR NEGATIVE: ICD-10-CM

## 2024-11-05 DIAGNOSIS — C50.612 MALIGNANT NEOPLASM OF AXILLARY TAIL OF LEFT BREAST IN FEMALE, ESTROGEN RECEPTOR NEGATIVE: ICD-10-CM

## 2024-11-05 DIAGNOSIS — G89.3 NEOPLASM RELATED PAIN (ACUTE) (CHRONIC): ICD-10-CM

## 2024-11-05 DIAGNOSIS — F41.9 ANXIETY: ICD-10-CM

## 2024-11-05 LAB
ALBUMIN SERPL BCP-MCNC: 4 G/DL (ref 3.5–5.2)
ALP SERPL-CCNC: 67 U/L (ref 40–150)
ALT SERPL W/O P-5'-P-CCNC: 42 U/L (ref 10–44)
ANION GAP SERPL CALC-SCNC: 11 MMOL/L (ref 8–16)
AST SERPL-CCNC: 28 U/L (ref 10–40)
B-HCG UR QL: NEGATIVE
BASOPHILS # BLD AUTO: 0.02 K/UL (ref 0–0.2)
BASOPHILS NFR BLD: 0.7 % (ref 0–1.9)
BILIRUB SERPL-MCNC: 0.9 MG/DL (ref 0.1–1)
BUN SERPL-MCNC: 11 MG/DL (ref 6–20)
CALCIUM SERPL-MCNC: 9.4 MG/DL (ref 8.7–10.5)
CHLORIDE SERPL-SCNC: 104 MMOL/L (ref 95–110)
CO2 SERPL-SCNC: 25 MMOL/L (ref 23–29)
CREAT SERPL-MCNC: 0.7 MG/DL (ref 0.5–1.4)
DIFFERENTIAL METHOD BLD: ABNORMAL
EOSINOPHIL # BLD AUTO: 0 K/UL (ref 0–0.5)
EOSINOPHIL NFR BLD: 0.7 % (ref 0–8)
ERYTHROCYTE [DISTWIDTH] IN BLOOD BY AUTOMATED COUNT: 13.6 % (ref 11.5–14.5)
EST. GFR  (NO RACE VARIABLE): >60 ML/MIN/1.73 M^2
GLUCOSE SERPL-MCNC: 87 MG/DL (ref 70–110)
HCT VFR BLD AUTO: 33.2 % (ref 37–48.5)
HGB BLD-MCNC: 11.6 G/DL (ref 12–16)
IMM GRANULOCYTES # BLD AUTO: 0.01 K/UL (ref 0–0.04)
IMM GRANULOCYTES NFR BLD AUTO: 0.3 % (ref 0–0.5)
LYMPHOCYTES # BLD AUTO: 0.8 K/UL (ref 1–4.8)
LYMPHOCYTES NFR BLD: 27.2 % (ref 18–48)
MAGNESIUM SERPL-MCNC: 2.1 MG/DL (ref 1.6–2.6)
MCH RBC QN AUTO: 31.4 PG (ref 27–31)
MCHC RBC AUTO-ENTMCNC: 34.9 G/DL (ref 32–36)
MCV RBC AUTO: 90 FL (ref 82–98)
MONOCYTES # BLD AUTO: 0.5 K/UL (ref 0.3–1)
MONOCYTES NFR BLD: 17.3 % (ref 4–15)
NEUTROPHILS # BLD AUTO: 1.6 K/UL (ref 1.8–7.7)
NEUTROPHILS NFR BLD: 53.8 % (ref 38–73)
NRBC BLD-RTO: 0 /100 WBC
PLATELET # BLD AUTO: 146 K/UL (ref 150–450)
PMV BLD AUTO: 8.4 FL (ref 9.2–12.9)
POTASSIUM SERPL-SCNC: 3.9 MMOL/L (ref 3.5–5.1)
PROT SERPL-MCNC: 6.3 G/DL (ref 6–8.4)
RBC # BLD AUTO: 3.69 M/UL (ref 4–5.4)
SODIUM SERPL-SCNC: 140 MMOL/L (ref 136–145)
TSH SERPL DL<=0.005 MIU/L-ACNC: 2.02 UIU/ML (ref 0.4–4)
WBC # BLD AUTO: 2.94 K/UL (ref 3.9–12.7)

## 2024-11-05 PROCEDURE — 85025 COMPLETE CBC W/AUTO DIFF WBC: CPT | Mod: PN | Performed by: INTERNAL MEDICINE

## 2024-11-05 PROCEDURE — 96411 CHEMO IV PUSH ADDL DRUG: CPT | Mod: PN

## 2024-11-05 PROCEDURE — 83735 ASSAY OF MAGNESIUM: CPT | Mod: PN | Performed by: INTERNAL MEDICINE

## 2024-11-05 PROCEDURE — 63600175 PHARM REV CODE 636 W HCPCS: Mod: JZ,JG,PN | Performed by: NURSE PRACTITIONER

## 2024-11-05 PROCEDURE — 96413 CHEMO IV INFUSION 1 HR: CPT | Mod: PN

## 2024-11-05 PROCEDURE — 96377 APPLICATON ON-BODY INJECTOR: CPT | Mod: PN

## 2024-11-05 PROCEDURE — 96367 TX/PROPH/DG ADDL SEQ IV INF: CPT | Mod: PN

## 2024-11-05 PROCEDURE — 25000003 PHARM REV CODE 250: Mod: PN | Performed by: NURSE PRACTITIONER

## 2024-11-05 PROCEDURE — 1159F MED LIST DOCD IN RCRD: CPT | Mod: CPTII,S$GLB,, | Performed by: NURSE PRACTITIONER

## 2024-11-05 PROCEDURE — 99214 OFFICE O/P EST MOD 30 MIN: CPT | Mod: S$GLB,,, | Performed by: NURSE PRACTITIONER

## 2024-11-05 PROCEDURE — 96375 TX/PRO/DX INJ NEW DRUG ADDON: CPT | Mod: PN

## 2024-11-05 PROCEDURE — 80053 COMPREHEN METABOLIC PANEL: CPT | Mod: PN | Performed by: INTERNAL MEDICINE

## 2024-11-05 PROCEDURE — 81025 URINE PREGNANCY TEST: CPT | Mod: PN | Performed by: INTERNAL MEDICINE

## 2024-11-05 PROCEDURE — 3074F SYST BP LT 130 MM HG: CPT | Mod: CPTII,S$GLB,, | Performed by: NURSE PRACTITIONER

## 2024-11-05 PROCEDURE — 96417 CHEMO IV INFUS EACH ADDL SEQ: CPT | Mod: PN

## 2024-11-05 PROCEDURE — 99999 PR PBB SHADOW E&M-EST. PATIENT-LVL V: CPT | Mod: PBBFAC,,, | Performed by: NURSE PRACTITIONER

## 2024-11-05 PROCEDURE — 84443 ASSAY THYROID STIM HORMONE: CPT | Performed by: INTERNAL MEDICINE

## 2024-11-05 PROCEDURE — 3008F BODY MASS INDEX DOCD: CPT | Mod: CPTII,S$GLB,, | Performed by: NURSE PRACTITIONER

## 2024-11-05 PROCEDURE — 3078F DIAST BP <80 MM HG: CPT | Mod: CPTII,S$GLB,, | Performed by: NURSE PRACTITIONER

## 2024-11-05 RX ORDER — DOXORUBICIN HYDROCHLORIDE 2 MG/ML
48 INJECTION, SOLUTION INTRAVENOUS
Status: COMPLETED | OUTPATIENT
Start: 2024-11-05 | End: 2024-11-05

## 2024-11-05 RX ORDER — HEPARIN 100 UNIT/ML
500 SYRINGE INTRAVENOUS
Status: DISCONTINUED | OUTPATIENT
Start: 2024-11-05 | End: 2024-11-05 | Stop reason: HOSPADM

## 2024-11-05 RX ORDER — LORATADINE 10 MG/1
10 TABLET ORAL DAILY
Qty: 90 TABLET | Refills: 0 | Status: SHIPPED | OUTPATIENT
Start: 2024-11-05 | End: 2025-02-03

## 2024-11-05 RX ORDER — PROCHLORPERAZINE EDISYLATE 5 MG/ML
10 INJECTION INTRAMUSCULAR; INTRAVENOUS ONCE AS NEEDED
Status: CANCELLED
Start: 2024-11-05

## 2024-11-05 RX ORDER — DIPHENHYDRAMINE HYDROCHLORIDE 50 MG/ML
50 INJECTION INTRAMUSCULAR; INTRAVENOUS ONCE AS NEEDED
Status: CANCELLED | OUTPATIENT
Start: 2024-11-05

## 2024-11-05 RX ORDER — LORAZEPAM 2 MG/ML
1 INJECTION INTRAMUSCULAR ONCE AS NEEDED
Status: DISCONTINUED | OUTPATIENT
Start: 2024-11-05 | End: 2024-11-05 | Stop reason: HOSPADM

## 2024-11-05 RX ORDER — LORAZEPAM 2 MG/ML
1 INJECTION INTRAMUSCULAR ONCE AS NEEDED
Status: CANCELLED
Start: 2024-11-05

## 2024-11-05 RX ORDER — HEPARIN 100 UNIT/ML
500 SYRINGE INTRAVENOUS
Status: CANCELLED | OUTPATIENT
Start: 2024-11-05

## 2024-11-05 RX ORDER — EPINEPHRINE 0.3 MG/.3ML
0.3 INJECTION SUBCUTANEOUS ONCE AS NEEDED
Status: CANCELLED | OUTPATIENT
Start: 2024-11-05

## 2024-11-05 RX ORDER — DOXORUBICIN HYDROCHLORIDE 2 MG/ML
48 INJECTION, SOLUTION INTRAVENOUS
Status: CANCELLED | OUTPATIENT
Start: 2024-11-05

## 2024-11-05 RX ORDER — SODIUM CHLORIDE 0.9 % (FLUSH) 0.9 %
10 SYRINGE (ML) INJECTION
Status: CANCELLED | OUTPATIENT
Start: 2024-11-05

## 2024-11-05 RX ORDER — SODIUM CHLORIDE 0.9 % (FLUSH) 0.9 %
10 SYRINGE (ML) INJECTION
Status: DISCONTINUED | OUTPATIENT
Start: 2024-11-05 | End: 2024-11-05 | Stop reason: HOSPADM

## 2024-11-05 RX ORDER — EPINEPHRINE 0.3 MG/.3ML
0.3 INJECTION SUBCUTANEOUS ONCE AS NEEDED
Status: DISCONTINUED | OUTPATIENT
Start: 2024-11-05 | End: 2024-11-05 | Stop reason: HOSPADM

## 2024-11-05 RX ORDER — DIPHENHYDRAMINE HYDROCHLORIDE 50 MG/ML
50 INJECTION INTRAMUSCULAR; INTRAVENOUS ONCE AS NEEDED
Status: DISCONTINUED | OUTPATIENT
Start: 2024-11-05 | End: 2024-11-05 | Stop reason: HOSPADM

## 2024-11-05 RX ORDER — PROCHLORPERAZINE EDISYLATE 5 MG/ML
10 INJECTION INTRAMUSCULAR; INTRAVENOUS ONCE AS NEEDED
Status: DISCONTINUED | OUTPATIENT
Start: 2024-11-05 | End: 2024-11-05 | Stop reason: HOSPADM

## 2024-11-05 RX ORDER — LORAZEPAM 1 MG/1
1 TABLET ORAL 2 TIMES DAILY PRN
Qty: 60 TABLET | Refills: 0 | Status: SHIPPED | OUTPATIENT
Start: 2024-11-05 | End: 2025-11-05

## 2024-11-05 RX ADMIN — DOXORUBICIN HYDROCHLORIDE 76 MG: 2 INJECTION, SOLUTION INTRAVENOUS at 11:11

## 2024-11-05 RX ADMIN — SODIUM CHLORIDE 200 MG: 9 INJECTION, SOLUTION INTRAVENOUS at 10:11

## 2024-11-05 RX ADMIN — PEGFILGRASTIM 6 MG: KIT SUBCUTANEOUS at 11:11

## 2024-11-05 RX ADMIN — DEXAMETHASONE SODIUM PHOSPHATE 0.25 MG: 10 INJECTION, SOLUTION INTRAMUSCULAR; INTRAVENOUS at 10:11

## 2024-11-05 RX ADMIN — CYCLOPHOSPHAMIDE 940 MG: 200 INJECTION, SOLUTION INTRAVENOUS at 11:11

## 2024-11-05 RX ADMIN — APREPITANT 130 MG: 130 INJECTION, EMULSION INTRAVENOUS at 10:11

## 2024-11-05 NOTE — PROGRESS NOTES
Oncology Nutrition   Chemotherapy Infusion Visit    Nutrition Follow Up   RD met with pt at chairside during infusion tx. Pt present for cycle 7 of Keytruda AC. Pt continues to do well nutritionally - maintaining weight, chewing without difficulty and denies any nutrition related side effects.   Pt is requesting samples of Tummy Drops. RD provided samples and coupons.       Wt Readings from Last 10 Encounters:   11/05/24 56.8 kg (125 lb 3.5 oz)   11/05/24 56.8 kg (125 lb 3.5 oz)   10/25/24 57.2 kg (126 lb)   10/15/24 57.5 kg (126 lb 12.2 oz)   10/15/24 57.5 kg (126 lb 12.2 oz)   10/02/24 58.2 kg (128 lb 4.9 oz)   09/24/24 57 kg (125 lb 10.6 oz)   09/23/24 56.7 kg (125 lb)   09/17/24 58.6 kg (129 lb 3 oz)   09/10/24 58.4 kg (128 lb 12 oz)       All other nutrition questions/concerns addressed as appropriate. Will continue to monitor prn throughout treatment.     Jenifer Shabazz, JADON, LDN  11/05/2024  3:41 PM

## 2024-11-06 ENCOUNTER — TELEPHONE (OUTPATIENT)
Dept: HEMATOLOGY/ONCOLOGY | Facility: CLINIC | Age: 39
End: 2024-11-06
Payer: COMMERCIAL

## 2024-11-08 ENCOUNTER — TELEPHONE (OUTPATIENT)
Dept: HEMATOLOGY/ONCOLOGY | Facility: CLINIC | Age: 39
End: 2024-11-08
Payer: COMMERCIAL

## 2024-11-08 NOTE — TELEPHONE ENCOUNTER
LMOVM for pt to remind of appt on 11/11/24 with Dr. Hein. Asked pt if unable to attend to please let us know, All contact info was left for pt.

## 2024-11-11 ENCOUNTER — CLINICAL SUPPORT (OUTPATIENT)
Dept: REHABILITATION | Facility: HOSPITAL | Age: 39
End: 2024-11-11
Payer: COMMERCIAL

## 2024-11-11 DIAGNOSIS — Z17.421 TRIPLE NEGATIVE BREAST CANCER: ICD-10-CM

## 2024-11-11 DIAGNOSIS — C50.919 TRIPLE NEGATIVE BREAST CANCER: ICD-10-CM

## 2024-11-11 DIAGNOSIS — G47.00 INSOMNIA, UNSPECIFIED TYPE: ICD-10-CM

## 2024-11-11 DIAGNOSIS — R45.89 ANXIETY ABOUT HEALTH: Primary | ICD-10-CM

## 2024-11-11 PROCEDURE — 97811 ACUP 1/> W/O ESTIM EA ADD 15: CPT | Mod: PN | Performed by: ACUPUNCTURIST

## 2024-11-11 PROCEDURE — 97810 ACUP 1/> WO ESTIM 1ST 15 MIN: CPT | Mod: PN | Performed by: ACUPUNCTURIST

## 2024-11-11 NOTE — PROGRESS NOTES
Acupuncture Follow-Up Note     Name: Aleena Duckworth  Clinic Number: [id number]    Traditional Chinese Medicine (TCM) Diagnosis: Qi Stagnation, Blood Stasis, Qi Deficiency, Blood Deficiency, Wind , and Yin Deficiency  Medical Diagnosis:   1. Anxiety about health    2. Triple negative breast cancer    3. Insomnia, unspecified type         Evaluation Date: 11/11/2024    Visit #/Visits authorized:     Precautions: Standard    Subjective     Chief Concern: Anxiety (Patient reporting 2/10 anxiety over health issues), other low back pain (4/10 low back pain/), and Hot Flashes (Hot flashes 5/10 today, improving slightly in terms of duration and frequency, but still moderately intense. )       Medical necessity is demonstrated by the following IMPAIRMENTS: Medical Necessity: Decreased mobility limits day to day activities, social, and emergent situations              Aggravating Factors:  stress     Relieving Factors:  nothing until acupuncture    Symptom Description:     Quality:  Aching, Hot, Cold, and Variable  Severity:  2-5/10  Frequency:  every day      Objective     Observation: Patient is responding slowly to treatment but nevertheless making improvements, and this is only her 4/12 treatments.  She is optimistic that acupuncture will help with her symptoms.       Pulse:        thready       New Findings:  na    Treatment     Treatment Principles:  move qi and blood, relieve bi, clear channels, nourish yin, calm sabillon    Acupuncture points used:  4 ALMENDAREZ, Du20, ER FUENTES, Gb34, Ht7, Pc6, Lu9 , Ki3, Ki6, Li11, Sp6, Sp9, St36, and YIN HUBER    Bilateral points:  Unilateral points:  Auricular Treatment:  sabillon men    Needles In: 30  Needles Out: 30  Needles W/O STIM placed: 335  Needles W/O STIM removed: 355      Other Traditional Chinese Medicine Modalities -  infrared heat    Assessment     After treatment, patient felt relief and plans to continue weekly.      Patient prognosis is Good.     Patient will continue to benefit  from acupuncture treatment to address the deficits listed in the problem list box on initial evaluation, provide patient family education and to maximize pt's level of independence in the home and community environment.     Patient's spiritual, cultural and educational needs considered and pt agreeable to plan of care and goals.     Anticipated barriers to treatment: none    Plan     Recommend 1 /week for 12 sessions then re-assess.      Education:  Patient is aware of cumulative benefit of acupuncture

## 2024-11-13 ENCOUNTER — PATIENT MESSAGE (OUTPATIENT)
Dept: SURGERY | Facility: CLINIC | Age: 39
End: 2024-11-13
Payer: COMMERCIAL

## 2024-11-14 ENCOUNTER — PATIENT MESSAGE (OUTPATIENT)
Dept: HEMATOLOGY/ONCOLOGY | Facility: CLINIC | Age: 39
End: 2024-11-14
Payer: COMMERCIAL

## 2024-11-14 DIAGNOSIS — C50.919 TRIPLE NEGATIVE BREAST CANCER: ICD-10-CM

## 2024-11-14 DIAGNOSIS — G89.3 NEOPLASM RELATED PAIN: ICD-10-CM

## 2024-11-14 DIAGNOSIS — Z17.421 TRIPLE NEGATIVE BREAST CANCER: ICD-10-CM

## 2024-11-14 DIAGNOSIS — C50.612 MALIGNANT NEOPLASM OF AXILLARY TAIL OF LEFT FEMALE BREAST, UNSPECIFIED ESTROGEN RECEPTOR STATUS: Primary | ICD-10-CM

## 2024-11-14 DIAGNOSIS — G89.3 NEOPLASM RELATED PAIN (ACUTE) (CHRONIC): ICD-10-CM

## 2024-11-14 NOTE — TELEPHONE ENCOUNTER
Called and spoke to patient about her message. Informed her that reconstruction is not typically done with this type of surgery. She has the option of reconstruction in the future and will have to defer to Dr. Lake at [facility] because he did her original reconstruction. Also explained that she will need radiation treatments after surgery and a referral will be sent to Dr. Martinez in that regard. Told her she needed to see these physicians in before treatment for planning. Patient verbalized all of the above and thanked me.

## 2024-11-15 DIAGNOSIS — G89.3 NEOPLASM RELATED PAIN (ACUTE) (CHRONIC): ICD-10-CM

## 2024-11-15 RX ORDER — MORPHINE SULFATE 15 MG/1
15 TABLET, FILM COATED, EXTENDED RELEASE ORAL 2 TIMES DAILY
Qty: 60 TABLET | Refills: 0 | Status: SHIPPED | OUTPATIENT
Start: 2024-11-15 | End: 2024-12-15

## 2024-11-15 NOTE — TELEPHONE ENCOUNTER
Spoke to patient, she was asking for Dr. Cruz to send [facility] contin [facility] due to them being out of the med. Informed patient that I have already sent a refill request to Dr. Cruz. She thanked me and will await prescription order to be filled.

## 2024-11-15 NOTE — TELEPHONE ENCOUNTER
----- Message from Lilliam sent at 11/15/2024  4:10 PM CST -----  Contact: PT  Type: Needs Medical Advice    Who Called: PT  Best Call Back Number: [phone]  Additional  Information: Pt asking for nurse to give her a call back  said RX morphine (MS CONTIN) 15 MG 12 hr tablet  is out of stock at [facility] . Asking to send to 24 hr one   [id number] - [place] [address] [email or web address] AT [place] [address] & [address] [place] [id number]  Phone: [phone] Fax: [phone]    Please Advise- Thank you

## 2024-11-18 ENCOUNTER — PATIENT MESSAGE (OUTPATIENT)
Dept: HEMATOLOGY/ONCOLOGY | Facility: CLINIC | Age: 39
End: 2024-11-18
Payer: COMMERCIAL

## 2024-11-18 ENCOUNTER — TELEPHONE (OUTPATIENT)
Dept: RADIATION ONCOLOGY | Facility: CLINIC | Age: 39
End: 2024-11-18
Payer: COMMERCIAL

## 2024-11-18 DIAGNOSIS — G89.3 NEOPLASM RELATED PAIN (ACUTE) (CHRONIC): ICD-10-CM

## 2024-11-18 RX ORDER — MORPHINE SULFATE 15 MG/1
15 TABLET, FILM COATED, EXTENDED RELEASE ORAL 2 TIMES DAILY
Qty: 60 TABLET | Refills: 0 | Status: SHIPPED | OUTPATIENT
Start: 2024-11-18 | End: 2024-11-18 | Stop reason: SDUPTHER

## 2024-11-18 RX ORDER — OXYCODONE HYDROCHLORIDE 10 MG/1
10 TABLET ORAL EVERY 4 HOURS PRN
Qty: 20 TABLET | Refills: 0 | Status: SHIPPED | OUTPATIENT
Start: 2024-11-18

## 2024-11-18 RX ORDER — MORPHINE SULFATE 15 MG/1
15 TABLET, FILM COATED, EXTENDED RELEASE ORAL 2 TIMES DAILY
Qty: 60 TABLET | Refills: 0 | Status: SHIPPED | OUTPATIENT
Start: 2024-11-18 | End: 2024-12-18

## 2024-11-21 ENCOUNTER — TELEPHONE (OUTPATIENT)
Dept: HEMATOLOGY/ONCOLOGY | Facility: CLINIC | Age: 39
End: 2024-11-21
Payer: COMMERCIAL

## 2024-11-21 NOTE — TELEPHONE ENCOUNTER
Returned call to pt to schedule her Acup apt; no ans left detailed voicemail for pt to return my call to schedule acup apt.  ----- Message from Alberta sent at 11/21/2024  1:20 PM CST -----  Type: Needs Medical Advice  Who Called: patient  Symptoms (please be specific):    How long has patient had these symptoms:    Pharmacy name and phone #:    Best Call Back Number:   Additional Information: patient requesting a call back to schedule acupuncture

## 2024-11-22 ENCOUNTER — TELEPHONE (OUTPATIENT)
Dept: HEMATOLOGY/ONCOLOGY | Facility: CLINIC | Age: 39
End: 2024-11-22
Payer: COMMERCIAL

## 2024-11-22 NOTE — TELEPHONE ENCOUNTER
Messaged noted and appt canceled as per patient request.  ----- Message from Alberta sent at 11/22/2024  1:53 PM CST -----  Type: Needs Medical Advice  Who Called:  patient   Symptoms (please be specific):    How long has patient had these symptoms:    Pharmacy name and phone #:    Best Call Back Number: [phone]  Additional Information: Patient called to cancel acupuncture appt on 11/22 at 2:30 will call back to reschedule

## 2024-12-02 ENCOUNTER — PATIENT MESSAGE (OUTPATIENT)
Dept: SURGERY | Facility: CLINIC | Age: 39
End: 2024-12-02
Payer: COMMERCIAL

## 2024-12-03 ENCOUNTER — INFUSION (OUTPATIENT)
Dept: INFUSION THERAPY | Facility: HOSPITAL | Age: 39
End: 2024-12-03
Attending: INTERNAL MEDICINE
Payer: COMMERCIAL

## 2024-12-03 ENCOUNTER — OFFICE VISIT (OUTPATIENT)
Dept: HEMATOLOGY/ONCOLOGY | Facility: CLINIC | Age: 39
End: 2024-12-03
Payer: COMMERCIAL

## 2024-12-03 ENCOUNTER — LAB VISIT (OUTPATIENT)
Dept: LAB | Facility: HOSPITAL | Age: 39
End: 2024-12-03
Attending: INTERNAL MEDICINE
Payer: COMMERCIAL

## 2024-12-03 VITALS
TEMPERATURE: 97 F | RESPIRATION RATE: 18 BRPM | HEART RATE: 85 BPM | DIASTOLIC BLOOD PRESSURE: 66 MMHG | SYSTOLIC BLOOD PRESSURE: 109 MMHG | BODY MASS INDEX: 23.61 KG/M2 | OXYGEN SATURATION: 100 % | WEIGHT: 128.31 LBS | HEIGHT: 62 IN

## 2024-12-03 VITALS
TEMPERATURE: 97 F | HEART RATE: 85 BPM | HEIGHT: 62 IN | DIASTOLIC BLOOD PRESSURE: 66 MMHG | OXYGEN SATURATION: 100 % | BODY MASS INDEX: 23.61 KG/M2 | SYSTOLIC BLOOD PRESSURE: 109 MMHG | WEIGHT: 128.31 LBS | RESPIRATION RATE: 18 BRPM

## 2024-12-03 DIAGNOSIS — Z17.421 TRIPLE NEGATIVE BREAST CANCER: Primary | ICD-10-CM

## 2024-12-03 DIAGNOSIS — Z72.0 TOBACCO ABUSE: ICD-10-CM

## 2024-12-03 DIAGNOSIS — Z17.421 TRIPLE NEGATIVE BREAST CANCER: ICD-10-CM

## 2024-12-03 DIAGNOSIS — C50.919 TRIPLE NEGATIVE BREAST CANCER: Primary | ICD-10-CM

## 2024-12-03 DIAGNOSIS — F41.9 ANXIETY: ICD-10-CM

## 2024-12-03 DIAGNOSIS — C50.919 TRIPLE NEGATIVE BREAST CANCER: ICD-10-CM

## 2024-12-03 DIAGNOSIS — R45.89 ANXIETY ABOUT HEALTH: ICD-10-CM

## 2024-12-03 LAB
ALBUMIN SERPL BCP-MCNC: 3.8 G/DL (ref 3.5–5.2)
ALP SERPL-CCNC: 74 U/L (ref 40–150)
ALT SERPL W/O P-5'-P-CCNC: 40 U/L (ref 10–44)
ANION GAP SERPL CALC-SCNC: 9 MMOL/L (ref 8–16)
AST SERPL-CCNC: 29 U/L (ref 10–40)
B-HCG UR QL: NEGATIVE
BASOPHILS # BLD AUTO: 0.01 K/UL (ref 0–0.2)
BASOPHILS NFR BLD: 0.3 % (ref 0–1.9)
BILIRUB SERPL-MCNC: 1 MG/DL (ref 0.1–1)
BUN SERPL-MCNC: 12 MG/DL (ref 6–20)
CALCIUM SERPL-MCNC: 9.3 MG/DL (ref 8.7–10.5)
CHLORIDE SERPL-SCNC: 106 MMOL/L (ref 95–110)
CO2 SERPL-SCNC: 27 MMOL/L (ref 23–29)
CREAT SERPL-MCNC: 0.7 MG/DL (ref 0.5–1.4)
DIFFERENTIAL METHOD BLD: ABNORMAL
EOSINOPHIL # BLD AUTO: 0.1 K/UL (ref 0–0.5)
EOSINOPHIL NFR BLD: 1.5 % (ref 0–8)
ERYTHROCYTE [DISTWIDTH] IN BLOOD BY AUTOMATED COUNT: 13.3 % (ref 11.5–14.5)
EST. GFR  (NO RACE VARIABLE): >60 ML/MIN/1.73 M^2
GLUCOSE SERPL-MCNC: 92 MG/DL (ref 70–110)
HCT VFR BLD AUTO: 32.6 % (ref 37–48.5)
HGB BLD-MCNC: 11.5 G/DL (ref 12–16)
IMM GRANULOCYTES # BLD AUTO: 0 K/UL (ref 0–0.04)
IMM GRANULOCYTES NFR BLD AUTO: 0 % (ref 0–0.5)
LYMPHOCYTES # BLD AUTO: 0.8 K/UL (ref 1–4.8)
LYMPHOCYTES NFR BLD: 23.8 % (ref 18–48)
MAGNESIUM SERPL-MCNC: 1.9 MG/DL (ref 1.6–2.6)
MCH RBC QN AUTO: 31.7 PG (ref 27–31)
MCHC RBC AUTO-ENTMCNC: 35.3 G/DL (ref 32–36)
MCV RBC AUTO: 90 FL (ref 82–98)
MONOCYTES # BLD AUTO: 0.5 K/UL (ref 0.3–1)
MONOCYTES NFR BLD: 15.2 % (ref 4–15)
NEUTROPHILS # BLD AUTO: 2 K/UL (ref 1.8–7.7)
NEUTROPHILS NFR BLD: 59.2 % (ref 38–73)
NRBC BLD-RTO: 0 /100 WBC
PLATELET # BLD AUTO: 119 K/UL (ref 150–450)
PMV BLD AUTO: 8.7 FL (ref 9.2–12.9)
POTASSIUM SERPL-SCNC: 3.8 MMOL/L (ref 3.5–5.1)
PROT SERPL-MCNC: 6 G/DL (ref 6–8.4)
RBC # BLD AUTO: 3.63 M/UL (ref 4–5.4)
SODIUM SERPL-SCNC: 142 MMOL/L (ref 136–145)
TSH SERPL DL<=0.005 MIU/L-ACNC: 3.08 UIU/ML (ref 0.4–4)
WBC # BLD AUTO: 3.36 K/UL (ref 3.9–12.7)

## 2024-12-03 PROCEDURE — 81025 URINE PREGNANCY TEST: CPT | Mod: PN | Performed by: INTERNAL MEDICINE

## 2024-12-03 PROCEDURE — 96411 CHEMO IV PUSH ADDL DRUG: CPT | Mod: PN

## 2024-12-03 PROCEDURE — 84443 ASSAY THYROID STIM HORMONE: CPT | Performed by: INTERNAL MEDICINE

## 2024-12-03 PROCEDURE — 83735 ASSAY OF MAGNESIUM: CPT | Mod: PN | Performed by: INTERNAL MEDICINE

## 2024-12-03 PROCEDURE — 25000003 PHARM REV CODE 250: Mod: PN | Performed by: NURSE PRACTITIONER

## 2024-12-03 PROCEDURE — 36415 COLL VENOUS BLD VENIPUNCTURE: CPT | Mod: PN | Performed by: INTERNAL MEDICINE

## 2024-12-03 PROCEDURE — 3074F SYST BP LT 130 MM HG: CPT | Mod: CPTII,S$GLB,, | Performed by: NURSE PRACTITIONER

## 2024-12-03 PROCEDURE — 85025 COMPLETE CBC W/AUTO DIFF WBC: CPT | Mod: PN | Performed by: INTERNAL MEDICINE

## 2024-12-03 PROCEDURE — 96413 CHEMO IV INFUSION 1 HR: CPT | Mod: PN

## 2024-12-03 PROCEDURE — 99999 PR PBB SHADOW E&M-EST. PATIENT-LVL IV: CPT | Mod: PBBFAC,,, | Performed by: NURSE PRACTITIONER

## 2024-12-03 PROCEDURE — 3078F DIAST BP <80 MM HG: CPT | Mod: CPTII,S$GLB,, | Performed by: NURSE PRACTITIONER

## 2024-12-03 PROCEDURE — 63600175 PHARM REV CODE 636 W HCPCS: Mod: PN | Performed by: NURSE PRACTITIONER

## 2024-12-03 PROCEDURE — 99214 OFFICE O/P EST MOD 30 MIN: CPT | Mod: S$GLB,,, | Performed by: NURSE PRACTITIONER

## 2024-12-03 PROCEDURE — 80053 COMPREHEN METABOLIC PANEL: CPT | Mod: PN | Performed by: INTERNAL MEDICINE

## 2024-12-03 PROCEDURE — 96417 CHEMO IV INFUS EACH ADDL SEQ: CPT | Mod: PN

## 2024-12-03 PROCEDURE — 3008F BODY MASS INDEX DOCD: CPT | Mod: CPTII,S$GLB,, | Performed by: NURSE PRACTITIONER

## 2024-12-03 PROCEDURE — 96375 TX/PRO/DX INJ NEW DRUG ADDON: CPT | Mod: PN

## 2024-12-03 PROCEDURE — 96367 TX/PROPH/DG ADDL SEQ IV INF: CPT | Mod: PN

## 2024-12-03 RX ORDER — EPINEPHRINE 0.3 MG/.3ML
0.3 INJECTION SUBCUTANEOUS ONCE AS NEEDED
Status: DISCONTINUED | OUTPATIENT
Start: 2024-12-03 | End: 2024-12-03 | Stop reason: HOSPADM

## 2024-12-03 RX ORDER — HEPARIN 100 UNIT/ML
500 SYRINGE INTRAVENOUS
Status: CANCELLED | OUTPATIENT
Start: 2024-12-03

## 2024-12-03 RX ORDER — EPINEPHRINE 0.3 MG/.3ML
0.3 INJECTION SUBCUTANEOUS ONCE AS NEEDED
Status: CANCELLED | OUTPATIENT
Start: 2024-12-03

## 2024-12-03 RX ORDER — LORAZEPAM 2 MG/ML
1 INJECTION INTRAMUSCULAR ONCE AS NEEDED
Status: DISCONTINUED | OUTPATIENT
Start: 2024-12-03 | End: 2024-12-03 | Stop reason: HOSPADM

## 2024-12-03 RX ORDER — DOXORUBICIN HYDROCHLORIDE 2 MG/ML
45 INJECTION, SOLUTION INTRAVENOUS
Status: COMPLETED | OUTPATIENT
Start: 2024-12-03 | End: 2024-12-03

## 2024-12-03 RX ORDER — DOXORUBICIN HYDROCHLORIDE 2 MG/ML
45 INJECTION, SOLUTION INTRAVENOUS
Status: CANCELLED | OUTPATIENT
Start: 2024-12-03

## 2024-12-03 RX ORDER — LORAZEPAM 2 MG/ML
1 INJECTION INTRAMUSCULAR ONCE AS NEEDED
Status: CANCELLED
Start: 2024-12-03

## 2024-12-03 RX ORDER — DIPHENHYDRAMINE HYDROCHLORIDE 50 MG/ML
50 INJECTION INTRAMUSCULAR; INTRAVENOUS ONCE AS NEEDED
Status: CANCELLED | OUTPATIENT
Start: 2024-12-03

## 2024-12-03 RX ORDER — PROCHLORPERAZINE EDISYLATE 5 MG/ML
10 INJECTION INTRAMUSCULAR; INTRAVENOUS ONCE AS NEEDED
Status: CANCELLED
Start: 2024-12-03

## 2024-12-03 RX ORDER — SODIUM CHLORIDE 0.9 % (FLUSH) 0.9 %
10 SYRINGE (ML) INJECTION
Status: CANCELLED | OUTPATIENT
Start: 2024-12-03

## 2024-12-03 RX ORDER — DIPHENHYDRAMINE HYDROCHLORIDE 50 MG/ML
50 INJECTION INTRAMUSCULAR; INTRAVENOUS ONCE AS NEEDED
Status: DISCONTINUED | OUTPATIENT
Start: 2024-12-03 | End: 2024-12-03 | Stop reason: HOSPADM

## 2024-12-03 RX ORDER — VARENICLINE TARTRATE 0.5 (11)-1
KIT ORAL
Qty: 1 EACH | Refills: 0 | Status: SHIPPED | OUTPATIENT
Start: 2024-12-03 | End: 2024-12-06 | Stop reason: SDUPTHER

## 2024-12-03 RX ORDER — LORAZEPAM 1 MG/1
1 TABLET ORAL 2 TIMES DAILY PRN
Qty: 60 TABLET | Refills: 0 | Status: SHIPPED | OUTPATIENT
Start: 2024-12-03 | End: 2025-12-03

## 2024-12-03 RX ORDER — SODIUM CHLORIDE 0.9 % (FLUSH) 0.9 %
10 SYRINGE (ML) INJECTION
Status: DISCONTINUED | OUTPATIENT
Start: 2024-12-03 | End: 2024-12-03 | Stop reason: HOSPADM

## 2024-12-03 RX ADMIN — DEXAMETHASONE SODIUM PHOSPHATE 0.25 MG: 10 INJECTION, SOLUTION INTRAMUSCULAR; INTRAVENOUS at 12:12

## 2024-12-03 RX ADMIN — DOXORUBICIN HYDROCHLORIDE 72 MG: 2 INJECTION, SOLUTION INTRAVENOUS at 01:12

## 2024-12-03 RX ADMIN — APREPITANT 130 MG: 130 INJECTION, EMULSION INTRAVENOUS at 12:12

## 2024-12-03 RX ADMIN — SODIUM CHLORIDE 200 MG: 9 INJECTION, SOLUTION INTRAVENOUS at 12:12

## 2024-12-03 RX ADMIN — CYCLOPHOSPHAMIDE 960 MG: 200 INJECTION, SOLUTION INTRAVENOUS at 01:12

## 2024-12-03 RX ADMIN — PEGFILGRASTIM 6 MG: KIT SUBCUTANEOUS at 02:12

## 2024-12-03 NOTE — PLAN OF CARE
Problem: Fatigue  Goal: Improved Activity Tolerance  Outcome: Progressing  Intervention: Promote Improved Energy  Flowsheets (Taken 12/3/2024 1136)  Fatigue Management: frequent rest breaks encouraged  Activity Management: Ambulated -L4     Problem: Adult Inpatient Plan of Care  Goal: Patient-Specific Goal (Individualized)  Outcome: Progressing  Goal: Absence of Hospital-Acquired Illness or Injury  Outcome: Progressing  Goal: Optimal Comfort and Wellbeing  Outcome: Progressing  Intervention: Monitor Pain and Promote Comfort  Flowsheets (Taken 12/3/2024 1136)  Pain Management Interventions: warm blanket provided  Goal: Readiness for Transition of Care  Outcome: Progressing

## 2024-12-03 NOTE — PROGRESS NOTES
Name: Aleena Duckworth  MRN:  [id number]  :  1985 Age 38 y.o.  Date of Service: 12/3/2024    Reason for visit:  Aleena Duckworth is a 38 y.o. female here regarding...clearance for C8 AC/Pembro    # Invasive Ductal Carcinoma of the Left Breast  Date of Original Diagnosis:  2023  Recurrence: 5/15/24, localized  Original Stage: pT1b pN0(sn) triple negative, grade 3, Ki-67 80%  Recurrence Stage: [id number] Stage 2B Triple negative, grade 3, Ki-67 76%  Current Sites of Disease:   left axillary tail/lymph node  Current Goals of Therapy:  Curative  Current Therapy:   keynote 522 NACT     Oncologic History/History of Present Illness:   Former patient of Dr. Ayala, per her notes  self palpated a left-sided breast mass.    2023  bilateral breast mammogram that showed a lobular hypoechoic solid mass within the left breast at 2 o'clock position 7 cm     2023, ultrasound guided biopsy = invasive ductal carcinoma, grade 3, ER/VT/HER2 Randall negative, Ki-67 80%.     23  left partial mastectomy that revealed invasive ductal carcinoma 9 x 8 x 6 mm, single focus, approximately 1 mm focus of high-grade DCIS, closest margin 3 mm, anterior.  Two sentinel lymph nodes were negative.  pT1b pN0(sn).    23 cycle 1 of Docetaxel and Cytoxan with numerous SE  2023 cycle 2 of TC with 20% dose reduction of docetaxel.  With again numerous side effects.     Extensive GOC discussion held with patient and family and decided to stop chemotherapy.     2023 s/p bilateral mastectomy, implant removal and hybrid flap and small implants    2024 Reports to [facility] to establish care with me/Nancy. Reports left lateral axillary tail nodules.  Causing anxiety.    24     mammogram with ultrasound BI-RADS 4- 11 mm left axillary tail mass  5/15/24   left axillary tail  and lymph node positive for poorly differentiated carcinoma, triple negative, staging scans negative.    24  Had a prolonged discussion with  Onset: 3 days  Location/description: very occasional cough but it seems like she is faking it so unsure if its real or not. Pt has been pulling at her ears but did this before with teething. Denies congestion or runny nose  Associated Symptoms: Pt is teething  What improves/worsens symptoms: tylenol   Symptom specific medications: n/a  Input and Output: Normal  Activity level: Normal  Temperature (route and time): x 3 days 100.6 is the highest  Weight: 7.413 kg  Recent Care: 2019    COVID Screening:    Have you or anyone in your household travelled in the last 14 days? Denies  Have you or anyone in your household had contact with someone who has tested positive for COVID 19? Denies  Is anyone in your household ill with the following symptoms:                                                                        Fevers:Mother had a fever 2 weeks ago for 1 day.                                                                         Respiratory Symptoms/ including sore throat:Denies                                                                        Nausea, Vomiting, or Diarrhea:Denies                                                                        Any loss of taste or smell:Denies     No batteries in the scale so cant get updated weight on patient. Mother has last dosage per last week.    patient's , father, mother she was motivated to pursue neoadjuvant chemotherapy.    Today (12/03/24)  Presents with her  for consideration of C8 (AC/pembro) today.  Endorses PN, but no worse.  To note:  Doxorubicin reduced 20% on C6 & 7.  Patient is requesting this cycle be reduced to 25% due to leg pains associated with Doxorubicin.  Requesting refill on Lorazepam & new starter kit for Chantix as she is due to undergo surgery - no surgery date to date.  Hydrating & eating well.  Remains anxious; glad this is the last chemo infusion.  No fevers, chills, abdominal pain, N/V/D, constipation, bleeding or bruising. No new lumps or bumps.     Oncology History   Triple negative breast cancer   4/17/2023 Initial Diagnosis    Triple negative breast cancer     4/18/2023 Cancer Staged    Staging form: Breast, AJCC 8th Edition  - Pathologic stage from 4/18/2023: Stage IB (pT1b, pN0, cM0, G3, ER-, IA-, HER2-)     5/1/2023 - 5/25/2023 Chemotherapy    Treatment Summary   Plan Name: OP BREAST TC - DOCETAXEL CYCLOPHOSPHAMIDE Q3W  Treatment Goal: Curative  Status: Inactive  Start Date: 5/1/2023  End Date: 5/25/2023  Provider: Ambar Ayala MD  Chemotherapy: cycloPHOSphamide 600 mg/m2 = 880 mg in sodium chloride 0.9% 289.4 mL chemo infusion, 600 mg/m2 = 880 mg, Intravenous, Clinic/HOD 1 time, 2 of 4 cycles  Dose modification: 600 mg/m2 (Cycle 3), 480 mg/m2 (Cycle 3, Reason: Dose not tolerated)  Administration: 880 mg (5/1/2023)  DOCEtaxel 75 mg/m2 = 110 mg in sodium chloride 0.9% 290.5 mL chemo infusion, 75 mg/m2 = 110 mg, Intravenous, Clinic/HOD 1 time, 2 of 4 cycles  Dose modification: 60 mg/m2 (original dose 75 mg/m2, Cycle 2, Reason: Dose not tolerated)  Administration: 110 mg (5/1/2023), 90 mg (5/24/2023)     5/22/2024 Cancer Staged    Staging form: Breast, AJCC 8th Edition  - Clinical stage from 5/22/2024: Stage IIB (rcT0, cN1(f), cM0, G3, ER-, IA-, HER2-)     6/4/2024 -  Chemotherapy    Treatment Summary    Plan Name: OP PEMBROLIZUMAB CARBOPLATIN (AUC 5) WITH WEEKLY PACLITAXEL FOLLOWED BY PEMBROLIZUMAB DOXORUBICIN CYCLOPHOSPHAMIDE FOLLOWED BY PEMBROLIZUMAB 200 MG Q3W  Treatment Goal: Curative  Status: Active  Start Date: 6/4/2024  End Date: 6/10/2025 (Planned)  Provider: Eunice Cruz MD  Chemotherapy: DOXOrubicin chemo injection 94 mg, 60 mg/m2 = 94 mg, Intravenous, Clinic/HOD 1 time, 3 of 4 cycles  Dose modification: 48 mg/m2 (80 % of original dose 60 mg/m2, Cycle 6, Reason: Original Dose Changed), 30 mg/m2 (original dose 60 mg/m2, Cycle 5, Reason: MD Discretion, Comment: elevated tbili - give 50% dose), 45 mg/m2 (original dose 60 mg/m2, Cycle 8, Reason: Other (see comments))  Administration: 76 mg (10/15/2024), 48 mg (9/24/2024), 76 mg (11/5/2024)  CARBOplatin (PARAPLATIN) 525 mg in sodium chloride 0.9% 302.5 mL chemo infusion, 525 mg, Intravenous, Clinic/HOD 1 time, 4 of 4 cycles  Dose modification: 421.2 mg (80 % of original dose 526.5 mg, Cycle 4, Reason: Other (see comments), Comment: thrombocytopenia),   (original dose 526.5 mg, Cycle 4, Reason: Dose not tolerated, Comment: thrombocytopenia)  Administration: 525 mg (6/4/2024), 525 mg (7/2/2024), 485 mg (8/20/2024), 525 mg (7/29/2024)  cycloPHOSphamide 600 mg/m2 = 940 mg in 0.9% NaCl 254.7 mL chemo infusion, 600 mg/m2 = 940 mg, Intravenous, Clinic/HOD 1 time, 3 of 4 cycles  Administration: 940 mg (9/24/2024), 940 mg (10/15/2024), 940 mg (11/5/2024)  PACLitaxeL (TAXOL) 80 mg/m2 = 114 mg in sodium chloride 0.9% 250 mL chemo infusion, 80 mg/m2 = 114 mg, Intravenous, Clinic/HOD 1 time, 4 of 4 cycles  Dose modification: 40 mg/m2 (original dose 40 mg/m2, Cycle 4)  Administration: 114 mg (6/4/2024), 114 mg (6/11/2024), 114 mg (6/24/2024), 114 mg (7/2/2024), 114 mg (7/8/2024), 126 mg (7/29/2024), 120 mg (7/15/2024), 126 mg (8/12/2024), 126 mg (8/5/2024), 66 mg (9/5/2024)         PHYSICAL EXAMINATION:  Weight:  Gain of 3 pounds in 3 weeks  /66 (BP Location:  "Left arm, Patient Position: Sitting)   Pulse 85   Temp 97.1 °F (36.2 °C) (Temporal)   Resp 18   Ht 5' 2" (1.575 m)   Wt 58.2 kg (128 lb 4.9 oz)   SpO2 100%   BMI 23.47 kg/m²   Wt Readings from Last 3 Encounters:   12/03/24 58.2 kg (128 lb 4.9 oz)   11/05/24 56.8 kg (125 lb 3.5 oz)   11/05/24 56.8 kg (125 lb 3.5 oz)     ECOG PERFORMANCE STATUS: 0  Physical Exam   General:  cushinoid-appearing but improving, nontoxic  Eyes:  Equal and round pupils, EOMI, no scleral icterus  Mouth:  No lesions, moist  Cardiovascular:  RRR, warm, well-perfused  Lungs:  CTA, unlabored on room air, no wheezing  Neurologic:  Awake, alert and oriented, participating in the exam  Psych:  Appropriate mood and affect  Skin:  Pale pallor, No rashes  Heme:  No petechiae, no purpura       LABORATORY:  reviewed     Lab Results   Component Value Date    WBC 3.36 (L) 12/03/2024    RBC 3.63 (L) 12/03/2024    HGB 11.5 (L) 12/03/2024    HCT 32.6 (L) 12/03/2024    MCV 90 12/03/2024    MCH 31.7 (H) 12/03/2024    MCHC 35.3 12/03/2024    RDW 13.3 12/03/2024     (L) 12/03/2024    MPV 8.7 (L) 12/03/2024    GRAN 2.0 12/03/2024    GRAN 59.2 12/03/2024    LYMPH 0.8 (L) 12/03/2024    LYMPH 23.8 12/03/2024    MONO 0.5 12/03/2024    MONO 15.2 (H) 12/03/2024    EOS 0.1 12/03/2024    BASO 0.01 12/03/2024    EOSINOPHIL 1.5 12/03/2024    BASOPHIL 0.3 12/03/2024     ANC = 2.0     BMP  Lab Results   Component Value Date     12/03/2024    K 3.8 12/03/2024     12/03/2024    CO2 27 12/03/2024    BUN 12 12/03/2024    CREATININE 0.7 12/03/2024    CALCIUM 9.3 12/03/2024    ANIONGAP 9 12/03/2024    EGFRNORACEVR >60.0 12/03/2024     Lab Results   Component Value Date    ALT 40 12/03/2024    AST 29 12/03/2024    ALKPHOS 74 12/03/2024    BILITOT 1.0 12/03/2024     Magnesium:  1.9  UPT negative  Lab Results   Component Value Date    TSH 2.022 11/05/2024    FREET4 1.00 03/06/2024         PATHOLOGY:  DIAGNOSIS:  05/17/2024 RODO/carson  LEFT AXILLARY TAIL, " ULTRASOUND-GUIDED NEEDLE CORE BIOPSY:  --FRAGMENTS OF LYMPH NODE POSITIVE FOR METASTATIC POORLY DIFFERENTIATED   CARCINOMA (SEE COMMENT).  --RECEPTOR STUDIES PENDING.      RADIOLOGY:  CT chest abdomen pelvis 05/24/2024  Impression:  1. Postoperative changes of bilateral breast reconstruction with breast implants in place.  Two small subcutaneous soft tissue nodules along the left axillary tail in this patient with biopsy-proven left axillary lymph node triple negative breast cancer, as detailed above.  Correlate with recent mammographic imaging/biopsy.  2. No evidence of metastatic disease within the chest, abdomen, or pelvis.  3. Mild bilateral pelviectasis without evidence of gina hydronephrosis or obstructive uropathy, nonspecific and may be physiologic.    NM Bone Scan  05/24/2024  Impression:  No evidence of metastatic disease    Echo 10/25/24:  EF 60%    ASSESSMENT AND PLAN:  Aleena Duckworth is a 38 y.o. female with...    # Invasive Ductal Carcinoma of the Left Breast  Date of Original Diagnosis:  02/09/2023  Recurrence: 5/15/24, localized  Original Stage: pT1b pN0(sn) triple negative, grade 3, Ki-67 80%  Recurrence Stage: [id number] Stage 2B Triple negative, grade 3, Ki-67 76%  Current Sites of Disease:   left axillary tail/lymph node  Current Goals of Therapy:  Curative  Current Therapy:   keynote 522 NACT         Staging CT and PET negative for distant disease    Added 1 mg of Ativan for each dose of chemotherapy given her adverse reactions in the past and anxiety surrounding chemo  Added filgrastim to  days 2,3,4/9,10,11/16,17,18  given history of neutropenic fever with TC regimen,  can give additional antiemetics if necessary  Advised patient she needs to have weekly mental health visits while on chemotherapy, referral to Oncology Psychology  Will see her weekly during Cycles 1-4   6/17/24--one-week delay for cycle 1 day 15 of chemotherapy due to thrombocytopenia and LFT elevation, platelets 91,000,  repeat CBC and CMP on 06/24.   06/24/24:  Dr. Cruz reviewed labs; ok to proceed with Platelets + 91k again this week; Proceed with C1D15 today; f/u next week 07/01 with Dr. Cruz for C2D1 with labs prior.  07/02/24:  Proceed with C2D1 today with Zarxio on Days 2, 4 (holiday on day 3); f/u next week as scheduled on 07/08 with labs prior for clearance for C2D8  07/08/24:  Proceed with C2D8 today; Zarxio on Days 9, 10, 11; f/u in 1 week for clearance for C2D15 with labs prior.  07/15/24:  Proceed with C2D15 today; Zarxio on Days 16,17,18; f/u in 1 week for clearance for C3D1 with labs prior.  07/22/24:  Defer x 1 week C3D1 with platelets 66k; f/u in 1 week with CBC, CMP, MG, UPT (TSH done 07/15)  07/29/24:  Proceed with C3D1 with platelets 104k; f/u next week with Dr. Cruz with labs for clearance for C3D8  08/05/24:  Proceed with C3D8, full dose   08/12/24:  Proceed with C3D15 with paltelets 104k; Zarxio on Days 16,17,18  08/20/24:  Proceed with C4D1 with filagstrim on days 2,3,4; advised her to take 1/2 dose of steriods and reminded her not to take steriods with day 8 and day 15 paclitaxel; changed carboplatin to AUC 4 due to pain and marked fatigue; dropped paclitaxel due to symptoms mentioned; dose reduce day 8 and ay 15 paclitaxel to 40mg/m2   08/26/24:  HOLD C4D8; obtain MRI lumbar spine STAT - phone review; f/u in 1 week with labs prior  9/4/24- proceed C4d8 50% dose reduction in taxol, skip c4d15, no oral steroids for now  09/17/24:  Defer x 1 week in light of platelets = 65k; f/u in 1 week with Dr. MONTANO or me with CBC, CMP, MG, UPT prior (labs day before)  09/23/24:  Proceed with C5D1 tomorrow 09/24 as long as patient does not start running fever, chills, flu-like symptoms (recent exposure); f/u in 3 weeks as scheduled with Dr. Cruz with labs prior for consideration of C6, 50% dose reduction in doxorubicin   10/15/24: proceed with cycle 6, 20% dose reduction in doxorubicin in light of difficulty with  platelets and overall tolerability; getting ECHO this week   11/05/24:  Proceed with c7 with 20% dose reduction on doxorubicin; f/u in 3 weeks as scheduled with Dr. Cruz with labs on 11/26/24 12/03/24:  Proceed with C8 with 25% dose reduction from original Doxorubicin (60 mg/m2) per patient request; f/u 4 - 6 weeks post surgery; surgery date unknown; f/u with Dr. Valle 12/09 with imaging 12/05; will reach out to clinic.    #LFT elevation- advised to d/c OTC meds, one week delay in chemotherapy  8/5/24 down trending proceed w/ full dose   08/26/24:  continues to trend down  9/4/24: better   09/17/24:  Slight uptick in bili = 1.2  09/23/24:  bili 1.4; liver enzymes wnl  10/15/24:  bili nml ; liver enzymes near nml   11/05/24:  Bili nml; liver enzymes wnl  12/03/24:  stable    # medication induced constipation- advised to take BID docusate and PRN senna   # anxiety about health- as noted above,  could be exacerbated by Adderall, ativan PRN   11/05/24:  Refill on Lorazepam  12/03/24:  Refill on Lorazepam    # history of neutropenic fever- as above  # thrombocytopenia- fluctuating platelet levels, commonly this is associated with over-the-counter medications or other medications, encouraged cessation of all OTCs, improved  08/26/24:  Platelets 137K  09/17/24:  Platelets 65k  09/22/24:  Platelets 111k  10/15/24 :  Platelets 161k  11/05/24:  Platelets 146k  12/03/24:  Platelets 119k    #ADHD, has been on Adderall for several years.  Was born prematurely, was diagnosed with a stroke and heart murmur at birth, was treated at [facility].     #Heavy menorrhagia for the past few years, worsening recently and has developed CAROLIN.  Has been on iron supplementation.  Sees gynecologist, Dr. Almita Clark with [facility].      #Hyperbilirubinemia - An abdominal ultrasound was done, negative for any abnormalities.Saw Hepatology prior to chemo who suspected she has Gilbert's disease and found no contraindication to proceeding  with chemotherapy     Tobacco abuse  -Rx Chantix sent to restart    CACHORRO Ga, JULIANNAP-C  [facility]  35 minutes were spent in coordination of patient's care, record review and counseling.      Route Chart for Scheduling    Med Onc Chart Routing      Follow up with physician . To be determined - does not have surgery date to date   Follow up with NUNO    Infusion scheduling note   Proceed with C8 AC/Pem - dose reduction of Doxorubicin by 25% per patient request   Injection scheduling note    Labs    Imaging    Pharmacy appointment    Other referrals              Treatment Plan Information   OP PEMBROLIZUMAB CARBOPLATIN (AUC 5) WITH WEEKLY PACLITAXEL FOLLOWED BY PEMBROLIZUMAB DOXORUBICIN CYCLOPHOSPHAMIDE FOLLOWED BY PEMBROLIZUMAB 200 MG Q3W Eunice Cruz MD   Associated diagnosis: Triple negative breast cancer Stage IB, Stage IIB pT1b, pN0, cM0, G3, ER-, MT-, HER2-, rcT0, cN1(f), cM0, G3, ER-, MT-, HER2- noted on 4/17/2023   Line of treatment: Neoadjuvant  Treatment Goal: Curative     Upcoming Treatment Dates - OP PEMBROLIZUMAB CARBOPLATIN (AUC 5) WITH WEEKLY PACLITAXEL FOLLOWED BY PEMBROLIZUMAB DOXORUBICIN CYCLOPHOSPHAMIDE FOLLOWED BY PEMBROLIZUMAB 200 MG Q3W    12/3/2024       Chemotherapy       DOXOrubicin chemo injection 72 mg       cycloPHOSphamide 600 mg/m2 = 960 mg in 0.9% NaCl 254.8 mL chemo infusion       Supportive Care       LORazepam injection 1 mg       prochlorperazine injection Soln 10 mg       Antiemetics       aprepitant (Cinvanti) injection 130 mg       palonosetron 0.25mg/dexAMETHasone 12mg in NS IVPB 0.25 mg 50 mL       Growth Factor       pegfilgrastim (NEULASTA (ON BODY INJECTOR)) injection 6 mg       Immunotherapy       pembrolizumab (KEYTRUDA) 200 mg in 0.9% NaCl SolP 108 mL infusion  12/24/2024       Supportive Care       LORazepam injection 1 mg       prochlorperazine injection Soln 10 mg       Immunotherapy       pembrolizumab (KEYTRUDA) 200  mg in 0.9% NaCl SolP 108 mL infusion  1/14/2025       Supportive Care       LORazepam injection 1 mg       prochlorperazine injection Soln 10 mg       Immunotherapy       pembrolizumab (KEYTRUDA) 200 mg in sodium chloride 0.9% SolP 108 mL infusion  2/4/2025       Supportive Care       LORazepam injection 1 mg       prochlorperazine injection Soln 10 mg       Immunotherapy       pembrolizumab (KEYTRUDA) 200 mg in sodium chloride 0.9% SolP 108 mL infusion    Therapy Plan Information  INF FLUIDS for Triple negative breast cancer, noted on 4/17/2023  IV Fluids  sodium chloride 0.9% bolus 1,000 mL 1,000 mL  1,000 mL, Intravenous, Every visit  Flushes  sodium chloride 0.9% flush 10 mL  10 mL, Intravenous, PRN  heparin, porcine (PF) 100 unit/mL injection flush 500 Units  500 Units, Intravenous, PRN  Antiemetics  ondansetron injection 8 mg  8 mg, Intravenous, PRN      No therapy plan of the specified type found.    No therapy plan of the specified type found.

## 2024-12-03 NOTE — Clinical Note
Can you let me know when you have a surgery date; we will f/u 4-6 weeks post for resumption of treatment - thank you Shirley Metz is a 84 year old female who presented to [facility] due to chest pain radiation down her left side which started last night PTA. She was recently seen by Dr. Castillo and started on Eliquis and her medications were adjusted. Her current medical conditions include Anemia, Basal cell carcinoma, HTN, HLP, AFIB. Of note, she has numerous intolerances with multiple cardiac medications. ED workup revealed , K+ 4.3, Cr 1.4, Troponin 0.033, EKG revealed AFL/AFIB, variable rate control. She received IV Diltiazem in ED and was started on IV Diltiazem drip. She also received Lovenox 1mg/kg this AM as well. Cardiology consulted to assist with medical management. Chart reviewed, Patient seen and examined in ED. Denies chest pain on exam today. She remains in AFIB, variable rate control at time of exam today. No shortness of breath, RUIZ or palpitations. NO leg swelling or claudications. Denies orthopnea, PND or abdominal bloating today. Will adjust Cardizem gtt to 15 mg/hr today for better rate control and give an additional bolus today. ECHO pending. Further recs to follow.

## 2024-12-04 NOTE — PROGRESS NOTES
[facility] [place] - Breast Surgery Services   History and Physical    Subjective:      Aleena Duckworth is a 38 y.o. female       12/9/24  Here for end of NACT follow up, received last treatment 12/3/2024 - 6 weeks 1/14/2025.    She does not feel the mass.    Vaping again. Could not afford chantix maintenance. Agrees to smoking cessation clinic and wants another starter pack.     Med Onc Dr. Cruz/Wyatt Torres 12/3/24  Rad Onc Dr. Martinez after surgery       8/26/2024  [place] through chemo follow up   Last chemo 11/12/24    States she had quit vaping after starter chantix but 3 days later started again. She wants another starter pack and will do maintenance after if needed.     OP PEMBROLIZUMAB CARBOPLATIN (AUC 5) WITH WEEKLY PACLITAXEL FOLLOWED BY PEMBROLIZUMAB DOXORUBICIN CYCLOPHOSPHAMIDE FOLLOWED BY PEMBROLIZUMAB 200 MG Q3W      History with presentation to [facility] 5/22/24:    Patient with history of left triple negative breast cancer.  Details below.  In summary, she presented with palpable painful breast mass for a week February 2023.  She elected to do surgery 1st.  She had left lumpectomy sentinel node biopsy at [facility].  She was T1 with an 8 mm lesion.  3 mm anterior margin.  2 negative sentinel nodes.    She underwent 2 treatments of adjuvant chemo Taxol and cyclophosphamide.  She developed severe abdominal pain, dehydration, diarrhea, sepsis of unknown source.  Multiple admissions.  She could not tolerate further chemo and it was aborted May 2023.      She elected to have bilateral mastectomy with reconstruction July 2023 and did not get radiation.  Her breast pathology at that time was benign.    She has been vaping throughout this process and is still vaping.  She would like to quit.  She does not due to the stress.  She has done marijuana gummies during her treatments and that did help.    She initially presented February 2023 with palpable painful area and started noticing this  "again March 2024 this year.  Initial workup at [facility] ultrasound showed fat necrosis.  She persisted and had further workup at [facility] and was found to have abnormal left axillary masses.  Found to have recurrent triple negative breast cancer on core biopsy.    Initially was treated with Dr. Ayala and now follows with Dr. Cruz.  Medical oncology      02/25/23  47 gene [employer] panel negative       Breast history with initial cancer presentation:    2002  Breast Augmentation -  Sub pec 450 ml Implant      02/02/23  Painful palpable mass x 1 week    02/09/23 Left 2 OC CNB [facility]  Grade 3 IDC   ER 0 [place] 0  FISH neg Ki67 80%    02/27/23 Left lumpectomy, Left SLNB [facility] Dr. Douglas Last  9 x 8 x 6 mm Grade 3 IDC closest margin 3 mm anterior   Focal DCIS  0/2 SLNB    Elected Mastectomy instead if XRT     05/01/2023 Taxotere Cytoxan  05/24/2023 cycle 2 day 1 Taxotere cyclophosphamide    5/2023 Stopped Adjuvant chemo after 2 cycles due to side effects  Dr. Ayala.  Had 3 admissions to the ER for abdominal pain.  Admitted for sepsis and started on antibiotics.  Cultures negative, no clear source, discharge on Bactrim.    "S/p cycle 1 of  docetaxel and Cytoxan  with poor tolerability and hospitalization.   Dose reduced for cycle 2, docetaxel to 60 mg/m2. Has had multiple set backs including transaminitis, bronchitis, significant fluid overload.   We discussed the early stage of her tumor and that given that and the very poor tolerability, it is best to stop chemotherapy at this time."    5/1/2023 Stopped Adjuvant chemo after 2 cycles due to side effects  Dr. Ayala.    7/26/2023 Bilateral mastectomy with hybrid reconstruction, SOO/ implants    [place]  Bilateral stromal fibrosis    11/13/23 bilateral reconstruction revision, implant exchange, fat grafting, abdominal site revision, dermal grafts to bilateral nipples  Dr. Lake       Relevant info:  Smoker   present at office visit "     Family history of cancer:  Mother- Breast Cancer Dx 51  Father - Kidney Cancer   Maternal Grandmother - Breast Cancer   Paternal Aunt - Breast Cancer      Menarche at age 13. . Age at first live birth 19. did breast feed 3 months. LMP 2024. OCP-N/A. Menopause at N/A. Denies HRT.  Personal history of left breast cancer . Had previous left breast biopsy . Genetic testing negative .    Vaping for 2-3 years  Covid-19 vaccine right arm    Patient Active Problem List   Diagnosis    Triple negative breast cancer    Insomnia    Anxiety about health    Transaminitis    Nausea    Functional diarrhea    Thrombocytopenia    Anemia in neoplastic disease    Leukopenia    Hot flushes, perimenopausal    Malignant neoplasm of axillary tail of left female breast    CINV (chemotherapy-induced nausea and vomiting)    Chemotherapy-induced fatigue     Current Outpatient Medications on File Prior to Visit   Medication Sig Dispense Refill    dexAMETHasone (DECADRON) 4 MG Tab Take 2 tablets (8 mg total) by mouth once daily. Take 2 tablets (8 mg) by mouth once daily on days 2,3,4 following chemotherapy. 24 tablet 2    dextroamphetamine-amphetamine (ADDERALL XR) 20 MG 24 hr capsule Take 20 mg by mouth every morning.      dextroamphetamine-amphetamine (ADDERALL) 20 mg tablet Take 1 tablet by mouth.      diazePAM (VALIUM) 2 MG tablet Take 2 mg by mouth every evening.      DORYX MPC 60 mg TbEC Take 1 tablet by mouth 2 (two) times daily.      famotidine (PEPCID) 20 MG tablet Take 20 mg by mouth 2 (two) times daily.      LIDOcaine-prilocaine (EMLA) cream Apply topically as needed (apply 30 to 60 minutes prior to chemo). 30 g 2    loratadine (CLARITIN) 10 mg tablet Take 1 tablet (10 mg total) by mouth once daily. 90 tablet 0    LORazepam (ATIVAN) 1 MG tablet Take 1 tablet (1 mg total) by mouth 2 (two) times daily as needed for Anxiety. 60 tablet 0    morphine (MS CONTIN) 15 MG 12 hr tablet Take 1 tablet (15 mg total) by  mouth 2 (two) times daily. Scheduled. 60 tablet 0    mupirocin (BACTROBAN) 2 % ointment       OLANZapine (ZYPREXA) 5 MG tablet TAKE 1 TABLET BY MOUTH EVERY EVENING ON DAYS 1-4 OF EACH CHEMOTHERAPY 30 tablet 1    ondansetron (ZOFRAN-ODT) 8 MG TbDL Take 1 tablet (8 mg total) by mouth every 8 (eight) hours as needed (nausea/vomiting). Take 1 tablet (8 mg) by mouth every 8 hours as needed for nausea/vomiting. 60 tablet 5    oxyCODONE (ROXICODONE) 10 mg Tab immediate release tablet Take 1 tablet (10 mg total) by mouth every 4 (four) hours as needed for Pain. 20 tablet 0    promethazine (PHENERGAN) 12.5 MG Tab (Take 1-2 tabs every 6 hours as needed for nausea persistent despite zofran) 40 tablet 3    SANCUSO 3.1 mg/24 hour Apply ONE PATCH AT least 24 hours prior TO chemotherapy; MAY be applied UP TO 48 hours BEFORE chemotherapy. REMOVE PATCH a minimum of 24 hours AFTER chemotherapy completion. MAY wear FOR a max of SEVEN DAYS      tretinoin (RETIN-A) 0.05 % cream       zolpidem (AMBIEN) 10 mg Tab Take 5 mg by mouth nightly as needed.      TRINTELLIX 5 mg Tab  (Patient not taking: Reported on 12/9/2024)      varenicline (CHANTIX STARTING MONTH BOX) 0.5 mg (11)- 1 mg (42) tablet Take one 0.5mg tab by mouth once daily X3 days,then increase to one 0.5mg tab twice daily X4 days,then increase to one 1mg tab twice daily (Patient not taking: Reported on 12/9/2024) 53 tablet 0     No current facility-administered medications on file prior to visit.     Review of patient's allergies indicates:  No Known Allergies  Past Medical History:   Diagnosis Date    ADHD (attention deficit hyperactivity disorder)     Anemia     Breast cancer     triple negative    Hypertension     pre eclampsia and eclampsia    Stroke     at birth     Past Surgical History:   Procedure Laterality Date    ABLATION      BREAST BIOPSY Left 05/15/2024    BREAST RECONSTRUCTION  11/2023    BREAST SURGERY      lumpectomy    CYSTOSCOPY      INSERTION OF TUNNELED  CENTRAL VENOUS CATHETER (CVC) WITH SUBCUTANEOUS PORT Right 06/03/2024    Procedure: [id number];  Surgeon: Jj Silva MD;  Location: [facility] OR;  Service: General;  Laterality: Right;    LITHOTRIPSY      MASTECTOMY  11/2023    TUBAL LIGATION      VAGINAL DELIVERY       Family History   Problem Relation Name Age of Onset    Hypertension Mother      Breast cancer Mother  51        negative screening    Heart disease Father      Kidney cancer Father      Breast cancer Paternal Aunt      Diabetes Maternal Grandmother      Breast cancer Maternal Grandmother      Breast cancer Other Both GGM      Social History     Socioeconomic History    Marital status:    Tobacco Use    Smoking status: Every Day     Types: Cigarettes, Vaping with nicotine     Passive exposure: Never    Smokeless tobacco: Never    Tobacco comments:     quitting   Substance and Sexual Activity    Alcohol use: Not Currently    Drug use: Never    Sexual activity: Yes     Partners: Male     Birth control/protection: See Surgical Hx     Comment: tubal     Social Drivers of Health     Financial Resource Strain: Medium Risk (1/18/2024)    Overall Financial Resource Strain (CARDIA)     Difficulty of Paying Living Expenses: Somewhat hard   Food Insecurity: Patient Declined (1/18/2024)    Hunger Vital Sign     Worried About Running Out of Food in the Last Year: Patient declined     Ran Out of Food in the Last Year: Patient declined   Transportation Needs: No Transportation Needs (1/18/2024)    PRAPARE - Transportation     Lack of Transportation (Medical): No     Lack of Transportation (Non-Medical): No   Physical Activity: Insufficiently Active (1/18/2024)    Exercise Vital Sign     Days of Exercise per Week: 2 days     Minutes of Exercise per Session: 30 min   Stress: Stress Concern Present (1/18/2024)    [place] [employer] - Occupational Stress Questionnaire     Feeling of Stress : Rather much   Housing Stability: High  "Risk (1/18/2024)    Housing Stability Vital Sign     Unable to Pay for Housing in the Last Year: Yes     Number of Places Lived in the Last Year: 1     Unstable Housing in the Last Year: No         Review of Systems    No CP, No SOB      Objective:      BP (!) 100/56 (BP Location: Right arm, Patient Position: Sitting)   Pulse 90   Temp 97.5 °F (36.4 °C) (Temporal)   Resp 16   Ht 5' 2" (1.575 m)   Wt 60.3 kg (132 lb 15 oz)   SpO2 98%   BMI 24.31 kg/m²     Constitutional: NAD AAOX4  HEENT: EOMI, nonicteric sclera  NECK: no mass, no thyromegaly, no lymphadenopathy  CV: regular rate  PULM: good respiratory effort  ABDOMEN: soft, Nontender, nondistended. No guarding. No rebound.  MUSCULOSKELETAL: Good ROM  SKIN: No rashes or ulcerations seen.   NEUROLOGIC: CN grossly intact. Motor, sensory grossly intact    Breast exam 5/22/24  Bilateral implant SOO recon  [place] pattern scars  Right: No mass, discharge, dimpling, lymphadenopathy    Left:  No discharge, dimpling  Left axillary masses  1 lower ax tail 1.5cm and higher in mid axilla 2cm movable masses  No peau  No DC  No other breast mass      Breast exam 8/26/24  Bilateral implant SOO recon  [place] pattern scars  Right: No mass, discharge, dimpling, lymphadenopathy    Left:  No discharge, dimpling  Left deep in axilla with pt guidance found 1.5cm soft movable area  No peau  No DC  No other breast mass    Breast exam 12/9/24  Bilateral implant and SOO recon  [place] pattern scars  Right: No mass, discharge, lymphadenopathy    Left:    Left deep in axilla along pec 1.5cm soft movable area  No peau  No other breast mass  No discharge          Patient Active Problem List   Diagnosis    Triple negative breast cancer    Insomnia    Anxiety about health    Transaminitis    Nausea    Functional diarrhea    Thrombocytopenia    Anemia in neoplastic disease    Leukopenia    Hot flushes, perimenopausal    Malignant neoplasm of axillary tail of left female breast    CINV " (chemotherapy-induced nausea and vomiting)    Chemotherapy-induced fatigue        High complexity of problems addressed by Dr. Valle - breast cancer, treatment options, expectations, effects of treatment, risks, benefits. Extensive and complex data reviewed, independent interpretation of tests, discussion of management with another health care provider.    12/09/2024 reviewed all complex data again.  MA/NP functioned as a scribe.  Services and exam were personally performed, decisions made, complex data reviewed by Dr. Valle.  Time spent on chart and with pt 40min.     Breast cancer poses a threat to life and bodily function.  Independent interpretation of tests was performed.  Discussion of management was done with another physician.      Evidence based NCCN and MD Rich guidelines used for treatment planning.       I have given her all options.  I have explained procedure, risks, benefits, postop expectations. All questions answered. Risks include but are not limited to infection, bleeding, injury to nerves, vessels, numbness, weakness, difficulty lifting arm, swelling of arm (lymphedema), inability to remove all lesion, residual breast tissue, recurrence of malignancy, need for further procedure, loss of skin flap, seroma (fluid collection), allergic reaction, close or positive margins requiring additional surgery, vascular clotting, pulmonary embolus.      Explained smoking increases risks of complications - infection, perforation, increased pain, chronic pain, stricture, ischemia, recurrent problems, poor healing, flap failure, seromas, pulmonary complications, ventilator, clots, pulmonary embolism, thrombosis, cancers, heart attack, stroke, death, lymphedema.        Imaging and Chronology:     2002  Breast Augmentation -  Sub pec 450 ml Implant      02/02/23  Painful palpable mass x 1 week    02/09/23 Left 2 OC CNB [facility]  Grade 3 IDC   ER 0 [place] 0  FISH neg Ki67 80%    02/27/23 Left  "lumpectomy, Left SLNB [facility] Dr. Douglas Last  9 x 8 x 6 mm Grade 3 IDC    closest margin 3 mm anterior   Focal DCIS  0/2 SLNB    Elected Mastectomy instead if XRT     05/01/2023 Taxotere Cytoxan  05/24/2023 cycle 2 day 1 Taxotere cyclophosphamide    5/2023 Stopped Adjuvant chemo after 2 cycles due to side effects Dr. Ayala.  Had 3 admissions to the ER for abdominal pain.  Admitted for sepsis and started on antibiotics.  Cultures negative, no clear source, discharge on Bactrim.    "S/p cycle 1 of  docetaxel and Cytoxan  with poor tolerability and hospitalization.   Dose reduced for cycle 2, docetaxel to 60 mg/m2. Has had multiple set backs including transaminitis, bronchitis, significant fluid overload.   We discussed the early stage of her tumor and that given that and the very poor tolerability, it is best to stop chemotherapy at this time."    7/26/2023 Bilateral mastectomy with hybrid reconstruction, SOO/ implants  Dr.Wise Robledo Charles  Path showed benign Bilateral stromal fibrosis    11/13/23 bilateral reconstruction revision, implant exchange, fat grafting, abdominal site revision, dermal grafts to bilateral nipples  Dr. Lake     3/27/2024 Lt Dx Mammo, Lt Limited US - [facility]   Left there is a hypoechoic, parallel, oval, circumscribed mass measuring approximately 5  x 5 x 3 mm. 3 month follow up.  Left there is a mildly heterogeneous, parallel, oval circumscribed mass measuring 6 x 7 x 6 mm suspected fatty hilum. 3 month follow up.     Both masses are palpable and mobile on physical exam within the clinic. These masses probably reflect benign lymph nodes versus less likely fat necrosis. 3 month follow up.   BI-RADS: 3 Probably Benign     5/9/2024 Bilateral Complete US - [facility]   Left axillary tail corresponds to a circumscribed, round, heterogeneous solid mass with internal vascularity and posterior acoustic enhancement, measuring 10 x 9 x 11 mm. Biopsy recommended.     Left axillary tail, there are " scattered oval, circumscribed, fat injections measuring 8 x 3 x 8 mm,     Left 10:00, 8 cm from the nipple, measuring 5 x 4 x 6 mm, 9 x 3 x 7 mm, left 11:00, 9 cm from the nipple, measuring 10 x 4 x 5 mm.     BI-RADS Category: Overall: 4 - Suspicious 4A    5/15/2024 Left Axillary Tail, CNB, Infinity Clip - [facility]   Fragments of lymph node positive for metastatic poorly differentiated carcinoma (see comment)    TNBC    Comment: Mammary origin of this carcinoma is suggested, although not unequivocally proven, based solely upon this needle core biopsy    sampling. The findings suggest against bronchopulmonary origin. Correlation with clinicoradiologic parameters would be needed for    further potential characterization.     5/2024 MRI Breast - [facility]   Right Nml   High left axilla, there is a suspicious, asymmetric prominent axillary lymph node with a fatty hilum and diffuse cortical thickening measuring 9 x 8 x 7 mm.      Left axillary tail, there is a suspicious, mildly irregular, oval, washout enhancing mass abutting the skin and the lateral and superior aspects of the muscle/implant, measuring 6 x 6 x 6 mm, which corresponds to the oval, heterogeneous mass seen by ultrasound in the left axillary tail measuring 8 x 3 x 8 mm (L1).     10 mm posterior inferior medial to L1 and abutting the posterior aspect of the implant and chest wall muscle, there is a washout enhancing, left axillary tail lymph node with a biopsy clip at the lateral posterior aspect measuring 10 x 11 x 14 mm (L2).      Impression:  Biopsy proven metastatic left axillary tail lymph node measuring 14 mm (L2) with a nearby suspicious mass 10 mm (L1) away measuring 6 mm and a suspicious high left axillary lymph node measuring 9 mm.       5/24/24 CT CAP, bone scan  No distant mets    Med onc marta    OP PEMBROLIZUMAB CARBOPLATIN (AUC 5) WITH WEEKLY PACLITAXEL FOLLOWED BY PEMBROLIZUMAB DOXORUBICIN CYCLOPHOSPHAMIDE FOLLOWED BY PEMBROLIZUMAB 200 MG Q3W  [place]  522    6/3/24  3 rekha's placed to 3 LN     6/14/24 brain MRI  No mets    7/15/24 left ax US [facility]  Left ax tail mass not visible  Left ax tail LN [id number], was [id number] on 5/9/24 08/26/24  left US  Met ax tail LN 6mm better with clip and rekha  Left ax tail mass gone, rekha in   Left ax LN with rekha nml    No abn LN    10/7/24 US   3 rekha in left ax tail/axilla  No associated LN or mass present  rCR    12/02/2024 Left Complete US - [facility]   3 rekha's in left ax tail/ax region in known met LN and left ax mass  No abnormal LN or mass seen  rCR          Assessment/Plan:      Cancer Staging   Triple negative breast cancer  Staging form: Breast, AJCC 8th Edition  - Pathologic stage from 4/18/2023: Stage IB (pT1b, pN0, cM0, G3, ER-, ME-, HER2-) - Signed by Ambar Ayala MD on 4/18/2023  - Clinical stage from 5/22/2024: Stage IIB (rcT0, cN1(f), cM0, G3, ER-, ME-, HER2-) - Signed by Tia Valle MD on 5/22/2024 2023 History of left UOQ TNBC treated with lumpectomy, sentinel node biopsy, 2 rounds of adjuvant chemo.  Then bilateral mastectomy with recon.       5/22/24 Now with recurrent left axillary lymph node TNBC.  Appears to have 3 nodes by imaging.      Completed NACT. Dr Cruz. Keynote 522  Last chemo 12/3/24    OP PEMBROLIZUMAB CARBOPLATIN (AUC 5) WITH WEEKLY PACLITAXEL FOLLOWED BY PEMBROLIZUMAB DOXORUBICIN CYCLOPHOSPHAMIDE FOLLOWED BY PEMBROLIZUMAB 200 MG Q3W    Has had improved disease by US      Plan  Left axillary lymph node dissection.  Has REKHA  to the left axillary lymph nodes x3.  1/14/25 [facility]      Plan Postsurgical radiation. Dr Martinez   Large xrt with likely boost per [facility] protocol postop    Needs to quit vaping.  Explained poor healing, increased malignancies, recurrences, lymphedema, complications.   Discussed Chantix.  Risks and benefits.    Agrees to smoking cessation clinic - ordered  Wants starter chantix again  She understands the critical importance of quitting      Prehab. Has been doing  this. Her mother uses a sleeve, so she is aware of lymphedema and the risks.     12/3/24  WBC 3.36 Hb 11.5 plt 119 alb 3.8  Repeat labs 2-4 weeks

## 2024-12-06 DIAGNOSIS — Z72.0 TOBACCO ABUSE: ICD-10-CM

## 2024-12-06 RX ORDER — VARENICLINE TARTRATE 0.5 (11)-1
KIT ORAL
Qty: 53 TABLET | Refills: 0 | Status: SHIPPED | OUTPATIENT
Start: 2024-12-06

## 2024-12-09 ENCOUNTER — DOCUMENTATION ONLY (OUTPATIENT)
Dept: SURGERY | Facility: CLINIC | Age: 39
End: 2024-12-09
Payer: COMMERCIAL

## 2024-12-09 ENCOUNTER — PATIENT MESSAGE (OUTPATIENT)
Dept: SURGERY | Facility: CLINIC | Age: 39
End: 2024-12-09

## 2024-12-09 ENCOUNTER — PATIENT MESSAGE (OUTPATIENT)
Dept: HEMATOLOGY/ONCOLOGY | Facility: CLINIC | Age: 39
End: 2024-12-09
Payer: COMMERCIAL

## 2024-12-09 ENCOUNTER — OFFICE VISIT (OUTPATIENT)
Dept: SURGERY | Facility: CLINIC | Age: 39
End: 2024-12-09
Payer: COMMERCIAL

## 2024-12-09 VITALS
TEMPERATURE: 98 F | OXYGEN SATURATION: 98 % | HEIGHT: 62 IN | SYSTOLIC BLOOD PRESSURE: 100 MMHG | BODY MASS INDEX: 24.46 KG/M2 | HEART RATE: 90 BPM | DIASTOLIC BLOOD PRESSURE: 56 MMHG | RESPIRATION RATE: 16 BRPM | WEIGHT: 132.94 LBS

## 2024-12-09 DIAGNOSIS — F17.200 SMOKER: ICD-10-CM

## 2024-12-09 DIAGNOSIS — Z17.421 TRIPLE NEGATIVE BREAST CANCER: Primary | ICD-10-CM

## 2024-12-09 DIAGNOSIS — C50.919 TRIPLE NEGATIVE BREAST CANCER: Primary | ICD-10-CM

## 2024-12-09 PROCEDURE — 99215 OFFICE O/P EST HI 40 MIN: CPT | Mod: S$GLB,,, | Performed by: SURGERY

## 2024-12-09 PROCEDURE — 3074F SYST BP LT 130 MM HG: CPT | Mod: CPTII,S$GLB,, | Performed by: SURGERY

## 2024-12-09 PROCEDURE — 1159F MED LIST DOCD IN RCRD: CPT | Mod: CPTII,S$GLB,, | Performed by: SURGERY

## 2024-12-09 PROCEDURE — 3008F BODY MASS INDEX DOCD: CPT | Mod: CPTII,S$GLB,, | Performed by: SURGERY

## 2024-12-09 PROCEDURE — 99999 PR PBB SHADOW E&M-EST. PATIENT-LVL V: CPT | Mod: PBBFAC,,, | Performed by: SURGERY

## 2024-12-09 PROCEDURE — 3078F DIAST BP <80 MM HG: CPT | Mod: CPTII,S$GLB,, | Performed by: SURGERY

## 2024-12-09 RX ORDER — VARENICLINE TARTRATE 0.5 (11)-1
KIT ORAL
Qty: 1 EACH | Refills: 0 | Status: SHIPPED | OUTPATIENT
Start: 2024-12-09

## 2024-12-09 RX ORDER — GRANISETRON 3.1 MG/24H
1 PATCH TRANSDERMAL ONCE
Qty: 1 PATCH | Refills: 2 | Status: SHIPPED | OUTPATIENT
Start: 2024-12-09 | End: 2024-12-09

## 2024-12-10 DIAGNOSIS — C50.612 MALIGNANT NEOPLASM OF AXILLARY TAIL OF LEFT FEMALE BREAST, UNSPECIFIED ESTROGEN RECEPTOR STATUS: Primary | ICD-10-CM

## 2024-12-12 ENCOUNTER — TELEPHONE (OUTPATIENT)
Dept: HEMATOLOGY/ONCOLOGY | Facility: CLINIC | Age: 39
End: 2024-12-12
Payer: COMMERCIAL

## 2024-12-12 NOTE — TELEPHONE ENCOUNTER
Called pt to get her scheduled for Prehab daniels 1/14; pt accepted the apt on 1/3/24 at 2:00 pm. Location and check-in process known to pt. Also scheduled pt for Acup apt on 12/16/24 at 2:00 pm as requested. Location and check in process known to pt. She thanked me for my assistance.

## 2024-12-13 ENCOUNTER — TELEPHONE (OUTPATIENT)
Dept: HEMATOLOGY/ONCOLOGY | Facility: CLINIC | Age: 39
End: 2024-12-13
Payer: COMMERCIAL

## 2024-12-13 NOTE — TELEPHONE ENCOUNTER
Spoke to pt to remind of appt on 12/16/24 with Dr. Hein. Pt verbalized understanding and confirmed appt Location was discussed.

## 2024-12-16 ENCOUNTER — PATIENT MESSAGE (OUTPATIENT)
Dept: HEMATOLOGY/ONCOLOGY | Facility: CLINIC | Age: 39
End: 2024-12-16
Payer: COMMERCIAL

## 2024-12-16 ENCOUNTER — CLINICAL SUPPORT (OUTPATIENT)
Dept: REHABILITATION | Facility: HOSPITAL | Age: 39
End: 2024-12-16
Payer: COMMERCIAL

## 2024-12-16 DIAGNOSIS — R45.89 ANXIETY ABOUT HEALTH: Primary | ICD-10-CM

## 2024-12-16 DIAGNOSIS — G47.00 INSOMNIA, UNSPECIFIED TYPE: ICD-10-CM

## 2024-12-16 DIAGNOSIS — Z17.421 TRIPLE NEGATIVE BREAST CANCER: ICD-10-CM

## 2024-12-16 DIAGNOSIS — C50.919 TRIPLE NEGATIVE BREAST CANCER: ICD-10-CM

## 2024-12-16 DIAGNOSIS — G89.3 NEOPLASM RELATED PAIN (ACUTE) (CHRONIC): ICD-10-CM

## 2024-12-16 PROCEDURE — 97814 ACUP 1/> W/ESTIM EA ADDL 15: CPT | Mod: PN | Performed by: ACUPUNCTURIST

## 2024-12-16 PROCEDURE — 97813 ACUP 1/> W/ESTIM 1ST 15 MIN: CPT | Mod: PN | Performed by: ACUPUNCTURIST

## 2024-12-16 RX ORDER — MORPHINE SULFATE 15 MG/1
15 TABLET, FILM COATED, EXTENDED RELEASE ORAL 2 TIMES DAILY
Qty: 60 TABLET | Refills: 0 | OUTPATIENT
Start: 2024-12-16 | End: 2025-01-15

## 2024-12-16 NOTE — PROGRESS NOTES
Acupuncture Follow-Up Note     Name: Aleena Duckworth  Clinic Number: [id number]    Traditional Chinese Medicine (TCM) Diagnosis: Qi Stagnation, Blood Stasis, Qi Deficiency, Blood Deficiency, Wind , Damp, and Yin Deficiency  Medical Diagnosis:   1. Anxiety about health    2. Triple negative breast cancer    3. Insomnia, unspecified type         Evaluation Date: 12/16/2024    Visit #/Visits authorized:     Precautions: Standard    Subjective     Chief Concern: Fatigue (Patient is struggling with accomplishing daily necessities due to the fatigue from her current therapy, which she rates as 7/10 and constant.   ) and Hot Flashes (She also has increased hot flashes which seem to be getting worse.  She rates them 8/10)       Medical necessity is demonstrated by the following IMPAIRMENTS: Medical Necessity: Decreased mobility limits day to day activities, social, and emergent situations and Decreased quality of life              Aggravating Factors:  movement     Relieving Factors:  nothing     Symptom Description:     Quality:  Variable  Severity:  7-8/10  Frequency:  every day      Objective     Observation: Patient is struggling with hot flashes and fatigue during this second round of chemotherapy.  Her first round was less intense and she realizes that we can reduce her symptoms better after the therapy is complete.  At this juncture all we can hope for is to moderate her symptoms.  She will need to be seen 2xwk/3wks in order to maintain some level of comfort.  Once her week with her increased symptoms is not sufficient.       Pulse:        thready       New Findings:  na    Treatment     Treatment Principles:  move qi and blood, relieve bi, nourish yin     Acupuncture points used:  4 ALMENDAREZ, Du20, ER FUENTES, Gb34, Ki3, Ki6, Li11, REN12, REN4, REN6, Sj5, Sp6, Sp9, St25, St36, and YIN HUBER    Bilateral points:  Unilateral points:  Auricular Treatment:  sabillon men  liver and kidney  Needles In: 35  Needles Out: 35  Needles W/  STIM placed: 205  [place] W/ STIM removed: 235      Other Traditional Chinese Medicine Modalities -  infrared heat    Assessment     After treatment, patient felt relief and plans to continue twice weekly as recommended.      Patient prognosis is Good.     Patient will continue to benefit from acupuncture treatment to address the deficits listed in the problem list box on initial evaluation, provide patient family education and to maximize pt's level of independence in the home and community environment.     Patient's spiritual, cultural and educational needs considered and pt agreeable to plan of care and goals.     Anticipated barriers to treatment: none    Plan     Recommend 2 /week for 6 sessions then re-assess.      Education:  Patient is aware of cumulative benefit of acupuncture

## 2024-12-18 DIAGNOSIS — G89.3 NEOPLASM RELATED PAIN (ACUTE) (CHRONIC): ICD-10-CM

## 2024-12-18 DIAGNOSIS — G89.3 NEOPLASM RELATED PAIN (ACUTE) (CHRONIC): Primary | ICD-10-CM

## 2024-12-18 RX ORDER — MORPHINE SULFATE 15 MG/1
15 TABLET, FILM COATED, EXTENDED RELEASE ORAL 2 TIMES DAILY
Qty: 60 TABLET | Refills: 0 | Status: CANCELLED | OUTPATIENT
Start: 2024-12-18 | End: 2025-01-17

## 2024-12-18 RX ORDER — MORPHINE SULFATE 15 MG/1
15 TABLET, FILM COATED, EXTENDED RELEASE ORAL 2 TIMES DAILY
Qty: 20 TABLET | Refills: 0 | Status: SHIPPED | OUTPATIENT
Start: 2024-12-18 | End: 2024-12-28

## 2024-12-18 NOTE — TELEPHONE ENCOUNTER
This is the last refill for MS Contin from medical oncology.     Eunice Cruz M.D.   Oncology and Benign Hematology

## 2024-12-27 DIAGNOSIS — G89.3 NEOPLASM RELATED PAIN (ACUTE) (CHRONIC): ICD-10-CM

## 2024-12-27 RX ORDER — MORPHINE SULFATE 15 MG/1
15 TABLET, FILM COATED, EXTENDED RELEASE ORAL 2 TIMES DAILY
Qty: 20 TABLET | Refills: 0 | Status: CANCELLED | OUTPATIENT
Start: 2024-12-27 | End: 2025-01-06

## 2024-12-31 DIAGNOSIS — C50.919 TRIPLE NEGATIVE BREAST CANCER: ICD-10-CM

## 2024-12-31 DIAGNOSIS — Z17.421 TRIPLE NEGATIVE BREAST CANCER: ICD-10-CM

## 2024-12-31 RX ORDER — OLANZAPINE 5 MG/1
TABLET ORAL
Qty: 30 TABLET | Refills: 1 | Status: SHIPPED | OUTPATIENT
Start: 2024-12-31

## 2025-01-01 ENCOUNTER — PATIENT MESSAGE (OUTPATIENT)
Dept: SURGERY | Facility: CLINIC | Age: 40
End: 2025-01-01
Payer: COMMERCIAL

## 2025-01-03 ENCOUNTER — TELEPHONE (OUTPATIENT)
Dept: HEMATOLOGY/ONCOLOGY | Facility: CLINIC | Age: 40
End: 2025-01-03
Payer: COMMERCIAL

## 2025-01-03 NOTE — TELEPHONE ENCOUNTER
----- Message from [employer] sent at 1/3/2025 10:17 AM CST -----  Type: Needs Medical Advice  Who Called:  suly  Best Call Back Number: [phone]    Additional Information: suly states she needs to reschedule her 1/3 with swapnil , please call to further discuss thank you .

## 2025-01-08 ENCOUNTER — CLINICAL SUPPORT (OUTPATIENT)
Dept: REHABILITATION | Facility: HOSPITAL | Age: 40
End: 2025-01-08
Payer: COMMERCIAL

## 2025-01-08 ENCOUNTER — TELEPHONE (OUTPATIENT)
Dept: SMOKING CESSATION | Facility: CLINIC | Age: 40
End: 2025-01-08
Payer: COMMERCIAL

## 2025-01-08 ENCOUNTER — PATIENT MESSAGE (OUTPATIENT)
Dept: SURGERY | Facility: CLINIC | Age: 40
End: 2025-01-08
Payer: COMMERCIAL

## 2025-01-08 DIAGNOSIS — C50.919 TRIPLE NEGATIVE BREAST CANCER: ICD-10-CM

## 2025-01-08 DIAGNOSIS — Z91.89 AT RISK FOR LYMPHEDEMA: Primary | ICD-10-CM

## 2025-01-08 DIAGNOSIS — Z17.421 TRIPLE NEGATIVE BREAST CANCER: Primary | ICD-10-CM

## 2025-01-08 DIAGNOSIS — Z17.421 TRIPLE NEGATIVE BREAST CANCER: ICD-10-CM

## 2025-01-08 DIAGNOSIS — M89.8X9 BONE PAIN DUE TO G-CSF: ICD-10-CM

## 2025-01-08 DIAGNOSIS — C50.919 TRIPLE NEGATIVE BREAST CANCER: Primary | ICD-10-CM

## 2025-01-08 PROCEDURE — 97110 THERAPEUTIC EXERCISES: CPT | Mod: PN

## 2025-01-08 PROCEDURE — 97535 SELF CARE MNGMENT TRAINING: CPT | Mod: PN

## 2025-01-08 PROCEDURE — 97161 PT EVAL LOW COMPLEX 20 MIN: CPT | Mod: PN

## 2025-01-08 NOTE — TELEPHONE ENCOUNTER
I called patient to reschedule her tobacco cessation appointment but patient states she is tobacco free.

## 2025-01-08 NOTE — PROGRESS NOTES
[facility]  Physical Therapy Initial Evaluation PRE-OP  Lymphedema Therapy    Visit Date: 1/8/2025     Name: Aleena Duckworth  Clinic Number: [id number]  Therapy Diagnosis:   Encounter Diagnoses   Name Primary?    Triple negative breast cancer     At risk for lymphedema      Physician: Tia Valle MD  Physician Orders: PT Eval and Treat  Medical Diagnosis from Referral: Triple negative breast cancer. At risk for Lymphedema.  Chart review pertaining to cancer hx:    Cancer Staging   Triple negative breast cancer  Staging form: Breast, AJCC 8th Edition  - Pathologic stage from 4/18/2023: Stage IB (pT1b, pN0, cM0, G3, ER-, KY-, HER2-) - Signed by Ambar Ayala MD on 4/18/2023  - Clinical stage from 5/22/2024: Stage IIB (rcT0, cN1(f), cM0, G3, ER-, KY-, HER2-) - Signed by Tia Valle MD on 5/22/2024 2023 History of left UOQ TNBC treated with lumpectomy, sentinel node biopsy, 2 rounds of adjuvant chemo.  Then bilateral mastectomy with recon.         5/22/24 Now with recurrent left axillary lymph node TNBC.  Appears to have 3 nodes by imaging.        Completed NACT. Dr Cruz. Keynote 522  Last chemo 12/3/24     OP PEMBROLIZUMAB CARBOPLATIN (AUC 5) WITH WEEKLY PACLITAXEL FOLLOWED BY PEMBROLIZUMAB DOXORUBICIN CYCLOPHOSPHAMIDE FOLLOWED BY PEMBROLIZUMAB 200 MG Q3W     Has had improved disease by US     Plan  Left axillary lymph node dissection.  Has KELLY  to the left axillary lymph nodes x3.  1/14/25 [facility]        Plan Postsurgical radiation. Dr Martinez   Large xrt with likely boost per MDA protocol postop     Needs to quit vaping.  Explained poor healing, increased malignancies, recurrences, lymphedema, complications.   Discussed Chantix.  Risks and benefits.    Agrees to smoking cessation clinic - ordered  Wants starter chantix again  She understands the critical importance of quitting        Prehab. Has been doing this. Her mother uses a sleeve, so she is aware of  lymphedema and the risks.      12/3/24  WBC 3.36 Hb 11.5 plt 119 alb 3.8  Repeat labs 2-4 weeks      Evaluation Date: 1/8/2025  Authorization: pending  Plan of Care Expiration: PT f/u 6-8 weeks post-op  Reassessment Due: 6-8 weeks post-op  Fact-G Completed: 1 / 2    Visit: 1 / 2  PTA Visit: -- / 5  Time In: 01:00 Pm  Time Out: 02:00 PM  Total Billable Time: 60 minutes    Precautions: Standard, Immunosuppression and cancer    Subjective     Pt reports: Had left breast cancer in 2023, had a lumpectomy and SNLB with 2 rounds of adjuvant chemo. Later elected to have bilateral mastectomy with reconstruction July 2023 but did not do radiation.  The cancer returned so will be having an axillary dissection on the 14th.     Dx: Triple negative breast cancer.   Surgery date: 1/14/2025, L axillary dissection   Radiation: planning after surgery and immunotherapy.  Chemotherapy: completed ~ 1 month ago.     Pain  Location: low back, legs and ankles.   Current 3/10, Worst 9/10, Best 3/10   Description:   Can't bend over to  things or walking prolonged distance can aggravate the pain.    Past Medical History:   Past Medical History:   Diagnosis Date    ADHD (attention deficit hyperactivity disorder)     Anemia     Breast cancer     triple negative    Hypertension     pre eclampsia and eclampsia    Stroke     at birth       Past Surgical History:  has a past surgical history that includes Tubal ligation; Breast surgery; Vaginal delivery; Breast reconstruction (11/2023); Mastectomy (11/2023); Ablation; Cystoscopy; Lithotripsy; Insertion of tunneled central venous catheter (CVC) with subcutaneous port (Right, 06/03/2024); and Breast biopsy (Left, 05/15/2024).    Medications: has a current medication list which includes the following prescription(s): dexamethasone, dextroamphetamine-amphetamine, dextroamphetamine-amphetamine, diazepam, doryx mpc, famotidine, lidocaine-prilocaine, loratadine, lorazepam, morphine, mupirocin,  olanzapine, ondansetron, oxycodone, promethazine, tretinoin, trintellix, varenicline, and zolpidem.    Allergies: Review of patient's allergies indicates:  No Known Allergies       Hand Dominance: Left  Diet: regular diet, appetite is good, drinks water frequently throughout the day.  Habitus: well developed, well nourished    Prior Therapy/Previous treatment included: No PT this calendar year.   DME owned: N/A  Social History: lives with their spouse and 9 yr old daughter also has 20 yr old reg in college.  Occupation:   in the film industry,    Prior Exercise Routine: was going to the gym regularly, lifting weights, cardio  Prior Level of Function: independent and active  Current Level of Function:  has not been able to participate in regular exercise, low back and leg pain limiting her activity.     Patient's Goals: Anxious to get back to her regular routines, exercising again.    Objective     Mental Status: Alert/Oriented    Observations  Posture: wfl  Joint Integrity: WFLs bilateral  Skin Integrity: intact bilateral  Edema: none bilateral    Sensation  Light Touch: intact bilateral  Proprioception: intact bilateral    A/PROM  (L) UE: WFLs  (R) UE: WFLs  Limitations:  none    STRENGTH  (L) UE: WFLs  (R) UE: WFLs  Limitations:  none    Baseline Measurements of BUEs  LANDMARK LEFT UE (cm)   W + 15 inches 31.8   W + 12 inches 27.2   Elbow 24.5   W + 7 inches 25.2   W + 4 inches 19.8   Wrist 15.7   DPC 19.2   IP Thumb 6.5   Arm Length 40   Garments recommended:   Sigvaris Secure Arm Sleeve with silicone band 15-20 mmHg size S1  Sigvaris Secure Gauntlet 15-20 mmHg size small    Pt to wear garments 2 weeks after surgery.   Only going to wear during the daytime and remove at night when sleeping.   Recommended to be worn for 1 yr after surgery for prophylactic measures in reducing future occurrence of lymphedema.     Functional Mobility   Bed mobility: independent   Roll to left: independent   Roll  to right: independent   Supine to prone: independent   Scooting to edge of bed: independent   Supine to sit: independent   Sit to supine: independent   Transfers to bed: independent   Transfers to toilet: independent   Sit to stand: independent   Stand pivot: independent   Car transfers: independent     Gait Assessment  AD used: none  Assistance: independent  Distance: community distances  Endurance: WFL     Gait Pattern: WFL       Treatment     Treatment Time In: 01:30 PM  Treatment Time Out: 02:00PM  Total Treatment time separate from Evaluation: 30 minutes      Self-Care/Home Management to improve behavioral/activity modifications related to ADLs, compensatory training, safety procedures, and adaptive equipment for 15 minutes including:  Garments: PT recommend wearing garments for one year per guidelines for prophylactical assist to decrease risk for lymphedema  Skin care  Weight management  Sleep  Nutrition  Infection prevention      Therapeutic Exercise to develop ROM, flexibility, and posture for 15 minutes including:  Surgical protocol for position recommendations  Diaphragmatic Breathing  Exercises by day, complete with pictures of exercises and description for safe progression of motion    Education: Instructed on general anatomy/physiology, lymphedema information (definitions, signs, symptoms, precautions), role of therapy in multi-disciplinary team, purpose of lymphedema physical therapy and the benefits/risks of treatment, risks of refusing treatment, POC, and goals for therapy were discussed with the pt.    Written Home Exercises Provided: yes.  Exercises were reviewed and Aleena was able to demonstrate them prior to the end of the session. Aleena demonstrated good  understanding of the education provided.     See EMR under Patient Instructions for exercises provided 1/8/2025.    Assessment     Aleena is a 39 y.o. female referred to outpatient physical therapy with a medical diagnosis of Triple negative  breast cancer, At Risk for Lymphedema with surgical procedure planned on 01/14/2025. Pt was seen today pre-operatively to assess strength and ROM of BUEs, to take baseline circumferential measurements of BUEs to aid in the early detection of lymphedema post-operatively, and to provide pt education on exercises/precautions post-operative. Pt does not exhibit any ROM impairments currently. This pt will benefit from skilled PT for reassessment of baseline measurements 6-8 week post-operatively and to address future impairments following surgery such as pain, limited ROM, or decreased mobility. Will need to wait until pt is medically cleared to determine if pt is safe for MLD, pneumatic compression pump, or lymphatouch treatments.      Plan of care discussed with patient: Yes  Pt's spiritual, cultural and educational needs considered and patient is agreeable to the plan of care and goals as stated below:     Anticipated barriers for therapy:  none    Medical Necessity is demonstrated by the following:  History  Co-morbidities and personal factors that may impact the plan of care Co-morbidities:   history of cancer, level of undertstanding of current condition, and prior lumpectomy, prior B mastectomy    Personal Factors:   coping style     moderate   Examination  Body Structures and Functions, activity limitations and participation restrictions that may impact the plan of care Body Systems:    gross symmetry    Activity limitations:   Mobility  no deficits    Self care  no deficits    Domestic Life  no deficits    Participation Restrictions:   none         low   Clinical Presentation stable and uncomplicated low   Decision Making/ Complexity Score: moderate       GOALS  Short Term Goals: 3 months  Pt to be seen for reassessment in 6-8 weeks after surgery.  Pt will demonstrate 100% knowledge of lymphedema precautions and signs of infection.  Pt to obtain compression garments for prophylactic concerns according to APTA  clinical guidelines published in Journal of Physical Therapy.    Long Term Goals: deferred    Plan     Plan of Care: 1/8/2025 to 04/08/2025.    Patient to be seen in 6-8 weeks following surgical date for 1-2 visits for reassessment. Patient will benefit from Outpatient Physical Therapy services which may include the following interventions: patient education, HEP, therapeutic exercises, neuromuscular re-education, therapeutic activity, manual therapy, self care/home management, modalities, gait training, decongestive massage, multi-layered bandaging, self massage, self bandaging, and assistance in obtaining appropriate compression garment.      If pt is to undergo XRT, POC must exclude MLD, pneumatic compression pump, and lymphatouch to any radiated area.       Vicky Brar, PT , CLT

## 2025-01-08 NOTE — PATIENT INSTRUCTIONS
Garments recommended:   Sigvaris Secure Arm Sleeve with silicone band 15-20 mmHg size S1  Sigvaris Secure Gauntlet 15-20 mmHg size small    Pt to wear garments 2 weeks after surgery.   Only going to wear during the daytime and remove at night when sleeping.   Recommended to be worn for 1 yr after surgery for prophylactic measures in reducing future occurrence of lymphedema.     [facility]  [address], [place] [address]  [phone]    [employer]  [address], [place] [address]  [phone]  (Garment prescriptions will be sent her to check your benefits for coverage.)          Post Operative Breast Cancer Surgery Exercises    After surgery your shoulder and chest may feel tight and sore. Movement may cause stretching across your chest, axilla (armpit), and the incision site limiting your ability to raise your arm. Exercise will be extremely important in preventing swelling and in helping you regain movement, strength, and use of your arm.     Your post-operative exercise program can be divided into three stages. Your surgeon will inform you when to move to the next stage.     STAGE TIME PURPOSE EXERCISE   I Post-op day 1 to 4  (Drains are in) To prevent and/or reduce swelling - Positioning  - Hand and arm precautions   II Post-op day 5 to 14  (After drains are removed) To provide gentle movement without much stretching  - Shoulder shrugs  - Shoulder retraction   III Post-op day 15-6 wks  (After suture are removed) To stretch and regain full motion - Wall ladder  - Range of motion exercises  - Wand exercises       These exercises are general guideline for use following a mastectomy. Please consult your physician for additional information. Of a tissue expander has been placed, or if you have had reconstruction, you must get approval from your physician before beginning exercises.   Check with your physician prior to beginning a new stage.  Avoid elbows above  shoulders until after post-op day 14.    STAGE 1      Abdominal Breathing Exercises      Get comfortable and relax your neck and shoulders. You can sit or lie down. Place one hand on your upper chest and place the other hand on your belly button. Use your hands to feel the movements as you breathe in and out.  Take a deep breath in through your nose and feel the hand on your stomach move out. Do not let your shoulders move up. The hand on your chest should not move.   Breathe out slow and gentle through your mouth. Pucker your lips as if you were going to whistle or blow out a candle. The hand on your stomach should move in as you breathe out. Breathe out as long as you can until all the air is gone.   To help keep the lymphatic system moving well, practice two breaths every hour using the steps for abdominal breathing exercises.        Positioning    Several times a day, elevate your arm on pillows so your hand is above the level of your heart. This position will allow gravity to drain excess fluids out of your hand and arm. Try to place pillows under involved arm while in sitting position or while laying down.                STAGE 2    Shoulder Shrugs Shoulder Retraction    While sitting with arms supported, shrug both of your shoulder and then relax. Repeat 10 times, 3 times a day.   While sitting or standing, pull both shoulder back, bringing shoulder blades together. Repeat 10 times,   3 times a day.        STAGE 3  Range of Motion Exercises    To retain full motion of your shoulder, it must be moved in all planes, several times a day. Begin arm range of motion exercises with 10 reps of each, 2 times a day. Progress to 3 x 10 reps, as tolerated 2 times a day.    Wand - Shoulder Flexion       Lay on your back in a comfortable position. Raise both arms overhead, then bring back down by your side.        Wand - Shoulder Abduction       Lay on your back in a comfortable position. Raise both arms out to your side,  then bring back down by your side.        Wand - Shoulder External Rotation      Lay on your back in a comfortable position. Position your arms as pictured below, with your elbows bent and your hand in the arm. Slowly lower your hand down, then bring back up.        Wand - Shoulder Flexion      Hold onto a cane or broom with both hands approximately 14 inches apart with arms straight at your side. Raise the cane forward and upwards as far as you can go or until your elbow is near your ear. Then gradually return to the original position.        Wand - Shoulder Abduction      Hold onto the ends of a cane with both hands, then raise the involved arm sideways and upward over your head with the aid of the cane and the good arm. Keep trunk straight! Then gradually return to original position.        Wand - Shoulder External Rotation      Holding onto a cane with both hands approximately 14 inches apart at shoulder level, turn the cane clockwise and then counterclockwise as far as possible.        Wall Climbing - Shoulder Flexion      Stand facing the wall and extend the involved arm directly in front of you so that your fingertips touch the wall (at arm's length away from the wall). Creep up the side of the wall with your fingertips, taking a step toward the wall as you reach higher and higher up the wall. Repeat this procedure going down the wall, but taking a step backward this time. Begin with 5 wall climbs, then progress to 10 reps at a time, 1-2 times a day.        Wall Climbing - Shoulder Abduction     ([email or web address]/) Repeat the above procedure, but this time position yourself perpendicular (at a right angle) to the wall so that the involved arm will extend sideways up the wall. Keep your trunk straight, not leaning toward the wall or shrugging your shoulder. Place a charles (tape) on the wall each week to see if you are making progress. Begin with 5 wall  climbs, then progress to 10 reps at a time, 1-2 times a day.        PRECAUTIONS    As a result of the removal of lymph nodes and vessels, your arm is susceptible to infection and swelling. A hot, reddened or swollen arm denotes the presence of infection and should be brought to the attention of your physician immediately. Try to avoid cuts, scratches, pinpricks, hangnails, sunburns, insect bites, chemical irritation, and irritating detergent.    The following are helpful hints:    Avoid injections, blood draws, blood pressure, and IVs to be done to your involved arm. If you have undergone axillary dissection, then consider using the opposite only for injections, blood draws, blood pressure, and IVs.  Prevent chapping of your hand and arm by applying lotion liberally several times a day. Recommend Eucerin lotion, No massages to affected upper extremity if you have had lymph nodes dissection or radiation to lymph nodes.   Do not cut nails too close to the quick. Do not cut or pick your cuticles. Use cuticle cream or remover.  Avoid carrying heavy objects (over 5 lbs) with your involved arm.   Protect your arm from burns with small electrical appliances such as irons, curling irons, and frying pans.   Wear sunscreen in the sun.  Apply insect repellent when going to areas where you might be exposed to insect bites.   Use an electric razor for shaving.   Wear loose fitting work/rubber gloves when washing dishes, using , or using steel wool.  Wear garden gloves when gardening or arranging thorn flowers.  Do not wear tight jewelry on your affected arm.  Do not wear narrow tight straps on clothing or under garments.    IMPORTANT    If you do cut, burn, or omalley the skin, wash the area and use an antiseptic. If it becomes red, warm, or unusually hard or swollen, contact your physician immediately.     If there is swelling without redness, increased warmth or hardness, the positioning and pumping exercises as  described above can help decrease the swelling. Position your hand higher than the elbow, elbow higher than the shoulder, and shoulder higher than your heart. Maintain this position for 30 minutes. Repeat as necessary.     OCCUPATIONAL AND PHYSICAL THERAPY    Most women have enough motion in their involved arm to perform normal activities within 2 months after surgery. Any post-operative swelling or pain has probably disappeared. However; some women may develop persistent stiffness, weakness, pain, or swelling. If this is your experience, call your physician. He or she may recommend that a physical or occupational therapist work with you to minimize your problems.     CONCLUSION    Besides your exercise program, your daily routine at home can be continued and will be beneficial toward your recovery:  Getting dressed every day  Daily grooming  Cooking and light housework  Being active with family and friends    REMINDER  Pace yourself!  Strive for a balance between work and relaxation  Avoid excessive pulling and lifting which may strain your shoulder

## 2025-01-10 ENCOUNTER — PATIENT MESSAGE (OUTPATIENT)
Dept: HEMATOLOGY/ONCOLOGY | Facility: CLINIC | Age: 40
End: 2025-01-10
Payer: COMMERCIAL

## 2025-01-29 ENCOUNTER — TELEPHONE (OUTPATIENT)
Dept: PAIN MEDICINE | Facility: CLINIC | Age: 40
End: 2025-01-29
Payer: COMMERCIAL

## 2025-01-29 NOTE — TELEPHONE ENCOUNTER
Left patient voicemail with call back number to reschedule appt with Dr. Sepulveda to be with Dr. Ashford since referral was sent by Dr. Nunn to Dr. Ashford. Gave her the option to keep current appt since Dr. Ashford's next available is in March, but said that we could switch if she was open to that